# Patient Record
Sex: MALE | Race: BLACK OR AFRICAN AMERICAN | NOT HISPANIC OR LATINO
[De-identification: names, ages, dates, MRNs, and addresses within clinical notes are randomized per-mention and may not be internally consistent; named-entity substitution may affect disease eponyms.]

---

## 2017-02-01 ENCOUNTER — APPOINTMENT (OUTPATIENT)
Dept: VASCULAR SURGERY | Facility: CLINIC | Age: 77
End: 2017-02-01

## 2017-02-21 ENCOUNTER — APPOINTMENT (OUTPATIENT)
Dept: VASCULAR SURGERY | Facility: CLINIC | Age: 77
End: 2017-02-21

## 2017-02-21 VITALS
BODY MASS INDEX: 26.58 KG/M2 | TEMPERATURE: 97.7 F | DIASTOLIC BLOOD PRESSURE: 78 MMHG | HEART RATE: 70 BPM | WEIGHT: 150 LBS | SYSTOLIC BLOOD PRESSURE: 166 MMHG | HEIGHT: 63 IN

## 2018-03-06 ENCOUNTER — APPOINTMENT (OUTPATIENT)
Dept: VASCULAR SURGERY | Facility: CLINIC | Age: 78
End: 2018-03-06

## 2018-03-27 ENCOUNTER — EMERGENCY (EMERGENCY)
Facility: HOSPITAL | Age: 78
LOS: 1 days | Discharge: HOME | End: 2018-03-27
Attending: STUDENT IN AN ORGANIZED HEALTH CARE EDUCATION/TRAINING PROGRAM
Payer: MEDICARE

## 2018-03-27 VITALS — HEIGHT: 63 IN | WEIGHT: 149.03 LBS

## 2018-03-27 DIAGNOSIS — K40.90 UNILATERAL INGUINAL HERNIA, WITHOUT OBSTRUCTION OR GANGRENE, NOT SPECIFIED AS RECURRENT: Chronic | ICD-10-CM

## 2018-03-27 LAB
ALBUMIN SERPL ELPH-MCNC: 4.1 G/DL — SIGNIFICANT CHANGE UP (ref 3.3–5.2)
ALP SERPL-CCNC: 91 U/L — SIGNIFICANT CHANGE UP (ref 40–120)
ALT FLD-CCNC: 14 U/L — SIGNIFICANT CHANGE UP
ANION GAP SERPL CALC-SCNC: 14 MMOL/L — SIGNIFICANT CHANGE UP (ref 5–17)
AST SERPL-CCNC: 19 U/L — SIGNIFICANT CHANGE UP
BASOPHILS # BLD AUTO: 0 K/UL — SIGNIFICANT CHANGE UP (ref 0–0.2)
BASOPHILS NFR BLD AUTO: 0.5 % — SIGNIFICANT CHANGE UP (ref 0–2)
BILIRUB SERPL-MCNC: 0.4 MG/DL — SIGNIFICANT CHANGE UP (ref 0.4–2)
BUN SERPL-MCNC: 23 MG/DL — HIGH (ref 8–20)
CALCIUM SERPL-MCNC: 9.4 MG/DL — SIGNIFICANT CHANGE UP (ref 8.6–10.2)
CHLORIDE SERPL-SCNC: 100 MMOL/L — SIGNIFICANT CHANGE UP (ref 98–107)
CO2 SERPL-SCNC: 23 MMOL/L — SIGNIFICANT CHANGE UP (ref 22–29)
CREAT SERPL-MCNC: 1.28 MG/DL — SIGNIFICANT CHANGE UP (ref 0.5–1.3)
EOSINOPHIL # BLD AUTO: 0.1 K/UL — SIGNIFICANT CHANGE UP (ref 0–0.5)
EOSINOPHIL NFR BLD AUTO: 1.1 % — SIGNIFICANT CHANGE UP (ref 0–5)
GLUCOSE SERPL-MCNC: 136 MG/DL — HIGH (ref 70–115)
HCT VFR BLD CALC: 38 % — LOW (ref 42–52)
HGB BLD-MCNC: 13 G/DL — LOW (ref 14–18)
LYMPHOCYTES # BLD AUTO: 2.4 K/UL — SIGNIFICANT CHANGE UP (ref 1–4.8)
LYMPHOCYTES # BLD AUTO: 29 % — SIGNIFICANT CHANGE UP (ref 20–55)
MCHC RBC-ENTMCNC: 28.1 PG — SIGNIFICANT CHANGE UP (ref 27–31)
MCHC RBC-ENTMCNC: 34.2 G/DL — SIGNIFICANT CHANGE UP (ref 32–36)
MCV RBC AUTO: 82.1 FL — SIGNIFICANT CHANGE UP (ref 80–94)
MONOCYTES # BLD AUTO: 0.6 K/UL — SIGNIFICANT CHANGE UP (ref 0–0.8)
MONOCYTES NFR BLD AUTO: 8 % — SIGNIFICANT CHANGE UP (ref 3–10)
NEUTROPHILS # BLD AUTO: 5 K/UL — SIGNIFICANT CHANGE UP (ref 1.8–8)
NEUTROPHILS NFR BLD AUTO: 61.2 % — SIGNIFICANT CHANGE UP (ref 37–73)
NT-PROBNP SERPL-SCNC: 122 PG/ML — SIGNIFICANT CHANGE UP (ref 0–300)
PLATELET # BLD AUTO: 261 K/UL — SIGNIFICANT CHANGE UP (ref 150–400)
POTASSIUM SERPL-MCNC: 4.3 MMOL/L — SIGNIFICANT CHANGE UP (ref 3.5–5.3)
POTASSIUM SERPL-SCNC: 4.3 MMOL/L — SIGNIFICANT CHANGE UP (ref 3.5–5.3)
PROT SERPL-MCNC: 7.2 G/DL — SIGNIFICANT CHANGE UP (ref 6.6–8.7)
RBC # BLD: 4.63 M/UL — SIGNIFICANT CHANGE UP (ref 4.6–6.2)
RBC # FLD: 14.9 % — SIGNIFICANT CHANGE UP (ref 11–15.6)
SODIUM SERPL-SCNC: 137 MMOL/L — SIGNIFICANT CHANGE UP (ref 135–145)
TROPONIN T SERPL-MCNC: <0.01 NG/ML — SIGNIFICANT CHANGE UP (ref 0–0.06)
WBC # BLD: 8.1 K/UL — SIGNIFICANT CHANGE UP (ref 4.8–10.8)
WBC # FLD AUTO: 8.1 K/UL — SIGNIFICANT CHANGE UP (ref 4.8–10.8)

## 2018-03-27 PROCEDURE — 71046 X-RAY EXAM CHEST 2 VIEWS: CPT | Mod: 26

## 2018-03-27 PROCEDURE — 99283 EMERGENCY DEPT VISIT LOW MDM: CPT

## 2018-03-27 PROCEDURE — 93010 ELECTROCARDIOGRAM REPORT: CPT

## 2018-03-27 PROCEDURE — 84484 ASSAY OF TROPONIN QUANT: CPT

## 2018-03-27 PROCEDURE — 36415 COLL VENOUS BLD VENIPUNCTURE: CPT

## 2018-03-27 PROCEDURE — 85027 COMPLETE CBC AUTOMATED: CPT

## 2018-03-27 PROCEDURE — 99284 EMERGENCY DEPT VISIT MOD MDM: CPT

## 2018-03-27 PROCEDURE — 71046 X-RAY EXAM CHEST 2 VIEWS: CPT

## 2018-03-27 PROCEDURE — 80053 COMPREHEN METABOLIC PANEL: CPT

## 2018-03-27 PROCEDURE — 93005 ELECTROCARDIOGRAM TRACING: CPT

## 2018-03-27 PROCEDURE — 83880 ASSAY OF NATRIURETIC PEPTIDE: CPT

## 2018-03-27 NOTE — ED PROVIDER NOTE - OBJECTIVE STATEMENT
78 y/o M pt with a pmhx of DM , HTN, prostate CA(removed) asthma, hyperlipidemia, sickle cell anemia and hernia repair presents to the ED c/o high blood pressure that onset today. Reports a reading of 180/120. Reports diaphoresis after this. During the event he was lying down. Denies change in vision, dizziness, chest pain, sob, back pain, fever, chills, n/v/d, urinary symptoms, rash, recent travel, sick contacts or any other complaints. Allergic to aspirin.   PMD: Dr. Vergara.

## 2018-03-27 NOTE — ED PROVIDER NOTE - PROGRESS NOTE DETAILS
PT seen resting comfortably in no acute distress, no acute complaints at this time, PT tolerating PO intake,  PT informed of all results, pt informed of possibility of change in radiology read after official read, PT will be DC home with follow up to PCP, continue home medications, pt educated about when to return to the ED if needed. PT verbalizes that he understands all instructions and results.

## 2018-03-27 NOTE — ED PROVIDER NOTE - ATTENDING CONTRIBUTION TO CARE
76 yo male with episode of elevated blood pressure. I personally saw the patient with the PA, and completed the key components of the history and physical exam. I then discussed the management plan with the PA.

## 2018-03-27 NOTE — ED PROVIDER NOTE - PMH
Asthma, mild intermittent, uncomplicated    Cancer  protate  Diabetes mellitus    Hyperlipidemia    Hypertension    Other hemorrhoids    Sickle cell anemia  Carrier of the disease, presently doesn't have it

## 2018-03-27 NOTE — ED PROVIDER NOTE - CHPI ED SYMPTOMS NEG
no loss of consciousness/no nausea/no pain/no fever/no dizziness/no chills/no decreased eating/drinking/no back pain/no headache/no vomiting

## 2018-03-27 NOTE — ED ADULT NURSE REASSESSMENT NOTE - NS ED NURSE REASSESS COMMENT FT1
Pt. received at 1930, resting comfortably in stretcher, offers no complaints at this time. informed of plan of care. awaiting results, will continue to monitor and maintain safety.

## 2018-03-28 VITALS
OXYGEN SATURATION: 97 % | SYSTOLIC BLOOD PRESSURE: 156 MMHG | RESPIRATION RATE: 18 BRPM | TEMPERATURE: 98 F | DIASTOLIC BLOOD PRESSURE: 64 MMHG | HEART RATE: 68 BPM

## 2018-07-01 ENCOUNTER — OUTPATIENT (OUTPATIENT)
Dept: OUTPATIENT SERVICES | Facility: HOSPITAL | Age: 78
LOS: 1 days | End: 2018-07-01
Payer: MEDICARE

## 2018-07-01 DIAGNOSIS — K40.90 UNILATERAL INGUINAL HERNIA, WITHOUT OBSTRUCTION OR GANGRENE, NOT SPECIFIED AS RECURRENT: Chronic | ICD-10-CM

## 2018-07-01 PROCEDURE — G9001: CPT

## 2018-07-02 ENCOUNTER — INPATIENT (INPATIENT)
Facility: HOSPITAL | Age: 78
LOS: 1 days | Discharge: HOME | DRG: 247 | End: 2018-07-04
Attending: FAMILY MEDICINE | Admitting: INTERNAL MEDICINE
Payer: MEDICARE

## 2018-07-02 VITALS
HEART RATE: 65 BPM | SYSTOLIC BLOOD PRESSURE: 146 MMHG | OXYGEN SATURATION: 97 % | RESPIRATION RATE: 18 BRPM | TEMPERATURE: 98 F | DIASTOLIC BLOOD PRESSURE: 74 MMHG

## 2018-07-02 DIAGNOSIS — I20.9 ANGINA PECTORIS, UNSPECIFIED: ICD-10-CM

## 2018-07-02 DIAGNOSIS — K40.90 UNILATERAL INGUINAL HERNIA, WITHOUT OBSTRUCTION OR GANGRENE, NOT SPECIFIED AS RECURRENT: Chronic | ICD-10-CM

## 2018-07-02 DIAGNOSIS — Z90.79 ACQUIRED ABSENCE OF OTHER GENITAL ORGAN(S): Chronic | ICD-10-CM

## 2018-07-02 LAB
ALBUMIN SERPL ELPH-MCNC: 3.8 G/DL — SIGNIFICANT CHANGE UP (ref 3.3–5.2)
ALP SERPL-CCNC: 93 U/L — SIGNIFICANT CHANGE UP (ref 40–120)
ALT FLD-CCNC: 13 U/L — SIGNIFICANT CHANGE UP
ANION GAP SERPL CALC-SCNC: 13 MMOL/L — SIGNIFICANT CHANGE UP (ref 5–17)
APTT BLD: 35.3 SEC — SIGNIFICANT CHANGE UP (ref 27.5–37.4)
AST SERPL-CCNC: 17 U/L — SIGNIFICANT CHANGE UP
BASOPHILS # BLD AUTO: 0 K/UL — SIGNIFICANT CHANGE UP (ref 0–0.2)
BASOPHILS NFR BLD AUTO: 0.7 % — SIGNIFICANT CHANGE UP (ref 0–2)
BILIRUB SERPL-MCNC: 0.4 MG/DL — SIGNIFICANT CHANGE UP (ref 0.4–2)
BUN SERPL-MCNC: 23 MG/DL — HIGH (ref 8–20)
CALCIUM SERPL-MCNC: 9.4 MG/DL — SIGNIFICANT CHANGE UP (ref 8.6–10.2)
CHLORIDE SERPL-SCNC: 100 MMOL/L — SIGNIFICANT CHANGE UP (ref 98–107)
CO2 SERPL-SCNC: 23 MMOL/L — SIGNIFICANT CHANGE UP (ref 22–29)
CREAT SERPL-MCNC: 1.27 MG/DL — SIGNIFICANT CHANGE UP (ref 0.5–1.3)
EOSINOPHIL # BLD AUTO: 0.1 K/UL — SIGNIFICANT CHANGE UP (ref 0–0.5)
EOSINOPHIL NFR BLD AUTO: 1 % — SIGNIFICANT CHANGE UP (ref 0–5)
GLUCOSE SERPL-MCNC: 154 MG/DL — HIGH (ref 70–115)
HCT VFR BLD CALC: 37.3 % — LOW (ref 42–52)
HGB BLD-MCNC: 12.7 G/DL — LOW (ref 14–18)
INR BLD: 0.94 RATIO — SIGNIFICANT CHANGE UP (ref 0.88–1.16)
LIDOCAIN IGE QN: 43 U/L — SIGNIFICANT CHANGE UP (ref 22–51)
LYMPHOCYTES # BLD AUTO: 2.5 K/UL — SIGNIFICANT CHANGE UP (ref 1–4.8)
LYMPHOCYTES # BLD AUTO: 37.2 % — SIGNIFICANT CHANGE UP (ref 20–55)
MCHC RBC-ENTMCNC: 27.3 PG — SIGNIFICANT CHANGE UP (ref 27–31)
MCHC RBC-ENTMCNC: 34 G/DL — SIGNIFICANT CHANGE UP (ref 32–36)
MCV RBC AUTO: 80.2 FL — SIGNIFICANT CHANGE UP (ref 80–94)
MONOCYTES # BLD AUTO: 0.5 K/UL — SIGNIFICANT CHANGE UP (ref 0–0.8)
MONOCYTES NFR BLD AUTO: 7.1 % — SIGNIFICANT CHANGE UP (ref 3–10)
NEUTROPHILS # BLD AUTO: 3.6 K/UL — SIGNIFICANT CHANGE UP (ref 1.8–8)
NEUTROPHILS NFR BLD AUTO: 53.7 % — SIGNIFICANT CHANGE UP (ref 37–73)
NT-PROBNP SERPL-SCNC: 144 PG/ML — SIGNIFICANT CHANGE UP (ref 0–300)
PLATELET # BLD AUTO: 303 K/UL — SIGNIFICANT CHANGE UP (ref 150–400)
POTASSIUM SERPL-MCNC: 4.9 MMOL/L — SIGNIFICANT CHANGE UP (ref 3.5–5.3)
POTASSIUM SERPL-SCNC: 4.9 MMOL/L — SIGNIFICANT CHANGE UP (ref 3.5–5.3)
PROT SERPL-MCNC: 7.2 G/DL — SIGNIFICANT CHANGE UP (ref 6.6–8.7)
PROTHROM AB SERPL-ACNC: 10.3 SEC — SIGNIFICANT CHANGE UP (ref 9.8–12.7)
RBC # BLD: 4.65 M/UL — SIGNIFICANT CHANGE UP (ref 4.6–6.2)
RBC # FLD: 14.4 % — SIGNIFICANT CHANGE UP (ref 11–15.6)
SODIUM SERPL-SCNC: 136 MMOL/L — SIGNIFICANT CHANGE UP (ref 135–145)
T3 SERPL-MCNC: 100 NG/DL — SIGNIFICANT CHANGE UP (ref 80–200)
TROPONIN T SERPL-MCNC: <0.01 NG/ML — SIGNIFICANT CHANGE UP (ref 0–0.06)
WBC # BLD: 6.8 K/UL — SIGNIFICANT CHANGE UP (ref 4.8–10.8)
WBC # FLD AUTO: 6.8 K/UL — SIGNIFICANT CHANGE UP (ref 4.8–10.8)

## 2018-07-02 PROCEDURE — 99220: CPT

## 2018-07-02 PROCEDURE — 99223 1ST HOSP IP/OBS HIGH 75: CPT

## 2018-07-02 PROCEDURE — 93010 ELECTROCARDIOGRAM REPORT: CPT

## 2018-07-02 PROCEDURE — 71046 X-RAY EXAM CHEST 2 VIEWS: CPT | Mod: 26

## 2018-07-02 RX ORDER — METOPROLOL TARTRATE 50 MG
50 TABLET ORAL
Qty: 0 | Refills: 0 | Status: DISCONTINUED | OUTPATIENT
Start: 2018-07-02 | End: 2018-07-04

## 2018-07-02 RX ORDER — ONDANSETRON 8 MG/1
4 TABLET, FILM COATED ORAL EVERY 6 HOURS
Qty: 0 | Refills: 0 | Status: DISCONTINUED | OUTPATIENT
Start: 2018-07-02 | End: 2018-07-04

## 2018-07-02 RX ORDER — SODIUM CHLORIDE 9 MG/ML
3 INJECTION INTRAMUSCULAR; INTRAVENOUS; SUBCUTANEOUS EVERY 8 HOURS
Qty: 0 | Refills: 0 | Status: DISCONTINUED | OUTPATIENT
Start: 2018-07-02 | End: 2018-07-04

## 2018-07-02 RX ORDER — AMLODIPINE BESYLATE 2.5 MG/1
10 TABLET ORAL DAILY
Qty: 0 | Refills: 0 | Status: DISCONTINUED | OUTPATIENT
Start: 2018-07-02 | End: 2018-07-04

## 2018-07-02 RX ORDER — NITROGLYCERIN 6.5 MG
0.4 CAPSULE, EXTENDED RELEASE ORAL ONCE
Qty: 0 | Refills: 0 | Status: COMPLETED | OUTPATIENT
Start: 2018-07-02 | End: 2018-07-02

## 2018-07-02 RX ORDER — SODIUM CHLORIDE 9 MG/ML
3 INJECTION INTRAMUSCULAR; INTRAVENOUS; SUBCUTANEOUS ONCE
Qty: 0 | Refills: 0 | Status: COMPLETED | OUTPATIENT
Start: 2018-07-02 | End: 2018-07-02

## 2018-07-02 RX ORDER — GLUCAGON INJECTION, SOLUTION 0.5 MG/.1ML
1 INJECTION, SOLUTION SUBCUTANEOUS ONCE
Qty: 0 | Refills: 0 | Status: DISCONTINUED | OUTPATIENT
Start: 2018-07-02 | End: 2018-07-04

## 2018-07-02 RX ORDER — VALSARTAN 80 MG/1
160 TABLET ORAL DAILY
Qty: 0 | Refills: 0 | Status: DISCONTINUED | OUTPATIENT
Start: 2018-07-02 | End: 2018-07-04

## 2018-07-02 RX ORDER — DEXTROSE 50 % IN WATER 50 %
12.5 SYRINGE (ML) INTRAVENOUS ONCE
Qty: 0 | Refills: 0 | Status: DISCONTINUED | OUTPATIENT
Start: 2018-07-02 | End: 2018-07-04

## 2018-07-02 RX ORDER — SODIUM CHLORIDE 9 MG/ML
1000 INJECTION, SOLUTION INTRAVENOUS
Qty: 0 | Refills: 0 | Status: DISCONTINUED | OUTPATIENT
Start: 2018-07-02 | End: 2018-07-04

## 2018-07-02 RX ORDER — HYDRALAZINE HCL 50 MG
50 TABLET ORAL EVERY 8 HOURS
Qty: 0 | Refills: 0 | Status: DISCONTINUED | OUTPATIENT
Start: 2018-07-02 | End: 2018-07-04

## 2018-07-02 RX ORDER — ATORVASTATIN CALCIUM 80 MG/1
40 TABLET, FILM COATED ORAL AT BEDTIME
Qty: 0 | Refills: 0 | Status: DISCONTINUED | OUTPATIENT
Start: 2018-07-02 | End: 2018-07-04

## 2018-07-02 RX ORDER — INSULIN LISPRO 100/ML
VIAL (ML) SUBCUTANEOUS EVERY 6 HOURS
Qty: 0 | Refills: 0 | Status: DISCONTINUED | OUTPATIENT
Start: 2018-07-02 | End: 2018-07-04

## 2018-07-02 RX ORDER — ASPIRIN/CALCIUM CARB/MAGNESIUM 324 MG
325 TABLET ORAL ONCE
Qty: 0 | Refills: 0 | Status: COMPLETED | OUTPATIENT
Start: 2018-07-02 | End: 2018-07-02

## 2018-07-02 RX ORDER — ENOXAPARIN SODIUM 100 MG/ML
40 INJECTION SUBCUTANEOUS EVERY 24 HOURS
Qty: 0 | Refills: 0 | Status: DISCONTINUED | OUTPATIENT
Start: 2018-07-02 | End: 2018-07-03

## 2018-07-02 RX ORDER — DEXTROSE 50 % IN WATER 50 %
15 SYRINGE (ML) INTRAVENOUS ONCE
Qty: 0 | Refills: 0 | Status: DISCONTINUED | OUTPATIENT
Start: 2018-07-02 | End: 2018-07-04

## 2018-07-02 RX ORDER — NITROGLYCERIN 6.5 MG
1 CAPSULE, EXTENDED RELEASE ORAL ONCE
Qty: 0 | Refills: 0 | Status: COMPLETED | OUTPATIENT
Start: 2018-07-02 | End: 2018-07-02

## 2018-07-02 RX ADMIN — SODIUM CHLORIDE 3 MILLILITER(S): 9 INJECTION INTRAMUSCULAR; INTRAVENOUS; SUBCUTANEOUS at 13:00

## 2018-07-02 RX ADMIN — Medication 325 MILLIGRAM(S): at 13:05

## 2018-07-02 RX ADMIN — Medication 1 INCH(S): at 13:05

## 2018-07-02 RX ADMIN — Medication 0.4 MILLIGRAM(S): at 13:05

## 2018-07-02 NOTE — ED PROVIDER NOTE - CHEST RISK OF DEAD HOSPITALIZATION
Risk factors making the patient's symptoms likely to be from CAD Risk factors making the patient's symptoms likely to be from CAD/Persistent or servere chest pain or anginal equivalent pain

## 2018-07-02 NOTE — ED ADULT NURSE NOTE - ED STAT RN HANDOFF DETAILS
pt a+ox3, denies any pain or discomfort. pt placed on monitor, NSR noted on monitor, VSS and in no apparent distress.

## 2018-07-02 NOTE — ED PROVIDER NOTE - OBJECTIVE STATEMENT
CC: CP  Presenting symptoms: 78yo male c/o chest pain and diaphoresis since this am.  Patient also with hypertension w/  and then took his BP meds prior to arrival.  Diaphoresis now resolved, but CP continues in ED.  Patient has had this similar episodes recently and has not had a cardiac workup or stress test in over 1 year.  Pertinent Positives: CP, diaphoresis  Pertinent Negatives: no SOB, no HA, no rash, no N/V/D  Timing: intermittently, constant today  Quality: pressure  Radiation: none  Severity: moderate  Aggravating Factors: exertion  Relieving Factors: rest

## 2018-07-02 NOTE — H&P ADULT - PROBLEM SELECTOR PLAN 1
Await results of LHC, cont. o/p regimen. Patient with ASA allergy. Obtain prior echo report before repeating, Check ESR/CRP, Pericarditis? Last Cath reported normal 5 years ago, neg o/p holter, normal exercise tolerance. If LHC neg, advised family to seek GI evaluation for possible esophageal spasm/GERD. Ambulation, DVT-P. No hx of falls

## 2018-07-02 NOTE — ED PROVIDER NOTE - NS ED ROS FT
no fever  + chest pain  + diaphoresis  no SOB  no abd pain  no HA  All other ROS negative except as per HPI

## 2018-07-02 NOTE — H&P ADULT - HISTORY OF PRESENT ILLNESS
78 y/o male with history of DM-2, HTN, HLD, prostate cancer s/p prostatectomy, Asthma, Sickle cell trait, coming to the hospital with complaints of intermittent SSCP that has no association with activity. No associative SOB, Diaphoresis, pleurisy. Patient and family state his Cardiologist at [facility] did a LHC 5 years ago and was told it was negative. No hx of CHF, or arrythmia. He was given a holter in the past and was also told he has no arrhythmias. Had CP that woke him from sleep this time which prompted them to come to the hospital. He does admit to upset stomach and burping at times, but states this pain was higher up in his chest. ED w/u this time was non-diagnostic. Seen by cardiology and scheduled for LHC in AM.

## 2018-07-02 NOTE — ED ADULT TRIAGE NOTE - CHIEF COMPLAINT QUOTE
pt c/o chest pain and sweating this am. daughter called md who advised come in for eval despite recent normal  cardiac work up

## 2018-07-02 NOTE — ED CDU PROVIDER INITIAL DAY NOTE - OBJECTIVE STATEMENT
CC: CP  Presenting symptoms: 76yo male c/o chest pain and diaphoresis since this am.  Patient also with hypertension w/  and then took his BP meds prior to arrival.  Diaphoresis now resolved, but CP continues in ED.  Patient has had this similar episodes recently and has not had a cardiac workup or stress test in over 1 year.  Pertinent Positives: CP, diaphoresis  Pertinent Negatives: no SOB, no HA, no rash, no N/V/D  Timing: intermittently, constant today  Quality: pressure  Radiation: none  Severity: moderate  Aggravating Factors: exertion  Relieving Factors: rest

## 2018-07-02 NOTE — CONSULT NOTE ADULT - SUBJECTIVE AND OBJECTIVE BOX
[place] [facility] - [facility], THE [facility]                                   [address] [place] [address]                                                      PHONE: [phone]                                                         FAX: [phone]  [email or web address]  ---------------------------------------------------------------------------------------------------------------------------------    Reason for Consult: chest pain     HPI:  GUIDO MATUTE is an 77y Male with history of DM HTN HLD prostate cancer ex smoke quit 2006 who presents with recurrent episodes of chest pain 5/10 pressure associated with diaphoresis and improved with Nirto.  Current chest pain free.  Prior cardiac testing WNL as per patient and family.  This is the third episodes in the lasted 4 weeks.      PAST MEDICAL & SURGICAL HISTORY:  Sickle cell anemia: Carrier of the disease, presently doesn&#x27;t have it  Asthma, mild intermittent, uncomplicated  Other hemorrhoids  Cancer: protate  Diabetes mellitus  Hyperlipidemia  Hypertension  Inguinal hernia      aspirin (Stomach Upset)  EGGPLANT (Hives)  strawberry (Hives)      MEDICATIONS  (STANDING):    MEDICATIONS  (PRN):      Social History:  Cigarettes:       ex smoker              Alchohol:  none               Illicit Drug Abuse:  none    ROS: Negative other than as mentioned in HPI.    Vital Signs Last 24 Hrs  T(C): 36.7 (02 Jul 2018 11:44), Max: 36.7 (02 Jul 2018 11:44)  T(F): 98.1 (02 Jul 2018 11:44), Max: 98.1 (02 Jul 2018 11:44)  HR: 58 (02 Jul 2018 12:43) (58 - 65)  BP: 146/74 (02 Jul 2018 11:44) (146/74 - 146/74)  BP(mean): --  RR: 18 (02 Jul 2018 11:44) (18 - 18)  SpO2: 97% (02 Jul 2018 11:44) (97% - 97%)  ICU Vital Signs Last 24 Hrs  GUIDO MATUTE  I&O's Detail    I&O's Summary    Drug Dosing Weight  GUIDO MATUTE      PHYSICAL EXAM:  General: Appears well developed, well nourished alert and cooperative.  HEENT: Head; normocephalic, atraumatic.  Eyes: Pupils reactive, cornea wnl.  Neck: Supple, no nodes adenopathy, no NVD or carotid bruit or thyromegaly.  CARDIOVASCULAR: Normal S1 and S2, No murmur, rub, gallop or lift.   LUNGS: No rales, rhonchi or wheeze. Normal breath sounds bilaterally.  ABDOMEN: Soft, nontender without mass or organomegaly. bowel sounds normoactive.  EXTREMITIES: No clubbing, cyanosis or edema. Distal pulses wnl.   SKIN: warm and dry with normal turgor.  NEURO: Alert/oriented x 3/normal motor exam. No pathologic reflexes.    PSYCH: normal affect.        LABS:                        12.7   6.8   )-----------( 303      ( 02 Jul 2018 12:58 )             37.3     07-02    136  |  100  |  23.0<H>  ----------------------------<  154<H>  4.9   |  23.0  |  1.27    Ca    9.4      02 Jul 2018 12:58    TPro  7.2  /  Alb  3.8  /  TBili  0.4  /  DBili  x   /  AST  17  /  ALT  13  /  AlkPhos  93  07-02    GUIDO MATUTE  CARDIAC MARKERS ( 02 Jul 2018 12:58 )  x     / <0.01 ng/mL / x     / x     / x          PT/INR - ( 02 Jul 2018 12:58 )   PT: 10.3 sec;   INR: 0.94 ratio         PTT - ( 02 Jul 2018 12:58 )  PTT:35.3 sec      RADIOLOGY & ADDITIONAL STUDIES:    INTERPRETATION OF TELEMETRY (personally reviewed):    ECG: NSR without ischemic changes     ECHO: WNL as per pt and family ~ one year ago     CARDIAC CATHETERIZATION: pending     Assessment and Plan:  In summary, GUIDO MATUTE is an 77y Male with past medical history significant for DM HTN HLD prostate cancer ex smoke quit 2006 who presents with recurrent episodes of chest pain 5/10 pressure associated with diaphoresis and improved with Nirto.  Current chest pain free.  Prior cardiac testing WNL as per patient and family.  This is the third episodes in the lasted 4 weeks.      Plan  1.  Left heart cath today   2.  Continue current CV medications   3.  further CV recommendations pending left heart cath results

## 2018-07-02 NOTE — ED PROVIDER NOTE - MEDICAL DECISION MAKING DETAILS
Admit for cardiac cath. Obs patient pending cardiac cath in am Admit patient pending cardiac cath in am

## 2018-07-03 ENCOUNTER — TRANSCRIPTION ENCOUNTER (OUTPATIENT)
Age: 78
End: 2018-07-03

## 2018-07-03 DIAGNOSIS — E78.5 HYPERLIPIDEMIA, UNSPECIFIED: ICD-10-CM

## 2018-07-03 DIAGNOSIS — E11.9 TYPE 2 DIABETES MELLITUS WITHOUT COMPLICATIONS: ICD-10-CM

## 2018-07-03 DIAGNOSIS — I24.9 ACUTE ISCHEMIC HEART DISEASE, UNSPECIFIED: ICD-10-CM

## 2018-07-03 DIAGNOSIS — R07.89 OTHER CHEST PAIN: ICD-10-CM

## 2018-07-03 DIAGNOSIS — I10 ESSENTIAL (PRIMARY) HYPERTENSION: ICD-10-CM

## 2018-07-03 DIAGNOSIS — Z71.89 OTHER SPECIFIED COUNSELING: ICD-10-CM

## 2018-07-03 LAB
BLD GP AB SCN SERPL QL: SIGNIFICANT CHANGE UP
DIR ANTIGLOB POLYSPECIFIC INTERPRETATION: SIGNIFICANT CHANGE UP
GLUCOSE BLDC GLUCOMTR-MCNC: 114 MG/DL — HIGH (ref 70–99)
GLUCOSE BLDC GLUCOMTR-MCNC: 119 MG/DL — HIGH (ref 70–99)
GLUCOSE BLDC GLUCOMTR-MCNC: 124 MG/DL — HIGH (ref 70–99)
GLUCOSE BLDC GLUCOMTR-MCNC: 124 MG/DL — HIGH (ref 70–99)
GLUCOSE BLDC GLUCOMTR-MCNC: 129 MG/DL — HIGH (ref 70–99)
HBA1C BLD-MCNC: 6.4 % — HIGH (ref 4–5.6)
TYPE + AB SCN PNL BLD: SIGNIFICANT CHANGE UP

## 2018-07-03 PROCEDURE — 99233 SBSQ HOSP IP/OBS HIGH 50: CPT

## 2018-07-03 PROCEDURE — 99217: CPT

## 2018-07-03 PROCEDURE — 93010 ELECTROCARDIOGRAM REPORT: CPT

## 2018-07-03 RX ORDER — METOPROLOL TARTRATE 50 MG
1 TABLET ORAL
Qty: 0 | Refills: 0 | DISCHARGE
Start: 2018-07-03

## 2018-07-03 RX ORDER — VALSARTAN 80 MG/1
1 TABLET ORAL
Qty: 0 | Refills: 0 | DISCHARGE
Start: 2018-07-03

## 2018-07-03 RX ORDER — HYDRALAZINE HCL 50 MG
5 TABLET ORAL ONCE
Qty: 0 | Refills: 0 | Status: COMPLETED | OUTPATIENT
Start: 2018-07-03 | End: 2018-07-03

## 2018-07-03 RX ORDER — TICAGRELOR 90 MG/1
1 TABLET ORAL
Qty: 0 | Refills: 0 | COMMUNITY
Start: 2018-07-03

## 2018-07-03 RX ORDER — ASPIRIN/CALCIUM CARB/MAGNESIUM 324 MG
1 TABLET ORAL
Qty: 0 | Refills: 0 | DISCHARGE
Start: 2018-07-03

## 2018-07-03 RX ORDER — TICAGRELOR 90 MG/1
90 TABLET ORAL
Qty: 0 | Refills: 0 | Status: DISCONTINUED | OUTPATIENT
Start: 2018-07-03 | End: 2018-07-04

## 2018-07-03 RX ORDER — ASPIRIN/CALCIUM CARB/MAGNESIUM 324 MG
81 TABLET ORAL DAILY
Qty: 0 | Refills: 0 | Status: DISCONTINUED | OUTPATIENT
Start: 2018-07-03 | End: 2018-07-04

## 2018-07-03 RX ADMIN — Medication 81 MILLIGRAM(S): at 11:54

## 2018-07-03 RX ADMIN — SODIUM CHLORIDE 3 MILLILITER(S): 9 INJECTION INTRAMUSCULAR; INTRAVENOUS; SUBCUTANEOUS at 05:33

## 2018-07-03 RX ADMIN — Medication 5 MILLIGRAM(S): at 16:40

## 2018-07-03 RX ADMIN — Medication 50 MILLIGRAM(S): at 05:35

## 2018-07-03 RX ADMIN — AMLODIPINE BESYLATE 10 MILLIGRAM(S): 2.5 TABLET ORAL at 05:35

## 2018-07-03 RX ADMIN — SODIUM CHLORIDE 3 MILLILITER(S): 9 INJECTION INTRAMUSCULAR; INTRAVENOUS; SUBCUTANEOUS at 22:25

## 2018-07-03 RX ADMIN — Medication 50 MILLIGRAM(S): at 22:26

## 2018-07-03 RX ADMIN — Medication 1 INCH(S): at 01:00

## 2018-07-03 RX ADMIN — VALSARTAN 160 MILLIGRAM(S): 80 TABLET ORAL at 05:35

## 2018-07-03 RX ADMIN — Medication 50 MILLIGRAM(S): at 18:14

## 2018-07-03 RX ADMIN — Medication 30 MILLILITER(S): at 11:54

## 2018-07-03 RX ADMIN — ATORVASTATIN CALCIUM 40 MILLIGRAM(S): 80 TABLET, FILM COATED ORAL at 22:26

## 2018-07-03 RX ADMIN — TICAGRELOR 90 MILLIGRAM(S): 90 TABLET ORAL at 18:14

## 2018-07-03 NOTE — DISCHARGE NOTE ADULT - PATIENT PORTAL LINK FT
You can access the [facility] Patient Portal, offered by [facility], by registering with the following website: [email or web address]

## 2018-07-03 NOTE — DISCHARGE NOTE ADULT - HOSPITAL COURSE
Chief Complaint: Patient is a 77y old  Male who presents with a chief complaint of Chest Pain (02 Jul 2018 23:42)    HPI:  78 y/o male with history of DM-2, HTN, HLD, prostate cancer s/p prostatectomy, Asthma, Sickle cell trait, coming to the hospital with complaints of intermittent SSCP that has no association with activity. No associative SOB, Diaphoresis, pleurisy. Patient and family state his Cardiologist at [facility] did a LHC 5 years ago and was told it was negative. No hx of CHF, or arrythmia. He was given a holter in the past and was also told he has no arrhythmias. Had CP that woke him from sleep this time which prompted them to come to the hospital. He does admit to upset stomach and burping at times, but states this pain was higher up in his chest. ED w/u this time was non-diagnostic. Seen by cardiology and scheduled for LHC in AM. (02 Jul 2018 23:42)    Interim History:  Pt underwent cath with stent to 80% stenosis of LAD. Chief Complaint: Patient is a 77y old  Male who presents with a chief complaint of Chest Pain (02 Jul 2018 23:42)    HPI:  76 y/o male with history of DM-2, HTN, HLD, prostate cancer s/p prostatectomy, Asthma, Sickle cell trait, coming to the hospital with complaints of intermittent SSCP that has no association with activity. No associative SOB, Diaphoresis, pleurisy. Patient and family state his Cardiologist at [facility] did a LHC 5 years ago and was told it was negative. No hx of CHF, or arrythmia. He was given a holter in the past and was also told he has no arrhythmias. Had CP that woke him from sleep this time which prompted them to come to the hospital. He does admit to upset stomach and burping at times, but states this pain was higher up in his chest. ED w/u this time was non-diagnostic. Seen by cardiology and scheduled for LHC in AM. (02 Jul 2018 23:42)  s/p cath tolerated it well  right groin good pulse    results  VENTRICLES: EF 60% w/ min MR.  CORONARY VESSELS: R dominant.  Large RCA w/ moderate mid vessel disease.  Large LAD w/ 80% mid vessel stenosis and large distal vessel.  Moderate sized LCx w/ 60% calcified OM1 stenosis w/ large distal vessel.  LAD:   --  Mid LAD:  COMPLICATIONS: No complications occurred during the cath lab visit. No  complications occurred during the cath lab visit.  DIAGNOSTIC IMPRESSIONS: ICS x 1 to LAD w/ 0% residual stenosis and THOR III  flow maintained thVENTRICLES: EF 60% w/ min MR.  CORONARY VESSELS: R dominant.  Large RCA w/ moderate mid vessel disease.  Large LAD w/ 80% mid vessel stenosis and large distal vessel.  Moderate sized LCx w/ 60% calcified OM1 stenosis w/ large distal vessel.  LAD:   --  Mid LAD:  COMPLICATIONS: No complications occurred during the cath lab visit. No  complications occurred during the cath lab visit.  DIAGNOSTIC IMPRESSIONS: ICS x 1 to LAD w/ 0% residual stenosis and THOR III  flow maintained thrurout.  Moderate OM stenosis.  Preserved EF.  ruout.  Moderate OM stenosis.  Preserved EF.      Interim History:  Pt underwent cath with stent to 80% stenosis of LAD. CC: Chest Pain (03 Jul 2018 16:59)    HPI:76 y/o male with history of DM-2, HTN, HLD, prostate cancer s/p prostatectomy, Asthma, Sickle cell trait, coming to the hospital with complaints of intermittent SSCP that has no association with activity. No associative SOB, Diaphoresis, pleurisy. Patient and family state his Cardiologist at [facility] did a LHC 5 years ago and was told it was negative. No hx of CHF, or arrythmia. He was given a holter in the past and was also told he has no arrhythmias. Had CP that woke him from sleep this time which prompted them to come to the hospital. He does admit to upset stomach and burping at times, but states this pain was higher up in his chest. ED w/u this time was non-diagnostic. Seen by cardiology and scheduled for LHC in AM. (02 Jul 2018 23:42)  INTERVAL HPI/OVERNIGHT EVENTS: none, denies any complaints  Vital Signs Last 24 Hrs  T(C): 37 (04 Jul 2018 05:25), Max: 37.2 (03 Jul 2018 22:23)  T(F): 98.6 (04 Jul 2018 05:25), Max: 98.9 (03 Jul 2018 22:23)  HR: 69 (04 Jul 2018 05:25) (57 - 73)  BP: 110/68 (04 Jul 2018 05:25) (110/68 - 163/87)  RR: 20 (04 Jul 2018 05:25) (16 - 20)  SpO2: 99% (04 Jul 2018 05:25) (97% - 100%)  03 Jul 2018 07:01  -  04 Jul 2018 07:00  --------------------------------------------------------  IN Oral Fluid: 120 mL  Total IN: 120 mL  OUT:  Voided: 940 mL  Total OUT: 940 mL  Total NET: -820 mL  CARDIAC MARKERS ( 02 Jul 2018 12:58 )  x     / <0.01 ng/mL / x     / x     / x                         12.9   8.1   )-----------( 298      ( 04 Jul 2018 06:02 )             37.7   04 Jul 2018 06:02  140    |  104    |  31.0   ----------------------------<  110    4.8     |  21.0   |  1.29   Ca    9.4        04 Jul 2018 06:02  TPro  6.7    /  Alb  3.8    /  TBili  0.7    /  DBili  x      /  AST  16     /  ALT  11     /  AlkPhos  90     04 Jul 2018 06:02  PT/INR - ( 02 Jul 2018 12:58 )   PT: 10.3 sec;   INR: 0.94 ratio     PTT - ( 02 Jul 2018 12:58 )  PTT:35.3 sec  POCT Blood Glucose.: 116 mg/dL (04 Jul 2018 08:17)  POCT Blood Glucose.: 117 mg/dL (04 Jul 2018 05:23)  POCT Blood Glucose.: 124 mg/dL (03 Jul 2018 22:03)  POCT Blood Glucose.: 114 mg/dL (03 Jul 2018 18:11)  LIVER FUNCTIONS - ( 04 Jul 2018 06:02 )  Alb: 3.8 g/dL / Pro: 6.7 g/dL / ALK PHOS: 90 U/L / ALT: 11 U/L / AST: 16 U/L / GGT: x         Hemoglobin A1C, Whole Blood: 6.4 % (07-03-18 @ 05:16)  MEDICATIONS reviewed  RADIOLOGY & ADDITIONAL TESTS: personally visualized  PHYSICAL EXAM:  General: elderly male in no acute distress  Eyes: PERRLA, EOMI; conjunctiva and sclera clear  Head: Normocephalic; atraumatic  ENMT: No nasal discharge; airway clear  Neck: Supple; non tender; no masses  Respiratory: No wheezes, rales or rhonchi  Cardiovascular: Regular rate and rhythm. S1 and S2  Gastrointestinal: Soft non-tender non-distended; Normal bowel sounds  Genitourinary: No costovertebral angle tenderness  Extremities: Normal range of motion, No clubbing, cyanosis or edema  Vascular: Peripheral pulses palpable 2+ bilaterally  Neurological: Alert and oriented x4  Skin: Warm and dry.   Musculoskeletal: Normal tone, without deformities  Psychiatric: Cooperative and appropriate  A/P: 76 y/o male with history of DMII, HTN, HLD, prostate cancer s/p prostatectomy, Asthma, Sickle cell trait p/w complaints of intermittent SSCP that has no association with activity - underwent cardiac cath due to risk factors and found multivessel CAD with 80% stenosis in LAD - s/p PCI with SOCORRO placement, also 60% LCx dx with need to f/u with cardiology as outpt for PET and possible PCI as well  1. CAD s/p PCI with SOCORRO to LAD - DAP, statin, BBL, ARB, f/u with cardiology as above for PET and possible elective PCI  2. DMII - well controlled with HgbA1C 6.4 - resume previous regimen  3. HLD - statin  4. HTN - norvasc, metoprolol  Medically stable for DC  F/u with cardiology   Time spent 65 min

## 2018-07-03 NOTE — DISCHARGE NOTE ADULT - SECONDARY DIAGNOSIS.
Type 2 diabetes mellitus without complication, without long-term current use of insulin Essential hypertension Hyperlipidemia, unspecified hyperlipidemia type Hyperlipidemia

## 2018-07-03 NOTE — DISCHARGE NOTE ADULT - CARE PROVIDER_API CALL
Samir Perera), Cardiology; Internal Medicine  [address] [place] [address]  Phone: [phone]  Fax: [phone] Samir Perera), Cardiology; Internal Medicine  [address] [place] [address]  Phone: [phone]  Fax: [phone]    Izzy Silver  Phone: (   )    -  Fax: (   )    -

## 2018-07-03 NOTE — DISCHARGE NOTE ADULT - PROVIDER TOKENS
JAYCOB:'[id number]' TOKEN:'[id number]',FREE:[LAST:[Nadeem],FIRST:[Izzy],PHONE:[(   )    -],FAX:[(   )    -],ADDRESS:[PMD]]

## 2018-07-03 NOTE — PROGRESS NOTE ADULT - SUBJECTIVE AND OBJECTIVE BOX
Patient: GUIDO MATUTE [id number] 77y Male                           Internal Medicine Hospitalist Progress Note  Chief Complaint: Patient is a 77y old  Male who presents with a chief complaint of Chest Pain (2018 23:42)    HPI:  76 y/o male with history of DM-2, HTN, HLD, prostate cancer s/p prostatectomy, Asthma, Sickle cell trait, coming to the hospital with complaints of intermittent SSCP that has no association with activity. No associative SOB, Diaphoresis, pleurisy. Patient and family state his Cardiologist at [facility] did a LHC 5 years ago and was told it was negative. No hx of CHF, or arrythmia. He was given a holter in the past and was also told he has no arrhythmias. Had CP that woke him from sleep this time which prompted them to come to the hospital. He does admit to upset stomach and burping at times, but states this pain was higher up in his chest. ED w/u this time was non-diagnostic. Seen by cardiology and scheduled for LHC in AM. (2018 23:42)    Interim History:  My onservice on    7/3/2018  Pt underwent cath with stent to 80% stenosis of LAD.  Denies complaints.  No chest pain / palpitations.     ____________________PHYSICAL EXAM:  Vitals reviewed as indicated below  GENERAL:  NAD Alert and Oriented x 3   HEENT: NCAT  CARDIOVASCULAR:  S1, S2  LUNGS: CTAB  ABDOMEN:  soft, (-) tenderness, (-) distension, (+) bowel sounds, (-) guarding, (-) rebound (-) rigidity  EXTREMITIES:  no cyanosis / clubbing / edema.   ____________________      BACKGROUND:  HEALTH ISSUES - PROBLEM Dx:  Hyperlipidemia, unspecified hyperlipidemia type: Hyperlipidemia, unspecified hyperlipidemia type  Essential hypertension: Essential hypertension  Type 2 diabetes mellitus without complication, without long-term current use of insulin: Type 2 diabetes mellitus without complication, without long-term current use of insulin  Other chest pain: Other chest pain        Allergies    EGGPLANT (Hives)  No Known Drug Allergies  strawberry (Hives)    Intolerances    aspirin (Stomach Upset)    PAST MEDICAL & SURGICAL HISTORY:  Sickle cell anemia: Carrier of the disease, presently doesn&#x27;t have it  Asthma, mild intermittent, uncomplicated  Other hemorrhoids  Cancer: protate  Diabetes mellitus  Hyperlipidemia  Hypertension  S/P prostatectomy  Inguinal hernia        VITALS:  Vital Signs Last 24 Hrs  T(C): 36.6 (2018 10:50), Max: 36.9 (2018 22:44)  T(F): 97.9 (2018 10:50), Max: 98.5 (2018 09:56)  HR: 61 (2018 15:40) (57 - 99)  BP: 133/71 (2018 15:40) (113/62 - 153/79)  BP(mean): 99 (2018 23:42) (99 - 99)  RR: 16 (2018 15:40) (16 - 20)  SpO2: 100% (2018 15:40) (96% - 100%) Daily     Daily Weight in k.2 (2018 04:30)  CAPILLARY BLOOD GLUCOSE      POCT Blood Glucose.: 129 mg/dL (2018 07:56)  POCT Blood Glucose.: 124 mg/dL (2018 05:33)  POCT Blood Glucose.: 119 mg/dL (2018 00:44)    I&O's Summary    2018 07:01  -  2018 07:00  --------------------------------------------------------  IN: 0 mL / OUT: 650 mL / NET: -650 mL    2018 07:01  -  2018 16:19  --------------------------------------------------------  IN: 0 mL / OUT: 600 mL / NET: -600 mL          LABS:                        12.7   6.8   )-----------( 303      ( 2018 12:58 )             37.3     07-02    136  |  100  |  23.0<H>  ----------------------------<  154<H>  4.9   |  23.0  |  1.27    Ca    9.4      2018 12:58    TPro  7.2  /  Alb  3.8  /  TBili  0.4  /  DBili  x   /  AST  17  /  ALT  13  /  AlkPhos  93  07-02    PT/INR - ( 2018 12:58 )   PT: 10.3 sec;   INR: 0.94 ratio         PTT - ( 2018 12:58 )  PTT:35.3 sec  LIVER FUNCTIONS - ( 2018 12:58 )  Alb: 3.8 g/dL / Pro: 7.2 g/dL / ALK PHOS: 93 U/L / ALT: 13 U/L / AST: 17 U/L / GGT: x             CARDIAC MARKERS ( 2018 12:58 )  x     / <0.01 ng/mL / x     / x     / x              MEDICATIONS:  MEDICATIONS  (STANDING):  amLODIPine   Tablet 10 milliGRAM(s) Oral daily  aspirin  chewable 81 milliGRAM(s) Oral daily  atorvastatin 40 milliGRAM(s) Oral at bedtime  dextrose 5%. 1000 milliLiter(s) (50 mL/Hr) IV Continuous <Continuous>  dextrose 50% Injectable 12.5 Gram(s) IV Push once  hydrALAZINE 50 milliGRAM(s) Oral every 8 hours  insulin lispro (HumaLOG) corrective regimen sliding scale   SubCutaneous every 6 hours  metoprolol tartrate 50 milliGRAM(s) Oral two times a day  multivitamin 1 Tablet(s) Oral daily  sodium chloride 0.9% lock flush 3 milliLiter(s) IV Push every 8 hours  ticagrelor 90 milliGRAM(s) Oral two times a day  valsartan 160 milliGRAM(s) Oral daily    MEDICATIONS  (PRN):  dextrose 40% Gel 15 Gram(s) Oral once PRN Blood Glucose LESS THAN 70 milliGRAM(s)/deciliter  glucagon  Injectable 1 milliGRAM(s) IntraMuscular once PRN Glucose LESS THAN 70 milligrams/deciliter  ondansetron Injectable 4 milliGRAM(s) IV Push every 6 hours PRN Nausea

## 2018-07-03 NOTE — ASU PATIENT PROFILE, ADULT - TEACHING/LEARNING LEARNING PREFERENCES
verbal instruction/individual instruction/audio/computer/internet/video/pictorial/skill demonstration/written material/group instruction

## 2018-07-03 NOTE — DISCHARGE NOTE ADULT - MEDICATION SUMMARY - MEDICATIONS TO TAKE
I will START or STAY ON the medications listed below when I get home from the hospital:    aspirin 81 mg oral tablet, chewable  -- 1 tab(s) by mouth once a day  -- Indication: For ASHD    valsartan 160 mg oral tablet  -- 1 tab(s) by mouth once a day  -- Indication: For HTN    Tradjenta 5 mg oral tablet  -- 1 tab(s) by mouth once a day  -- Indication: For DM    atorvastatin 40 mg oral tablet  -- 1 tab(s) by mouth once a day (at bedtime)  -- HOME  -- Indication: For HLD    ticagrelor 90 mg oral tablet  -- 1 tab(s) by mouth 2 times a day  -- Indication: For ASHD    metoprolol tartrate 50 mg oral tablet  -- 1 tab(s) by mouth 2 times a day  -- Indication: For ASHD    amlodipine 10 mg oral tablet  -- 1 tab(s) by mouth once a day  -- HOME  -- Indication: For HTN I will START or STAY ON the medications listed below when I get home from the hospital:    aspirin 81 mg oral tablet, chewable  -- 1 tab(s) by mouth once a day  -- Indication: For Ashd    valsartan 160 mg oral tablet  -- 1 tab(s) by mouth once a day  -- Indication: For Htn    Tradjenta 5 mg oral tablet  -- 1 tab(s) by mouth once a day  -- Indication: For dm    atorvastatin 40 mg oral tablet  -- 1 tab(s) by mouth once a day (at bedtime)  -- HOME  -- Indication: For Hld    ticagrelor 90 mg oral tablet  -- 1 tab(s) by mouth 2 times a day  -- Indication: For Ashd    metoprolol tartrate 50 mg oral tablet  -- 1 tab(s) by mouth 2 times a day  -- Indication: For Htn    amlodipine 10 mg oral tablet  -- 1 tab(s) by mouth once a day  -- HOME  -- Indication: For Htn I will START or STAY ON the medications listed below when I get home from the hospital:    aspirin 81 mg oral tablet, chewable  -- 1 tab(s) by mouth once a day  -- Indication: For Ashd    valsartan 160 mg oral tablet  -- 1 tab(s) by mouth once a day  -- Indication: For Htn    Tradjenta 5 mg oral tablet  -- 1 tab(s) by mouth once a day  -- Indication: For dm    atorvastatin 40 mg oral tablet  -- 1 tab(s) by mouth once a day (at bedtime)  -- HOME  -- Indication: For Hld    ticagrelor 90 mg oral tablet  -- 1 tab(s) by mouth 2 times a day  -- Indication: For Ashd    metoprolol tartrate 50 mg oral tablet  -- 1 tab(s) by mouth 2 times a day  -- Indication: For Htn    amlodipine 10 mg oral tablet  -- 1 tab(s) by mouth once a day  -- HOME  -- Indication: For Htn    Protonix 20 mg oral delayed release tablet  -- 1 tab(s) by mouth once a day   -- It is very important that you take or use this exactly as directed.  Do not skip doses or discontinue unless directed by your doctor.  Obtain medical advice before taking any non-prescription drugs as some may affect the action of this medication.  Swallow whole.  Do not crush.    -- Indication: For gerd

## 2018-07-03 NOTE — DISCHARGE NOTE ADULT - INSTRUCTIONS
Activities as tolerated minimize stair climbing, heavy lifting greater than 10lbs, strenuous house work, contact sports, No intercourse for 1 week. Site care no Bath tubs, Hot tubs , Pools for 1 week. Monitor site for infection such as warmth drainage or swelling of site. Monitor for bleeding call MD if continous bleeding occurs hold pressure report to nearest ER, Do not operate heavy machinery or drive for 24hours.

## 2018-07-03 NOTE — ED ADULT NURSE REASSESSMENT NOTE - NS ED NURSE REASSESS COMMENT FT1
no true allergy to asa, causes stomach upset
Spoke with Cathy in logistics, informed 2nd set orders are in. awaiting bed, per GUS Falcon, report given to GUS Crowe.
pain relieved after nitro
pending admit orders pt resting comfortably
report given to grecia on 4tower pending order  s

## 2018-07-03 NOTE — DISCHARGE NOTE ADULT - CARE PROVIDERS DIRECT ADDRESSES
[email or web address]

## 2018-07-03 NOTE — DISCHARGE NOTE ADULT - PLAN OF CARE
It is important to see your primary physician as well as the physicians noted below within the next week to perform a comprehensive medical review.  Call their offices for an appointment as soon as you leave the hospital.  If you do not have a primary physician, contact the [facility] at [place] [phone] located on [address] [place] [address].  Your medical issues appear to be stable at this time, but if your symptoms recur or worsen, contact your physicians and/or return to the hospital if necessary.  If you encounter any issues or questions with your medication, call your physicians before stopping the medication.  Do not drive.  Limit your diet to 2 grams of sodium daily. Optimize cardiac health monitor glucose, diabetic diet monitor BP statin

## 2018-07-03 NOTE — DISCHARGE NOTE ADULT - CARE PLAN
Principal Discharge DX:	ACS (acute coronary syndrome)  Assessment and plan of treatment:	It is important to see your primary physician as well as the physicians noted below within the next week to perform a comprehensive medical review.  Call their offices for an appointment as soon as you leave the hospital.  If you do not have a primary physician, contact the [facility] at [place] [phone] located on [address] [place] [address], [place] [address].  Your medical issues appear to be stable at this time, but if your symptoms recur or worsen, contact your physicians and/or return to the hospital if necessary.  If you encounter any issues or questions with your medication, call your physicians before stopping the medication.  Do not drive.  Limit your diet to 2 grams of sodium daily.  Secondary Diagnosis:	Type 2 diabetes mellitus without complication, without long-term current use of insulin  Secondary Diagnosis:	Essential hypertension  Secondary Diagnosis:	Hyperlipidemia, unspecified hyperlipidemia type  Secondary Diagnosis:	Hyperlipidemia Principal Discharge DX:	ACS (acute coronary syndrome)  Goal:	Optimize cardiac health  Assessment and plan of treatment:	It is important to see your primary physician as well as the physicians noted below within the next week to perform a comprehensive medical review.  Call their offices for an appointment as soon as you leave the hospital.  If you do not have a primary physician, contact the [facility] at [place] [phone] located on [address] [place] [address].  Your medical issues appear to be stable at this time, but if your symptoms recur or worsen, contact your physicians and/or return to the hospital if necessary.  If you encounter any issues or questions with your medication, call your physicians before stopping the medication.  Do not drive.  Limit your diet to 2 grams of sodium daily.  Secondary Diagnosis:	Type 2 diabetes mellitus without complication, without long-term current use of insulin  Secondary Diagnosis:	Essential hypertension  Secondary Diagnosis:	Hyperlipidemia, unspecified hyperlipidemia type  Secondary Diagnosis:	Hyperlipidemia Principal Discharge DX:	ACS (acute coronary syndrome)  Goal:	Optimize cardiac health  Assessment and plan of treatment:	It is important to see your primary physician as well as the physicians noted below within the next week to perform a comprehensive medical review.  Call their offices for an appointment as soon as you leave the hospital.  If you do not have a primary physician, contact the [facility] at [place] [phone] located on [address] [place] [address].  Your medical issues appear to be stable at this time, but if your symptoms recur or worsen, contact your physicians and/or return to the hospital if necessary.  If you encounter any issues or questions with your medication, call your physicians before stopping the medication.  Do not drive.  Limit your diet to 2 grams of sodium daily.  Secondary Diagnosis:	Type 2 diabetes mellitus without complication, without long-term current use of insulin  Assessment and plan of treatment:	monitor glucose, diabetic diet  Secondary Diagnosis:	Essential hypertension  Assessment and plan of treatment:	monitor BP  Secondary Diagnosis:	Hyperlipidemia, unspecified hyperlipidemia type  Assessment and plan of treatment:	statin  Secondary Diagnosis:	Hyperlipidemia

## 2018-07-04 VITALS
RESPIRATION RATE: 18 BRPM | OXYGEN SATURATION: 99 % | HEART RATE: 75 BPM | TEMPERATURE: 98 F | DIASTOLIC BLOOD PRESSURE: 58 MMHG | SYSTOLIC BLOOD PRESSURE: 107 MMHG

## 2018-07-04 LAB
ALBUMIN SERPL ELPH-MCNC: 3.8 G/DL — SIGNIFICANT CHANGE UP (ref 3.3–5.2)
ALP SERPL-CCNC: 90 U/L — SIGNIFICANT CHANGE UP (ref 40–120)
ALT FLD-CCNC: 11 U/L — SIGNIFICANT CHANGE UP
ANION GAP SERPL CALC-SCNC: 15 MMOL/L — SIGNIFICANT CHANGE UP (ref 5–17)
AST SERPL-CCNC: 16 U/L — SIGNIFICANT CHANGE UP
BASOPHILS # BLD AUTO: 0.1 K/UL — SIGNIFICANT CHANGE UP (ref 0–0.2)
BASOPHILS NFR BLD AUTO: 0.7 % — SIGNIFICANT CHANGE UP (ref 0–2)
BILIRUB SERPL-MCNC: 0.7 MG/DL — SIGNIFICANT CHANGE UP (ref 0.4–2)
BUN SERPL-MCNC: 31 MG/DL — HIGH (ref 8–20)
CALCIUM SERPL-MCNC: 9.4 MG/DL — SIGNIFICANT CHANGE UP (ref 8.6–10.2)
CHLORIDE SERPL-SCNC: 104 MMOL/L — SIGNIFICANT CHANGE UP (ref 98–107)
CHOLEST SERPL-MCNC: 169 MG/DL — SIGNIFICANT CHANGE UP (ref 110–199)
CO2 SERPL-SCNC: 21 MMOL/L — LOW (ref 22–29)
CREAT SERPL-MCNC: 1.29 MG/DL — SIGNIFICANT CHANGE UP (ref 0.5–1.3)
EOSINOPHIL # BLD AUTO: 0.1 K/UL — SIGNIFICANT CHANGE UP (ref 0–0.5)
EOSINOPHIL NFR BLD AUTO: 1.6 % — SIGNIFICANT CHANGE UP (ref 0–5)
GLUCOSE BLDC GLUCOMTR-MCNC: 116 MG/DL — HIGH (ref 70–99)
GLUCOSE BLDC GLUCOMTR-MCNC: 117 MG/DL — HIGH (ref 70–99)
GLUCOSE SERPL-MCNC: 110 MG/DL — SIGNIFICANT CHANGE UP (ref 70–115)
HCT VFR BLD CALC: 37.7 % — LOW (ref 42–52)
HDLC SERPL-MCNC: 51 MG/DL — LOW
HGB BLD-MCNC: 12.9 G/DL — LOW (ref 14–18)
LIPID PNL WITH DIRECT LDL SERPL: 100 MG/DL — SIGNIFICANT CHANGE UP
LYMPHOCYTES # BLD AUTO: 1.9 K/UL — SIGNIFICANT CHANGE UP (ref 1–4.8)
LYMPHOCYTES # BLD AUTO: 23.7 % — SIGNIFICANT CHANGE UP (ref 20–55)
MCHC RBC-ENTMCNC: 27.1 PG — SIGNIFICANT CHANGE UP (ref 27–31)
MCHC RBC-ENTMCNC: 34.2 G/DL — SIGNIFICANT CHANGE UP (ref 32–36)
MCV RBC AUTO: 79.2 FL — LOW (ref 80–94)
MONOCYTES # BLD AUTO: 0.8 K/UL — SIGNIFICANT CHANGE UP (ref 0–0.8)
MONOCYTES NFR BLD AUTO: 10.2 % — HIGH (ref 3–10)
NEUTROPHILS # BLD AUTO: 5.1 K/UL — SIGNIFICANT CHANGE UP (ref 1.8–8)
NEUTROPHILS NFR BLD AUTO: 63.6 % — SIGNIFICANT CHANGE UP (ref 37–73)
PLATELET # BLD AUTO: 298 K/UL — SIGNIFICANT CHANGE UP (ref 150–400)
POTASSIUM SERPL-MCNC: 4.8 MMOL/L — SIGNIFICANT CHANGE UP (ref 3.5–5.3)
POTASSIUM SERPL-SCNC: 4.8 MMOL/L — SIGNIFICANT CHANGE UP (ref 3.5–5.3)
PROT SERPL-MCNC: 6.7 G/DL — SIGNIFICANT CHANGE UP (ref 6.6–8.7)
RBC # BLD: 4.76 M/UL — SIGNIFICANT CHANGE UP (ref 4.6–6.2)
RBC # FLD: 14.5 % — SIGNIFICANT CHANGE UP (ref 11–15.6)
SODIUM SERPL-SCNC: 140 MMOL/L — SIGNIFICANT CHANGE UP (ref 135–145)
TOTAL CHOLESTEROL/HDL RATIO MEASUREMENT: 3 RATIO — LOW (ref 3.4–9.6)
TRIGL SERPL-MCNC: 88 MG/DL — SIGNIFICANT CHANGE UP (ref 10–200)
WBC # BLD: 8.1 K/UL — SIGNIFICANT CHANGE UP (ref 4.8–10.8)
WBC # FLD AUTO: 8.1 K/UL — SIGNIFICANT CHANGE UP (ref 4.8–10.8)

## 2018-07-04 PROCEDURE — 83690 ASSAY OF LIPASE: CPT

## 2018-07-04 PROCEDURE — 99152 MOD SED SAME PHYS/QHP 5/>YRS: CPT

## 2018-07-04 PROCEDURE — 84484 ASSAY OF TROPONIN QUANT: CPT

## 2018-07-04 PROCEDURE — 82962 GLUCOSE BLOOD TEST: CPT

## 2018-07-04 PROCEDURE — 93005 ELECTROCARDIOGRAM TRACING: CPT

## 2018-07-04 PROCEDURE — C1887: CPT

## 2018-07-04 PROCEDURE — C9600: CPT | Mod: LD

## 2018-07-04 PROCEDURE — 80053 COMPREHEN METABOLIC PANEL: CPT

## 2018-07-04 PROCEDURE — G0378: CPT

## 2018-07-04 PROCEDURE — C1874: CPT

## 2018-07-04 PROCEDURE — 86850 RBC ANTIBODY SCREEN: CPT

## 2018-07-04 PROCEDURE — 93458 L HRT ARTERY/VENTRICLE ANGIO: CPT

## 2018-07-04 PROCEDURE — 86900 BLOOD TYPING SEROLOGIC ABO: CPT

## 2018-07-04 PROCEDURE — 36415 COLL VENOUS BLD VENIPUNCTURE: CPT

## 2018-07-04 PROCEDURE — 85730 THROMBOPLASTIN TIME PARTIAL: CPT

## 2018-07-04 PROCEDURE — 93010 ELECTROCARDIOGRAM REPORT: CPT

## 2018-07-04 PROCEDURE — 83036 HEMOGLOBIN GLYCOSYLATED A1C: CPT

## 2018-07-04 PROCEDURE — 84436 ASSAY OF TOTAL THYROXINE: CPT

## 2018-07-04 PROCEDURE — 99239 HOSP IP/OBS DSCHRG MGMT >30: CPT

## 2018-07-04 PROCEDURE — 83880 ASSAY OF NATRIURETIC PEPTIDE: CPT

## 2018-07-04 PROCEDURE — 71046 X-RAY EXAM CHEST 2 VIEWS: CPT

## 2018-07-04 PROCEDURE — 86901 BLOOD TYPING SEROLOGIC RH(D): CPT

## 2018-07-04 PROCEDURE — 86880 COOMBS TEST DIRECT: CPT

## 2018-07-04 PROCEDURE — C1769: CPT

## 2018-07-04 PROCEDURE — C1725: CPT

## 2018-07-04 PROCEDURE — C1894: CPT

## 2018-07-04 PROCEDURE — 84480 ASSAY TRIIODOTHYRONINE (T3): CPT

## 2018-07-04 PROCEDURE — 99285 EMERGENCY DEPT VISIT HI MDM: CPT | Mod: 25

## 2018-07-04 PROCEDURE — 84443 ASSAY THYROID STIM HORMONE: CPT

## 2018-07-04 PROCEDURE — 85027 COMPLETE CBC AUTOMATED: CPT

## 2018-07-04 PROCEDURE — 80061 LIPID PANEL: CPT

## 2018-07-04 PROCEDURE — 85610 PROTHROMBIN TIME: CPT

## 2018-07-04 RX ORDER — PANTOPRAZOLE SODIUM 20 MG/1
1 TABLET, DELAYED RELEASE ORAL
Qty: 90 | Refills: 0
Start: 2018-07-04 | End: 2018-10-01

## 2018-07-04 RX ORDER — TICAGRELOR 90 MG/1
1 TABLET ORAL
Qty: 180 | Refills: 0 | OUTPATIENT
Start: 2018-07-04 | End: 2018-10-01

## 2018-07-04 RX ADMIN — Medication 50 MILLIGRAM(S): at 05:25

## 2018-07-04 RX ADMIN — SODIUM CHLORIDE 3 MILLILITER(S): 9 INJECTION INTRAMUSCULAR; INTRAVENOUS; SUBCUTANEOUS at 05:25

## 2018-07-04 RX ADMIN — TICAGRELOR 90 MILLIGRAM(S): 90 TABLET ORAL at 05:25

## 2018-07-04 RX ADMIN — VALSARTAN 160 MILLIGRAM(S): 80 TABLET ORAL at 05:25

## 2018-07-04 RX ADMIN — AMLODIPINE BESYLATE 10 MILLIGRAM(S): 2.5 TABLET ORAL at 05:25

## 2018-07-04 NOTE — PROGRESS NOTE ADULT - SUBJECTIVE AND OBJECTIVE BOX
s/p cath  Daughter at bedside   Np cp sob or palp  Ekg sinus rhythmn  B/P 110/68  HR 72 RR 13  Chest clear  Heart s1&s2 regular  Abd soft non tender  Ext no c/c/e  Right groin no ecchymosis    s/p cath    VENTRICLES: EF 60% w/ min MR.  CORONARY VESSELS: R dominant.  Large RCA w/ moderate mid vessel disease.  Large LAD w/ 80% mid vessel stenosis and large distal vessel.  Moderate sized LCx w/ 60% calcified OM1 stenosis w/ large distal vessel.  LAD:   --  Mid LAD:  COMPLICATIONS: No complications occurred during the cath lab visit. No  complications occurred during the cath lab visit.  DIAGNOSTIC IMPRESSIONS: ICS x 1 to LAD w/ 0% residual stenosis and THOR III  flow maintained thruout.  Moderate OM stenosis.  Preserved EF.    Hgb 12.9  creat stable at 1.29  Cm no arrhythmias  ekg pending  Discussed importance of taking brillinta and asa  Groin care instructions    stable for d/c with out patient managment s/p cath  Daughter at bedside   Np cp sob or palp  Ekg sinus rhythmn wnl  B/P 110/68  HR 72 RR 13  Chest clear  Heart s1&s2 regular  Abd soft non tender  Ext no c/c/e  Right groin no ecchymosis    s/p cath    VENTRICLES: EF 60% w/ min MR.  CORONARY VESSELS: R dominant.  Large RCA w/ moderate mid vessel disease.  Large LAD w/ 80% mid vessel stenosis and large distal vessel.  Moderate sized LCx w/ 60% calcified OM1 stenosis w/ large distal vessel.  LAD:   --  Mid LAD:  COMPLICATIONS: No complications occurred during the cath lab visit. No  complications occurred during the cath lab visit.  DIAGNOSTIC IMPRESSIONS: ICS x 1 to LAD w/ 0% residual stenosis and THOR III  flow maintained thruout.  Moderate OM stenosis.  Preserved EF.    Hgb 12.9  creat stable at 1.29  Cm no arrhythmias  ekg pending  Discussed importance of taking brillinta and asa  Groin care instructions    stable for d/c with out patient managment s/p cath  Daughter at bedside   Np cp sob or palp  Ekg sinus rhythmn wnl  B/P 110/68  HR 72 RR 13  Chest clear  Heart s1&s2 regular  Abd soft non tender  Ext no c/c/e  Right groin no ecchymosis    s/p cath    VENTRICLES: EF 60% w/ min MR.  CORONARY VESSELS: R dominant.  Large RCA w/ moderate mid vessel disease.  Large LAD w/ 80% mid vessel stenosis and large distal vessel.  Moderate sized LCx w/ 60% calcified OM1 stenosis w/ large distal vessel.  LAD:   --  Mid LAD:  COMPLICATIONS: No complications occurred during the cath lab visit. No  complications occurred during the cath lab visit.  DIAGNOSTIC IMPRESSIONS: ICS x 1 to LAD w/ 0% residual stenosis and THOR III  flow maintained thruout.  Moderate OM stenosis.  Preserved EF.    Hgb 12.9  creat stable at 1.29  Cm no arrhythmias  ekg pending  Discussed importance of taking brillinta and asa  Groin care instructions    stable for d/c with out patient managment  Of note patient has aspirin as a allergy  reviewed with patient he gets gerd  wll d/c home on protonix

## 2018-07-04 NOTE — PROGRESS NOTE ADULT - SUBJECTIVE AND OBJECTIVE BOX
[facility]                                   [address] [place] [address]                                                      PHONE: [phone]                                                         FAX: [phone]  [email or web address]  ---------------------------------------------------------------------------------------------------------------------------------    Overnight events/patient complaints:  NAD feeling much better     aspirin (Stomach Upset)  EGGPLANT (Hives)  No Known Drug Allergies  strawberry (Hives)    MEDICATIONS  (STANDING):  amLODIPine   Tablet 10 milliGRAM(s) Oral daily  aspirin  chewable 81 milliGRAM(s) Oral daily  atorvastatin 40 milliGRAM(s) Oral at bedtime  dextrose 5%. 1000 milliLiter(s) (50 mL/Hr) IV Continuous <Continuous>  dextrose 50% Injectable 12.5 Gram(s) IV Push once  hydrALAZINE 50 milliGRAM(s) Oral every 8 hours  insulin lispro (HumaLOG) corrective regimen sliding scale   SubCutaneous every 6 hours  metoprolol tartrate 50 milliGRAM(s) Oral two times a day  multivitamin 1 Tablet(s) Oral daily  sodium chloride 0.9% lock flush 3 milliLiter(s) IV Push every 8 hours  ticagrelor 90 milliGRAM(s) Oral two times a day  valsartan 160 milliGRAM(s) Oral daily    MEDICATIONS  (PRN):  dextrose 40% Gel 15 Gram(s) Oral once PRN Blood Glucose LESS THAN 70 milliGRAM(s)/deciliter  glucagon  Injectable 1 milliGRAM(s) IntraMuscular once PRN Glucose LESS THAN 70 milligrams/deciliter  ondansetron Injectable 4 milliGRAM(s) IV Push every 6 hours PRN Nausea      Vital Signs Last 24 Hrs  T(C): 37 (04 Jul 2018 05:25), Max: 37.2 (03 Jul 2018 22:23)  T(F): 98.6 (04 Jul 2018 05:25), Max: 98.9 (03 Jul 2018 22:23)  HR: 69 (04 Jul 2018 05:25) (57 - 73)  BP: 110/68 (04 Jul 2018 05:25) (110/68 - 163/87)  BP(mean): --  RR: 20 (04 Jul 2018 05:25) (16 - 20)  SpO2: 99% (04 Jul 2018 05:25) (96% - 100%)  ICU Vital Signs Last 24 Hrs  GUIDO MATUTE  I&O's Detail    03 Jul 2018 07:01  -  04 Jul 2018 07:00  --------------------------------------------------------  IN:    Oral Fluid: 120 mL  Total IN: 120 mL    OUT:    Voided: 940 mL  Total OUT: 940 mL    Total NET: -820 mL        I&O's Summary    03 Jul 2018 07:01  -  04 Jul 2018 07:00  --------------------------------------------------------  IN: 120 mL / OUT: 940 mL / NET: -820 mL      Drug Dosing Weight  GUIDO MATUTE      PHYSICAL EXAM:  General: Appears well developed, well nourished alert and cooperative.  HEENT: Head; normocephalic, atraumatic.  Eyes: Pupils reactive, cornea wnl.  Neck: Supple, no nodes adenopathy, no NVD or carotid bruit or thyromegaly.  CARDIOVASCULAR: Normal S1 and S2, No murmur, rub, gallop or lift.   LUNGS: No rales, rhonchi or wheeze. Normal breath sounds bilaterally.  ABDOMEN: Soft, nontender without mass or organomegaly. bowel sounds normoactive.  EXTREMITIES: No clubbing, cyanosis or edema. Distal pulses wnl.   SKIN: warm and dry with normal turgor.  NEURO: Alert/oriented x 3/normal motor exam. No pathologic reflexes.    PSYCH: normal affect.        LABS:                        12.9   8.1   )-----------( 298      ( 04 Jul 2018 06:02 )             37.7     07-04    140  |  104  |  31.0<H>  ----------------------------<  110  4.8   |  21.0<L>  |  1.29    Ca    9.4      04 Jul 2018 06:02    TPro  6.7  /  Alb  3.8  /  TBili  0.7  /  DBili  x   /  AST  16  /  ALT  11  /  AlkPhos  90  07-04    GUIDO MATUTE  CARDIAC MARKERS ( 02 Jul 2018 12:58 )  x     / <0.01 ng/mL / x     / x     / x          PT/INR - ( 02 Jul 2018 12:58 )   PT: 10.3 sec;   INR: 0.94 ratio         PTT - ( 02 Jul 2018 12:58 )  PTT:35.3 sec      RADIOLOGY & ADDITIONAL STUDIES:    INTERPRETATION OF TELEMETRY (personally reviewed):    ECG:      CARDIAC CATHETERIZATION:  Preserved EF.  DIAGNOSTIC RECOMMENDATIONS: Asa/brilinta.  Outpatient PET/followup at [facility] to assess for possible LCx  intervention.  INTERVENTIONAL IMPRESSIONS: ICS x 1 to LAD w/ 0% residual stenosis and THOR  III flow maintained thruout.  Moderate OM stenosis.  Preserved EF.  INTERVENTIONAL RECOMMENDATIONS: Asa/brilinta.  Outpatient PET/followup at [facility] to assess for possible LCx  intervention.  Prepared and signed by          ASSESSMENT AND PLAN:  In summary, GUIDO MATUTE is an 77y Male with past medical history significant for DM HTN HLD prostate cancer ex smoke quit 2006 who presents with recurrent episodes of chest pain 5/10 pressure associated with diaphoresis and improved with Nirto s/p cath severe LAD s/p SOCORRO moderate OM         Plan  1.  out pt PET to evaluate ischemia in OM area   2.  Continue current CV medications DAPT     DC planning from CV standpoint

## 2018-07-05 NOTE — ED PROVIDER NOTE - NSCAREINITIATED _GEN_ER
Physical Therapy Daily Treatment     Visit Count: 3   Plan of Care Dates: Initial: 6/29/2018 Through: 8/24/2018    Insurance Information:   THERAPY BENEFITS:  PAYOR: KATHLEEN  VISIT LIMIT: MED NECC   PER YEAR  Next Referring Provider Visit: 7/9/18     Referred by: Dr. Adiel Olivier MD  Medical Diagnosis (from order):  right TKA  Treatment Diagnosis: Knee Symptoms with Pain, Impaired Posture, Impaired Joint Mobility, Impaired Range of Motion, Impaired Motor Function/Muscle Performance, Impaired Gait/Locomotion Deficits, Impaired Mobility and Impaired Balance  Insurance: 1. ANTHEM/BCBS  2. N/A     Diagnosis Precautions: none     SUBJECTIVE   Pt reports that pain levels are less today, though last night had difficulty finding a comfortable sleeping position.     Current Pain: 4/10.      Functional Change: see above    OBJECTIVE   Gait:  Ambulates with wheeled walker, step-to pattern, moderate pressure through upper extremities     Right Knee ROM:    6-29-18 7/3/18   7/5/18   PROM 10-70 3-89  0-98   AROM 20-60  10-85 2-95      Treatment   Therapeutic Exercise:   NuStep level 4 x 10 minutes warmup with discussion of symptoms and function  Stairs up and down with reciprocal gait with heavy use of bilateral rails on 4 and 6 inch steps  Leg press 50# bilateral x 15, 30# right x 10 with frequent cueing to move into full endrange movements  Quad sets with overpressure for knee extension   SLR with assist from therapist and cueing for decreased extensor lag  TKE with cueing and assist for endrange movement  Mini squats x 10 with light support on plinth  Reviewed bilateral heel raises for HEP    Gait Training:  Ambulation with wheeled walker x 200 feet with cueing for heel strike and knee extension at initial contact and decreased pressure through upper extremities.      Current Home Program (not performed this date except as noted above):   Access Code: [email or web address]   Quad set  SLR  Heel slides  Calf stretch with strap in  sitting  Mini squat  Heel raises    ASSESSMENT   Continues to improved with ROM. Has significant extensor lag, however, so cane not attempted at this time.     Pain after treatment: NR/10  Result of above outlined education: Needs reinforcement    Goals:       To be obtained by end of this plan of care:   ·   Patient will walk 30 minutes with pain no greater than 1/10 without gait deviation in order to perform ADLs, shopping and errands.  ·  Patient will ascend and descend stairs with reciprocal gait pattern with pain no greater than 0/10 using no support without demonstrating gait deviation.  ·  Patient will squat to lift 25 pounds floor to waist with pain no greater than 1/10 in order to carry laundry and groceries.  ·  Patient will be independent in a home exercise program for strength, range of motion, gait and balance and understand progression of all exercises.        PLAN   Continue progression of ROM, weightbearing exercises, gait. Progress to cane and balance activities when extensor lag improved.     THERAPY DAILY BILLING   Primary Insurance:  ANTHEM/BCBS  Secondary Insurance: N/A    Evaluation Procedures:  No evaluation codes were used on this date of service    Timed Procedures:  Gait Training, 10 minutes  Therapeutic Exercise, 35 minutes    Untimed Procedures:  No untimed codes were used on this date of service    Total Treatment Time: 45 minutes    Shawn Negron(Attending)

## 2018-07-05 NOTE — ED CDU PROVIDER DISPOSITION NOTE - CHEST RISK OF DEAD HOSPITALIZATION
Persistent or servere chest pain or anginal equivalent pain/Risk factors making the patient's symptoms likely to be from CAD

## 2018-07-11 ENCOUNTER — INPATIENT (INPATIENT)
Facility: HOSPITAL | Age: 78
LOS: 2 days | Discharge: HOME | DRG: 247 | End: 2018-07-14
Attending: INTERNAL MEDICINE | Admitting: HOSPITALIST
Payer: MEDICARE

## 2018-07-11 VITALS — HEIGHT: 63 IN | WEIGHT: 156.09 LBS

## 2018-07-11 DIAGNOSIS — Z90.79 ACQUIRED ABSENCE OF OTHER GENITAL ORGAN(S): Chronic | ICD-10-CM

## 2018-07-11 DIAGNOSIS — K40.90 UNILATERAL INGUINAL HERNIA, WITHOUT OBSTRUCTION OR GANGRENE, NOT SPECIFIED AS RECURRENT: Chronic | ICD-10-CM

## 2018-07-11 LAB
ALBUMIN SERPL ELPH-MCNC: 3.9 G/DL — SIGNIFICANT CHANGE UP (ref 3.3–5.2)
ALP SERPL-CCNC: 95 U/L — SIGNIFICANT CHANGE UP (ref 40–120)
ALT FLD-CCNC: 14 U/L — SIGNIFICANT CHANGE UP
ANION GAP SERPL CALC-SCNC: 13 MMOL/L — SIGNIFICANT CHANGE UP (ref 5–17)
AST SERPL-CCNC: 16 U/L — SIGNIFICANT CHANGE UP
BASOPHILS # BLD AUTO: 0 K/UL — SIGNIFICANT CHANGE UP (ref 0–0.2)
BASOPHILS NFR BLD AUTO: 0.5 % — SIGNIFICANT CHANGE UP (ref 0–2)
BILIRUB SERPL-MCNC: 0.3 MG/DL — LOW (ref 0.4–2)
BUN SERPL-MCNC: 30 MG/DL — HIGH (ref 8–20)
CALCIUM SERPL-MCNC: 9.4 MG/DL — SIGNIFICANT CHANGE UP (ref 8.6–10.2)
CHLORIDE SERPL-SCNC: 101 MMOL/L — SIGNIFICANT CHANGE UP (ref 98–107)
CK SERPL-CCNC: 67 U/L — SIGNIFICANT CHANGE UP (ref 30–200)
CO2 SERPL-SCNC: 21 MMOL/L — LOW (ref 22–29)
CREAT SERPL-MCNC: 1.47 MG/DL — HIGH (ref 0.5–1.3)
EOSINOPHIL # BLD AUTO: 0.2 K/UL — SIGNIFICANT CHANGE UP (ref 0–0.5)
EOSINOPHIL NFR BLD AUTO: 2.1 % — SIGNIFICANT CHANGE UP (ref 0–5)
GLUCOSE SERPL-MCNC: 173 MG/DL — HIGH (ref 70–115)
HCT VFR BLD CALC: 37.9 % — LOW (ref 42–52)
HGB BLD-MCNC: 12.9 G/DL — LOW (ref 14–18)
LYMPHOCYTES # BLD AUTO: 2.3 K/UL — SIGNIFICANT CHANGE UP (ref 1–4.8)
LYMPHOCYTES # BLD AUTO: 31.2 % — SIGNIFICANT CHANGE UP (ref 20–55)
MCHC RBC-ENTMCNC: 27.2 PG — SIGNIFICANT CHANGE UP (ref 27–31)
MCHC RBC-ENTMCNC: 34 G/DL — SIGNIFICANT CHANGE UP (ref 32–36)
MCV RBC AUTO: 80 FL — SIGNIFICANT CHANGE UP (ref 80–94)
MONOCYTES # BLD AUTO: 0.7 K/UL — SIGNIFICANT CHANGE UP (ref 0–0.8)
MONOCYTES NFR BLD AUTO: 9.3 % — SIGNIFICANT CHANGE UP (ref 3–10)
NEUTROPHILS # BLD AUTO: 4.2 K/UL — SIGNIFICANT CHANGE UP (ref 1.8–8)
NEUTROPHILS NFR BLD AUTO: 56.8 % — SIGNIFICANT CHANGE UP (ref 37–73)
NT-PROBNP SERPL-SCNC: 93 PG/ML — SIGNIFICANT CHANGE UP (ref 0–300)
PLATELET # BLD AUTO: 272 K/UL — SIGNIFICANT CHANGE UP (ref 150–400)
POTASSIUM SERPL-MCNC: 5.2 MMOL/L — SIGNIFICANT CHANGE UP (ref 3.5–5.3)
POTASSIUM SERPL-SCNC: 5.2 MMOL/L — SIGNIFICANT CHANGE UP (ref 3.5–5.3)
PROT SERPL-MCNC: 7 G/DL — SIGNIFICANT CHANGE UP (ref 6.6–8.7)
RBC # BLD: 4.74 M/UL — SIGNIFICANT CHANGE UP (ref 4.6–6.2)
RBC # FLD: 14.5 % — SIGNIFICANT CHANGE UP (ref 11–15.6)
SODIUM SERPL-SCNC: 135 MMOL/L — SIGNIFICANT CHANGE UP (ref 135–145)
TROPONIN T SERPL-MCNC: <0.01 NG/ML — SIGNIFICANT CHANGE UP (ref 0–0.06)
WBC # BLD: 7.5 K/UL — SIGNIFICANT CHANGE UP (ref 4.8–10.8)
WBC # FLD AUTO: 7.5 K/UL — SIGNIFICANT CHANGE UP (ref 4.8–10.8)

## 2018-07-11 PROCEDURE — 71045 X-RAY EXAM CHEST 1 VIEW: CPT | Mod: 26

## 2018-07-11 PROCEDURE — 99285 EMERGENCY DEPT VISIT HI MDM: CPT

## 2018-07-11 PROCEDURE — 93010 ELECTROCARDIOGRAM REPORT: CPT

## 2018-07-11 RX ORDER — PANTOPRAZOLE SODIUM 20 MG/1
40 TABLET, DELAYED RELEASE ORAL
Qty: 0 | Refills: 0 | Status: DISCONTINUED | OUTPATIENT
Start: 2018-07-11 | End: 2018-07-14

## 2018-07-11 RX ORDER — INSULIN LISPRO 100/ML
VIAL (ML) SUBCUTANEOUS
Qty: 0 | Refills: 0 | Status: DISCONTINUED | OUTPATIENT
Start: 2018-07-11 | End: 2018-07-14

## 2018-07-11 RX ORDER — TICAGRELOR 90 MG/1
90 TABLET ORAL
Qty: 0 | Refills: 0 | Status: DISCONTINUED | OUTPATIENT
Start: 2018-07-11 | End: 2018-07-14

## 2018-07-11 RX ORDER — VALSARTAN 80 MG/1
160 TABLET ORAL DAILY
Qty: 0 | Refills: 0 | Status: DISCONTINUED | OUTPATIENT
Start: 2018-07-11 | End: 2018-07-14

## 2018-07-11 RX ORDER — NITROGLYCERIN 6.5 MG
1 CAPSULE, EXTENDED RELEASE ORAL ONCE
Qty: 0 | Refills: 0 | Status: COMPLETED | OUTPATIENT
Start: 2018-07-11 | End: 2018-07-11

## 2018-07-11 RX ORDER — SODIUM CHLORIDE 9 MG/ML
3 INJECTION INTRAMUSCULAR; INTRAVENOUS; SUBCUTANEOUS ONCE
Qty: 0 | Refills: 0 | Status: COMPLETED | OUTPATIENT
Start: 2018-07-11 | End: 2018-07-11

## 2018-07-11 RX ORDER — DEXTROSE 50 % IN WATER 50 %
15 SYRINGE (ML) INTRAVENOUS ONCE
Qty: 0 | Refills: 0 | Status: DISCONTINUED | OUTPATIENT
Start: 2018-07-11 | End: 2018-07-14

## 2018-07-11 RX ORDER — DEXTROSE 50 % IN WATER 50 %
25 SYRINGE (ML) INTRAVENOUS ONCE
Qty: 0 | Refills: 0 | Status: DISCONTINUED | OUTPATIENT
Start: 2018-07-11 | End: 2018-07-14

## 2018-07-11 RX ORDER — ATORVASTATIN CALCIUM 80 MG/1
40 TABLET, FILM COATED ORAL AT BEDTIME
Qty: 0 | Refills: 0 | Status: DISCONTINUED | OUTPATIENT
Start: 2018-07-11 | End: 2018-07-14

## 2018-07-11 RX ORDER — DEXTROSE 50 % IN WATER 50 %
12.5 SYRINGE (ML) INTRAVENOUS ONCE
Qty: 0 | Refills: 0 | Status: DISCONTINUED | OUTPATIENT
Start: 2018-07-11 | End: 2018-07-14

## 2018-07-11 RX ORDER — METOPROLOL TARTRATE 50 MG
50 TABLET ORAL
Qty: 0 | Refills: 0 | Status: DISCONTINUED | OUTPATIENT
Start: 2018-07-11 | End: 2018-07-13

## 2018-07-11 RX ORDER — GLUCAGON INJECTION, SOLUTION 0.5 MG/.1ML
1 INJECTION, SOLUTION SUBCUTANEOUS ONCE
Qty: 0 | Refills: 0 | Status: DISCONTINUED | OUTPATIENT
Start: 2018-07-11 | End: 2018-07-14

## 2018-07-11 RX ORDER — ASPIRIN/CALCIUM CARB/MAGNESIUM 324 MG
81 TABLET ORAL DAILY
Qty: 0 | Refills: 0 | Status: DISCONTINUED | OUTPATIENT
Start: 2018-07-11 | End: 2018-07-14

## 2018-07-11 RX ORDER — SODIUM CHLORIDE 9 MG/ML
1000 INJECTION, SOLUTION INTRAVENOUS
Qty: 0 | Refills: 0 | Status: DISCONTINUED | OUTPATIENT
Start: 2018-07-11 | End: 2018-07-14

## 2018-07-11 RX ORDER — NITROGLYCERIN 6.5 MG
0.4 CAPSULE, EXTENDED RELEASE ORAL
Qty: 0 | Refills: 0 | Status: DISCONTINUED | OUTPATIENT
Start: 2018-07-11 | End: 2018-07-14

## 2018-07-11 RX ADMIN — SODIUM CHLORIDE 3 MILLILITER(S): 9 INJECTION INTRAMUSCULAR; INTRAVENOUS; SUBCUTANEOUS at 21:08

## 2018-07-11 RX ADMIN — Medication 1 INCH(S): at 21:08

## 2018-07-11 RX ADMIN — Medication 0.4 MILLIGRAM(S): at 21:08

## 2018-07-11 NOTE — ED PROVIDER NOTE - OBJECTIVE STATEMENT
78 y/o M former smoker (1PPD for 30+years, stopped in 2006) with hx of DM, HLD, HTN, sickle cell anemia, CA presents to ED chest pressure, palpitations, dizziness onset this morning. Denies pain. pt states he woke up with the pressure in his chest. He is taking all his meds including ASA. Denies blood thinners. Pt was at [facility] on 7/2/18 with CP and had a stent in place for blockage; he doesn't feel much better since the stent placement. Pt was then sent to Cardiology to have a stress test done; after test was done it was concluded that pt needs another stent in place. denies fever. denies HA or neck pain. no sob. no abd pain. no n/v/d. no urinary f/u/d. no back pain. no motor or sensory deficits. denies illicit drug use. no recent travel. no rash. no other acute issues symptoms or concerns. Cardiology: [facility] cardiology

## 2018-07-11 NOTE — H&P ADULT - PROBLEM SELECTOR PLAN 5
no focal neurological deficit.  CT head pending.  orthostatic BP.  fall precautions.  monitor clinically.

## 2018-07-11 NOTE — H&P ADULT - ASSESSMENT
77 years old male with PMH of HTN, DM, CAD and dyslipidemia presented to the ER for the evaluation of intermittent chest pain/discomfort this morning. Pain is 4-5/10, non radiating and associated with SOB. He also c/o dizziness since this morning. Patient was seen by his cardiologist yesterday and had a nuclear stress test showing lateral ischemia. Patient denies any fever, chills, nausea, vomiting, headache, weakness or numbness.

## 2018-07-11 NOTE — H&P ADULT - HISTORY OF PRESENT ILLNESS
· HPI Objective Statement: 76 y/o M former smoker (1PPD for 30+years, stopped in 2006) with hx of DM, HLD, HTN, sickle cell anemia, CA presents to ED chest pressure, palpitations, dizziness onset this morning. Denies pain. pt states he woke up with the pressure in his chest. He is taking all his meds including ASA. Denies blood thinners. Pt was at [facility] on 7/2/18 with CP and had a stent in place for blockage; he doesn't feel much better since the stent placement. Pt was then sent to Cardiology to have a stress test done; after test was done it was concluded that pt needs another stent in place. denies fever. denies HA or neck pain. no sob. no abd pain. no n/v/d. no urinary f/u/d. no back pain. no motor or sensory deficits. denies illicit drug use. no recent travel. no rash. no other acute issues symptoms or concerns. Cardiology: [facility] cardiology 77 years old male with PMH of HTN, DM, CAD and dyslipidemia presented to the ER for the evaluation of intermittent chest pain/discomfort this morning. Pain is 4-5/10, non radiating and associated with SOB. He also c/o dizziness since this morning. Patient was seen by his cardiologist yesterday and had a nuclear stress test showing lateral ischemia. Patient denies any fever, chills, nausea, vomiting, headache, weakness or numbness. Patient was recently admitted to [facility] and had PCI.  He was discharged home on 7/4.

## 2018-07-11 NOTE — ED ADULT TRIAGE NOTE - CHIEF COMPLAINT QUOTE
pt presents to ED chest pressure since this morning. pt states he had recent stent placement 7/3/18. breathing si even and unlabored.

## 2018-07-11 NOTE — ED PROVIDER NOTE - NS_ ATTENDINGSCRIBEDETAILS _ED_A_ED_FT
I, Selwyn Santos, performed the initial face to face bedside interview with this patient regarding history of present illness, review of symptoms and relevant past medical, social and family history.  I completed an independent physical examination.    The history, relevant review of systems, past medical and surgical history, medical decision making, and physical examination was documented by the scribe in my presence and I attest to the accuracy of the documentation.

## 2018-07-11 NOTE — ED ADULT NURSE REASSESSMENT NOTE - NS ED NURSE REASSESS COMMENT FT1
after medication administration, pt states chest pressure has decreased and "he feels good." pt remains Normal Sinus Rhythm on cardiac monitor. vital signs remain stable. wife remains at bedside. pt educated on plan of care, pt able to successfully teach back plan of care to RN, RN will continue to reeducate pt during hospital stay.

## 2018-07-11 NOTE — ED PROVIDER NOTE - MEDICAL DECISION MAKING DETAILS
Hx of recent cardiac stent with chest pressure and abnormal stress (nuclear stress). Evaluating for ACS, evaluate for cardiology, and re-admission.

## 2018-07-11 NOTE — ED ADULT NURSE NOTE - OBJECTIVE STATEMENT
pt care assumed at 2020, no apparent distress noted at this time. pt received Alert and Oriented to person, place, situation and time laying in bed comfortably with daughter and spouse at the bedside. pt c/o chest pressure that started this morning. pt is scheduled for cath in the future. HR is Normal Sinus Rhythm on cardiac monitor, lung sounds are clear b/l, abd is soft and nontender with positive bowel sounds in all four quadrants, skin is warm, dry and appropriate for age and race. pt educated on plan of care, plan of care taught back to RN. proficiency determined from successful pt teach back. will continue to educate pt throughout ED stay.

## 2018-07-12 DIAGNOSIS — R42 DIZZINESS AND GIDDINESS: ICD-10-CM

## 2018-07-12 DIAGNOSIS — E78.5 HYPERLIPIDEMIA, UNSPECIFIED: ICD-10-CM

## 2018-07-12 DIAGNOSIS — R07.9 CHEST PAIN, UNSPECIFIED: ICD-10-CM

## 2018-07-12 DIAGNOSIS — I20.0 UNSTABLE ANGINA: ICD-10-CM

## 2018-07-12 DIAGNOSIS — I10 ESSENTIAL (PRIMARY) HYPERTENSION: ICD-10-CM

## 2018-07-12 DIAGNOSIS — E11.9 TYPE 2 DIABETES MELLITUS WITHOUT COMPLICATIONS: ICD-10-CM

## 2018-07-12 LAB
GLUCOSE BLDC GLUCOMTR-MCNC: 127 MG/DL — HIGH (ref 70–99)
GLUCOSE BLDC GLUCOMTR-MCNC: 137 MG/DL — HIGH (ref 70–99)
GLUCOSE BLDC GLUCOMTR-MCNC: 156 MG/DL — HIGH (ref 70–99)
GLUCOSE BLDC GLUCOMTR-MCNC: 178 MG/DL — HIGH (ref 70–99)
TROPONIN T SERPL-MCNC: <0.01 NG/ML — SIGNIFICANT CHANGE UP (ref 0–0.06)
TROPONIN T SERPL-MCNC: <0.01 NG/ML — SIGNIFICANT CHANGE UP (ref 0–0.06)

## 2018-07-12 PROCEDURE — 70450 CT HEAD/BRAIN W/O DYE: CPT | Mod: 26

## 2018-07-12 PROCEDURE — 93010 ELECTROCARDIOGRAM REPORT: CPT

## 2018-07-12 PROCEDURE — 99232 SBSQ HOSP IP/OBS MODERATE 35: CPT

## 2018-07-12 RX ORDER — SODIUM CHLORIDE 9 MG/ML
60 INJECTION, SOLUTION INTRAVENOUS
Qty: 0 | Refills: 0 | Status: DISCONTINUED | OUTPATIENT
Start: 2018-07-12 | End: 2018-07-13

## 2018-07-12 RX ORDER — AMLODIPINE BESYLATE 2.5 MG/1
10 TABLET ORAL DAILY
Qty: 0 | Refills: 0 | Status: DISCONTINUED | OUTPATIENT
Start: 2018-07-12 | End: 2018-07-14

## 2018-07-12 RX ADMIN — PANTOPRAZOLE SODIUM 40 MILLIGRAM(S): 20 TABLET, DELAYED RELEASE ORAL at 08:00

## 2018-07-12 RX ADMIN — ATORVASTATIN CALCIUM 40 MILLIGRAM(S): 80 TABLET, FILM COATED ORAL at 22:04

## 2018-07-12 RX ADMIN — Medication 50 MILLIGRAM(S): at 17:16

## 2018-07-12 RX ADMIN — Medication 50 MILLIGRAM(S): at 05:16

## 2018-07-12 RX ADMIN — Medication 81 MILLIGRAM(S): at 11:49

## 2018-07-12 RX ADMIN — VALSARTAN 160 MILLIGRAM(S): 80 TABLET ORAL at 05:16

## 2018-07-12 RX ADMIN — TICAGRELOR 90 MILLIGRAM(S): 90 TABLET ORAL at 17:16

## 2018-07-12 RX ADMIN — SODIUM CHLORIDE 60 MILLILITER(S): 9 INJECTION, SOLUTION INTRAVENOUS at 17:57

## 2018-07-12 RX ADMIN — Medication 1 INCH(S): at 09:00

## 2018-07-12 RX ADMIN — AMLODIPINE BESYLATE 10 MILLIGRAM(S): 2.5 TABLET ORAL at 05:17

## 2018-07-12 RX ADMIN — TICAGRELOR 90 MILLIGRAM(S): 90 TABLET ORAL at 05:16

## 2018-07-12 RX ADMIN — Medication 1: at 11:48

## 2018-07-12 NOTE — ED ADULT NURSE REASSESSMENT NOTE - NS ED NURSE REASSESS COMMENT FT1
4 tower called to give report. as per RN. she is getting another patient from another floor and she will call me back she is ready. charge RN made aware.

## 2018-07-12 NOTE — ED ADULT NURSE REASSESSMENT NOTE - NS ED NURSE REASSESS COMMENT FT1
pt in no apparent distress, resting comfortably, family at bedside, plan of care explained to pt, pt verbalized understanding.

## 2018-07-12 NOTE — PROGRESS NOTE ADULT - ASSESSMENT
77 years old male with PMH of HTN, DM, CAD (s/p stent few weeks ago on ASA and brilinta) and dyslipidemia presented to the ER for the evaluation of intermittent chest pain/discomfort *1 day.   Pain is 4-5/10, non radiating and associated with SOB. Patient denies any fever, chills, nausea, vomiting, headache, weakness or numbness. cardiology recommended telemetry monitoring and will discuss with interventional cardiologist further management.     Problem/Plan - 1:  ·  Problem: Chest pain.   unstable angina:  negative troponin, no ischemic EKG changes.  abnormal stress test on 7/10.  cardiology note appreciated, recommended telemetry monitoring and continue DAPT and will discuss with interventional cardiologist further management.  last echo revealed EF of 60%%.  Continue ASA, brilinta, atorvastatin, valsartan and metoprolol  further management as per cardiology.      Problem/Plan - 2:  ·  Problem: Diabetes mellitus.  Plan: on Tradjenta--held for now.  FS with SSI.      Problem/Plan - 3:  ·  Problem: Hypertension.  Plan: controlled.  continue norvasc 10 mg, valsartan 160 and metoprolol 50 mg.      Problem/Plan - 4:  ·  Problem: Hyperlipidemia.  Plan: continue atrovastatin 40 mg daily.      Problem/Plan - 5:  ·  Problem: Dizziness.  Plan: no focal neurological deficit.  CT head _ve  orthostatic BP _ve  fall precautions.  monitor clinically.      Problem/Plan - 6:  ·  KENTRELL: likely secondary to dehydration  will order 0.45 NS at 60 ml/h for 1 day  f/u bmp in am

## 2018-07-12 NOTE — ED ADULT NURSE REASSESSMENT NOTE - NS ED NURSE REASSESS COMMENT FT1
patient awake, alert. patient denies pain or discomfort at this time. patient denies chest pain. vss. as documented. awaiting bed. will continue to monitor.

## 2018-07-12 NOTE — PROGRESS NOTE ADULT - SUBJECTIVE AND OBJECTIVE BOX
GUIDO MATUTE    [id number]    77y      Male    CC:    INTERVAL HPI/OVERNIGHT EVENTS:    REVIEW OF SYSTEMS:    CONSTITUTIONAL: No fever, weight loss, or fatigue  RESPIRATORY: No cough, wheezing, hemoptysis; No shortness of breath  CARDIOVASCULAR: No chest pain, palpitations  GASTROINTESTINAL: No abdominal or epigastric pain. No nausea, vomiting  NEUROLOGICAL: No headaches, memory loss, loss of strength.  MISCELLANEOUS:      Vital Signs Last 24 Hrs  T(C): 36.8 (12 Jul 2018 11:34), Max: 37 (12 Jul 2018 08:01)  T(F): 98.2 (12 Jul 2018 11:34), Max: 98.6 (12 Jul 2018 08:01)  HR: 63 (12 Jul 2018 14:07) (57 - 86)  BP: 122/70 (12 Jul 2018 14:07) (110/55 - 156/89)  BP(mean): 88 (11 Jul 2018 23:37) (88 - 88)  RR: 18 (12 Jul 2018 14:07) (18 - 20)  SpO2: 100% (12 Jul 2018 14:07) (98% - 100%)    PHYSICAL EXAM:    GENERAL: NAD, well-groomed  HEENT: PERRL, +EOMI  NECK: soft, Supple, No JVD,   CHEST/LUNG: Clear to auscultation bilaterally; No wheezing  HEART: S1S2+, Regular rate and rhythm; No murmurs, rubs, or gallops  ABDOMEN: Soft, Nontender, Nondistended; Bowel sounds present  EXTREMITIES:  2+ Peripheral Pulses, No clubbing, cyanosis, or edema  SKIN: No rashes or lesions  NEURO: AAOX3, no focal deficits, no motor r sensory loss  PSYCH: normal mood      LABS:                        12.9   7.5   )-----------( 272      ( 11 Jul 2018 21:16 )             37.9     07-11    135  |  101  |  30.0<H>  ----------------------------<  173<H>  5.2   |  21.0<L>  |  1.47<H>    Ca    9.4      11 Jul 2018 21:16    TPro  7.0  /  Alb  3.9  /  TBili  0.3<L>  /  DBili  x   /  AST  16  /  ALT  14  /  AlkPhos  95  07-11            MEDICATIONS  (STANDING):  amLODIPine   Tablet 10 milliGRAM(s) Oral daily  aspirin  chewable 81 milliGRAM(s) Oral daily  atorvastatin 40 milliGRAM(s) Oral at bedtime  dextrose 5%. 1000 milliLiter(s) (50 mL/Hr) IV Continuous <Continuous>  dextrose 50% Injectable 12.5 Gram(s) IV Push once  dextrose 50% Injectable 25 Gram(s) IV Push once  dextrose 50% Injectable 25 Gram(s) IV Push once  insulin lispro (HumaLOG) corrective regimen sliding scale   SubCutaneous three times a day before meals  metoprolol tartrate 50 milliGRAM(s) Oral two times a day  pantoprazole    Tablet 40 milliGRAM(s) Oral before breakfast  sodium chloride 0.45%. 60 milliLiter(s) (60 mL/Hr) IV Continuous <Continuous>  ticagrelor 90 milliGRAM(s) Oral two times a day  valsartan 160 milliGRAM(s) Oral daily    MEDICATIONS  (PRN):  dextrose 40% Gel 15 Gram(s) Oral once PRN Blood Glucose LESS THAN 70 milliGRAM(s)/deciliter  glucagon  Injectable 1 milliGRAM(s) IntraMuscular once PRN Glucose LESS THAN 70 milligrams/deciliter  nitroglycerin     SubLingual 0.4 milliGRAM(s) SubLingual every 5 minutes PRN Chest Pain      RADIOLOGY & ADDITIONAL TESTS: GUIDO MATUTE    [id number]    77y      Male    CC: chest pain    INTERVAL HPI/OVERNIGHT EVENTS: no acute overnight events. No chest pain, sob, orthopnea.    REVIEW OF SYSTEMS:    CONSTITUTIONAL: No fever, weight loss, or fatigue  RESPIRATORY: No cough, wheezing, hemoptysis; No shortness of breath  CARDIOVASCULAR: No chest pain, palpitations  GASTROINTESTINAL: No abdominal or epigastric pain. No nausea, vomiting  NEUROLOGICAL: No headaches, memory loss, loss of strength.  MISCELLANEOUS: no edema      Vital Signs Last 24 Hrs  T(C): 36.8 (12 Jul 2018 11:34), Max: 37 (12 Jul 2018 08:01)  T(F): 98.2 (12 Jul 2018 11:34), Max: 98.6 (12 Jul 2018 08:01)  HR: 63 (12 Jul 2018 14:07) (57 - 86)  BP: 122/70 (12 Jul 2018 14:07) (110/55 - 156/89)  BP(mean): 88 (11 Jul 2018 23:37) (88 - 88)  RR: 18 (12 Jul 2018 14:07) (18 - 20)  SpO2: 100% (12 Jul 2018 14:07) (98% - 100%)    PHYSICAL EXAM:    GENERAL: NAD, well-groomed  HEENT: PERRL, +EOMI  NECK: soft, Supple, No JVD,   CHEST/LUNG: Clear to auscultation bilaterally; No wheezing  HEART: S1S2+, Regular rate and rhythm; No murmurs, rubs, or gallops  ABDOMEN: Soft, Nontender, Nondistended; Bowel sounds present  EXTREMITIES:  2+ Peripheral Pulses, No clubbing, cyanosis, or edema  SKIN: No rashes or lesions  NEURO: AAOX3, no focal deficits, no motor r sensory loss  PSYCH: normal mood      LABS:                        12.9   7.5   )-----------( 272      ( 11 Jul 2018 21:16 )             37.9     07-11    135  |  101  |  30.0<H>  ----------------------------<  173<H>  5.2   |  21.0<L>  |  1.47<H>    Ca    9.4      11 Jul 2018 21:16    TPro  7.0  /  Alb  3.9  /  TBili  0.3<L>  /  DBili  x   /  AST  16  /  ALT  14  /  AlkPhos  95  07-11            MEDICATIONS  (STANDING):  amLODIPine   Tablet 10 milliGRAM(s) Oral daily  aspirin  chewable 81 milliGRAM(s) Oral daily  atorvastatin 40 milliGRAM(s) Oral at bedtime  dextrose 5%. 1000 milliLiter(s) (50 mL/Hr) IV Continuous <Continuous>  dextrose 50% Injectable 12.5 Gram(s) IV Push once  dextrose 50% Injectable 25 Gram(s) IV Push once  dextrose 50% Injectable 25 Gram(s) IV Push once  insulin lispro (HumaLOG) corrective regimen sliding scale   SubCutaneous three times a day before meals  metoprolol tartrate 50 milliGRAM(s) Oral two times a day  pantoprazole    Tablet 40 milliGRAM(s) Oral before breakfast  sodium chloride 0.45%. 60 milliLiter(s) (60 mL/Hr) IV Continuous <Continuous>  ticagrelor 90 milliGRAM(s) Oral two times a day  valsartan 160 milliGRAM(s) Oral daily    MEDICATIONS  (PRN):  dextrose 40% Gel 15 Gram(s) Oral once PRN Blood Glucose LESS THAN 70 milliGRAM(s)/deciliter  glucagon  Injectable 1 milliGRAM(s) IntraMuscular once PRN Glucose LESS THAN 70 milligrams/deciliter  nitroglycerin     SubLingual 0.4 milliGRAM(s) SubLingual every 5 minutes PRN Chest Pain      RADIOLOGY & ADDITIONAL TESTS:

## 2018-07-13 ENCOUNTER — TRANSCRIPTION ENCOUNTER (OUTPATIENT)
Age: 78
End: 2018-07-13

## 2018-07-13 LAB
ALBUMIN SERPL ELPH-MCNC: 3.4 G/DL — SIGNIFICANT CHANGE UP (ref 3.3–5.2)
ALP SERPL-CCNC: 83 U/L — SIGNIFICANT CHANGE UP (ref 40–120)
ALT FLD-CCNC: 10 U/L — SIGNIFICANT CHANGE UP
ANION GAP SERPL CALC-SCNC: 13 MMOL/L — SIGNIFICANT CHANGE UP (ref 5–17)
AST SERPL-CCNC: 13 U/L — SIGNIFICANT CHANGE UP
BILIRUB SERPL-MCNC: 0.5 MG/DL — SIGNIFICANT CHANGE UP (ref 0.4–2)
BUN SERPL-MCNC: 26 MG/DL — HIGH (ref 8–20)
CALCIUM SERPL-MCNC: 9.2 MG/DL — SIGNIFICANT CHANGE UP (ref 8.6–10.2)
CHLORIDE SERPL-SCNC: 103 MMOL/L — SIGNIFICANT CHANGE UP (ref 98–107)
CO2 SERPL-SCNC: 23 MMOL/L — SIGNIFICANT CHANGE UP (ref 22–29)
CREAT SERPL-MCNC: 1.2 MG/DL — SIGNIFICANT CHANGE UP (ref 0.5–1.3)
GLUCOSE BLDC GLUCOMTR-MCNC: 100 MG/DL — HIGH (ref 70–99)
GLUCOSE BLDC GLUCOMTR-MCNC: 111 MG/DL — HIGH (ref 70–99)
GLUCOSE BLDC GLUCOMTR-MCNC: 137 MG/DL — HIGH (ref 70–99)
GLUCOSE BLDC GLUCOMTR-MCNC: 145 MG/DL — HIGH (ref 70–99)
GLUCOSE SERPL-MCNC: 133 MG/DL — HIGH (ref 70–115)
HCT VFR BLD CALC: 35.5 % — LOW (ref 42–52)
HGB BLD-MCNC: 12 G/DL — LOW (ref 14–18)
MCHC RBC-ENTMCNC: 26.7 PG — LOW (ref 27–31)
MCHC RBC-ENTMCNC: 33.8 G/DL — SIGNIFICANT CHANGE UP (ref 32–36)
MCV RBC AUTO: 78.9 FL — LOW (ref 80–94)
PLATELET # BLD AUTO: 272 K/UL — SIGNIFICANT CHANGE UP (ref 150–400)
POTASSIUM SERPL-MCNC: 4.4 MMOL/L — SIGNIFICANT CHANGE UP (ref 3.5–5.3)
POTASSIUM SERPL-SCNC: 4.4 MMOL/L — SIGNIFICANT CHANGE UP (ref 3.5–5.3)
PROT SERPL-MCNC: 6.2 G/DL — LOW (ref 6.6–8.7)
RBC # BLD: 4.5 M/UL — LOW (ref 4.6–6.2)
RBC # FLD: 14.7 % — SIGNIFICANT CHANGE UP (ref 11–15.6)
SODIUM SERPL-SCNC: 139 MMOL/L — SIGNIFICANT CHANGE UP (ref 135–145)
TROPONIN T SERPL-MCNC: <0.01 NG/ML — SIGNIFICANT CHANGE UP (ref 0–0.06)
WBC # BLD: 5.7 K/UL — SIGNIFICANT CHANGE UP (ref 4.8–10.8)
WBC # FLD AUTO: 5.7 K/UL — SIGNIFICANT CHANGE UP (ref 4.8–10.8)

## 2018-07-13 PROCEDURE — 99232 SBSQ HOSP IP/OBS MODERATE 35: CPT

## 2018-07-13 PROCEDURE — 93010 ELECTROCARDIOGRAM REPORT: CPT

## 2018-07-13 RX ORDER — METOPROLOL TARTRATE 50 MG
50 TABLET ORAL AT BEDTIME
Qty: 0 | Refills: 0 | Status: DISCONTINUED | OUTPATIENT
Start: 2018-07-13 | End: 2018-07-14

## 2018-07-13 RX ORDER — SODIUM CHLORIDE 9 MG/ML
1000 INJECTION INTRAMUSCULAR; INTRAVENOUS; SUBCUTANEOUS
Qty: 0 | Refills: 0 | Status: COMPLETED | OUTPATIENT
Start: 2018-07-13 | End: 2018-07-13

## 2018-07-13 RX ORDER — METOPROLOL TARTRATE 50 MG
100 TABLET ORAL DAILY
Qty: 0 | Refills: 0 | Status: DISCONTINUED | OUTPATIENT
Start: 2018-07-14 | End: 2018-07-14

## 2018-07-13 RX ORDER — SODIUM CHLORIDE 9 MG/ML
1000 INJECTION, SOLUTION INTRAVENOUS
Qty: 0 | Refills: 0 | Status: DISCONTINUED | OUTPATIENT
Start: 2018-07-13 | End: 2018-07-14

## 2018-07-13 RX ADMIN — Medication 81 MILLIGRAM(S): at 08:59

## 2018-07-13 RX ADMIN — TICAGRELOR 90 MILLIGRAM(S): 90 TABLET ORAL at 05:26

## 2018-07-13 RX ADMIN — Medication 50 MILLIGRAM(S): at 05:26

## 2018-07-13 RX ADMIN — VALSARTAN 160 MILLIGRAM(S): 80 TABLET ORAL at 05:26

## 2018-07-13 RX ADMIN — ATORVASTATIN CALCIUM 40 MILLIGRAM(S): 80 TABLET, FILM COATED ORAL at 21:49

## 2018-07-13 RX ADMIN — TICAGRELOR 90 MILLIGRAM(S): 90 TABLET ORAL at 18:21

## 2018-07-13 RX ADMIN — SODIUM CHLORIDE 30 MILLILITER(S): 9 INJECTION INTRAMUSCULAR; INTRAVENOUS; SUBCUTANEOUS at 18:22

## 2018-07-13 RX ADMIN — PANTOPRAZOLE SODIUM 40 MILLIGRAM(S): 20 TABLET, DELAYED RELEASE ORAL at 05:26

## 2018-07-13 RX ADMIN — Medication 50 MILLIGRAM(S): at 21:49

## 2018-07-13 RX ADMIN — AMLODIPINE BESYLATE 10 MILLIGRAM(S): 2.5 TABLET ORAL at 05:26

## 2018-07-13 NOTE — DISCHARGE NOTE ADULT - CARE PLAN
Principal Discharge DX:	Chest pain  Goal:	ADL without chest pain  Assessment and plan of treatment:	No heavy lifting, driving, sex, tub baths, swimming, or any activity that submerges the lower half of the body in water for 48 hours.  Limited walking and stairs for 48 hours.    Change the bandaid after 24 hours and every 24 hours after that.  Keep the puncture site dry and covered with a bandaid until a scab forms.    Observe the site frequently.  If bleeding or a large lump (the size of a golf ball or bigger) occurs lie flat, apply continuous direct pressure just above the puncture site for at least 10 minutes, and notify your physician immediately.  If the bleeding cannot be controlled, call 911 immediately for assistance.  Notify your physician of pain, swelling or any drainage.    Notify your physician immediately if coldness, numbness, discoloration or pain in your foot occurs.

## 2018-07-13 NOTE — PROGRESS NOTE ADULT - SUBJECTIVE AND OBJECTIVE BOX
GUIDO MATUTE    [id number]    77y      Male    CC: chest pain    INTERVAL HPI/OVERNIGHT EVENTS: No acute overnight events. Denied chest pain, sob.     REVIEW OF SYSTEMS:    CONSTITUTIONAL: No fever, weight loss, or fatigue  RESPIRATORY: No cough, wheezing, hemoptysis; No shortness of breath  CARDIOVASCULAR: No active chest pain, palpitations  GASTROINTESTINAL: No abdominal or epigastric pain. No nausea, vomiting  NEUROLOGICAL: No headaches, memory loss, loss of strength.  MISCELLANEOUS:      Vital Signs Last 24 Hrs  T(C): 36.7 (13 Jul 2018 05:23), Max: 36.9 (12 Jul 2018 15:36)  T(F): 98 (13 Jul 2018 05:23), Max: 98.5 (12 Jul 2018 17:42)  HR: 60 (13 Jul 2018 09:03) (56 - 67)  BP: 131/67 (13 Jul 2018 09:03) (122/70 - 152/81)  BP(mean): --  RR: 18 (13 Jul 2018 09:03) (18 - 18)  SpO2: 98% (13 Jul 2018 09:03) (97% - 100%)    PHYSICAL EXAM:    GENERAL: NAD, well-groomed  HEENT: PERRL, +EOMI  NECK: soft, Supple, No JVD,   CHEST/LUNG: Clear to auscultation bilaterally; No wheezing  HEART: S1S2+, Regular rate and rhythm; No murmurs, rubs, or gallops  ABDOMEN: Soft, Nontender, Nondistended; Bowel sounds present  EXTREMITIES:  2+ Peripheral Pulses, No clubbing, cyanosis, or edema  SKIN: No rashes or lesions  NEURO: AAOX3, no focal deficits, no motor r sensory loss  PSYCH: normal mood      LABS:                        12.0   5.7   )-----------( 272      ( 13 Jul 2018 05:48 )             35.5     07-13    139  |  103  |  26.0<H>  ----------------------------<  133<H>  4.4   |  23.0  |  1.20    Ca    9.2      13 Jul 2018 05:48    TPro  6.2<L>  /  Alb  3.4  /  TBili  0.5  /  DBili  x   /  AST  13  /  ALT  10  /  AlkPhos  83  07-13            MEDICATIONS  (STANDING):  amLODIPine   Tablet 10 milliGRAM(s) Oral daily  aspirin  chewable 81 milliGRAM(s) Oral daily  atorvastatin 40 milliGRAM(s) Oral at bedtime  dextrose 5%. 1000 milliLiter(s) (50 mL/Hr) IV Continuous <Continuous>  dextrose 50% Injectable 12.5 Gram(s) IV Push once  dextrose 50% Injectable 25 Gram(s) IV Push once  dextrose 50% Injectable 25 Gram(s) IV Push once  insulin lispro (HumaLOG) corrective regimen sliding scale   SubCutaneous three times a day before meals  metoprolol tartrate 50 milliGRAM(s) Oral two times a day  pantoprazole    Tablet 40 milliGRAM(s) Oral before breakfast  sodium chloride 0.45%. 1000 milliLiter(s) (60 mL/Hr) IV Continuous <Continuous>  ticagrelor 90 milliGRAM(s) Oral two times a day  valsartan 160 milliGRAM(s) Oral daily    MEDICATIONS  (PRN):  dextrose 40% Gel 15 Gram(s) Oral once PRN Blood Glucose LESS THAN 70 milliGRAM(s)/deciliter  glucagon  Injectable 1 milliGRAM(s) IntraMuscular once PRN Glucose LESS THAN 70 milligrams/deciliter  nitroglycerin     SubLingual 0.4 milliGRAM(s) SubLingual every 5 minutes PRN Chest Pain      RADIOLOGY & ADDITIONAL TESTS:

## 2018-07-13 NOTE — DISCHARGE NOTE ADULT - NS AS ACTIVITY OBS
Walking-Indoors allowed/Do not make important decisions/Sex allowed/Showering allowed/Walking-Outdoors allowed/Stairs allowed/No Heavy lifting/straining/Do not drive or operate machinery

## 2018-07-13 NOTE — PROGRESS NOTE ADULT - ASSESSMENT
77 years old male with PMH of HTN, DM, CAD (s/p recent PCI of LAD few weeks ago on ASA and brilinta with moderate disease in his LCX synstem ) and dyslipidemia presented to the ER for the evaluation of intermittent chest pain/discomfort *1 day.   Pain is 4-5/10, non radiating and associated with SOB. Patient denies any fever, chills, nausea, vomiting, headache, weakness or numbness. cardiology recommended telemetry monitoring and will discuss with interventional cardiologist further management. He Had ad a myoview by his primary cardiologist that confirmed the presence of lateral ischemia. Presently free of symptoms. cardiology scheduled cardiac cath today.       Problem/Plan - 1:  ·  Problem: Chest pain.   unstable angina:  negative troponin, no ischemic EKG changes.  Abnormal stress test on 7/10. Hx of recent PCI of his LAD earlier this month with moderate disease in his LCX system had a myoview by his primary cardiologist that confirmed the presence of lateral ischemia.   Is scheduled for cardiac cath today, will follow up the results  Continue ASA, brilinta, atorvastatin, valsartan and metoprolol  further management as per cardiology.      Problem/Plan - 2:  ·  Problem: Diabetes mellitus.  Plan: on Tradjenta--held for now.  FS with SSI.      Problem/Plan - 3:  ·  Problem: Hypertension.  Plan: controlled.  continue Norvasc 10 mg, valsartan 160 and metoprolol 50 mg.      Problem/Plan - 4:  ·  Problem: Hyperlipidemia.  Plan: continue Atorvastatin 40 mg daily.      Problem/Plan - 5:  ·  Problem: Dizziness.  Plan: no focal neurological deficit.  CT head _ve  orthostatic BP _ve  fall precautions.  monitor clinically.      Problem/Plan - 6:  ·  KENTRELL: improved today after hydration.  will continue 0.45 NS for 1 more day a he is going for cardiac cath. 77 years old male with PMH of HTN, DM, CAD (s/p recent PCI of LAD few weeks ago on ASA and brilinta with moderate disease in his LCX synstem ) and dyslipidemia presented to the ER for the evaluation of intermittent chest pain/discomfort *1 day.   Pain is 4-5/10, non radiating and associated with SOB. Patient denies any fever, chills, nausea, vomiting, headache, weakness or numbness. cardiology recommended telemetry monitoring and will discuss with interventional cardiologist further management. He Had ad a myoview by his primary cardiologist that confirmed the presence of lateral ischemia. Presently free of symptoms. cardiology scheduled cardiac cath today.       Problem/Plan - 1:  ·  Problem: Chest pain.   unstable angina:  negative troponin, no ischemic EKG changes.  Abnormal stress test on 7/10. Hx of recent PCI of his LAD earlier this month with moderate disease in his LCX system had a myoview by his primary cardiologist that confirmed the presence of lateral ischemia.   Is scheduled for cardiac cath today, will follow up the results. Site check after cardiac cath.  Continue ASA, brilinta, atorvastatin, valsartan and metoprolol  further management as per cardiology.      Problem/Plan - 2:  ·  Problem: Diabetes mellitus.  Plan: on Tradjenta--held for now.  FS with SSI.      Problem/Plan - 3:  ·  Problem: Hypertension.  Plan: controlled.  continue Norvasc 10 mg, valsartan 160 and metoprolol 50 mg.      Problem/Plan - 4:  ·  Problem: Hyperlipidemia.  Plan: continue Atorvastatin 40 mg daily.      Problem/Plan - 5:  ·  Problem: Dizziness.  Plan: no focal neurological deficit.  CT head _ve  orthostatic BP _ve  fall precautions.  monitor clinically.      Problem/Plan - 6:  ·  KENTRELL: improved today after hydration.  will continue 0.45 NS for 1 more day a he is going for cardiac cath.    DVT prophylaxis: SCD, no need for lovenox as patient is ambulatory and on DAPT and scheduled for cardiac cath today  GI prophylaxis: not needed  DC planning: according to the results of cardiac cath.

## 2018-07-13 NOTE — PROGRESS NOTE ADULT - SUBJECTIVE AND OBJECTIVE BOX
cONTINUE History of Present Illness: 	  77 years old male with PMH of HTN, DM, CAD and dyslipidemia presented to the ER for the evaluation of intermittent chest pain/discomfort this morning. Pain is 4-5/10, non radiating and associated with SOB. He also c/o dizziness since this morning. Patient was seen by his cardiologist yesterday and had a nuclear stress test showing lateral ischemia. Patient denies any fever, chills, nausea, vomiting, headache, weakness or numbness. Patient was recently admitted to [facility] and had PCI.  He was discharged home on 7/4.     S/P < from: Cardiac Cath Lab - Adult (07.13.18 @ 15:31) >   Cardiac catheterization performed electively. Coronary  intervention performed electively.  Local anesthetic given. Right femoral artery access. Left coronary artery  angiography. The vessel was injected utilizing a catheter. Right coronary  artery angiography. The vessel was injected utilizing a catheter.  RADIATION EXPOSURE: 5.63 min. A drug-eluting stent was performed on the 70  % lesion in the 1st obtuse marginal. Following intervention there was a 1  % residual stenosis. There was no dissection. Balloon angioplasty was  performed, using a EUPHORA 3.0MM X 15MM balloon, with 1 inflations and a  maximum inflation pressure of 12 anna. During the procedure, the previous  guider was changed for a 6F EBU4.0 LAUNCHER guider, and a new BMW 190CM  WIRE wire was advanced across the lesion. A PRAVEEN 3.50 X 15MM drug-eluting  stent was placed across the lesion and deployed at a maximum inflation  pressure of 12 anna. Balloon angioplasty was performed, using a NC EMERGE  3.50 X 8MM balloon, with 2 inflations and a maximum inflation pressure of  18 anna. Sheath Exchange for Intervention.  CONTRAST GIVEN: Omnipaque 37 ml. Omnipaque 49 ml.  MEDICATIONS GIVEN: Midazolam, 1 mg, IV. Fentanyl, 25 mcg, IV. Heparin, 6000  units, IV. 1% Lidocaine, 10 ml, subcutaneously.  VENTRICLES: No LV gram ( Cr 1.2 ).  CORONARY VESSELS: R dominant.  Large RCA w/ moderate diffuse disease.  Moderate sized LCx w/ calcified 70% OM1 stenosis w/ large distal vessel.  OM2 w/ moderate disease.  Large LAD w/ patent mid vessel stent.  CX:   --  OM1: There was a 70 % stenosis.  COMPLICATIONS: No complications occurred during the cath lab visit.  DIAGNOSTIC IMPRESSIONS: ICS x 1 to OM1 w/ 0% residual stenosis and THOR III  flow maintained thruout.  Patent LAD stent.  DIAGNOSTIC RECOMMENDATIONS: Asa/brilinta.  PET/followup 4 weeks.  INTERVENTIONAL IMPRESSIONS: ICS x 1 to OM1 w/ 0% residual stenosis and THOR  III flow maintained thruout.  Patent LAD stent.  Continue aspirin and brilinta as ordered  D/C sheath at 630 pm if ACT < 180  Bedrest 6 hours post procedure  Then as tolerated  Will monitor for change  As per Dr Machado Increase lopressor to 100mg AM and 50mg pm. cONTINUE History of Present Illness: 	  77 years old male with PMH of HTN, DM, CAD and dyslipidemia presented to the ER for the evaluation of intermittent chest pain/discomfort this morning. Pain is 4-5/10, non radiating and associated with SOB. He also c/o dizziness since this morning. Patient was seen by his cardiologist yesterday and had a nuclear stress test showing lateral ischemia. Patient denies any fever, chills, nausea, vomiting, headache, weakness or numbness. Patient was recently admitted to [facility] and had PCI.  He was discharged home on 7/4.     S/P < from: Cardiac Cath Lab - Adult (07.13.18 @ 15:31) >   Cardiac catheterization performed electively. Coronary  intervention performed electively.  Local anesthetic given. Right femoral artery access. Left coronary artery  angiography. The vessel was injected utilizing a catheter. Right coronary  artery angiography. The vessel was injected utilizing a catheter.  RADIATION EXPOSURE: 5.63 min. A drug-eluting stent was performed on the 70  % lesion in the 1st obtuse marginal. Following intervention there was a 1  % residual stenosis. There was no dissection. Balloon angioplasty was  performed, using a EUPHORA 3.0MM X 15MM balloon, with 1 inflations and a  maximum inflation pressure of 12 anna. During the procedure, the previous  guider was changed for a 6F EBU4.0 LAUNCHER guider, and a new BMW 190CM  WIRE wire was advanced across the lesion. A PRAVEEN 3.50 X 15MM drug-eluting  stent was placed across the lesion and deployed at a maximum inflation  pressure of 12 anna. Balloon angioplasty was performed, using a NC EMERGE  3.50 X 8MM balloon, with 2 inflations and a maximum inflation pressure of  18 anna. Sheath Exchange for Intervention.  CONTRAST GIVEN: Omnipaque 37 ml. Omnipaque 49 ml.  MEDICATIONS GIVEN: Midazolam, 1 mg, IV. Fentanyl, 25 mcg, IV. Heparin, 6000  units, IV. 1% Lidocaine, 10 ml, subcutaneously.  VENTRICLES: No LV gram ( Cr 1.2 ).  CORONARY VESSELS: R dominant.  Large RCA w/ moderate diffuse disease.  Moderate sized LCx w/ calcified 70% OM1 stenosis w/ large distal vessel.  OM2 w/ moderate disease.  Large LAD w/ patent mid vessel stent.  CX:   --  OM1: There was a 70 % stenosis.  COMPLICATIONS: No complications occurred during the cath lab visit.  DIAGNOSTIC IMPRESSIONS: ICS x 1 to OM1 w/ 0% residual stenosis and THOR III  flow maintained thruout.  Patent LAD stent.  DIAGNOSTIC RECOMMENDATIONS: Asa/brilinta.  PET/followup 4 weeks.  INTERVENTIONAL IMPRESSIONS: ICS x 1 to OM1 w/ 0% residual stenosis and THOR  III flow maintained thruout.  Patent LAD stent.  Continue aspirin and brilinta as ordered  D/C sheath at 630 pm if ACT < 180  Bedrest 6 hours post procedure  Then as tolerated  Will monitor for change  As per Dr Machado Increase lopressor to 100mg AM and 50mg pm.  Post EKG NSR no acute ST changes.  Will monitor.

## 2018-07-13 NOTE — PROGRESS NOTE ADULT - SUBJECTIVE AND OBJECTIVE BOX
[place] [facility]                                   [address] [place] [address]                                                      PHONE: [phone]                                                         FAX: [phone]  [email or web address]  ---------------------------------------------------------------------------------------------------------------------------------    Reason for Consult: CP    HPI:  GUIDO MATUTE is an 77y Male with Hx of recent PCI of his LAD earlier this month with moderate disease in his LCX system presenting to [facility] c/o an episode of CP yesterday at rest that resolved after he took SL NTG had a myoview by his primary cardiologist that confirmed the presence of lateral ischemia. Presently free of symptoms , Discussed with daughter at bedside    PAST MEDICAL & SURGICAL HISTORY:  Sickle cell anemia: Carrier of the disease, presently doesn&#x27;t have it  Asthma, mild intermittent, uncomplicated  Other hemorrhoids  Cancer: protate  Diabetes mellitus  Hyperlipidemia  Hypertension  S/P prostatectomy  Inguinal hernia      aspirin (Stomach Upset)  EGGPLANT (Hives)  No Known Drug Allergies  strawberry (Hives)      MEDICATIONS  (STANDING):  amLODIPine   Tablet 10 milliGRAM(s) Oral daily  aspirin  chewable 81 milliGRAM(s) Oral daily  atorvastatin 40 milliGRAM(s) Oral at bedtime  dextrose 5%. 1000 milliLiter(s) (50 mL/Hr) IV Continuous <Continuous>  dextrose 50% Injectable 12.5 Gram(s) IV Push once  dextrose 50% Injectable 25 Gram(s) IV Push once  dextrose 50% Injectable 25 Gram(s) IV Push once  insulin lispro (HumaLOG) corrective regimen sliding scale   SubCutaneous three times a day before meals  metoprolol tartrate 50 milliGRAM(s) Oral two times a day  pantoprazole    Tablet 40 milliGRAM(s) Oral before breakfast  ticagrelor 90 milliGRAM(s) Oral two times a day  valsartan 160 milliGRAM(s) Oral daily    MEDICATIONS  (PRN):  dextrose 40% Gel 15 Gram(s) Oral once PRN Blood Glucose LESS THAN 70 milliGRAM(s)/deciliter  glucagon  Injectable 1 milliGRAM(s) IntraMuscular once PRN Glucose LESS THAN 70 milligrams/deciliter  nitroglycerin     SubLingual 0.4 milliGRAM(s) SubLingual every 5 minutes PRN Chest Pain      Social History:  Cigarettes:                    Alchohol:                 Illicit Drug Abuse:      REVIEW OF SYSTEMS:    Constitutional: No fever, weight loss or fatigue  Eyes: No eye pain, visual disturbances, or discharge  ENMT:  No difficulty hearing, tinnitus, vertigo; No sinus or throat pain  Neck: No pain or stiffness  Respiratory: No cough, wheezing, chills or hemoptysis  Cardiovascular: No chest pain, palpitations, shortness of breath, dizziness or leg swelling  Gastrointestinal: No abdominal or epigastric pain. No nausea, vomiting or hematemesis; No diarrhea or constipation. No melena or hematochezia.  Genitourinary: No dysuria, frequency, hematuria or incontinence  Rectal: No pain, hemorrhoids or incontinence  Neurological: No headaches, memory loss, loss of strength, numbness or tremors  Skin: No itching, burning, rashes or lesions   Lymph Nodes: No enlarged glands  Endocrine: No heat or cold intolerance; No hair loss  Musculoskeletal: No joint pain or swelling; No muscle, back or extremity pain  Psychiatric: No depression, anxiety, mood swings or difficulty sleeping  Heme/Lymph: No easy bruising or bleeding gums  Allergy and Immunologic: No hives or eczema    Vital Signs Last 24 Hrs  T(C): 36.8 (12 Jul 2018 11:34), Max: 37 (12 Jul 2018 08:01)  T(F): 98.2 (12 Jul 2018 11:34), Max: 98.6 (12 Jul 2018 08:01)  HR: 70 (12 Jul 2018 11:34) (57 - 86)  BP: 110/55 (12 Jul 2018 11:34) (110/55 - 156/89)  BP(mean): 88 (11 Jul 2018 23:37) (88 - 88)  RR: 18 (12 Jul 2018 11:34) (18 - 20)  SpO2: 98% (12 Jul 2018 11:34) (98% - 100%)  ICU Vital Signs Last 24 Hrs  GUIDO MATUTE  I&O's Detail    I&O's Summary    Drug Dosing Weight  GUIDO MATUTE      PHYSICAL EXAM:  General: Appears well developed, well nourished alert and cooperative.  HEENT: Head; normocephalic, atraumatic.  Eyes: Pupils reactive, cornea wnl.  Neck: Supple, no nodes adenopathy, no NVD or carotid bruit or thyromegaly.  CARDIOVASCULAR: Normal S1 and S2, No murmur, rub, gallop or lift.   LUNGS: No rales, rhonchi or wheeze. Normal breath sounds bilaterally.  ABDOMEN: Soft, nontender without mass or organomegaly. bowel sounds normoactive.  EXTREMITIES: No clubbing, cyanosis or edema. Distal pulses wnl.   SKIN: warm and dry with normal turgor.  NEURO: Alert/oriented x 3/normal motor exam. No pathologic reflexes.    PSYCH: normal affect.        LABS:                        12.9   7.5   )-----------( 272      ( 11 Jul 2018 21:16 )             37.9     07-11    135  |  101  |  30.0<H>  ----------------------------<  173<H>  5.2   |  21.0<L>  |  1.47<H>    Ca    9.4      11 Jul 2018 21:16    TPro  7.0  /  Alb  3.9  /  TBili  0.3<L>  /  DBili  x   /  AST  16  /  ALT  14  /  AlkPhos  95  07-11    GUIDO MATUTE  CARDIAC MARKERS ( 12 Jul 2018 08:10 )  x     / <0.01 ng/mL / x     / x     / x      CARDIAC MARKERS ( 12 Jul 2018 01:13 )  x     / <0.01 ng/mL / x     / x     / x      CARDIAC MARKERS ( 11 Jul 2018 21:16 )  x     / <0.01 ng/mL / 67 U/L / x     / x                RADIOLOGY & ADDITIONAL STUDIES:    INTERPRETATION OF TELEMETRY (personally reviewed):    ECG: NSR      ACTIVE PROBLEMS:  HEALTH ISSUES - PROBLEM Dx:  Dizziness: Dizziness  Hyperlipidemia: Hyperlipidemia  Hypertension: Hypertension  Diabetes mellitus: Diabetes mellitus  Chest pain: Chest pain          Assessment and Plan:    In summary,  GUIDO MATUTE is an 77y Male with Hx of recent PCI of his LAD earlier this month with moderate disease in his LCX system presenting to [facility] c/o an episode of CP yesterday at rest that resolved after he took SL NTG had a myoview by his primary cardiologist that confirmed the presence of lateral ischemia. Presently free of symptoms , Discussed with daughter at bedside    Telemetry monitoring  Cardiac cath today  Discussed with daughter over the phone    NELLIE PRECIADO MD, FACC, K[place]ASH

## 2018-07-13 NOTE — DISCHARGE NOTE ADULT - PATIENT PORTAL LINK FT
You can access the [facility], offered by [facility], by registering with the following website: [email or web address]

## 2018-07-13 NOTE — PROGRESS NOTE ADULT - NSHPATTENDINGPLANDISCUSS_GEN_ALL_CORE
patient and patient daughter in detail. Both verbalized understanding.
patient and daughter in detail. Both verbalized understanding.

## 2018-07-13 NOTE — PROGRESS NOTE ADULT - SUBJECTIVE AND OBJECTIVE BOX
Post sheath pull  Patient with a #6 RFA sheath pulled at 1900. Manual pressure held for 17 minutes. Hemostasis achieved. No bleeding, bruising or hematoma. Distal pulses present. Patient with stable vitals. No pain. There are 2 little bumps-lymph node vs. scar tissue at the site which is unchanged from pre cath.

## 2018-07-13 NOTE — DISCHARGE NOTE ADULT - PLAN OF CARE
ADL without chest pain No heavy lifting, driving, sex, tub baths, swimming, or any activity that submerges the lower half of the body in water for 48 hours.  Limited walking and stairs for 48 hours.    Change the bandaid after 24 hours and every 24 hours after that.  Keep the puncture site dry and covered with a bandaid until a scab forms.    Observe the site frequently.  If bleeding or a large lump (the size of a golf ball or bigger) occurs lie flat, apply continuous direct pressure just above the puncture site for at least 10 minutes, and notify your physician immediately.  If the bleeding cannot be controlled, call 911 immediately for assistance.  Notify your physician of pain, swelling or any drainage.    Notify your physician immediately if coldness, numbness, discoloration or pain in your foot occurs.

## 2018-07-13 NOTE — DISCHARGE NOTE ADULT - HOSPITAL COURSE
77 years old male with PMH of HTN, DM, CAD (s/p recent PCI of LAD few weeks ago on ASA and brilinta with moderate disease in his LCX synstem ) and dyslipidemia presented to the ER for the evaluation of intermittent chest pain/discomfort *1 day.   Pain is 4-5/10, non radiating and associated with SOB. Patient denies any fever, chills, nausea, vomiting, headache, weakness or numbness. cardiology recommended telemetry monitoring and will discuss with interventional cardiologist further management. He Had ad a myoview by his primary cardiologist that confirmed the presence of lateral ischemia. Presently free of symptoms. cardiology scheduled cardiac cath today.       Problem/Plan - 1:  ·  Problem: Chest pain.   unstable angina:  negative troponin, no ischemic EKG changes.  Abnormal stress test on 7/10. Hx of recent PCI of his LAD earlier this month with moderate disease in his LCX system had a myoview by his primary cardiologist that confirmed the presence of lateral ischemia.   Is scheduled for cardiac cath today, will follow up the results. Site check after cardiac cath.  Continue ASA, brilinta, atorvastatin, valsartan and metoprolol  further management as per cardiology.      Problem/Plan - 2:  ·  Problem: Diabetes mellitus.  Plan: on Tradjenta- resume as an outpatient  FS with SSI.      Problem/Plan - 3:  ·  Problem: Hypertension.  Plan: controlled.  continue Norvasc 10 mg, valsartan 160 and metoprolol 50 mg.      Problem/Plan - 4:  ·  Problem: Hyperlipidemia.  Plan: continue Atorvastatin 40 mg daily.      Problem/Plan - 5:  ·  Problem: Dizziness.  Plan: no focal neurological deficit.  CT head _ve  orthostatic BP _ve  fall precautions.  monitor clinically.      Problem/Plan - 6:  ·  KENTRELL: improved today after hydration.  will continue 0.45 NS for 1 more day a he is going for cardiac cath.    DVT prophylaxis: SCD, no need for lovenox as patient is ambulatory and on DAPT and scheduled for cardiac cath today  GI prophylaxis: not needed  DC planning:  Attending Attestation:   I was physically present for the key portions of the evaluation and management (E/M) service provided.  I agree with the above history, physical, and plan which I have reviewed and edited where appropriate.     45 minutes spent on total encounter; more than 50% of the visit was spent counseling and/or coordinating care by the attending physician.

## 2018-07-13 NOTE — DISCHARGE NOTE ADULT - CARE PROVIDER_API CALL
Mihai Blevins), Cardiovascular Disease; Internal Medicine; Nuclear Cardiology  [address] [place] [address]  Phone: [phone]  Fax: [phone]

## 2018-07-14 VITALS
SYSTOLIC BLOOD PRESSURE: 111 MMHG | OXYGEN SATURATION: 99 % | HEART RATE: 64 BPM | DIASTOLIC BLOOD PRESSURE: 62 MMHG | TEMPERATURE: 98 F | RESPIRATION RATE: 18 BRPM

## 2018-07-14 LAB
ANION GAP SERPL CALC-SCNC: 15 MMOL/L — SIGNIFICANT CHANGE UP (ref 5–17)
BASOPHILS # BLD AUTO: 0.1 K/UL — SIGNIFICANT CHANGE UP (ref 0–0.2)
BASOPHILS NFR BLD AUTO: 1.1 % — SIGNIFICANT CHANGE UP (ref 0–2)
BUN SERPL-MCNC: 29 MG/DL — HIGH (ref 8–20)
CALCIUM SERPL-MCNC: 9.1 MG/DL — SIGNIFICANT CHANGE UP (ref 8.6–10.2)
CHLORIDE SERPL-SCNC: 101 MMOL/L — SIGNIFICANT CHANGE UP (ref 98–107)
CO2 SERPL-SCNC: 22 MMOL/L — SIGNIFICANT CHANGE UP (ref 22–29)
CREAT SERPL-MCNC: 1.3 MG/DL — SIGNIFICANT CHANGE UP (ref 0.5–1.3)
EOSINOPHIL # BLD AUTO: 0.2 K/UL — SIGNIFICANT CHANGE UP (ref 0–0.5)
EOSINOPHIL NFR BLD AUTO: 3.2 % — SIGNIFICANT CHANGE UP (ref 0–5)
GLUCOSE BLDC GLUCOMTR-MCNC: 156 MG/DL — HIGH (ref 70–99)
GLUCOSE BLDC GLUCOMTR-MCNC: 73 MG/DL — SIGNIFICANT CHANGE UP (ref 70–99)
GLUCOSE SERPL-MCNC: 110 MG/DL — SIGNIFICANT CHANGE UP (ref 70–115)
HCT VFR BLD CALC: 36.7 % — LOW (ref 42–52)
HGB BLD-MCNC: 12.4 G/DL — LOW (ref 14–18)
LYMPHOCYTES # BLD AUTO: 2.2 K/UL — SIGNIFICANT CHANGE UP (ref 1–4.8)
LYMPHOCYTES # BLD AUTO: 34.7 % — SIGNIFICANT CHANGE UP (ref 20–55)
MCHC RBC-ENTMCNC: 27 PG — SIGNIFICANT CHANGE UP (ref 27–31)
MCHC RBC-ENTMCNC: 33.8 G/DL — SIGNIFICANT CHANGE UP (ref 32–36)
MCV RBC AUTO: 79.8 FL — LOW (ref 80–94)
MONOCYTES # BLD AUTO: 0.7 K/UL — SIGNIFICANT CHANGE UP (ref 0–0.8)
MONOCYTES NFR BLD AUTO: 11.1 % — HIGH (ref 3–10)
NEUTROPHILS # BLD AUTO: 3.2 K/UL — SIGNIFICANT CHANGE UP (ref 1.8–8)
NEUTROPHILS NFR BLD AUTO: 49.7 % — SIGNIFICANT CHANGE UP (ref 37–73)
PLATELET # BLD AUTO: 282 K/UL — SIGNIFICANT CHANGE UP (ref 150–400)
POTASSIUM SERPL-MCNC: 4.3 MMOL/L — SIGNIFICANT CHANGE UP (ref 3.5–5.3)
POTASSIUM SERPL-SCNC: 4.3 MMOL/L — SIGNIFICANT CHANGE UP (ref 3.5–5.3)
RBC # BLD: 4.6 M/UL — SIGNIFICANT CHANGE UP (ref 4.6–6.2)
RBC # FLD: 14.9 % — SIGNIFICANT CHANGE UP (ref 11–15.6)
SODIUM SERPL-SCNC: 138 MMOL/L — SIGNIFICANT CHANGE UP (ref 135–145)
WBC # BLD: 6.5 K/UL — SIGNIFICANT CHANGE UP (ref 4.8–10.8)
WBC # FLD AUTO: 6.5 K/UL — SIGNIFICANT CHANGE UP (ref 4.8–10.8)

## 2018-07-14 PROCEDURE — 84484 ASSAY OF TROPONIN QUANT: CPT

## 2018-07-14 PROCEDURE — 93454 CORONARY ARTERY ANGIO S&I: CPT | Mod: XU

## 2018-07-14 PROCEDURE — 71045 X-RAY EXAM CHEST 1 VIEW: CPT

## 2018-07-14 PROCEDURE — 80048 BASIC METABOLIC PNL TOTAL CA: CPT

## 2018-07-14 PROCEDURE — 82550 ASSAY OF CK (CPK): CPT

## 2018-07-14 PROCEDURE — 99239 HOSP IP/OBS DSCHRG MGMT >30: CPT

## 2018-07-14 PROCEDURE — C1894: CPT

## 2018-07-14 PROCEDURE — C1725: CPT

## 2018-07-14 PROCEDURE — C1769: CPT

## 2018-07-14 PROCEDURE — 70450 CT HEAD/BRAIN W/O DYE: CPT

## 2018-07-14 PROCEDURE — 99152 MOD SED SAME PHYS/QHP 5/>YRS: CPT

## 2018-07-14 PROCEDURE — C9600: CPT | Mod: LC

## 2018-07-14 PROCEDURE — 80053 COMPREHEN METABOLIC PANEL: CPT

## 2018-07-14 PROCEDURE — 99285 EMERGENCY DEPT VISIT HI MDM: CPT | Mod: 25

## 2018-07-14 PROCEDURE — 36415 COLL VENOUS BLD VENIPUNCTURE: CPT

## 2018-07-14 PROCEDURE — 83880 ASSAY OF NATRIURETIC PEPTIDE: CPT

## 2018-07-14 PROCEDURE — 93005 ELECTROCARDIOGRAM TRACING: CPT

## 2018-07-14 PROCEDURE — 82962 GLUCOSE BLOOD TEST: CPT

## 2018-07-14 PROCEDURE — C1874: CPT

## 2018-07-14 PROCEDURE — 85027 COMPLETE CBC AUTOMATED: CPT

## 2018-07-14 PROCEDURE — C1887: CPT

## 2018-07-14 PROCEDURE — 93010 ELECTROCARDIOGRAM REPORT: CPT

## 2018-07-14 RX ORDER — AMLODIPINE BESYLATE 2.5 MG/1
1 TABLET ORAL
Qty: 0 | Refills: 0 | DISCHARGE
Start: 2018-07-14

## 2018-07-14 RX ORDER — NITROGLYCERIN 6.5 MG
1 CAPSULE, EXTENDED RELEASE ORAL
Qty: 30 | Refills: 0 | OUTPATIENT
Start: 2018-07-14 | End: 2018-08-12

## 2018-07-14 RX ORDER — TICAGRELOR 90 MG/1
1 TABLET ORAL
Qty: 180 | Refills: 0 | OUTPATIENT
Start: 2018-07-14 | End: 2018-10-11

## 2018-07-14 RX ADMIN — Medication 100 MILLIGRAM(S): at 05:46

## 2018-07-14 RX ADMIN — Medication 81 MILLIGRAM(S): at 08:53

## 2018-07-14 RX ADMIN — Medication 1: at 08:52

## 2018-07-14 RX ADMIN — PANTOPRAZOLE SODIUM 40 MILLIGRAM(S): 20 TABLET, DELAYED RELEASE ORAL at 05:46

## 2018-07-14 RX ADMIN — VALSARTAN 160 MILLIGRAM(S): 80 TABLET ORAL at 05:46

## 2018-07-14 RX ADMIN — AMLODIPINE BESYLATE 10 MILLIGRAM(S): 2.5 TABLET ORAL at 05:46

## 2018-07-14 RX ADMIN — TICAGRELOR 90 MILLIGRAM(S): 90 TABLET ORAL at 05:46

## 2018-07-14 NOTE — PROGRESS NOTE ADULT - SUBJECTIVE AND OBJECTIVE BOX
GUIDO LEHMANUTIER     Chief Complaint: Patient is a 77y old  Male who presents with a chief complaint of chest pain (13 Jul 2018 17:01)      PAST MEDICAL & SURGICAL HISTORY:  Sickle cell anemia: Carrier of the disease, presently doesn&#x27;t have it  Asthma, mild intermittent, uncomplicated  Other hemorrhoids  Cancer: protate  Diabetes mellitus  Hyperlipidemia  Hypertension  S/P prostatectomy  Inguinal hernia      HPI/OVERNIGHT EVENTS: Patient stable for discharge    MEDICATIONS  (STANDING):  amLODIPine   Tablet 10 milliGRAM(s) Oral daily  aspirin  chewable 81 milliGRAM(s) Oral daily  atorvastatin 40 milliGRAM(s) Oral at bedtime  dextrose 5%. 1000 milliLiter(s) (50 mL/Hr) IV Continuous <Continuous>  dextrose 50% Injectable 12.5 Gram(s) IV Push once  dextrose 50% Injectable 25 Gram(s) IV Push once  dextrose 50% Injectable 25 Gram(s) IV Push once  insulin lispro (HumaLOG) corrective regimen sliding scale   SubCutaneous three times a day before meals  metoprolol tartrate 50 milliGRAM(s) Oral at bedtime  metoprolol tartrate 100 milliGRAM(s) Oral daily  pantoprazole    Tablet 40 milliGRAM(s) Oral before breakfast  sodium chloride 0.45%. 1000 milliLiter(s) (60 mL/Hr) IV Continuous <Continuous>  ticagrelor 90 milliGRAM(s) Oral two times a day  valsartan 160 milliGRAM(s) Oral daily      Vital Signs Last 24 Hrs  T(C): 36.9 (14 Jul 2018 05:43), Max: 36.9 (13 Jul 2018 21:47)  T(F): 98.5 (14 Jul 2018 05:43), Max: 98.5 (14 Jul 2018 05:43)  HR: 65 (14 Jul 2018 11:36) (56 - 82)  BP: 118/65 (14 Jul 2018 11:36) (118/65 - 155/71)  BP(mean): --  RR: 18 (14 Jul 2018 05:43) (11 - 25)  SpO2: 99% (13 Jul 2018 20:15) (96% - 100%)    PHYSICAL EXAM:  Constitutional: NAD, well-groomed, well-developed  HEENT: PERRLA, EOMI, Normal Hearing, MMM  Neck: No LAD, No JVD  Back: Normal spine flexure, No CVA tenderness  Respiratory: CTAB Cardiovascular: S1 and S2, RRR, no M/G/R  Gastrointestinal: BS+, soft, NT/ND  Extremities: No peripheral edema  Vascular: 2+ peripheral pulses  Neurological: A/O x 3, no focal deficits  Psychiatric: Normal mood, normal affect  Musculoskeletal: 5/5 strength b/l upper and lower extremities  Skin: No rashes    CAPILLARY BLOOD GLUCOSE    LABS:                        12.4   6.5   )-----------( 282      ( 14 Jul 2018 05:48 )             36.7     07-14    138  |  101  |  29.0<H>  ----------------------------<  110  4.3   |  22.0  |  1.30    Ca    9.1      14 Jul 2018 05:48    TPro  6.2<L>  /  Alb  3.4  /  TBili  0.5  /  DBili  x   /  AST  13  /  ALT  10  /  AlkPhos  83  07-13          RADIOLOGY & ADDITIONAL TESTS:

## 2018-07-14 NOTE — PROGRESS NOTE ADULT - ASSESSMENT
77 years old male with PMH of HTN, DM, CAD (s/p recent PCI of LAD few weeks ago on ASA and brilinta with moderate disease in his LCX synstem ) and dyslipidemia presented to the ER for the evaluation of intermittent chest pain/discomfort *1 day.   Pain is 4-5/10, non radiating and associated with SOB. Patient denies any fever, chills, nausea, vomiting, headache, weakness or numbness. cardiology recommended telemetry monitoring and will discuss with interventional cardiologist further management. He Had ad a myoview by his primary cardiologist that confirmed the presence of lateral ischemia. Presently free of symptoms. cardiology scheduled cardiac cath today.       Problem/Plan - 1:  ·  Problem: Chest pain.   unstable angina:  negative troponin, no ischemic EKG changes.  Abnormal stress test on 7/10. Hx of recent PCI of his LAD earlier this month with moderate disease in his LCX system had a myoview by his primary cardiologist that confirmed the presence of lateral ischemia.   Is scheduled for cardiac cath today, will follow up the results. Site check after cardiac cath.  Continue ASA, brilinta, atorvastatin, valsartan and metoprolol  further management as per cardiology.      Problem/Plan - 2:  ·  Problem: Diabetes mellitus.  Plan: on Tradjenta- resume as an outpatient  FS with SSI.      Problem/Plan - 3:  ·  Problem: Hypertension.  Plan: controlled.  continue Norvasc 10 mg, valsartan 160 and metoprolol 50 mg.      Problem/Plan - 4:  ·  Problem: Hyperlipidemia.  Plan: continue Atorvastatin 40 mg daily.      Problem/Plan - 5:  ·  Problem: Dizziness.  Plan: no focal neurological deficit.  CT head _ve  orthostatic BP _ve  fall precautions.  monitor clinically.      Problem/Plan - 6:  ·  KENTRELL: improved today after hydration.  will continue 0.45 NS for 1 more day a he is going for cardiac cath.    DVT prophylaxis: SCD, no need for lovenox as patient is ambulatory and on DAPT and scheduled for cardiac cath today  GI prophylaxis: not needed  DC planning: according to the results of cardiac cath.

## 2018-07-14 NOTE — PROGRESS NOTE ADULT - SUBJECTIVE AND OBJECTIVE BOX
[facility] - [facility], THE HEART CENTER                                   [address] [place] [address]                                                      PHONE: [phone]                                                         FAX: [phone]  [email or web address]  ---------------------------------------------------------------------------------------------------------------------------------    FU for  Pt seen and examined.     Overnight events/patient complaints: patient denies any cp/sob    aspirin (Stomach Upset)  EGGPLANT (Hives)  No Known Drug Allergies  strawberry (Hives)    MEDICATIONS  (STANDING):  amLODIPine   Tablet 10 milliGRAM(s) Oral daily  aspirin  chewable 81 milliGRAM(s) Oral daily  atorvastatin 40 milliGRAM(s) Oral at bedtime  dextrose 5%. 1000 milliLiter(s) (50 mL/Hr) IV Continuous <Continuous>  dextrose 50% Injectable 12.5 Gram(s) IV Push once  dextrose 50% Injectable 25 Gram(s) IV Push once  dextrose 50% Injectable 25 Gram(s) IV Push once  insulin lispro (HumaLOG) corrective regimen sliding scale   SubCutaneous three times a day before meals  metoprolol tartrate 50 milliGRAM(s) Oral at bedtime  metoprolol tartrate 100 milliGRAM(s) Oral daily  pantoprazole    Tablet 40 milliGRAM(s) Oral before breakfast  sodium chloride 0.45%. 1000 milliLiter(s) (60 mL/Hr) IV Continuous <Continuous>  ticagrelor 90 milliGRAM(s) Oral two times a day  valsartan 160 milliGRAM(s) Oral daily    MEDICATIONS  (PRN):  dextrose 40% Gel 15 Gram(s) Oral once PRN Blood Glucose LESS THAN 70 milliGRAM(s)/deciliter  glucagon  Injectable 1 milliGRAM(s) IntraMuscular once PRN Glucose LESS THAN 70 milligrams/deciliter  nitroglycerin     SubLingual 0.4 milliGRAM(s) SubLingual every 5 minutes PRN Chest Pain      Vital Signs Last 24 Hrs  T(C): 36.9 (14 Jul 2018 05:43), Max: 36.9 (13 Jul 2018 21:47)  T(F): 98.5 (14 Jul 2018 05:43), Max: 98.5 (14 Jul 2018 05:43)  HR: 66 (14 Jul 2018 05:43) (56 - 82)  BP: 121/68 (14 Jul 2018 05:43) (119/63 - 155/76)  BP(mean): --  RR: 18 (14 Jul 2018 05:43) (11 - 25)  SpO2: 99% (13 Jul 2018 20:15) (96% - 100%)  ICU Vital Signs Last 24 Hrs  I&O's Summary    13 Jul 2018 07:01  -  14 Jul 2018 07:00  --------------------------------------------------------  IN: 1140 mL / OUT: 1250 mL / NET: -110 mL        PHYSICAL EXAM:  HEENT: no JVD  CARDIOVASCULAR: Normal S1 and S2, No murmur, rub, gallop or lift.   LUNGS: No rales, rhonchi or wheeze. Normal breath sounds bilaterally.  ABDOMEN: Soft, nontender without mass or organomegaly. bowel sounds normoactive.  EXTREMITIES: no edema          LABS:                        12.4   6.5   )-----------( 282      ( 14 Jul 2018 05:48 )             36.7     07-14    138  |  101  |  29.0<H>  ----------------------------<  110  4.3   |  22.0  |  1.30    Ca    9.1      14 Jul 2018 05:48    TPro  6.2<L>  /  Alb  3.4  /  TBili  0.5  /  DBili  x   /  AST  13  /  ALT  10  /  AlkPhos  83  07-13    GUIDO MATUTE  CARDIAC MARKERS ( 13 Jul 2018 05:48 )  x     / <0.01 ng/mL / x     / x     / x                RADIOLOGY & ADDITIONAL STUDIES:    LABS:                        12.4   6.5   )-----------( 282      ( 14 Jul 2018 05:48 )             36.7     07-14    138  |  101  |  29.0<H>  ----------------------------<  110  4.3   |  22.0  |  1.30    Ca    9.1      14 Jul 2018 05:48    TPro  6.2<L>  /  Alb  3.4  /  TBili  0.5  /  DBili  x   /  AST  13  /  ALT  10  /  AlkPhos  83  07-13    77y  CARDIAC MARKERS ( 13 Jul 2018 05:48 )  x     / <0.01 ng/mL / x     / x     / x          Assessment and Plan:  GUIDO MATUTE is an 77y Male with Hx of recent PCI of his LAD earlier this month with moderate disease in his LCX system presenting to [facility] c/o an episode of CP yesterday at rest that resolved after he took SL NTG had a myoview by his primary cardiologist that confirmed the presence of lateral ischemia. Presently free of symptoms , Discussed with daughter at bedside  CAD/CP: s/p OM stent. asymptomatic today. Can d/c home from cardiac standpoint, with OP follow up. SEEw patient and his wife about need for close follow up   HTN

## 2018-07-20 ENCOUNTER — EMERGENCY (EMERGENCY)
Facility: HOSPITAL | Age: 78
LOS: 1 days | Discharge: HOME | End: 2018-07-20
Attending: EMERGENCY MEDICINE
Payer: MEDICARE

## 2018-07-20 VITALS
HEART RATE: 79 BPM | TEMPERATURE: 97 F | HEIGHT: 64 IN | DIASTOLIC BLOOD PRESSURE: 76 MMHG | WEIGHT: 149.91 LBS | RESPIRATION RATE: 18 BRPM | OXYGEN SATURATION: 99 % | SYSTOLIC BLOOD PRESSURE: 144 MMHG

## 2018-07-20 DIAGNOSIS — Z95.5 PRESENCE OF CORONARY ANGIOPLASTY IMPLANT AND GRAFT: Chronic | ICD-10-CM

## 2018-07-20 DIAGNOSIS — Z90.79 ACQUIRED ABSENCE OF OTHER GENITAL ORGAN(S): Chronic | ICD-10-CM

## 2018-07-20 DIAGNOSIS — K40.90 UNILATERAL INGUINAL HERNIA, WITHOUT OBSTRUCTION OR GANGRENE, NOT SPECIFIED AS RECURRENT: Chronic | ICD-10-CM

## 2018-07-20 LAB
ALBUMIN SERPL ELPH-MCNC: 4.2 G/DL — SIGNIFICANT CHANGE UP (ref 3.3–5.2)
ALP SERPL-CCNC: 99 U/L — SIGNIFICANT CHANGE UP (ref 40–120)
ALT FLD-CCNC: 15 U/L — SIGNIFICANT CHANGE UP
ANION GAP SERPL CALC-SCNC: 15 MMOL/L — SIGNIFICANT CHANGE UP (ref 5–17)
APPEARANCE UR: CLEAR — SIGNIFICANT CHANGE UP
APTT BLD: 34.4 SEC — SIGNIFICANT CHANGE UP (ref 27.5–37.4)
AST SERPL-CCNC: 18 U/L — SIGNIFICANT CHANGE UP
BACTERIA # UR AUTO: NEGATIVE — SIGNIFICANT CHANGE UP
BASOPHILS # BLD AUTO: 0.1 K/UL — SIGNIFICANT CHANGE UP (ref 0–0.2)
BASOPHILS NFR BLD AUTO: 1.1 % — SIGNIFICANT CHANGE UP (ref 0–2)
BILIRUB SERPL-MCNC: 0.5 MG/DL — SIGNIFICANT CHANGE UP (ref 0.4–2)
BILIRUB UR-MCNC: NEGATIVE — SIGNIFICANT CHANGE UP
BUN SERPL-MCNC: 26 MG/DL — HIGH (ref 8–20)
CALCIUM SERPL-MCNC: 9.8 MG/DL — SIGNIFICANT CHANGE UP (ref 8.6–10.2)
CHLORIDE SERPL-SCNC: 100 MMOL/L — SIGNIFICANT CHANGE UP (ref 98–107)
CK SERPL-CCNC: 72 U/L — SIGNIFICANT CHANGE UP (ref 30–200)
CO2 SERPL-SCNC: 23 MMOL/L — SIGNIFICANT CHANGE UP (ref 22–29)
COLOR SPEC: YELLOW — SIGNIFICANT CHANGE UP
CREAT SERPL-MCNC: 1.4 MG/DL — HIGH (ref 0.5–1.3)
DIFF PNL FLD: NEGATIVE — SIGNIFICANT CHANGE UP
EOSINOPHIL # BLD AUTO: 0.1 K/UL — SIGNIFICANT CHANGE UP (ref 0–0.5)
EOSINOPHIL NFR BLD AUTO: 1.5 % — SIGNIFICANT CHANGE UP (ref 0–5)
EPI CELLS # UR: NEGATIVE — SIGNIFICANT CHANGE UP
GLUCOSE SERPL-MCNC: 131 MG/DL — HIGH (ref 70–115)
GLUCOSE UR QL: NEGATIVE MG/DL — SIGNIFICANT CHANGE UP
HCT VFR BLD CALC: 38.5 % — LOW (ref 42–52)
HGB BLD-MCNC: 13 G/DL — LOW (ref 14–18)
INR BLD: 0.91 RATIO — SIGNIFICANT CHANGE UP (ref 0.88–1.16)
KETONES UR-MCNC: NEGATIVE — SIGNIFICANT CHANGE UP
LEUKOCYTE ESTERASE UR-ACNC: NEGATIVE — SIGNIFICANT CHANGE UP
LYMPHOCYTES # BLD AUTO: 1.9 K/UL — SIGNIFICANT CHANGE UP (ref 1–4.8)
LYMPHOCYTES # BLD AUTO: 26.2 % — SIGNIFICANT CHANGE UP (ref 20–55)
MCHC RBC-ENTMCNC: 26.8 PG — LOW (ref 27–31)
MCHC RBC-ENTMCNC: 33.8 G/DL — SIGNIFICANT CHANGE UP (ref 32–36)
MCV RBC AUTO: 79.4 FL — LOW (ref 80–94)
MONOCYTES # BLD AUTO: 0.7 K/UL — SIGNIFICANT CHANGE UP (ref 0–0.8)
MONOCYTES NFR BLD AUTO: 9.4 % — SIGNIFICANT CHANGE UP (ref 3–10)
NEUTROPHILS # BLD AUTO: 4.4 K/UL — SIGNIFICANT CHANGE UP (ref 1.8–8)
NEUTROPHILS NFR BLD AUTO: 61.7 % — SIGNIFICANT CHANGE UP (ref 37–73)
NITRITE UR-MCNC: NEGATIVE — SIGNIFICANT CHANGE UP
NT-PROBNP SERPL-SCNC: 145 PG/ML — SIGNIFICANT CHANGE UP (ref 0–300)
PH UR: 7 — SIGNIFICANT CHANGE UP (ref 5–8)
PLATELET # BLD AUTO: 277 K/UL — SIGNIFICANT CHANGE UP (ref 150–400)
POTASSIUM SERPL-MCNC: 4.9 MMOL/L — SIGNIFICANT CHANGE UP (ref 3.5–5.3)
POTASSIUM SERPL-SCNC: 4.9 MMOL/L — SIGNIFICANT CHANGE UP (ref 3.5–5.3)
PROT SERPL-MCNC: 7.4 G/DL — SIGNIFICANT CHANGE UP (ref 6.6–8.7)
PROT UR-MCNC: 100 MG/DL
PROTHROM AB SERPL-ACNC: 10 SEC — SIGNIFICANT CHANGE UP (ref 9.8–12.7)
RBC # BLD: 4.85 M/UL — SIGNIFICANT CHANGE UP (ref 4.6–6.2)
RBC # FLD: 14.9 % — SIGNIFICANT CHANGE UP (ref 11–15.6)
RBC CASTS # UR COMP ASSIST: SIGNIFICANT CHANGE UP /HPF (ref 0–4)
SODIUM SERPL-SCNC: 138 MMOL/L — SIGNIFICANT CHANGE UP (ref 135–145)
SP GR SPEC: 1.01 — SIGNIFICANT CHANGE UP (ref 1.01–1.02)
T4 AB SER-ACNC: 9.2 UG/DL — SIGNIFICANT CHANGE UP (ref 4.5–12)
TROPONIN T SERPL-MCNC: <0.01 NG/ML — SIGNIFICANT CHANGE UP (ref 0–0.06)
TROPONIN T SERPL-MCNC: <0.01 NG/ML — SIGNIFICANT CHANGE UP (ref 0–0.06)
TSH SERPL-MCNC: 1.06 UIU/ML — SIGNIFICANT CHANGE UP (ref 0.27–4.2)
UROBILINOGEN FLD QL: NEGATIVE MG/DL — SIGNIFICANT CHANGE UP
WBC # BLD: 7.1 K/UL — SIGNIFICANT CHANGE UP (ref 4.8–10.8)
WBC # FLD AUTO: 7.1 K/UL — SIGNIFICANT CHANGE UP (ref 4.8–10.8)
WBC UR QL: NEGATIVE — SIGNIFICANT CHANGE UP

## 2018-07-20 PROCEDURE — 99218: CPT

## 2018-07-20 PROCEDURE — 71045 X-RAY EXAM CHEST 1 VIEW: CPT | Mod: 26

## 2018-07-20 PROCEDURE — 93010 ELECTROCARDIOGRAM REPORT: CPT

## 2018-07-20 PROCEDURE — 93306 TTE W/DOPPLER COMPLETE: CPT | Mod: 26

## 2018-07-20 NOTE — ED ADULT NURSE REASSESSMENT NOTE - ANCILLARY STATUS
cardiology/physician at bedside
physician at bedside/Dr. Martinez
physician at bedside/SERA Crowder
Cardiologist at bedside/physician at bedside

## 2018-07-20 NOTE — ED PROVIDER NOTE - ATTENDING CONTRIBUTION TO CARE
seen with acp: well appearing male, NAD; currently no symptoms; normal heart and lung sounds; no JVD; no pedal edema; patient with lengthy recent workup for CAD, has had 2 caths recently with 2 stents; denies chest pain, only c/o palps; currently sinus rhythm, normal labs; seen and discussed with [facility]; will keep overnight on tele, obtain echo, if normal, can be d/c'd to f/u with them in office for holter monitor

## 2018-07-20 NOTE — CONSULT NOTE ADULT - SUBJECTIVE AND OBJECTIVE BOX
[facility], THE HEART CENTER                                   [address] [place] [address]                                                      PHONE: [phone]                                                         FAX: [phone]  [email or web address]  ---------------------------------------------------------------------------------------------------------------------------------    HPI:  GUIDO MATUTE is an 77y Male HTN, HLD, CAD s/p PCI to     PAST MEDICAL & SURGICAL HISTORY:  Sickle cell anemia: Carrier of the disease, presently doesn&#x27;t have it  Asthma, mild intermittent, uncomplicated  Other hemorrhoids  Cancer: protate  Diabetes mellitus  Hyperlipidemia  Hypertension  S/P primary angioplasty with coronary stent  S/P prostatectomy  Inguinal hernia      aspirin (Stomach Upset)  EGGPLANT (Hives)  No Known Drug Allergies  strawberry (Hives)      MEDICATIONS  (STANDING):    MEDICATIONS  (PRN):      Family History: Pt denies hx of early cad, SCD, or congenital heart disease.      Social History:  Cigarettes:                    Alchohol:                 Illicit Drug Abuse:      ROS:  Constitutional: No fever, weight loss or fatigue  Eyes: No eye pain, visual disturbances, or discharge  ENMT:  No difficulty hearing, tinnitus, vertigo; No sinus or throat pain  Neck: No pain or stiffness  Respiratory: No cough, wheezing, chills or hemoptysis  Cardiovascular: No chest pain, palpitations, shortness of breath, dizziness or leg swelling  Gastrointestinal: No abdominal or epigastric pain. No nausea, vomiting or hematemesis; No diarrhea or constipation. No melena or hematochezia.  Genitourinary: No dysuria, frequency, hematuria or incontinence  Rectal: No pain, hemorrhoids or incontinence  Neurological: No headaches, memory loss, loss of strength, numbness or tremors  Skin: No itching, burning, rashes or lesions   Lymph Nodes: No enlarged glands  Endocrine: No heat or cold intolerance; No hair loss  Musculoskeletal: No joint pain or swelling; No muscle, back or extremity pain  Psychiatric: No depression, anxiety, mood swings or difficulty sleeping  Heme/Lymph: No easy bruising or bleeding gums  Allergy and Immunologic: No hives or eczema    Vital Signs Last 24 Hrs  T(C): 36.3 (20 Jul 2018 15:09), Max: 36.3 (20 Jul 2018 15:09)  T(F): 97.4 (20 Jul 2018 15:09), Max: 97.4 (20 Jul 2018 15:09)  HR: 79 (20 Jul 2018 15:09) (79 - 79)  BP: 144/76 (20 Jul 2018 15:09) (144/76 - 144/76)  BP(mean): --  RR: 18 (20 Jul 2018 15:09) (18 - 18)  SpO2: 99% (20 Jul 2018 15:09) (99% - 99%)  ICU Vital Signs Last 24 Hrs  GUIDO MATUTE  I&O's Detail    I&O's Summary    Drug Dosing Weight  GUIDO MATUTE      PHYSICAL EXAM:  General: Appears well developed, well nourished alert and cooperative.  HEENT: Head; normocephalic, atraumatic.  Eyes: Pupils reactive, cornea wnl.  Neck: Supple, no nodes adenopathy, no NVD or carotid bruit or thyromegaly.  CARDIOVASCULAR: Normal S1 and S2, No murmur, rub, gallop or lift.   LUNGS: No rales, rhonchi or wheeze. Normal breath sounds bilaterally.  ABDOMEN: Soft, nontender without mass or organomegaly. bowel sounds normoactive.  EXTREMITIES: No clubbing, cyanosis or edema. Distal pulses wnl.   SKIN: warm and dry with normal turgor.  NEURO: Alert/oriented x 3/normal motor exam. No pathologic reflexes.    PSYCH: normal affect.        LABS:          GUIDO MATUTE      PT/INR - ( 20 Jul 2018 16:21 )   PT: 10.0 sec;   INR: 0.91 ratio         PTT - ( 20 Jul 2018 16:21 )  PTT:34.4 sec      RADIOLOGY & ADDITIONAL STUDIES:    INTERPRETATION OF TELEMETRY (personally reviewed):    ECG:    ECHO:    STRESS TEST:    CARDIAC CATHETERIZATION:    Assessment and Plan:  In summary, GUIDO MATUTE is an 77y Male with past medical history significant for       Thank you for allowing [facility] to participate in the care of this patient.  Please feel free to call with any questions or concerns. [facility], THE HEART CENTER                                   [address] [place] [address]                                                      PHONE: [phone]                                                         FAX: [phone]  [email or web address]  ---------------------------------------------------------------------------------------------------------------------------------    HPI:  GUIDO MATUTE is an 77y Male HTN, HLD, CAD s/p PCI to OM1 1 weeks ago, DM, prostate CA former smoker who presented to [facility] ED with chest pain.         PAST MEDICAL & SURGICAL HISTORY:  Sickle cell anemia: Carrier of the disease, presently doesn&#x27;t have it  Asthma, mild intermittent, uncomplicated  Other hemorrhoids  Cancer: protate  Diabetes mellitus  Hyperlipidemia  Hypertension  S/P primary angioplasty with coronary stent  S/P prostatectomy  Inguinal hernia      aspirin (Stomach Upset)  EGGPLANT (Hives)  No Known Drug Allergies  strawberry (Hives)      MEDICATIONS  (STANDING):    MEDICATIONS  (PRN):      Family History: Pt denies hx of early cad, SCD, or congenital heart disease.      Social History:  Cigarettes:                    Alchohol:                 Illicit Drug Abuse:      ROS:  Constitutional: No fever, weight loss or fatigue  Eyes: No eye pain, visual disturbances, or discharge  ENMT:  No difficulty hearing, tinnitus, vertigo; No sinus or throat pain  Neck: No pain or stiffness  Respiratory: No cough, wheezing, chills or hemoptysis  Cardiovascular: No chest pain, palpitations, shortness of breath, dizziness or leg swelling  Gastrointestinal: No abdominal or epigastric pain. No nausea, vomiting or hematemesis; No diarrhea or constipation. No melena or hematochezia.  Genitourinary: No dysuria, frequency, hematuria or incontinence  Rectal: No pain, hemorrhoids or incontinence  Neurological: No headaches, memory loss, loss of strength, numbness or tremors  Skin: No itching, burning, rashes or lesions   Lymph Nodes: No enlarged glands  Endocrine: No heat or cold intolerance; No hair loss  Musculoskeletal: No joint pain or swelling; No muscle, back or extremity pain  Psychiatric: No depression, anxiety, mood swings or difficulty sleeping  Heme/Lymph: No easy bruising or bleeding gums  Allergy and Immunologic: No hives or eczema    Vital Signs Last 24 Hrs  T(C): 36.3 (20 Jul 2018 15:09), Max: 36.3 (20 Jul 2018 15:09)  T(F): 97.4 (20 Jul 2018 15:09), Max: 97.4 (20 Jul 2018 15:09)  HR: 79 (20 Jul 2018 15:09) (79 - 79)  BP: 144/76 (20 Jul 2018 15:09) (144/76 - 144/76)  BP(mean): --  RR: 18 (20 Jul 2018 15:09) (18 - 18)  SpO2: 99% (20 Jul 2018 15:09) (99% - 99%)  ICU Vital Signs Last 24 Hrs  GUIDO LEHMANUTIER  I&O's Detail    I&O's Summary    Drug Dosing Weight  GUIDO MATUTE      PHYSICAL EXAM:  General: Appears well developed, well nourished alert and cooperative.  HEENT: Head; normocephalic, atraumatic.  Eyes: Pupils reactive, cornea wnl.  Neck: Supple, no nodes adenopathy, no NVD or carotid bruit or thyromegaly.  CARDIOVASCULAR: Normal S1 and S2, No murmur, rub, gallop or lift.   LUNGS: No rales, rhonchi or wheeze. Normal breath sounds bilaterally.  ABDOMEN: Soft, nontender without mass or organomegaly. bowel sounds normoactive.  EXTREMITIES: No clubbing, cyanosis or edema. Distal pulses wnl.   SKIN: warm and dry with normal turgor.  NEURO: Alert/oriented x 3/normal motor exam. No pathologic reflexes.    PSYCH: normal affect.        LABS:          GUIDO MATUTE      PT/INR - ( 20 Jul 2018 16:21 )   PT: 10.0 sec;   INR: 0.91 ratio         PTT - ( 20 Jul 2018 16:21 )  PTT:34.4 sec      RADIOLOGY & ADDITIONAL STUDIES:    INTERPRETATION OF TELEMETRY (personally reviewed):    ECG:       CARDIAC CATHETERIZATION:CORONARY VESSELS: R dominant.  Large RCA w/ moderate diffuse disease.  Moderate sized LCx w/ calcified 70% OM1 stenosis w/ large distal vessel.  OM2 w/ moderate disease.  Large LAD w/ patent mid vessel stent.  CX:   --  OM1: There was a 70 % stenosis.  COMPLICATIONS: No complications occurred during the cath lab visit.  SUMMARY:  Summary: Addendum 7/17/2018: Case reconfirmed to generate Attachment A  report  DIAGNOSTIC IMPRESSIONS: ICS x 1 to OM1 w/ 0% residual stenosis and THOR III  flow maintained thruout.  Patent LAD stent.  DIAGNOSTIC RECOMMENDATIONS: Asa/brilinta.  PET/followup 4 weeks.  INTERVENTIONAL IMPRESSIONS: ICS x 1 to OM1 w/ 0% residual stenosis and THOR  III flow maintained thruout.  Patent LAD stent.  INTERVENTIONAL RECOMMENDATIONS: Asa/brilinta.  PET/followup 4 weeks.      Assessment and Plan:  In summary, GUIDO MATUTE is an 77y Male with past medical history significant for     pericardiis   colchicine 0.6 mg bid for 1 month           Thank you for allowing [facility] to participate in the care of this patient.  Please feel free to call with any questions or concerns. [facility], THE HEART CENTER                                   [address] [place] [address]                                                      PHONE: [phone]                                                         FAX: [phone]  [email or web address]  ---------------------------------------------------------------------------------------------------------------------------------    HPI:  GUIDO MATUTE is an 77y Male HTN, HLD, CAD s/p PCI to OM1 1 weeks ago, DM, prostate CA former smoker who presented to [facility] ED with palpitations.  Pt states he developed palps since his stent was placed.  His episodes occur while sleeping and wake him up.  He also has episodes while he is awake.  He has never had this before.       PAST MEDICAL & SURGICAL HISTORY:  Sickle cell anemia: Carrier of the disease, presently doesn&#x27;t have it  Asthma, mild intermittent, uncomplicated  Other hemorrhoids  Cancer: protate  Diabetes mellitus  Hyperlipidemia  Hypertension  S/P primary angioplasty with coronary stent  S/P prostatectomy  Inguinal hernia      aspirin (Stomach Upset)  EGGPLANT (Hives)  No Known Drug Allergies  strawberry (Hives)      MEDICATIONS  (STANDING):    MEDICATIONS  (PRN):      Family History: Pt denies hx of early cad, SCD, or congenital heart disease.      Social History:  Cigarettes:                    Alchohol:                 Illicit Drug Abuse:      ROS:  Constitutional: No fever, weight loss or fatigue  Eyes: No eye pain, visual disturbances, or discharge  ENMT:  No difficulty hearing, tinnitus, vertigo; No sinus or throat pain  Neck: No pain or stiffness  Respiratory: No cough, wheezing, chills or hemoptysis  Cardiovascular: No chest pain, palpitations, shortness of breath, dizziness or leg swelling  Gastrointestinal: No abdominal or epigastric pain. No nausea, vomiting or hematemesis; No diarrhea or constipation. No melena or hematochezia.  Genitourinary: No dysuria, frequency, hematuria or incontinence  Rectal: No pain, hemorrhoids or incontinence  Neurological: No headaches, memory loss, loss of strength, numbness or tremors  Skin: No itching, burning, rashes or lesions   Lymph Nodes: No enlarged glands  Endocrine: No heat or cold intolerance; No hair loss  Musculoskeletal: No joint pain or swelling; No muscle, back or extremity pain  Psychiatric: No depression, anxiety, mood swings or difficulty sleeping  Heme/Lymph: No easy bruising or bleeding gums  Allergy and Immunologic: No hives or eczema    Vital Signs Last 24 Hrs  T(C): 36.3 (20 Jul 2018 15:09), Max: 36.3 (20 Jul 2018 15:09)  T(F): 97.4 (20 Jul 2018 15:09), Max: 97.4 (20 Jul 2018 15:09)  HR: 79 (20 Jul 2018 15:09) (79 - 79)  BP: 144/76 (20 Jul 2018 15:09) (144/76 - 144/76)  BP(mean): --  RR: 18 (20 Jul 2018 15:09) (18 - 18)  SpO2: 99% (20 Jul 2018 15:09) (99% - 99%)  ICU Vital Signs Last 24 Hrs  GUIDO MATUTE  I&O's Detail    I&O's Summary    Drug Dosing Weight  GUIDO MATUTE      PHYSICAL EXAM:  General: Appears well developed, well nourished alert and cooperative.  HEENT: Head; normocephalic, atraumatic.  Eyes: Pupils reactive, cornea wnl.  Neck: Supple, no nodes adenopathy, no NVD or carotid bruit or thyromegaly.  CARDIOVASCULAR: Normal S1 and S2, No murmur, rub, gallop or lift.   LUNGS: No rales, rhonchi or wheeze. Normal breath sounds bilaterally.  ABDOMEN: Soft, nontender without mass or organomegaly. bowel sounds normoactive.  EXTREMITIES: No clubbing, cyanosis or edema. Distal pulses wnl.   SKIN: warm and dry with normal turgor.  NEURO: Alert/oriented x 3/normal motor exam. No pathologic reflexes.    PSYCH: normal affect.        LABS:          GUIDO MATUTE      PT/INR - ( 20 Jul 2018 16:21 )   PT: 10.0 sec;   INR: 0.91 ratio         PTT - ( 20 Jul 2018 16:21 )  PTT:34.4 sec      RADIOLOGY & ADDITIONAL STUDIES:    INTERPRETATION OF TELEMETRY (personally reviewed):    ECG: nsr, no st elevations/depressions        CARDIAC CATHETERIZATION:CORONARY VESSELS: R dominant.  Large RCA w/ moderate diffuse disease.  Moderate sized LCx w/ calcified 70% OM1 stenosis w/ large distal vessel.  OM2 w/ moderate disease.  Large LAD w/ patent mid vessel stent.  CX:   --  OM1: There was a 70 % stenosis.  COMPLICATIONS: No complications occurred during the cath lab visit.  SUMMARY:  Summary: Addendum 7/17/2018: Case reconfirmed to generate Attachment A  report  DIAGNOSTIC IMPRESSIONS: ICS x 1 to OM1 w/ 0% residual stenosis and THOR III  flow maintained thruout.  Patent LAD stent.  DIAGNOSTIC RECOMMENDATIONS: Asa/brilinta.  PET/followup 4 weeks.  INTERVENTIONAL IMPRESSIONS: ICS x 1 to OM1 w/ 0% residual stenosis and THOR  III flow maintained thruout.  Patent LAD stent.  INTERVENTIONAL RECOMMENDATIONS: Asa/brilinta.  PET/followup 4 weeks.      Assessment and Plan:  In summary, GUIDO MATUTE is an 77y Male HTN, HLD, CAD s/p PCI to OM1 1 weeks ago, DM, prostate CA former smoker who presented to [facility] ED with palpitations.  Pt states he developed palps since his stent was placed.  His episodes occur while sleeping and wake him up.  He also has episodes while he is awake.  He has never had this before.       palpitations:  -etiology unclear - anxiety vs arrhythmia  -monitor on tele overnight  -echo  -will check labs  -no need to trend trops  -if labs, echo unremarkable then pt can be d/c'd home and will arrange for 30 day event monitor with his cardio or [facility]    Thank you for allowing [facility] to participate in the care of this patient.  Please feel free to call with any questions or concerns.

## 2018-07-20 NOTE — ED CDU PROVIDER INITIAL DAY NOTE - OBJECTIVE STATEMENT
78 y/o M pt with hx of HTN, HLD DM and prostate CA s/p 2 cardiac stents placed presents with heart palpitations. He reports approximately 1 hour ago, he started to feel like his heart was racing and developed SOB awhile ambulating. Had symptoms last week presented [facility] ED Friday and was discharged Saturday. Stents were placed July 2, and July 12 2018. He denies CP, ABD pain, nausea, vomiting fever or chills. Pt is a former smoker (2006). No further complaints at this time.

## 2018-07-20 NOTE — ED ADULT NURSE NOTE - OBJECTIVE STATEMENT
Pt feels like his heart is racing, has been short of breath, was diaphoretic yesterday when this was happening.  Patient A&Ox3, c/o some blurry vision.  Denies passing out.  Last episode was an hour ago, lasted for an hour.  Now has no complaints.

## 2018-07-20 NOTE — ED PROVIDER NOTE - OBJECTIVE STATEMENT
76 y/o M pt with hx of HTN, HLD DM and prostate CA s/p 2 cardiac stents placed presents with heart palpitations. He reports approximately 1 hour ago, he started to feel like his heart was racing and developed SOB awhile ambulating. Had symptoms last week presented [facility] ED Friday and was discharged Saturday. Stents were placed July 2, and July 12 2018. He denies CP, ABD pain, nausea, vomiting fever or chills. Pt is a former smoker (2006). No further complaints at this time.

## 2018-07-20 NOTE — ED PROVIDER NOTE - MEDICAL DECISION MAKING DETAILS
76 y/o M pt presents with palpitations, will check labs and CXR. Will contact cardiothoracic surgeon and reevaluate.

## 2018-07-20 NOTE — ED CDU PROVIDER INITIAL DAY NOTE - MEDICAL DECISION MAKING DETAILS
well appearing male, NAD; currently no symptoms; normal heart and lung sounds; no JVD; no pedal edema; patient with lengthy recent workup for CAD, has had 2 caths recently with 2 stents; denies chest pain, only c/o palps; currently sinus rhythm, normal labs; seen and discussed with [facility]; will keep overnight on tele, obtain echo, if normal, can be d/c'd to f/u with them in office for holter monitor

## 2018-07-21 VITALS
DIASTOLIC BLOOD PRESSURE: 64 MMHG | RESPIRATION RATE: 16 BRPM | SYSTOLIC BLOOD PRESSURE: 127 MMHG | TEMPERATURE: 98 F | OXYGEN SATURATION: 98 % | HEART RATE: 84 BPM

## 2018-07-21 PROCEDURE — 36415 COLL VENOUS BLD VENIPUNCTURE: CPT

## 2018-07-21 PROCEDURE — 84443 ASSAY THYROID STIM HORMONE: CPT

## 2018-07-21 PROCEDURE — 85730 THROMBOPLASTIN TIME PARTIAL: CPT

## 2018-07-21 PROCEDURE — 99284 EMERGENCY DEPT VISIT MOD MDM: CPT | Mod: 25

## 2018-07-21 PROCEDURE — 71045 X-RAY EXAM CHEST 1 VIEW: CPT

## 2018-07-21 PROCEDURE — 80053 COMPREHEN METABOLIC PANEL: CPT

## 2018-07-21 PROCEDURE — 83880 ASSAY OF NATRIURETIC PEPTIDE: CPT

## 2018-07-21 PROCEDURE — 85610 PROTHROMBIN TIME: CPT

## 2018-07-21 PROCEDURE — 81001 URINALYSIS AUTO W/SCOPE: CPT

## 2018-07-21 PROCEDURE — 82550 ASSAY OF CK (CPK): CPT

## 2018-07-21 PROCEDURE — 84484 ASSAY OF TROPONIN QUANT: CPT

## 2018-07-21 PROCEDURE — 85027 COMPLETE CBC AUTOMATED: CPT

## 2018-07-21 PROCEDURE — 93306 TTE W/DOPPLER COMPLETE: CPT

## 2018-07-21 PROCEDURE — G0378: CPT

## 2018-07-21 PROCEDURE — 84436 ASSAY OF TOTAL THYROXINE: CPT

## 2018-07-21 PROCEDURE — 99217: CPT

## 2018-07-21 PROCEDURE — 93010 ELECTROCARDIOGRAM REPORT: CPT

## 2018-07-21 PROCEDURE — 93005 ELECTROCARDIOGRAM TRACING: CPT

## 2018-07-21 NOTE — PROGRESS NOTE ADULT - SUBJECTIVE AND OBJECTIVE BOX
[place] [facility], THE [facility]                                   [address] [place] [address]                                                      PHONE: [phone]                                                         FAX: [phone]  [email or web address]  ---------------------------------------------------------------------------------------------------------------------------------    Overnight events/patient complaints:  NAD feeling     aspirin (Stomach Upset)  EGGPLANT (Hives)  No Known Drug Allergies  strawberry (Hives)    MEDICATIONS  (STANDING):    MEDICATIONS  (PRN):      Vital Signs Last 24 Hrs  T(C): 37 (2018 08:27), Max: 37 (2018 08:27)  T(F): 98.6 (2018 08:27), Max: 98.6 (2018 08:27)  HR: 77 (2018 08:27) (68 - 79)  BP: 142/68 (2018 08:27) (142/68 - 162/68)  BP(mean): --  RR: 16 (2018 08:27) (14 - 18)  SpO2: 98% (2018 08:27) (97% - 99%)  ICU Vital Signs Last 24 Hrs  GUIDO MATUTE  I&O's Detail    I&O's Summary    Drug Dosing Weight  GUIDO MATUTE      PHYSICAL EXAM:  General: Appears well developed, well nourished alert and cooperative.  HEENT: Head; normocephalic, atraumatic.  Eyes: Pupils reactive, cornea wnl.  Neck: Supple, no nodes adenopathy, no NVD or carotid bruit or thyromegaly.  CARDIOVASCULAR: Normal S1 and S2, No murmur, rub, gallop or lift.   LUNGS: No rales, rhonchi or wheeze. Normal breath sounds bilaterally.  ABDOMEN: Soft, nontender without mass or organomegaly. bowel sounds normoactive.  EXTREMITIES: No clubbing, cyanosis or edema. Distal pulses wnl.   SKIN: warm and dry with normal turgor.  NEURO: Alert/oriented x 3/normal motor exam. No pathologic reflexes.    PSYCH: normal affect.        LABS:                        13.0   7.1   )-----------( 277      ( 2018 16:21 )             38.5     07-20    138  |  100  |  26.0<H>  ----------------------------<  131<H>  4.9   |  23.0  |  1.40<H>    Ca    9.8      2018 16:21    TPro  7.4  /  Alb  4.2  /  TBili  0.5  /  DBili  x   /  AST  18  /  ALT  15  /  AlkPhos  99  07-    GUIDO MATUTE  CARDIAC MARKERS ( 2018 20:45 )  x     / <0.01 ng/mL / x     / x     / x      CARDIAC MARKERS ( 2018 16:21 )  x     / <0.01 ng/mL / 72 U/L / x     / x          PT/INR - ( 2018 16:21 )   PT: 10.0 sec;   INR: 0.91 ratio         PTT - ( 2018 16:21 )  PTT:34.4 sec  Urinalysis Basic - ( 2018 17:08 )    Color: Yellow / Appearance: Clear / S.010 / pH: x  Gluc: x / Ketone: Negative  / Bili: Negative / Urobili: Negative mg/dL   Blood: x / Protein: 100 mg/dL / Nitrite: Negative   Leuk Esterase: Negative / RBC: 0-2 /HPF / WBC Negative   Sq Epi: x / Non Sq Epi: Negative / Bacteria: Negative        RADIOLOGY & ADDITIONAL STUDIES:    INTERPRETATION OF TELEMETRY (personally reviewed):    ECG:    ECHO:  pending     CARDIAC CATHETERIZATION:  CORONARY VESSELS: R dominant.  Large RCA w/ moderate diffuse disease.  Moderate sized LCx w/ calcified 70% OM1 stenosis w/ large distal vessel.  OM2 w/ moderate disease.  Large LAD w/ patent mid vessel stent.  CX:   --  OM1: There was a 70 % stenosis.  COMPLICATIONS: No complications occurred during the cath lab visit.  SUMMARY:  Summary: Addendum 2018: Case reconfirmed to generate Attachment A  report  DIAGNOSTIC IMPRESSIONS: ICS x 1 to OM1 w/ 0% residual stenosis and THOR III  flow maintained thruout.  Patent LAD stent.  DIAGNOSTIC RECOMMENDATIONS: Asa/brilinta.  PET/followup 4 weeks.  INTERVENTIONAL IMPRESSIONS: ICS x 1 to OM1 w/ 0% residual stenosis and THOR  III flow maintained thruout.  Patent LAD stent.  INTERVENTIONAL RECOMMENDATIONS: Asa/brilinta.  PET/followup 4 weeks.  Prepared and signed by  Adán Machado MD  Signed 201813:[id number]      ASSESSMENT AND PLAN:  In summary, GUIDO MATUTE is an 77y Male with past medical history significant for HTN, HLD, CAD s/p PCI to OM1 1 weeks ago, DM, prostate CA former smoker who presented to [facility] ED with palpitations.  Pt states he developed palps since his stent was placed.  His episodes occur while sleeping and wake him up.  He also has episodes while he is awake; TELE stable negative for AMI     Plan  1.  Awaiting ECHO   2.  Continue current CV medications     DC planning if ECHO stable

## 2018-07-21 NOTE — ED ADULT NURSE REASSESSMENT NOTE - COMFORT CARE
ambulated to bathroom/plan of care explained
po fluids offered/repositioned/treatment delay explained/wait time explained/side rails up/warm blanket provided/plan of care explained
plan of care explained

## 2018-07-21 NOTE — ED ADULT NURSE REASSESSMENT NOTE - NS ED NURSE REASSESS COMMENT FT1
Pt received in the stretcher resting comfortably, no distress noted, no chest pain reported, NSR on CM, awaiting cardiologist eval, will continue to monitor
Pt reassessed.  Denies physical complaints.  No acute distress noted.

## 2018-07-21 NOTE — ED CDU PROVIDER DISPOSITION NOTE - CLINICAL COURSE
Pt had uneventful CDU stay. 2 troponins were negative. Pt was seen by cardiology both yesterday and today, received TTE, and experienced no cardiac events on monitor ; Was deemed well for discharge by two cardiologists.  Pt daughter requested EP but Dr. Perera spoke with them and advised that outpatient f/u for monitor placement was appropriate and safe.

## 2018-07-30 ENCOUNTER — EMERGENCY (EMERGENCY)
Facility: HOSPITAL | Age: 78
LOS: 1 days | Discharge: HOME | End: 2018-07-30
Attending: EMERGENCY MEDICINE
Payer: MEDICARE

## 2018-07-30 VITALS
RESPIRATION RATE: 20 BRPM | DIASTOLIC BLOOD PRESSURE: 70 MMHG | OXYGEN SATURATION: 98 % | SYSTOLIC BLOOD PRESSURE: 140 MMHG | TEMPERATURE: 98 F | HEART RATE: 78 BPM

## 2018-07-30 VITALS — HEIGHT: 63 IN | WEIGHT: 149.91 LBS

## 2018-07-30 DIAGNOSIS — Z95.5 PRESENCE OF CORONARY ANGIOPLASTY IMPLANT AND GRAFT: Chronic | ICD-10-CM

## 2018-07-30 DIAGNOSIS — K40.90 UNILATERAL INGUINAL HERNIA, WITHOUT OBSTRUCTION OR GANGRENE, NOT SPECIFIED AS RECURRENT: Chronic | ICD-10-CM

## 2018-07-30 DIAGNOSIS — Z90.79 ACQUIRED ABSENCE OF OTHER GENITAL ORGAN(S): Chronic | ICD-10-CM

## 2018-07-30 LAB
ALBUMIN SERPL ELPH-MCNC: 4 G/DL — SIGNIFICANT CHANGE UP (ref 3.3–5.2)
ALP SERPL-CCNC: 108 U/L — SIGNIFICANT CHANGE UP (ref 40–120)
ALT FLD-CCNC: 16 U/L — SIGNIFICANT CHANGE UP
ANION GAP SERPL CALC-SCNC: 14 MMOL/L — SIGNIFICANT CHANGE UP (ref 5–17)
APPEARANCE UR: CLEAR — SIGNIFICANT CHANGE UP
APTT BLD: 32.4 SEC — SIGNIFICANT CHANGE UP (ref 27.5–37.4)
AST SERPL-CCNC: 19 U/L — SIGNIFICANT CHANGE UP
BACTERIA # UR AUTO: ABNORMAL
BILIRUB SERPL-MCNC: 0.5 MG/DL — SIGNIFICANT CHANGE UP (ref 0.4–2)
BILIRUB UR-MCNC: NEGATIVE — SIGNIFICANT CHANGE UP
BLD GP AB SCN SERPL QL: SIGNIFICANT CHANGE UP
BUN SERPL-MCNC: 21 MG/DL — HIGH (ref 8–20)
CALCIUM SERPL-MCNC: 9.8 MG/DL — SIGNIFICANT CHANGE UP (ref 8.6–10.2)
CHLORIDE SERPL-SCNC: 100 MMOL/L — SIGNIFICANT CHANGE UP (ref 98–107)
CO2 SERPL-SCNC: 23 MMOL/L — SIGNIFICANT CHANGE UP (ref 22–29)
COLOR SPEC: ABNORMAL
CREAT SERPL-MCNC: 1.16 MG/DL — SIGNIFICANT CHANGE UP (ref 0.5–1.3)
DIFF PNL FLD: ABNORMAL
GLUCOSE SERPL-MCNC: 154 MG/DL — HIGH (ref 70–115)
GLUCOSE UR QL: NEGATIVE MG/DL — SIGNIFICANT CHANGE UP
HCT VFR BLD CALC: 36.9 % — LOW (ref 42–52)
HCT VFR BLD CALC: 38.2 % — LOW (ref 42–52)
HGB BLD-MCNC: 12.8 G/DL — LOW (ref 14–18)
HGB BLD-MCNC: 12.9 G/DL — LOW (ref 14–18)
INR BLD: 0.94 RATIO — SIGNIFICANT CHANGE UP (ref 0.88–1.16)
KETONES UR-MCNC: NEGATIVE — SIGNIFICANT CHANGE UP
LEUKOCYTE ESTERASE UR-ACNC: ABNORMAL
MCHC RBC-ENTMCNC: 27.2 PG — SIGNIFICANT CHANGE UP (ref 27–31)
MCHC RBC-ENTMCNC: 33.5 G/DL — SIGNIFICANT CHANGE UP (ref 32–36)
MCV RBC AUTO: 81.1 FL — SIGNIFICANT CHANGE UP (ref 80–94)
NITRITE UR-MCNC: NEGATIVE — SIGNIFICANT CHANGE UP
PH UR: 7 — SIGNIFICANT CHANGE UP (ref 5–8)
PLATELET # BLD AUTO: 269 K/UL — SIGNIFICANT CHANGE UP (ref 150–400)
POTASSIUM SERPL-MCNC: 4.7 MMOL/L — SIGNIFICANT CHANGE UP (ref 3.5–5.3)
POTASSIUM SERPL-SCNC: 4.7 MMOL/L — SIGNIFICANT CHANGE UP (ref 3.5–5.3)
PROT SERPL-MCNC: 7.2 G/DL — SIGNIFICANT CHANGE UP (ref 6.6–8.7)
PROT UR-MCNC: 100 MG/DL
PROTHROM AB SERPL-ACNC: 10.3 SEC — SIGNIFICANT CHANGE UP (ref 9.8–12.7)
RBC # BLD: 4.71 M/UL — SIGNIFICANT CHANGE UP (ref 4.6–6.2)
RBC # FLD: 15.2 % — SIGNIFICANT CHANGE UP (ref 11–15.6)
RBC CASTS # UR COMP ASSIST: >50 /HPF (ref 0–4)
SODIUM SERPL-SCNC: 137 MMOL/L — SIGNIFICANT CHANGE UP (ref 135–145)
SP GR SPEC: 1 — LOW (ref 1.01–1.02)
TYPE + AB SCN PNL BLD: SIGNIFICANT CHANGE UP
UROBILINOGEN FLD QL: NEGATIVE MG/DL — SIGNIFICANT CHANGE UP
WBC # BLD: 7.4 K/UL — SIGNIFICANT CHANGE UP (ref 4.8–10.8)
WBC # FLD AUTO: 7.4 K/UL — SIGNIFICANT CHANGE UP (ref 4.8–10.8)
WBC UR QL: SIGNIFICANT CHANGE UP

## 2018-07-30 PROCEDURE — 85018 HEMOGLOBIN: CPT

## 2018-07-30 PROCEDURE — 86850 RBC ANTIBODY SCREEN: CPT

## 2018-07-30 PROCEDURE — 85730 THROMBOPLASTIN TIME PARTIAL: CPT

## 2018-07-30 PROCEDURE — 99284 EMERGENCY DEPT VISIT MOD MDM: CPT

## 2018-07-30 PROCEDURE — 85610 PROTHROMBIN TIME: CPT

## 2018-07-30 PROCEDURE — 74176 CT ABD & PELVIS W/O CONTRAST: CPT | Mod: 26

## 2018-07-30 PROCEDURE — 85027 COMPLETE CBC AUTOMATED: CPT

## 2018-07-30 PROCEDURE — 99284 EMERGENCY DEPT VISIT MOD MDM: CPT | Mod: GC

## 2018-07-30 PROCEDURE — 74176 CT ABD & PELVIS W/O CONTRAST: CPT

## 2018-07-30 PROCEDURE — 80053 COMPREHEN METABOLIC PANEL: CPT

## 2018-07-30 PROCEDURE — 81001 URINALYSIS AUTO W/SCOPE: CPT

## 2018-07-30 PROCEDURE — 86901 BLOOD TYPING SEROLOGIC RH(D): CPT

## 2018-07-30 PROCEDURE — 86900 BLOOD TYPING SEROLOGIC ABO: CPT

## 2018-07-30 PROCEDURE — 85014 HEMATOCRIT: CPT

## 2018-07-30 PROCEDURE — 36415 COLL VENOUS BLD VENIPUNCTURE: CPT

## 2018-07-30 NOTE — ED STATDOCS - DIAGNOSTIC INTERPRETATION
CT report reviewed, non-obstructing stones, findings discussed with patient, pain free at time of evaln, referred to URology, patient understands and agrees with plan

## 2018-07-30 NOTE — ED STATDOCS - CHPI ED SYMPTOM NEG
no chills/no fever/no nausea/no numbness/no palpitations/no decreased eating/drinking/no dysuria/no pain no decreased eating/drinking/no fever/no numbness/no nausea/no chills/no dysuria/no pain

## 2018-07-30 NOTE — ED STATDOCS - ATTENDING CONTRIBUTION TO CARE
76 yo man seen and evaluated with ACP  Presents for gross hematuria, painless  no fever, chills, nausea, vomiting, urinary symptoms  no blood thinners, takes plavix and aspirin  vitals reviewed, exam as documented  line, labs urine  CT for hydro, renal stone hunt  check results, reassess

## 2018-07-30 NOTE — ED STATDOCS - CARE PLAN
Principal Discharge DX:	Hematuria, gross Principal Discharge DX:	Hematuria, gross  Goal:	Monitor  Assessment and plan of treatment:	Please follow up with your PMD as well as the urologist.  On Imagine their was noted to have Bi-lateral intrarenal non-obstructing calculi Principal Discharge DX:	Hematuria, gross  Goal:	Monitor  Assessment and plan of treatment:	Please follow up with your PMD as well as the urologist.  On Imagine their was noted to have Bi-lateral intrarenal non-obstructing calculi  Secondary Diagnosis:	Renal stones

## 2018-07-30 NOTE — ED STATDOCS - FAMILY HISTORY
Family history of prostate cancer     Father  Still living? Unknown  Family history of essential hypertension, Age at diagnosis: Age Unknown

## 2018-07-30 NOTE — ED STATDOCS - OBJECTIVE STATEMENT
A 76 yo man with pmhx of prostate cancer( Radiation therpay), Sickle cell disease, CAD( S/p stents 7/13/2018), presented to the ED with complaints of gross haematuria. The patient states it started earlier this morning.  The patient states he was urinating and noticed gross blood in the toilet bowel. Pt states he has had a similar presentation in past in 1990. During that hospitalization, patient was dx with sickle cell trait. The patient denies having any blurry vision, CP, SOB, abdominal pain, Melena, no recent travel nor sick contacts.

## 2018-07-30 NOTE — ED STATDOCS - PLAN OF CARE
Monitor Please follow up with your PMD as well as the urologist.  On Imagine their was noted to have Bi-lateral intrarenal non-obstructing calculi

## 2018-07-30 NOTE — ED ADULT TRIAGE NOTE - CHIEF COMPLAINT QUOTE
Patient states that he was seen in the ED 2 weeks ago for hematuria, patient states that he had a stent placed and has been home. Patient states that he is back here today for another episode of hematuria this morning with a clot. Patient denies any pain or other complaints

## 2018-07-30 NOTE — ED STATDOCS - PHYSICAL EXAMINATION
Vital Signs Last 24 Hrs  T(C): 36.7 (30 Jul 2018 14:04), Max: 36.7 (30 Jul 2018 14:04)  T(F): 98.1 (30 Jul 2018 14:04), Max: 98.1 (30 Jul 2018 14:04)  HR: 78 (30 Jul 2018 14:04) (78 - 78)  BP: 148/76 (30 Jul 2018 14:04) (148/76 - 148/76)  BP(mean): --  RR: 18 (30 Jul 2018 14:04) (18 - 18)  SpO2: 97% (30 Jul 2018 14:04) (97% - 97%)

## 2018-08-07 ENCOUNTER — OUTPATIENT (OUTPATIENT)
Dept: OUTPATIENT SERVICES | Facility: HOSPITAL | Age: 78
LOS: 1 days | End: 2018-08-07
Payer: MEDICARE

## 2018-08-07 ENCOUNTER — APPOINTMENT (OUTPATIENT)
Dept: CT IMAGING | Facility: CLINIC | Age: 78
End: 2018-08-07
Payer: MEDICARE

## 2018-08-07 DIAGNOSIS — Z90.79 ACQUIRED ABSENCE OF OTHER GENITAL ORGAN(S): Chronic | ICD-10-CM

## 2018-08-07 DIAGNOSIS — Z00.8 ENCOUNTER FOR OTHER GENERAL EXAMINATION: ICD-10-CM

## 2018-08-07 DIAGNOSIS — C61 MALIGNANT NEOPLASM OF PROSTATE: ICD-10-CM

## 2018-08-07 DIAGNOSIS — Z95.5 PRESENCE OF CORONARY ANGIOPLASTY IMPLANT AND GRAFT: Chronic | ICD-10-CM

## 2018-08-07 DIAGNOSIS — K40.90 UNILATERAL INGUINAL HERNIA, WITHOUT OBSTRUCTION OR GANGRENE, NOT SPECIFIED AS RECURRENT: Chronic | ICD-10-CM

## 2018-08-07 PROCEDURE — 74178 CT ABD&PLV WO CNTR FLWD CNTR: CPT | Mod: 26

## 2018-08-07 PROCEDURE — 74178 CT ABD&PLV WO CNTR FLWD CNTR: CPT

## 2018-08-13 ENCOUNTER — INPATIENT (INPATIENT)
Facility: HOSPITAL | Age: 78
LOS: 0 days | Discharge: HOME | DRG: 395 | End: 2018-08-13
Attending: INTERNAL MEDICINE | Admitting: HOSPITALIST
Payer: COMMERCIAL

## 2018-08-13 ENCOUNTER — TRANSCRIPTION ENCOUNTER (OUTPATIENT)
Age: 78
End: 2018-08-13

## 2018-08-13 VITALS
SYSTOLIC BLOOD PRESSURE: 132 MMHG | TEMPERATURE: 97 F | RESPIRATION RATE: 20 BRPM | OXYGEN SATURATION: 99 % | HEART RATE: 68 BPM | DIASTOLIC BLOOD PRESSURE: 68 MMHG

## 2018-08-13 VITALS — WEIGHT: 149.91 LBS

## 2018-08-13 DIAGNOSIS — K62.5 HEMORRHAGE OF ANUS AND RECTUM: ICD-10-CM

## 2018-08-13 DIAGNOSIS — Z95.5 PRESENCE OF CORONARY ANGIOPLASTY IMPLANT AND GRAFT: Chronic | ICD-10-CM

## 2018-08-13 DIAGNOSIS — Z90.79 ACQUIRED ABSENCE OF OTHER GENITAL ORGAN(S): Chronic | ICD-10-CM

## 2018-08-13 DIAGNOSIS — K40.90 UNILATERAL INGUINAL HERNIA, WITHOUT OBSTRUCTION OR GANGRENE, NOT SPECIFIED AS RECURRENT: Chronic | ICD-10-CM

## 2018-08-13 DIAGNOSIS — Z98.890 OTHER SPECIFIED POSTPROCEDURAL STATES: Chronic | ICD-10-CM

## 2018-08-13 LAB
ALBUMIN SERPL ELPH-MCNC: 3.8 G/DL — SIGNIFICANT CHANGE UP (ref 3.3–5.2)
ALP SERPL-CCNC: 101 U/L — SIGNIFICANT CHANGE UP (ref 40–120)
ALT FLD-CCNC: 13 U/L — SIGNIFICANT CHANGE UP
ANION GAP SERPL CALC-SCNC: 13 MMOL/L — SIGNIFICANT CHANGE UP (ref 5–17)
APTT BLD: 33.9 SEC — SIGNIFICANT CHANGE UP (ref 27.5–37.4)
AST SERPL-CCNC: 15 U/L — SIGNIFICANT CHANGE UP
BASOPHILS # BLD AUTO: 0 K/UL — SIGNIFICANT CHANGE UP (ref 0–0.2)
BASOPHILS NFR BLD AUTO: 0.5 % — SIGNIFICANT CHANGE UP (ref 0–2)
BILIRUB SERPL-MCNC: 0.4 MG/DL — SIGNIFICANT CHANGE UP (ref 0.4–2)
BLD GP AB SCN SERPL QL: SIGNIFICANT CHANGE UP
BUN SERPL-MCNC: 33 MG/DL — HIGH (ref 8–20)
CALCIUM SERPL-MCNC: 9.3 MG/DL — SIGNIFICANT CHANGE UP (ref 8.6–10.2)
CHLORIDE SERPL-SCNC: 103 MMOL/L — SIGNIFICANT CHANGE UP (ref 98–107)
CO2 SERPL-SCNC: 21 MMOL/L — LOW (ref 22–29)
CREAT SERPL-MCNC: 1.38 MG/DL — HIGH (ref 0.5–1.3)
DIR ANTIGLOB POLYSPECIFIC INTERPRETATION: SIGNIFICANT CHANGE UP
EOSINOPHIL # BLD AUTO: 0.3 K/UL — SIGNIFICANT CHANGE UP (ref 0–0.5)
EOSINOPHIL NFR BLD AUTO: 2.9 % — SIGNIFICANT CHANGE UP (ref 0–5)
GLUCOSE BLDC GLUCOMTR-MCNC: 113 MG/DL — HIGH (ref 70–99)
GLUCOSE SERPL-MCNC: 134 MG/DL — HIGH (ref 70–115)
HCT VFR BLD CALC: 35.1 % — LOW (ref 42–52)
HCT VFR BLD CALC: 37 % — LOW (ref 42–52)
HGB BLD-MCNC: 12.1 G/DL — LOW (ref 14–18)
HGB BLD-MCNC: 12.6 G/DL — LOW (ref 14–18)
INR BLD: 0.91 RATIO — SIGNIFICANT CHANGE UP (ref 0.88–1.16)
LYMPHOCYTES # BLD AUTO: 3 K/UL — SIGNIFICANT CHANGE UP (ref 1–4.8)
LYMPHOCYTES # BLD AUTO: 32.1 % — SIGNIFICANT CHANGE UP (ref 20–55)
MCHC RBC-ENTMCNC: 26.9 PG — LOW (ref 27–31)
MCHC RBC-ENTMCNC: 34.1 G/DL — SIGNIFICANT CHANGE UP (ref 32–36)
MCV RBC AUTO: 79.1 FL — LOW (ref 80–94)
MONOCYTES # BLD AUTO: 0.6 K/UL — SIGNIFICANT CHANGE UP (ref 0–0.8)
MONOCYTES NFR BLD AUTO: 6.5 % — SIGNIFICANT CHANGE UP (ref 3–10)
NEUTROPHILS # BLD AUTO: 5.5 K/UL — SIGNIFICANT CHANGE UP (ref 1.8–8)
NEUTROPHILS NFR BLD AUTO: 57.9 % — SIGNIFICANT CHANGE UP (ref 37–73)
PLATELET # BLD AUTO: 267 K/UL — SIGNIFICANT CHANGE UP (ref 150–400)
POTASSIUM SERPL-MCNC: 4.6 MMOL/L — SIGNIFICANT CHANGE UP (ref 3.5–5.3)
POTASSIUM SERPL-SCNC: 4.6 MMOL/L — SIGNIFICANT CHANGE UP (ref 3.5–5.3)
PROT SERPL-MCNC: 7 G/DL — SIGNIFICANT CHANGE UP (ref 6.6–8.7)
PROTHROM AB SERPL-ACNC: 10 SEC — SIGNIFICANT CHANGE UP (ref 9.8–12.7)
RBC # BLD: 4.68 M/UL — SIGNIFICANT CHANGE UP (ref 4.6–6.2)
RBC # FLD: 15.2 % — SIGNIFICANT CHANGE UP (ref 11–15.6)
SODIUM SERPL-SCNC: 137 MMOL/L — SIGNIFICANT CHANGE UP (ref 135–145)
TYPE + AB SCN PNL BLD: SIGNIFICANT CHANGE UP
WBC # BLD: 9.5 K/UL — SIGNIFICANT CHANGE UP (ref 4.8–10.8)
WBC # FLD AUTO: 9.5 K/UL — SIGNIFICANT CHANGE UP (ref 4.8–10.8)

## 2018-08-13 PROCEDURE — 86900 BLOOD TYPING SEROLOGIC ABO: CPT

## 2018-08-13 PROCEDURE — 93010 ELECTROCARDIOGRAM REPORT: CPT

## 2018-08-13 PROCEDURE — 85730 THROMBOPLASTIN TIME PARTIAL: CPT

## 2018-08-13 PROCEDURE — 99223 1ST HOSP IP/OBS HIGH 75: CPT

## 2018-08-13 PROCEDURE — 86901 BLOOD TYPING SEROLOGIC RH(D): CPT

## 2018-08-13 PROCEDURE — 99285 EMERGENCY DEPT VISIT HI MDM: CPT

## 2018-08-13 PROCEDURE — 36415 COLL VENOUS BLD VENIPUNCTURE: CPT

## 2018-08-13 PROCEDURE — 86850 RBC ANTIBODY SCREEN: CPT

## 2018-08-13 PROCEDURE — 85610 PROTHROMBIN TIME: CPT

## 2018-08-13 PROCEDURE — 85018 HEMOGLOBIN: CPT

## 2018-08-13 PROCEDURE — 85027 COMPLETE CBC AUTOMATED: CPT

## 2018-08-13 PROCEDURE — 82962 GLUCOSE BLOOD TEST: CPT

## 2018-08-13 PROCEDURE — 86880 COOMBS TEST DIRECT: CPT

## 2018-08-13 PROCEDURE — 85014 HEMATOCRIT: CPT

## 2018-08-13 PROCEDURE — 74174 CTA ABD&PLVS W/CONTRAST: CPT

## 2018-08-13 PROCEDURE — 93005 ELECTROCARDIOGRAM TRACING: CPT

## 2018-08-13 PROCEDURE — 80053 COMPREHEN METABOLIC PANEL: CPT

## 2018-08-13 PROCEDURE — 74174 CTA ABD&PLVS W/CONTRAST: CPT | Mod: 26

## 2018-08-13 RX ORDER — POLYETHYLENE GLYCOL 3350 17 G/17G
17 POWDER, FOR SOLUTION ORAL
Qty: 510 | Refills: 0
Start: 2018-08-13 | End: 2018-09-11

## 2018-08-13 RX ORDER — SODIUM CHLORIDE 9 MG/ML
1000 INJECTION INTRAMUSCULAR; INTRAVENOUS; SUBCUTANEOUS
Qty: 0 | Refills: 0 | Status: DISCONTINUED | OUTPATIENT
Start: 2018-08-13 | End: 2018-08-13

## 2018-08-13 RX ORDER — SODIUM CHLORIDE 9 MG/ML
3 INJECTION INTRAMUSCULAR; INTRAVENOUS; SUBCUTANEOUS ONCE
Qty: 0 | Refills: 0 | Status: COMPLETED | OUTPATIENT
Start: 2018-08-13 | End: 2018-08-13

## 2018-08-13 RX ORDER — INSULIN LISPRO 100/ML
VIAL (ML) SUBCUTANEOUS
Qty: 0 | Refills: 0 | Status: DISCONTINUED | OUTPATIENT
Start: 2018-08-13 | End: 2018-08-13

## 2018-08-13 RX ORDER — DEXTROSE 50 % IN WATER 50 %
25 SYRINGE (ML) INTRAVENOUS ONCE
Qty: 0 | Refills: 0 | Status: DISCONTINUED | OUTPATIENT
Start: 2018-08-13 | End: 2018-08-13

## 2018-08-13 RX ORDER — DEXTROSE 50 % IN WATER 50 %
15 SYRINGE (ML) INTRAVENOUS ONCE
Qty: 0 | Refills: 0 | Status: DISCONTINUED | OUTPATIENT
Start: 2018-08-13 | End: 2018-08-13

## 2018-08-13 RX ORDER — METOPROLOL TARTRATE 50 MG
50 TABLET ORAL
Qty: 0 | Refills: 0 | Status: DISCONTINUED | OUTPATIENT
Start: 2018-08-13 | End: 2018-08-13

## 2018-08-13 RX ORDER — DEXTROSE 50 % IN WATER 50 %
12.5 SYRINGE (ML) INTRAVENOUS ONCE
Qty: 0 | Refills: 0 | Status: DISCONTINUED | OUTPATIENT
Start: 2018-08-13 | End: 2018-08-13

## 2018-08-13 RX ORDER — GLUCAGON INJECTION, SOLUTION 0.5 MG/.1ML
1 INJECTION, SOLUTION SUBCUTANEOUS ONCE
Qty: 0 | Refills: 0 | Status: DISCONTINUED | OUTPATIENT
Start: 2018-08-13 | End: 2018-08-13

## 2018-08-13 RX ORDER — LOSARTAN POTASSIUM 100 MG/1
50 TABLET, FILM COATED ORAL DAILY
Qty: 0 | Refills: 0 | Status: DISCONTINUED | OUTPATIENT
Start: 2018-08-13 | End: 2018-08-13

## 2018-08-13 RX ORDER — CLOPIDOGREL BISULFATE 75 MG/1
1 TABLET, FILM COATED ORAL
Qty: 30 | Refills: 0
Start: 2018-08-13 | End: 2018-09-11

## 2018-08-13 RX ORDER — SENNA PLUS 8.6 MG/1
2 TABLET ORAL
Qty: 40 | Refills: 0
Start: 2018-08-13 | End: 2018-09-01

## 2018-08-13 RX ORDER — ASPIRIN/CALCIUM CARB/MAGNESIUM 324 MG
81 TABLET ORAL DAILY
Qty: 0 | Refills: 0 | Status: DISCONTINUED | OUTPATIENT
Start: 2018-08-13 | End: 2018-08-13

## 2018-08-13 RX ORDER — HYDROCORTISONE 1 %
1 OINTMENT (GRAM) TOPICAL
Qty: 10 | Refills: 0
Start: 2018-08-13 | End: 2018-08-22

## 2018-08-13 RX ORDER — PANTOPRAZOLE SODIUM 20 MG/1
40 TABLET, DELAYED RELEASE ORAL
Qty: 0 | Refills: 0 | Status: DISCONTINUED | OUTPATIENT
Start: 2018-08-13 | End: 2018-08-13

## 2018-08-13 RX ORDER — HYDROCORTISONE 1 %
1 OINTMENT (GRAM) TOPICAL DAILY
Qty: 0 | Refills: 0 | Status: DISCONTINUED | OUTPATIENT
Start: 2018-08-13 | End: 2018-08-13

## 2018-08-13 RX ORDER — TICAGRELOR 90 MG/1
90 TABLET ORAL
Qty: 0 | Refills: 0 | Status: DISCONTINUED | OUTPATIENT
Start: 2018-08-13 | End: 2018-08-13

## 2018-08-13 RX ORDER — ATORVASTATIN CALCIUM 80 MG/1
40 TABLET, FILM COATED ORAL AT BEDTIME
Qty: 0 | Refills: 0 | Status: DISCONTINUED | OUTPATIENT
Start: 2018-08-13 | End: 2018-08-13

## 2018-08-13 RX ORDER — SODIUM CHLORIDE 9 MG/ML
1000 INJECTION, SOLUTION INTRAVENOUS
Qty: 0 | Refills: 0 | Status: DISCONTINUED | OUTPATIENT
Start: 2018-08-13 | End: 2018-08-13

## 2018-08-13 RX ORDER — AMLODIPINE BESYLATE 2.5 MG/1
10 TABLET ORAL DAILY
Qty: 0 | Refills: 0 | Status: DISCONTINUED | OUTPATIENT
Start: 2018-08-13 | End: 2018-08-13

## 2018-08-13 RX ADMIN — SODIUM CHLORIDE 100 MILLILITER(S): 9 INJECTION INTRAMUSCULAR; INTRAVENOUS; SUBCUTANEOUS at 06:55

## 2018-08-13 RX ADMIN — LOSARTAN POTASSIUM 50 MILLIGRAM(S): 100 TABLET, FILM COATED ORAL at 06:54

## 2018-08-13 RX ADMIN — TICAGRELOR 90 MILLIGRAM(S): 90 TABLET ORAL at 06:53

## 2018-08-13 RX ADMIN — SODIUM CHLORIDE 3 MILLILITER(S): 9 INJECTION INTRAMUSCULAR; INTRAVENOUS; SUBCUTANEOUS at 02:34

## 2018-08-13 RX ADMIN — AMLODIPINE BESYLATE 10 MILLIGRAM(S): 2.5 TABLET ORAL at 06:54

## 2018-08-13 RX ADMIN — PANTOPRAZOLE SODIUM 40 MILLIGRAM(S): 20 TABLET, DELAYED RELEASE ORAL at 09:57

## 2018-08-13 RX ADMIN — Medication 50 MILLIGRAM(S): at 06:54

## 2018-08-13 NOTE — ED PROVIDER NOTE - OBJECTIVE STATEMENT
76 y/o M, with hx of asthma, MI, DM, HLD, HTN, prostate cancer, inguinal hernia, and coronary stents, presents to the ED c/o rectal bleeding, onset approximately 4:00PM.  Daughter states that pt has 5 episodes of blood in his stool, each time stool appeared brighter red.  Also notes that pt has been passing clots.  Daughter also notes that pt has recently had blood in his urine.  Denies any pain at this time including chest pain, abd pain, N/V, fever, chills, dizziness, SOB, or visual changes.  Currently taking aspirin and Brilinta.  Last colonoscopy approximately 3 years ago and was normal.

## 2018-08-13 NOTE — DISCHARGE NOTE ADULT - MEDICATION SUMMARY - MEDICATIONS TO STOP TAKING
I will STOP taking the medications listed below when I get home from the hospital:    ticagrelor 90 mg oral tablet  -- 1 tab(s) by mouth 2 times a day

## 2018-08-13 NOTE — DISCHARGE NOTE ADULT - MEDICATION SUMMARY - MEDICATIONS TO TAKE
I will START or STAY ON the medications listed below when I get home from the hospital:    aspirin 81 mg oral tablet, chewable  -- 1 tab(s) by mouth once a day  -- Indication: For cad stent    valsartan 160 mg oral tablet  -- 1 tab(s) by mouth once a day  -- Indication: For Htn    Tradjenta 5 mg oral tablet  -- 1 tab(s) by mouth once a day  -- Indication: For dm    atorvastatin 40 mg oral tablet  -- 1 tab(s) by mouth once a day (at bedtime)  -- HOME  -- Indication: For Hld    Plavix 75 mg oral tablet  -- 1 tab(s) by mouth once a day   -- Do not take aspirin or aspirin containing products without knowledge and consent of your physician.    -- Indication: For cad stent    metoprolol tartrate 50 mg oral tablet  -- 1 tab(s) by mouth 2 times a day  -- Indication: For cad    amLODIPine 10 mg oral tablet  -- 1 tab(s) by mouth once a day  -- Indication: For Htn    hydrocortisone 25 mg rectal suppository  -- 1 suppository(ies) rectally once a day  -- Indication: For Hemorrhoidal bleed    Protonix 20 mg oral delayed release tablet  -- 1 tab(s) by mouth once a day   -- It is very important that you take or use this exactly as directed.  Do not skip doses or discontinue unless directed by your doctor.  Obtain medical advice before taking any non-prescription drugs as some may affect the action of this medication.  Swallow whole.  Do not crush.    -- Indication: For gi prophylaxis

## 2018-08-13 NOTE — DISCHARGE NOTE ADULT - CARE PROVIDER_API CALL
Thania Epperson (DO), Gastroenterology  [address]  [address]  [place] [address]  Phone: [phone]  Fax: [phone]    Nadeem Vergara (MD), Cardiovascular Disease; Internal Medicine  [address]  [place] [address]  Phone: [phone]  Fax: [phone]    Art Daniel), Urology  [address] [facility] [address] [place] [address]  Phone: [phone]  Fax: [phone]

## 2018-08-13 NOTE — DISCHARGE NOTE ADULT - PATIENT PORTAL LINK FT
You can access the [facility] Portal, offered by [facility], by registering with the following website: [email or web address]

## 2018-08-13 NOTE — H&P ADULT - PSH
H/O colonoscopy  Normal as per patient 3 years ago.  Inguinal hernia    S/P primary angioplasty with coronary stent    S/P prostatectomy

## 2018-08-13 NOTE — H&P ADULT - HISTORY OF PRESENT ILLNESS
77 years old male with PMH of CAD with recent stents (on Brilinta and Aspirin), DM, HTN, HLD, CKD 3, Prostate Cancer s/p Radiation Seeds, Sickle Cell Trait and Lung Nodules comes with bleeding per rectum. As per patient, it started yesterday around 4 pm - had 6-7 episodes of bernice blood. Denies abdominal pain, nausea, vomiting or fever. He also noticed blood in urine 2 weeks ago and came to ER. He was discharged after complete evaluation. Denies hematuria or other urinary symptoms at this time.  He was admitted in 2015 with same complaint and was diagnosed with hemorrhoids/proctitis. Denies constipation. 77 years old male with PMH of CAD with recent stents (on Brilinta and Aspirin), DM, HTN, HLD, CKD 3, Prostate Cancer s/p Radiation Seeds, Sickle Cell Trait and Lung Nodules comes with bleeding per rectum. As per patient, it started yesterday around 4 pm - had 6-7 episodes of bernice blood. Denies abdominal pain, nausea, vomiting or fever. He also noticed blood in urine 2 weeks ago and came to ER. He was discharged after complete evaluation. Denies hematuria or other urinary symptoms at this time. Denies chest pain, palpitations, shortness of breath or dizziness.   He was admitted in 2015 with same complaint and was diagnosed with hemorrhoids/proctitis. Denies constipation.

## 2018-08-13 NOTE — DISCHARGE NOTE ADULT - HOSPITAL COURSE
Pt admitted with Rectal bleed. CT ruled out internal bleed. H/H stable. no further episodes. likely hemorrhoidal bleed. responded to steroid suppositories. UA showed plenty RBCs. d/w patient and family and daughter- to follow with regular URo/ Uro Onc about same since with prostate RT seed implants. stressed upon regular bowel  regimen. if recurs or increasing frequency to follow with GI. counselled about Valsartan recall and to check with her pharmacy about same.    Vital Signs Last 24 Hrs  T(C): 36.1 (08-13-18 @ 11:47), Max: 36.8 (08-13-18 @ 06:38)  T(F): 97 (08-13-18 @ 11:47), Max: 98.3 (08-13-18 @ 06:38)  HR: 68 (08-13-18 @ 11:47) (60 - 70)  BP: 132/68 (08-13-18 @ 11:47) (129/70 - 149/67)  BP(mean): --  RR: 20 (08-13-18 @ 11:47) (18 - 20)  SpO2: 99% (08-13-18 @ 11:47) (98% - 100%)  GENERAL:  well-groomed, well-developed  HEAD:  Atraumatic, Normocephalic  EYES: EOMI, conjunctiva and sclera clear  NECK:  No JVD, Normal thyroid  NERVOUS SYSTEM:  Alert & Oriented X 3, Motor Strength 5/5 B/L upper and lower extremities  CHEST/LUNG: Clear to auscultate bilaterally; No rales, rhonchi, wheezing, or rubs  HEART: Regular rate and rhythm; No murmurs, rubs, or gallops  ABDOMEN: Soft, Nontender, Nondistended; Bowel sounds present  EXTREMITIES:  2+ Peripheral Pulses, No clubbing, cyanosis, or edema  SKIN: No rashes or lesions    Medically stable and agreeable with discharge and follow up plan. Patient was advised to return to ED if any symptoms occur or worsen.    Time 45 mins

## 2018-08-13 NOTE — CONSULT NOTE ADULT - ASSESSMENT
Assessment  Rectal bleeding ? radiation proctitis  CAD s/p recent PCI OM &/2018  Remote LAD PCI  Normal EV no evidence of valvular disease  Htn  Dm  Prostate CA Assessment  Rectal bleeding ? radiation proctitis  CAD s/p recent PCI LAD / OM &/2018 - asx w/o angina or ischemia on ECG  Normal EV no evidence of valvular disease  Htn  Dm  Prostate CA      Rec  Agree with Hgb > 8.5, transfuse as needed  Cont asa   Hold brilinta for now  If pt requires colonscopy cardiac status is stable, no contraindicatipns  Prior to dc home switch from brilinta to plavix with asa 81 mg  will follow

## 2018-08-13 NOTE — H&P ADULT - NSHPLABSRESULTS_GEN_ALL_CORE
LABS:                        12.6   9.5   )-----------( 267      ( 13 Aug 2018 02:48 )             37.0     08-13    137  |  103  |  33.0<H>  ----------------------------<  134<H>  4.6   |  21.0<L>  |  1.38<H>    Ca    9.3      13 Aug 2018 02:48    TPro  7.0  /  Alb  3.8  /  TBili  0.4  /  DBili  x   /  AST  15  /  ALT  13  /  AlkPhos  101  08-13    PT/INR - ( 13 Aug 2018 02:48 )   PT: 10.0 sec;   INR: 0.91 ratio         PTT - ( 13 Aug 2018 02:48 )  PTT:33.9 sec      CT Angio Abdomen and Pelvis w/ IV Cont (08.13.18 @ 03:50)  No CT evidence of active GI bleed.  Normal appendix. No bowel obstruction or free air.

## 2018-08-13 NOTE — CONSULT NOTE ADULT - SUBJECTIVE AND OBJECTIVE BOX
Patient is a 77y old  Male who presents with a chief complaint of Bleeding per rectum (13 Aug 2018 05:50)      HPI:  77 years old male with PMH of CAD with recent stents (on Brilinta and Aspirin), DM, HTN, HLD, CKD 3, Prostate Cancer s/p Radiation Seeds, Sickle Cell Trait and Lung Nodules comes with bleeding per rectum. Patient seen in Er. Had PTCA  in July. Plesae on Brilinta and asa. 2 weeks ago had hematuria which has since stopped. Yesterday had 6 episodes of rectal bleeding.- stool mixed with blood. Moderate amount . Lasted minutes each episode. No abdominal pain, no nauasea, no vomiting, no fevers, no constipation, no diarrhea. NO heartburn symptoms. Hgb stable from 2 weeks ago.  Pt  with hx of prostate  CA in 2014  treated with radiation seeds. Had rectal bleeding in 2015- hemorrhoids, but no other findings as per patient. ( done in [facility]). pt does not know the name of MD.              REVIEW OF SYSTEMS:  Constitutional: No fever, weight loss or fatigue  ENMT:  No difficulty hearing, tinnitus, vertigo; No sinus or throat pain  Respiratory: No cough, wheezing, chills or hemoptysis  Cardiovascular: No chest pain, palpitations, dizziness or leg swelling  Gastrointestinal: No abdominal or epigastric pain. No nausea, vomiting or hematemesis; No diarrhea or constipation.+hematochezia.  Skin: No itching, burning, rashes or lesions   Musculoskeletal: No joint pain or swelling; No muscle, back or extremity pain    PAST MEDICAL & SURGICAL HISTORY:  MI (myocardial infarction)  Sickle cell anemia: Carrier of the disease, presently doesn&#x27;t have it  Asthma, mild intermittent, uncomplicated  Other hemorrhoids  Cancer: protate  Diabetes mellitus  Hyperlipidemia  Hypertension  H/O colonoscopy: Normal as per patient 3 years ago.  S/P primary angioplasty with coronary stent  S/P prostatectomy  Inguinal hernia      FAMILY HISTORY:  Family history of heart disease (Father)  Family history of essential hypertension  Family history of prostate cancer      SOCIAL HISTORY:  Smoking Status: [ ] Current, [ ] Former, [ ] Never  Pack Years:  [  ] EtOH-no  [  ] IVDA-no    MEDICATIONS:  MEDICATIONS  (STANDING):  amLODIPine   Tablet 10 milliGRAM(s) Oral daily  atorvastatin 40 milliGRAM(s) Oral at bedtime  dextrose 5%. 1000 milliLiter(s) (50 mL/Hr) IV Continuous <Continuous>  dextrose 50% Injectable 12.5 Gram(s) IV Push once  dextrose 50% Injectable 25 Gram(s) IV Push once  dextrose 50% Injectable 25 Gram(s) IV Push once  hydrocortisone hemorrhoidal Suppository 1 Suppository(s) Rectal daily  insulin lispro (HumaLOG) corrective regimen sliding scale   SubCutaneous Before meals and at bedtime  losartan 50 milliGRAM(s) Oral daily  metoprolol tartrate 50 milliGRAM(s) Oral two times a day  pantoprazole    Tablet 40 milliGRAM(s) Oral before breakfast  sodium chloride 0.9%. 1000 milliLiter(s) (100 mL/Hr) IV Continuous <Continuous>  ticagrelor 90 milliGRAM(s) Oral two times a day    MEDICATIONS  (PRN):  dextrose 40% Gel 15 Gram(s) Oral once PRN Blood Glucose LESS THAN 70 milliGRAM(s)/deciLiter  glucagon  Injectable 1 milliGRAM(s) IntraMuscular once PRN Glucose <70 milliGRAM(s)/deciLiter      Allergies    EGGPLANT (Hives)  No Known Drug Allergies  strawberry (Hives)    Intolerances    aspirin (Stomach Upset)      Vital Signs Last 24 Hrs  T(C): 36.8 (13 Aug 2018 06:38), Max: 36.8 (13 Aug 2018 06:38)  T(F): 98.3 (13 Aug 2018 06:38), Max: 98.3 (13 Aug 2018 06:38)  HR: 69 (13 Aug 2018 06:38) (60 - 70)  BP: 133/77 (13 Aug 2018 06:38) (133/77 - 149/67)  BP(mean): --  RR: 20 (13 Aug 2018 06:38) (18 - 20)  SpO2: 99% (13 Aug 2018 06:38) (98% - 100%)        PHYSICAL EXAM:    General: Well developed; well nourished; in no acute distress  HEENT: MMM, conjunctiva and sclera clear  H- RRR  L- CTA  Gastrointestinal: Soft, non-tender non-distended; Normal bowel sounds; No rebound or guarding  Extremities: Normal range of motion, No clubbing, cyanosis or edema  Neurological: Alert and oriented x3  Skin: Warm and dry. No obvious rash  rectal - scant blood on rectal exam. External hemorrhoids      LABS:                        12.6   9.5   )-----------( 267      ( 13 Aug 2018 02:48 )             37.0     13 Aug 2018 02:48    137    |  103    |  33.0   ----------------------------<  134    4.6     |  21.0   |  1.38     Ca    9.3        13 Aug 2018 02:48    TPro  7.0    /  Alb  3.8    /  TBili  0.4    /  DBili  x      /  AST  15     /  ALT  13     /  AlkPhos  101    / Amylase x      /Lipase x      13 Aug 2018 02:48              RADIOLOGY & ADDITIONAL STUDIES:    < from: CT Angio Abdomen and Pelvis w/ IV Cont (08.13.18 @ 03:50) >  IMPRESSION:     No CT evidence of active GI bleed.    Normal appendix. No bowel obstruction or free air.    < end of copied text >

## 2018-08-13 NOTE — H&P ADULT - NSHPPHYSICALEXAM_GEN_ALL_CORE
Vital Signs   T(C): 36.4 (13 Aug 2018 04:31), Max: 36.7 (13 Aug 2018 00:34)  T(F): 97.5 (13 Aug 2018 04:31), Max: 98.1 (13 Aug 2018 00:34)  HR: 70 (13 Aug 2018 04:31) (60 - 70)  BP: 149/67 (13 Aug 2018 04:31) (147/83 - 149/67)  RR: 20 (13 Aug 2018 04:31) (18 - 20)  SpO2: 100% (13 Aug 2018 04:31) (98% - 100%)  General: Well developed. Well nourished. No acute distress  HEENT: PERRLA. EOMI. Clear conjunctivae. Moist mucus membrane  Neck: Supple. No JVD. No Thyromegaly   Chest: CTA bilaterally - no wheezing, rales or rhonchi. No chest wall tenderness.  Heart: Normal S1 & S2 with RRR. No murmur.   Abdomen: Soft. Non-tender. Non-distended. + BS  Rectal: External hemorrhoids. No stool in rectum. Fresh Blood on glove tip. Normal sphincter tone.  Ext: No pedal edema. No calf tenderness   Neuro: AAO x 3. No focal deficit. CN II-XII grossly WNL.  No speech disorder.  Skin: Warm and Dry  Psychiatry: Normal mood and affect

## 2018-08-13 NOTE — DISCHARGE NOTE ADULT - CARE PLAN
Principal Discharge DX:	Rectal bleeding  Goal:	resolved  Assessment and plan of treatment:	Follow with PMD/ Uro ONC/ GI  Secondary Diagnosis:	Asthma, mild intermittent, uncomplicated  Secondary Diagnosis:	Diabetes mellitus  Secondary Diagnosis:	S/P primary angioplasty with coronary stent  Secondary Diagnosis:	Hypertension  Secondary Diagnosis:	Hyperlipidemia  Secondary Diagnosis:	Prostate cancer

## 2018-08-13 NOTE — CONSULT NOTE ADULT - SUBJECTIVE AND OBJECTIVE BOX
[place] [facility], THE HEART CENTER                                   [address] [place] [address]                                                      PHONE: [phone]                                                         FAX: [phone]  [email or web address]  ---------------------------------------------------------------------------------------------------------------------------------    Reason for Consult: GIB recent PCI    HPI:  GUIDO MATUTE is an 77y Male with htn, hyperlipidemia, CAd s/p remote LAD PCI, recent Cx OM PCI 7/2018 on asa and brilinta, h/o prostate Ca s/p XRT admitted with hematuria and rectal bleeding.  Cardio consult called for eval prior to possible coloscopy    PAST MEDICAL & SURGICAL HISTORY:  MI (myocardial infarction)  Sickle cell anemia: Carrier of the disease, presently doesn&#x27;t have it  Asthma, mild intermittent, uncomplicated  Other hemorrhoids  Cancer: protate  Diabetes mellitus  Hyperlipidemia  Hypertension  H/O colonoscopy: Normal as per patient 3 years ago.  S/P primary angioplasty with coronary stent  S/P prostatectomy  Inguinal hernia      aspirin (Stomach Upset)  EGGPLANT (Hives)  No Known Drug Allergies  strawberry (Hives)      MEDICATIONS  (STANDING):  amLODIPine   Tablet 10 milliGRAM(s) Oral daily  atorvastatin 40 milliGRAM(s) Oral at bedtime  dextrose 5%. 1000 milliLiter(s) (50 mL/Hr) IV Continuous <Continuous>  dextrose 50% Injectable 12.5 Gram(s) IV Push once  dextrose 50% Injectable 25 Gram(s) IV Push once  dextrose 50% Injectable 25 Gram(s) IV Push once  hydrocortisone hemorrhoidal Suppository 1 Suppository(s) Rectal daily  insulin lispro (HumaLOG) corrective regimen sliding scale   SubCutaneous Before meals and at bedtime  losartan 50 milliGRAM(s) Oral daily  metoprolol tartrate 50 milliGRAM(s) Oral two times a day  pantoprazole    Tablet 40 milliGRAM(s) Oral before breakfast  sodium chloride 0.9%. 1000 milliLiter(s) (100 mL/Hr) IV Continuous <Continuous>  ticagrelor 90 milliGRAM(s) Oral two times a day    MEDICATIONS  (PRN):  dextrose 40% Gel 15 Gram(s) Oral once PRN Blood Glucose LESS THAN 70 milliGRAM(s)/deciLiter  glucagon  Injectable 1 milliGRAM(s) IntraMuscular once PRN Glucose <70 milliGRAM(s)/deciLiter      Social History:  Cigarettes:         neg           Alchohol:          neg      Illicit Drug Abuse:  neg  neg FH CAD    ROS: Negative other than as mentioned in HPI.    Vital Signs Last 24 Hrs  T(C): 36.8 (13 Aug 2018 06:38), Max: 36.8 (13 Aug 2018 06:38)  T(F): 98.3 (13 Aug 2018 06:38), Max: 98.3 (13 Aug 2018 06:38)  HR: 69 (13 Aug 2018 06:38) (60 - 70)  BP: 133/77 (13 Aug 2018 06:38) (133/77 - 149/67)  BP(mean): --  RR: 20 (13 Aug 2018 06:38) (18 - 20)  SpO2: 99% (13 Aug 2018 06:38) (98% - 100%)  ICU Vital Signs Last 24 Hrs  GUIDO JUJU  I&O's Detail    I&O's Summary    Drug Dosing Weight  GUIDO MATUTE      PHYSICAL EXAM:  General: Appears well developed, well nourished alert and cooperative.  HEENT: Head; normocephalic, atraumatic.  Eyes: Pupils reactive, cornea wnl.  Neck: Supple, no nodes adenopathy, no NVD or carotid bruit or thyromegaly.  CARDIOVASCULAR: Normal S1 and S2, No murmur, rub, gallop or lift.   LUNGS: No rales, rhonchi or wheeze. Normal breath sounds bilaterally.  ABDOMEN: Soft, nontender without mass or organomegaly. bowel sounds normoactive.  EXTREMITIES: No clubbing, cyanosis or edema. Distal pulses wnl.   SKIN: warm and dry with normal turgor.  NEURO: Alert/oriented x 3/normal motor exam. No pathologic reflexes.    PSYCH: normal affect.        LABS:                        12.6   9.5   )-----------( 267      ( 13 Aug 2018 02:48 )             37.0     08-13    137  |  103  |  33.0<H>  ----------------------------<  134<H>  4.6   |  21.0<L>  |  1.38<H>    Ca    9.3      13 Aug 2018 02:48    TPro  7.0  /  Alb  3.8  /  TBili  0.4  /  DBili  x   /  AST  15  /  ALT  13  /  AlkPhos  101  08-13    GUIDO LEHMANUTIER      PT/INR - ( 13 Aug 2018 02:48 )   PT: 10.0 sec;   INR: 0.91 ratio         PTT - ( 13 Aug 2018 02:48 )  PTT:33.9 sec      RADIOLOGY & ADDITIONAL STUDIES:    INTERPRETATION OF TELEMETRY (personally reviewed):    ECG: NSR no acute changes    ECHO:  < from: TTE Echo Complete w/Doppler (07.20.18 @ 21:01) >    Summary:   1. Normal biventricular systolic function. Left ventricular ejection   fraction, by visual estimation, is 60 to 65%.   2. Spectral Doppler shows impaired relaxation pattern of left   ventricular myocardial filling (Grade I diastolic dysfunction).   3. No significant valvular abnormalities.   4. No pericardial effusion.   5. ** No prior echocardiograms available for comparison.    [id number] Radha Russell , Electronically signed on 7/21/2018 at   11:29:04 AM              *** Final ***                < end of copied text >    STRESS TEST:    CARDIAC CATHETERIZATION:< from: Cardiac Cath Lab - Adult (07.13.18 @ 15:31) >  VENTRICLES: No LV gram ( Cr 1.2 ).  CORONARY VESSELS: R dominant.  Large RCA w/ moderate diffuse disease.  Moderate sized LCx w/ calcified 70% OM1 stenosis w/ large distal vessel.  OM2 w/ moderate disease.  Large LAD w/ patent mid vessel stent.  CX:   --  OM1: There was a 70 % stenosis.  COMPLICATIONS: No complications occurred during the cath lab visit.  SUMMARY:  Summary: Addendum 7/17/2018: Case reconfirmed to generate Attachment A  report  DIAGNOSTIC IMPRESSIONS: ICS x 1 to OM1 w/ 0% residual stenosis and THOR III  flow maintained thruout.  Patent LAD stent.  DIAGNOSTIC RECOMMENDATIONS: Asa/brilinta.  PET/followup 4 weeks.  INTERVENTIONAL IMPRESSIONS: ICS x 1 to OM1 w/ 0% residual stenosis and THOR  III flow maintained thruout.  Patent LAD stent.  INTERVENTIONAL RECOMMENDATIONS: Asa/brilinta.  PET/followup 4 weeks.    < end of copied text > [place] [facility], [facility]                                   [address] [place] [address]                                                      PHONE: [phone]                                                         FAX: [phone]  [email or web address]  ---------------------------------------------------------------------------------------------------------------------------------    Reason for Consult: GIB recent PCI    HPI:  GUIDO MATUTE is an 77y Male with htn, hyperlipidemia, CAd s/p recent LAD and Cx PCI in July 2018,  on asa and brilinta, h/o prostate Ca s/p XRT admitted with hematuria and rectal bleeding.  Cardio consult called for eval prior to possible coloscopy.  Pt denies any assoc anginal sx since 2 vessel PCI.  Currently asx. Admitted with rectal bleeding, Hgb stable.    PAST MEDICAL & SURGICAL HISTORY:  MI (myocardial infarction)  Sickle cell anemia: Carrier of the disease, presently doesn&#x27;t have it  Asthma, mild intermittent, uncomplicated  Other hemorrhoids  Cancer: protate  Diabetes mellitus  Hyperlipidemia  Hypertension  H/O colonoscopy: Normal as per patient 3 years ago.  S/P primary angioplasty with coronary stent  S/P prostatectomy  Inguinal hernia      aspirin (Stomach Upset)  EGGPLANT (Hives)  No Known Drug Allergies  strawberry (Hives)      MEDICATIONS  (STANDING):  amLODIPine   Tablet 10 milliGRAM(s) Oral daily  atorvastatin 40 milliGRAM(s) Oral at bedtime  dextrose 5%. 1000 milliLiter(s) (50 mL/Hr) IV Continuous <Continuous>  dextrose 50% Injectable 12.5 Gram(s) IV Push once  dextrose 50% Injectable 25 Gram(s) IV Push once  dextrose 50% Injectable 25 Gram(s) IV Push once  hydrocortisone hemorrhoidal Suppository 1 Suppository(s) Rectal daily  insulin lispro (HumaLOG) corrective regimen sliding scale   SubCutaneous Before meals and at bedtime  losartan 50 milliGRAM(s) Oral daily  metoprolol tartrate 50 milliGRAM(s) Oral two times a day  pantoprazole    Tablet 40 milliGRAM(s) Oral before breakfast  sodium chloride 0.9%. 1000 milliLiter(s) (100 mL/Hr) IV Continuous <Continuous>  ticagrelor 90 milliGRAM(s) Oral two times a day    MEDICATIONS  (PRN):  dextrose 40% Gel 15 Gram(s) Oral once PRN Blood Glucose LESS THAN 70 milliGRAM(s)/deciLiter  glucagon  Injectable 1 milliGRAM(s) IntraMuscular once PRN Glucose <70 milliGRAM(s)/deciLiter      Social History:  Cigarettes:         neg           Alchohol:          neg      Illicit Drug Abuse:  neg  neg FH CAD    ROS: Negative other than as mentioned in HPI.    Vital Signs Last 24 Hrs  T(C): 36.8 (13 Aug 2018 06:38), Max: 36.8 (13 Aug 2018 06:38)  T(F): 98.3 (13 Aug 2018 06:38), Max: 98.3 (13 Aug 2018 06:38)  HR: 69 (13 Aug 2018 06:38) (60 - 70)  BP: 133/77 (13 Aug 2018 06:38) (133/77 - 149/67)  BP(mean): --  RR: 20 (13 Aug 2018 06:38) (18 - 20)  SpO2: 99% (13 Aug 2018 06:38) (98% - 100%)  ICU Vital Signs Last 24 Hrs  GUIDO JUJU  I&O's Detail    I&O's Summary    Drug Dosing Weight  GUIDO MATUTE      PHYSICAL EXAM:  General: Appears well developed, well nourished alert and cooperative.  HEENT: Head; normocephalic, atraumatic.  Eyes: Pupils reactive, cornea wnl.  Neck: Supple, no nodes adenopathy, no NVD or carotid bruit or thyromegaly.  CARDIOVASCULAR: Normal S1 and S2, No murmur, rub, gallop or lift.   LUNGS: No rales, rhonchi or wheeze. Normal breath sounds bilaterally.  ABDOMEN: Soft, nontender without mass or organomegaly. bowel sounds normoactive.  EXTREMITIES: No clubbing, cyanosis or edema. Distal pulses wnl.   SKIN: warm and dry with normal turgor.  NEURO: Alert/oriented x 3/normal motor exam. No pathologic reflexes.    PSYCH: normal affect.        LABS:                        12.6   9.5   )-----------( 267      ( 13 Aug 2018 02:48 )             37.0     08-13    137  |  103  |  33.0<H>  ----------------------------<  134<H>  4.6   |  21.0<L>  |  1.38<H>    Ca    9.3      13 Aug 2018 02:48    TPro  7.0  /  Alb  3.8  /  TBili  0.4  /  DBili  x   /  AST  15  /  ALT  13  /  AlkPhos  101  08-13    GUIDO MATUTE      PT/INR - ( 13 Aug 2018 02:48 )   PT: 10.0 sec;   INR: 0.91 ratio         PTT - ( 13 Aug 2018 02:48 )  PTT:33.9 sec      RADIOLOGY & ADDITIONAL STUDIES:    INTERPRETATION OF TELEMETRY (personally reviewed):    ECG: NSR no acute changes    ECHO:  < from: TTE Echo Complete w/Doppler (07.20.18 @ 21:01) >    Summary:   1. Normal biventricular systolic function. Left ventricular ejection   fraction, by visual estimation, is 60 to 65%.   2. Spectral Doppler shows impaired relaxation pattern of left   ventricular myocardial filling (Grade I diastolic dysfunction).   3. No significant valvular abnormalities.   4. No pericardial effusion.   5. ** No prior echocardiograms available for comparison.    [id number] Radha Russell DO, Electronically signed on 7/21/2018 at   11:29:04 AM              *** Final ***                < end of copied text >    STRESS TEST:    CARDIAC CATHETERIZATION:< from: Cardiac Cath Lab - Adult (07.13.18 @ 15:31) >  VENTRICLES: No LV gram ( Cr 1.2 ).  CORONARY VESSELS: R dominant.  Large RCA w/ moderate diffuse disease.  Moderate sized LCx w/ calcified 70% OM1 stenosis w/ large distal vessel.  OM2 w/ moderate disease.  Large LAD w/ patent mid vessel stent.  CX:   --  OM1: There was a 70 % stenosis.  COMPLICATIONS: No complications occurred during the cath lab visit.  SUMMARY:  Summary: Addendum 7/17/2018: Case reconfirmed to generate Attachment A  report  DIAGNOSTIC IMPRESSIONS: ICS x 1 to OM1 w/ 0% residual stenosis and THOR III  flow maintained thruout.  Patent LAD stent.  DIAGNOSTIC RECOMMENDATIONS: Asa/brilinta.  PET/followup 4 weeks.  INTERVENTIONAL IMPRESSIONS: ICS x 1 to OM1 w/ 0% residual stenosis and THOR  III flow maintained thruout.  Patent LAD stent.  INTERVENTIONAL RECOMMENDATIONS: Asa/brilinta.  PET/followup 4 weeks.    < end of copied text >

## 2018-08-13 NOTE — H&P ADULT - ASSESSMENT
77 years old male with PMH of CAD with recent stents (on Brilinta and Aspirin), DM, HTN, HLD, CKD 3, Prostate Cancer s/p Radiation Seeds, Sickle Cell Trait and Lung Nodules comes with bleeding per rectum. As per patient, it started yesterday around 4 pm - had 6-7 episodes of bernice blood. Denies abdominal pain, nausea, vomiting or fever. He also noticed blood in urine 2 weeks ago and came to ER. He was discharged after complete evaluation. Denies hematuria or other urinary symptoms at this time.  He was admitted in 2015 with same complaint and was diagnosed with hemorrhoids/proctitis. Denies constipation. 77 years old male with PMH of CAD with recent stents (on Brilinta and Aspirin), DM, HTN, HLD, CKD 3, Prostate Cancer s/p Radiation Seeds, Sickle Cell Trait and Lung Nodules comes with bleeding per rectum. As per patient, it started yesterday around 4 pm - had 6-7 episodes of bernice blood. Denies abdominal pain, nausea, vomiting or fever. He also noticed blood in urine 2 weeks ago and came to ER. He was discharged after complete evaluation. Denies hematuria or other urinary symptoms at this time. Denies chest pain, palpitations, shortness of breath or dizziness.   He was admitted in 2015 with same complaint and was diagnosed with hemorrhoids/proctitis. Denies constipation.    1) Rectal Bleed  - Likely secondary to hemorrhoids. However, cannot rule out upper GI bleed. Will get GI Consult.  - Monitor H&H  - UA to rule out hematuria  - Protonix 40 mg  - Hydrocortisone Suppository  - Hold Aspirin. Continue Brilinta. If H&H remain stable, restart Aspirin after GI evaluation.  2) CAD   - s/p recent stents  - Hold Asprin  - Continue Brilinta  - Continue Lipitor and Metoprolol  3) HLD  - Continue Lipitor  4) HTN  - Continue Amlodipine, Metoprolol and Valsartan  5) DM  - HbA1c 6.4 on 7/3/18  - Accu checks and RISS  6) Acute on chronic kidney injury  - Gentle hydration  - Avoid nephrotoxic medications  - Monitor renal function  DVT Prophylaxis -- IPC

## 2018-08-13 NOTE — ED ADULT NURSE NOTE - OBJECTIVE STATEMENT
Pt. presents to ED with c/o 5-6 episodes of bloody stool and hematuria. Pt. on Brilinta and ASA, denies any recent trauma, states Hx of prostate CA last received radiation in 2015

## 2018-08-13 NOTE — DISCHARGE NOTE ADULT - SECONDARY DIAGNOSIS.
Asthma, mild intermittent, uncomplicated Diabetes mellitus S/P primary angioplasty with coronary stent Hypertension Hyperlipidemia Prostate cancer

## 2018-08-13 NOTE — ED PROVIDER NOTE - PMH
Asthma, mild intermittent, uncomplicated    Cancer  protate  Diabetes mellitus    Hyperlipidemia    Hypertension    MI (myocardial infarction)    Other hemorrhoids    Sickle cell anemia  Carrier of the disease, presently doesn't have it

## 2018-08-13 NOTE — ED ADULT NURSE NOTE - NSIMPLEMENTINTERV_GEN_ALL_ED
Implemented All Universal Safety Interventions:  [place] to call system. Call bell, personal items and telephone within reach. Instruct patient to call for assistance. Room bathroom lighting operational. Non-slip footwear when patient is off stretcher. Physically safe environment: no spills, clutter or unnecessary equipment. Stretcher in lowest position, wheels locked, appropriate side rails in place.

## 2018-08-13 NOTE — ED PROVIDER NOTE - MUSCULOSKELETAL, MLM
Spine appears normal, range of motion is not limited, no muscle or joint tenderness.  No LE edema or cyanosis bilaterally

## 2018-08-13 NOTE — H&P ADULT - FAMILY HISTORY
Family history of prostate cancer     Family history of essential hypertension     Father  Still living? Unknown  Family history of heart disease, Age at diagnosis: Age Unknown

## 2018-08-13 NOTE — CONSULT NOTE ADULT - PROBLEM SELECTOR RECOMMENDATION 9
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 Bleeding secondary to Brilinta  - Hgb stable from 2 weeks ago. Negative CTA, scant blood on exam  - agree with hydrocortisone suppositories bid  - get cardiac clearance in case colonoscopy  is needed. However, if hemoglobin is stable, will not do colonoscopy as inpatient. ( can do as outpatient when Brilinta can be stopped).  If colonoscopy is done it would be diagnostic only as unable to biopsy, do cautery or remove polyps with brilinta on board.   - hematuria on U/A - to be adressed by primary MD  - clear liquid diet and advance as tolerated.   - please see if primary can get old colonoscopy report ( maybe call primary md)

## 2018-08-13 NOTE — CONSULT NOTE ADULT - PROBLEM/RECOMMENDATION-1
Problem: Patient Care Overview (Infant)  Goal: Plan of Care Review  Outcome: Ongoing (interventions implemented as appropriate)    17 [id number]   Outcome Evaluation   Outcome Summary/Follow up Plan PO fed well this shift and gained weight. Had no heartrate drops this shift.    Patient Care Overview   Progress improving       Goal: Infant Individualization and Mutuality  Outcome: Ongoing (interventions implemented as appropriate)  Goal: Discharge Needs Assessment  Outcome: Ongoing (interventions implemented as appropriate)    Problem:  Infant, Late or Early Term  Goal: Signs and Symptoms of Listed Potential Problems Will be Absent or Manageable ( Infant, Late or Early Term)  Outcome: Ongoing (interventions implemented as appropriate)       DISPLAY PLAN FREE TEXT

## 2018-08-31 NOTE — PATIENT PROFILE ADULT. - SPIRITUAL CULTURAL, RELIGIOUS PRACTICES/VALUES, PROFILE
Chief Complaint   Patient presents with    ADD         HPI:  Lesvia Whittington is a 22 y.o. (: 1999) here today  for symptoms he thinks are related to ADD . His mom is with him in the office today. He says that in class he is having difficult time with concentration. He says that his mind races with a thousand thoughts. He says that he has a difficult staying on task. He can't sit down and watch a movie he always has to be doing something. He says that he paces a lot, especially if on the phone he will apace all over the house. Mom says he was tested as a child at [facility] in the second grade and mom says that she was told that he was borderline ADD. Mom notes it was his  who pointed out the problem initially. Lesvia Whittington muddled through school managing it as best he could but is currently feeling overwhelmed by the demands and impulsivity. He has really struggled with inattention and has to relieve problems on tests multiple times      Review of Systems   Constitutional: Negative for chills and fever. Respiratory: Negative for cough and shortness of breath. Cardiovascular: Negative for chest pain and palpitations. Gastrointestinal: Negative for abdominal pain, constipation, diarrhea, nausea and vomiting. Endocrine: Negative for polyuria. Genitourinary: Negative for dysuria.        Past Medical History:   Diagnosis Date    Allergic rhinitis, seasonal     Conjunctivitis, allergic      Family History   Problem Relation Age of Onset    No Known Problems Mother     Hypertension Father      Social History     Social History    Marital status: Single     Spouse name: N/A    Number of children: N/A    Years of education: N/A     Occupational History    Student - construction      Social History Main Topics    Smoking status: Never Smoker    Smokeless tobacco: Never Used      Comment: counseled on tobacco exposure avoidance    Alcohol use 0.0 oz/week      Comment: Rarely    Drug use: No    Sexual activity: No     Other Topics Concern    Not on file     Social History Narrative    No narrative on file       New Prescriptions    METHYLPHENIDATE (RITALIN) 10 MG TABLET    Take 1 tablet by mouth 2 times daily for 30 days. .         Meds Prior to visit:  Prior to Visit Medications    Medication Sig Taking? Authorizing Provider   cetirizine (ZYRTEC) 10 MG tablet Take 10 mg by mouth daily. Yes Historical Provider, MD     No Known Allergies    OBJECTIVE:    Ht 5' 8.5\" (1.74 m)   Wt 168 lb (76.2 kg)   BMI 25.17 kg/m²   BP Readings from Last 2 Encounters:   11/08/17 130/85   10/09/17 120/79     Wt Readings from Last 3 Encounters:   08/31/18 168 lb (76.2 kg)   11/08/17 160 lb (72.6 kg)   10/09/17 163 lb (73.9 kg)       Physical Exam   Constitutional: He appears well-developed and well-nourished. Cardiovascular: Normal rate and regular rhythm. Exam reveals no gallop and no friction rub. No murmur heard. Pulmonary/Chest: Effort normal and breath sounds normal. He has no wheezes. He has no rales. Abdominal: Soft. Bowel sounds are normal. He exhibits no distension and no mass. There is no tenderness. Skin: Skin is warm and dry. No rash noted. No results found for: LABA1C, LABMICR, LDLCALC    ASSESSMENT/PLAN:    1. Attention deficit hyperactivity disorder (ADHD), predominantly inattentive type  Due to long-term presence of symptoms and concordant history of mother and patient along with prior evaluation. We will trial Ritalin 10 mg twice a day and recheck patient in 4 weeks. I counseled patient on risks and benefits of medication and also instructed him to seek counseling help at his school along with behavioral modification  - methylphenidate (RITALIN) 10 MG tablet; Take 1 tablet by mouth 2 times daily for 30 days. .  Dispense: 60 tablet;  Refill: 0      RTC 4 weeks    Scribe attestation:  Tom Torres MA, am scribing for and in the presence of Radha Cole MD. Electronically n/a

## 2018-09-02 ENCOUNTER — EMERGENCY (EMERGENCY)
Facility: HOSPITAL | Age: 78
LOS: 1 days | Discharge: HOME | End: 2018-09-02
Attending: EMERGENCY MEDICINE
Payer: MEDICARE

## 2018-09-02 VITALS
OXYGEN SATURATION: 98 % | HEART RATE: 72 BPM | TEMPERATURE: 98 F | SYSTOLIC BLOOD PRESSURE: 142 MMHG | WEIGHT: 134.92 LBS | DIASTOLIC BLOOD PRESSURE: 71 MMHG | RESPIRATION RATE: 18 BRPM | HEIGHT: 60 IN

## 2018-09-02 VITALS
DIASTOLIC BLOOD PRESSURE: 70 MMHG | TEMPERATURE: 98 F | HEART RATE: 76 BPM | RESPIRATION RATE: 16 BRPM | OXYGEN SATURATION: 98 % | SYSTOLIC BLOOD PRESSURE: 130 MMHG

## 2018-09-02 DIAGNOSIS — Z90.79 ACQUIRED ABSENCE OF OTHER GENITAL ORGAN(S): Chronic | ICD-10-CM

## 2018-09-02 DIAGNOSIS — K40.90 UNILATERAL INGUINAL HERNIA, WITHOUT OBSTRUCTION OR GANGRENE, NOT SPECIFIED AS RECURRENT: Chronic | ICD-10-CM

## 2018-09-02 DIAGNOSIS — Z98.890 OTHER SPECIFIED POSTPROCEDURAL STATES: Chronic | ICD-10-CM

## 2018-09-02 DIAGNOSIS — Z95.5 PRESENCE OF CORONARY ANGIOPLASTY IMPLANT AND GRAFT: Chronic | ICD-10-CM

## 2018-09-02 LAB
ALBUMIN SERPL ELPH-MCNC: 3.4 G/DL — SIGNIFICANT CHANGE UP (ref 3.3–5.2)
ALP SERPL-CCNC: 99 U/L — SIGNIFICANT CHANGE UP (ref 40–120)
ALT FLD-CCNC: 9 U/L — SIGNIFICANT CHANGE UP
ANION GAP SERPL CALC-SCNC: 14 MMOL/L — SIGNIFICANT CHANGE UP (ref 5–17)
ANISOCYTOSIS BLD QL: SLIGHT — SIGNIFICANT CHANGE UP
AST SERPL-CCNC: 16 U/L — SIGNIFICANT CHANGE UP
BASOPHILS # BLD AUTO: 0.1 K/UL — SIGNIFICANT CHANGE UP (ref 0–0.2)
BASOPHILS NFR BLD AUTO: 1 % — SIGNIFICANT CHANGE UP (ref 0–2)
BILIRUB SERPL-MCNC: 0.3 MG/DL — LOW (ref 0.4–2)
BUN SERPL-MCNC: 18 MG/DL — SIGNIFICANT CHANGE UP (ref 8–20)
CALCIUM SERPL-MCNC: 9.1 MG/DL — SIGNIFICANT CHANGE UP (ref 8.6–10.2)
CHLORIDE SERPL-SCNC: 101 MMOL/L — SIGNIFICANT CHANGE UP (ref 98–107)
CO2 SERPL-SCNC: 24 MMOL/L — SIGNIFICANT CHANGE UP (ref 22–29)
CREAT SERPL-MCNC: 1.55 MG/DL — HIGH (ref 0.5–1.3)
EOSINOPHIL # BLD AUTO: 0.4 K/UL — SIGNIFICANT CHANGE UP (ref 0–0.5)
EOSINOPHIL NFR BLD AUTO: 4 % — SIGNIFICANT CHANGE UP (ref 0–6)
GLUCOSE SERPL-MCNC: 160 MG/DL — HIGH (ref 70–115)
HCT VFR BLD CALC: 33.8 % — LOW (ref 42–52)
HGB BLD-MCNC: 11.1 G/DL — LOW (ref 14–18)
LYMPHOCYTES # BLD AUTO: 26 % — SIGNIFICANT CHANGE UP (ref 20–55)
LYMPHOCYTES # BLD AUTO: 3 K/UL — SIGNIFICANT CHANGE UP (ref 1–4.8)
MACROCYTES BLD QL: SLIGHT — SIGNIFICANT CHANGE UP
MCHC RBC-ENTMCNC: 26.6 PG — LOW (ref 27–31)
MCHC RBC-ENTMCNC: 32.8 G/DL — SIGNIFICANT CHANGE UP (ref 32–36)
MCV RBC AUTO: 80.9 FL — SIGNIFICANT CHANGE UP (ref 80–94)
MICROCYTES BLD QL: SLIGHT — SIGNIFICANT CHANGE UP
MONOCYTES # BLD AUTO: 1 K/UL — HIGH (ref 0–0.8)
MONOCYTES NFR BLD AUTO: 10 % — SIGNIFICANT CHANGE UP (ref 3–10)
NEUTROPHILS # BLD AUTO: 5.8 K/UL — SIGNIFICANT CHANGE UP (ref 1.8–8)
NEUTROPHILS NFR BLD AUTO: 56 % — SIGNIFICANT CHANGE UP (ref 37–73)
OVALOCYTES BLD QL SMEAR: SLIGHT — SIGNIFICANT CHANGE UP
PLAT MORPH BLD: NORMAL — SIGNIFICANT CHANGE UP
PLATELET # BLD AUTO: 288 K/UL — SIGNIFICANT CHANGE UP (ref 150–400)
POIKILOCYTOSIS BLD QL AUTO: SLIGHT — SIGNIFICANT CHANGE UP
POTASSIUM SERPL-MCNC: 4.6 MMOL/L — SIGNIFICANT CHANGE UP (ref 3.5–5.3)
POTASSIUM SERPL-SCNC: 4.6 MMOL/L — SIGNIFICANT CHANGE UP (ref 3.5–5.3)
PROT SERPL-MCNC: 7.1 G/DL — SIGNIFICANT CHANGE UP (ref 6.6–8.7)
RBC # BLD: 4.18 M/UL — LOW (ref 4.6–6.2)
RBC # FLD: 15.4 % — SIGNIFICANT CHANGE UP (ref 11–15.6)
RBC BLD AUTO: ABNORMAL
SODIUM SERPL-SCNC: 139 MMOL/L — SIGNIFICANT CHANGE UP (ref 135–145)
VARIANT LYMPHS # BLD: 3 % — SIGNIFICANT CHANGE UP (ref 0–6)
WBC # BLD: 10.3 K/UL — SIGNIFICANT CHANGE UP (ref 4.8–10.8)
WBC # FLD AUTO: 10.3 K/UL — SIGNIFICANT CHANGE UP (ref 4.8–10.8)

## 2018-09-02 PROCEDURE — 70486 CT MAXILLOFACIAL W/O DYE: CPT | Mod: 26

## 2018-09-02 PROCEDURE — 70486 CT MAXILLOFACIAL W/O DYE: CPT

## 2018-09-02 PROCEDURE — 36415 COLL VENOUS BLD VENIPUNCTURE: CPT

## 2018-09-02 PROCEDURE — 85027 COMPLETE CBC AUTOMATED: CPT

## 2018-09-02 PROCEDURE — 99284 EMERGENCY DEPT VISIT MOD MDM: CPT

## 2018-09-02 PROCEDURE — 71046 X-RAY EXAM CHEST 2 VIEWS: CPT

## 2018-09-02 PROCEDURE — 80053 COMPREHEN METABOLIC PANEL: CPT

## 2018-09-02 PROCEDURE — 71046 X-RAY EXAM CHEST 2 VIEWS: CPT | Mod: 26

## 2018-09-02 PROCEDURE — 99284 EMERGENCY DEPT VISIT MOD MDM: CPT | Mod: 25

## 2018-09-02 RX ORDER — SODIUM CHLORIDE 9 MG/ML
1000 INJECTION INTRAMUSCULAR; INTRAVENOUS; SUBCUTANEOUS ONCE
Qty: 0 | Refills: 0 | Status: COMPLETED | OUTPATIENT
Start: 2018-09-02 | End: 2018-09-02

## 2018-09-02 RX ORDER — AZITHROMYCIN 500 MG/1
500 TABLET, FILM COATED ORAL ONCE
Qty: 0 | Refills: 0 | Status: COMPLETED | OUTPATIENT
Start: 2018-09-02 | End: 2018-09-02

## 2018-09-02 RX ADMIN — SODIUM CHLORIDE 1000 MILLILITER(S): 9 INJECTION INTRAMUSCULAR; INTRAVENOUS; SUBCUTANEOUS at 17:50

## 2018-09-02 RX ADMIN — AZITHROMYCIN 500 MILLIGRAM(S): 500 TABLET, FILM COATED ORAL at 18:15

## 2018-09-02 RX ADMIN — Medication 1 TABLET(S): at 21:34

## 2018-09-02 RX ADMIN — Medication 100 MILLIGRAM(S): at 18:15

## 2018-09-02 NOTE — ED ADULT NURSE NOTE - NSIMPLEMENTINTERV_GEN_ALL_ED
Implemented All Universal Safety Interventions:  [place] to call system. Call bell, personal items and telephone within reach. Instruct patient to call for assistance. Room bathroom lighting operational. Non-slip footwear when patient is off stretcher. Physically safe environment: no spills, clutter or unnecessary equipment. Stretcher in lowest position, wheels locked, appropriate side rails in place.

## 2018-09-02 NOTE — ED STATDOCS - OBJECTIVE STATEMENT
CC: 76 y/o M with hx of HTN, HLD and GERD presents to ED c/o persistent productive cough that began 2 weeks ago.  Presenting symptoms: Productive cough, fever, chills, nasal congestion  Pertinent Positives: Productive cough, fever, nasal congestion  Pertinent Negatives: no SOB, no chest pain  Timin weeks ago  Radiation: None  Severity: Mild  Aggravating Factors: None  Relieving Factors: None

## 2018-09-02 NOTE — ED STATDOCS - PHYSICAL EXAMINATION
Constitutional: Alert, NAD.   ENT: Airway patent. Mouth with normal mucosa. Rhinorrhea and nasal congestion.  Head: Atraumatic.   Eyes: Clear bilaterally. PERRL.   Cardiac: Normal rate.   Respiratory: Breath sounds clear bilaterally.   GI: Abdomen soft, non-tender, no guarding.   : No CVA or bladder tenderness.   Musculoskeletal: FROM, no muscle or joint tenderness or swelling.   Neuro: alert and oriented, no focal deficits, no motor or sensory deficits.   Skin: Dry, intact, no rash.   Psych: normal mood and affect.

## 2018-09-02 NOTE — ED STATDOCS - NS ED ROS FT
+ fever  + chills  + nasal congestion  + productive cough  no chest pain  no SOB  no abd pain  no HA  All other ROS negative except as per HPI

## 2018-09-02 NOTE — ED STATDOCS - MEDICAL DECISION MAKING DETAILS
Pt presents with productive cough. Will obtain CXR, labs, and CT of sinuses. Will give antibiotics. Will re-eval Pt presents with productive cough. Will obtain CXR, labs, and CT of sinuses. Will give antibiotics. Will re-eval.    Patient given detailed discharge and return instructions and verbalized understanding.  Patient will follow up without fail.  All questions answered.

## 2018-09-03 PROBLEM — I21.9 ACUTE MYOCARDIAL INFARCTION, UNSPECIFIED: Chronic | Status: ACTIVE | Noted: 2018-08-13

## 2019-01-05 NOTE — ED ADULT NURSE NOTE - AS SC BRADEN NUTRITION
Notified Dr. Jeronimo about pt complaining of feeling really sleepy. Breath sounds clear, urine output adequate, VS WNL. Md will order a Mag level.   (3) adequate

## 2019-02-20 NOTE — ED STATDOCS - PROGRESS NOTE DETAILS
initial evaluation in intake room of ; presents for palpitations with sob; no fever/chills  PMH includes htn, high chol, type 2 DM no longer on meds, CAD with stents x 2, prostate cancer  protocol orders entered for patient, placed in MAIN AREA of ED on monitor for a completed evaluation by another provider
Statement Selected

## 2019-08-01 ENCOUNTER — OUTPATIENT (OUTPATIENT)
Dept: OUTPATIENT SERVICES | Facility: HOSPITAL | Age: 79
LOS: 1 days | End: 2019-08-01
Payer: MEDICARE

## 2019-08-01 DIAGNOSIS — Z98.890 OTHER SPECIFIED POSTPROCEDURAL STATES: Chronic | ICD-10-CM

## 2019-08-01 DIAGNOSIS — Z95.5 PRESENCE OF CORONARY ANGIOPLASTY IMPLANT AND GRAFT: Chronic | ICD-10-CM

## 2019-08-01 DIAGNOSIS — K40.90 UNILATERAL INGUINAL HERNIA, WITHOUT OBSTRUCTION OR GANGRENE, NOT SPECIFIED AS RECURRENT: Chronic | ICD-10-CM

## 2019-08-01 DIAGNOSIS — Z90.79 ACQUIRED ABSENCE OF OTHER GENITAL ORGAN(S): Chronic | ICD-10-CM

## 2019-08-01 PROCEDURE — G9001: CPT

## 2019-08-22 ENCOUNTER — INPATIENT (INPATIENT)
Facility: HOSPITAL | Age: 79
LOS: 3 days | Discharge: HOME | DRG: 394 | End: 2019-08-26
Attending: HOSPITALIST | Admitting: FAMILY MEDICINE
Payer: MEDICARE

## 2019-08-22 VITALS
TEMPERATURE: 98 F | WEIGHT: 160.06 LBS | DIASTOLIC BLOOD PRESSURE: 78 MMHG | RESPIRATION RATE: 20 BRPM | SYSTOLIC BLOOD PRESSURE: 141 MMHG | HEIGHT: 64 IN | OXYGEN SATURATION: 98 % | HEART RATE: 72 BPM

## 2019-08-22 DIAGNOSIS — K40.90 UNILATERAL INGUINAL HERNIA, WITHOUT OBSTRUCTION OR GANGRENE, NOT SPECIFIED AS RECURRENT: Chronic | ICD-10-CM

## 2019-08-22 DIAGNOSIS — Z95.5 PRESENCE OF CORONARY ANGIOPLASTY IMPLANT AND GRAFT: Chronic | ICD-10-CM

## 2019-08-22 DIAGNOSIS — Z90.79 ACQUIRED ABSENCE OF OTHER GENITAL ORGAN(S): Chronic | ICD-10-CM

## 2019-08-22 DIAGNOSIS — K92.2 GASTROINTESTINAL HEMORRHAGE, UNSPECIFIED: ICD-10-CM

## 2019-08-22 DIAGNOSIS — Z98.890 OTHER SPECIFIED POSTPROCEDURAL STATES: Chronic | ICD-10-CM

## 2019-08-22 LAB
ALBUMIN SERPL ELPH-MCNC: 4.1 G/DL — SIGNIFICANT CHANGE UP (ref 3.3–5.2)
ALP SERPL-CCNC: 141 U/L — HIGH (ref 40–120)
ALT FLD-CCNC: 18 U/L — SIGNIFICANT CHANGE UP
ANION GAP SERPL CALC-SCNC: 14 MMOL/L — SIGNIFICANT CHANGE UP (ref 5–17)
APTT BLD: 34.2 SEC — SIGNIFICANT CHANGE UP (ref 27.5–36.3)
AST SERPL-CCNC: 20 U/L — SIGNIFICANT CHANGE UP
BASOPHILS # BLD AUTO: 0.06 K/UL — SIGNIFICANT CHANGE UP (ref 0–0.2)
BASOPHILS # BLD AUTO: 0.06 K/UL — SIGNIFICANT CHANGE UP (ref 0–0.2)
BASOPHILS NFR BLD AUTO: 0.9 % — SIGNIFICANT CHANGE UP (ref 0–2)
BASOPHILS NFR BLD AUTO: 1 % — SIGNIFICANT CHANGE UP (ref 0–2)
BILIRUB SERPL-MCNC: 0.4 MG/DL — SIGNIFICANT CHANGE UP (ref 0.4–2)
BLD GP AB SCN SERPL QL: SIGNIFICANT CHANGE UP
BUN SERPL-MCNC: 28 MG/DL — HIGH (ref 8–20)
CALCIUM SERPL-MCNC: 9.6 MG/DL — SIGNIFICANT CHANGE UP (ref 8.6–10.2)
CHLORIDE SERPL-SCNC: 103 MMOL/L — SIGNIFICANT CHANGE UP (ref 98–107)
CO2 SERPL-SCNC: 20 MMOL/L — LOW (ref 22–29)
CREAT SERPL-MCNC: 1.54 MG/DL — HIGH (ref 0.5–1.3)
EOSINOPHIL # BLD AUTO: 0.1 K/UL — SIGNIFICANT CHANGE UP (ref 0–0.5)
EOSINOPHIL # BLD AUTO: 0.11 K/UL — SIGNIFICANT CHANGE UP (ref 0–0.5)
EOSINOPHIL NFR BLD AUTO: 1.5 % — SIGNIFICANT CHANGE UP (ref 0–6)
EOSINOPHIL NFR BLD AUTO: 1.9 % — SIGNIFICANT CHANGE UP (ref 0–6)
GLUCOSE SERPL-MCNC: 125 MG/DL — HIGH (ref 70–115)
HCT VFR BLD CALC: 28.6 % — LOW (ref 39–50)
HCT VFR BLD CALC: 32.4 % — LOW (ref 39–50)
HGB BLD-MCNC: 10.5 G/DL — LOW (ref 13–17)
HGB BLD-MCNC: 9.3 G/DL — LOW (ref 13–17)
IMM GRANULOCYTES NFR BLD AUTO: 0.2 % — SIGNIFICANT CHANGE UP (ref 0–1.5)
IMM GRANULOCYTES NFR BLD AUTO: 0.3 % — SIGNIFICANT CHANGE UP (ref 0–1.5)
INR BLD: 0.92 RATIO — SIGNIFICANT CHANGE UP (ref 0.88–1.16)
LYMPHOCYTES # BLD AUTO: 1.82 K/UL — SIGNIFICANT CHANGE UP (ref 1–3.3)
LYMPHOCYTES # BLD AUTO: 2.38 K/UL — SIGNIFICANT CHANGE UP (ref 1–3.3)
LYMPHOCYTES # BLD AUTO: 31.4 % — SIGNIFICANT CHANGE UP (ref 13–44)
LYMPHOCYTES # BLD AUTO: 34.8 % — SIGNIFICANT CHANGE UP (ref 13–44)
MCHC RBC-ENTMCNC: 24.5 PG — LOW (ref 27–34)
MCHC RBC-ENTMCNC: 24.5 PG — LOW (ref 27–34)
MCHC RBC-ENTMCNC: 32.4 GM/DL — SIGNIFICANT CHANGE UP (ref 32–36)
MCHC RBC-ENTMCNC: 32.5 GM/DL — SIGNIFICANT CHANGE UP (ref 32–36)
MCV RBC AUTO: 75.3 FL — LOW (ref 80–100)
MCV RBC AUTO: 75.7 FL — LOW (ref 80–100)
MONOCYTES # BLD AUTO: 0.69 K/UL — SIGNIFICANT CHANGE UP (ref 0–0.9)
MONOCYTES # BLD AUTO: 0.79 K/UL — SIGNIFICANT CHANGE UP (ref 0–0.9)
MONOCYTES NFR BLD AUTO: 10.1 % — SIGNIFICANT CHANGE UP (ref 2–14)
MONOCYTES NFR BLD AUTO: 13.6 % — SIGNIFICANT CHANGE UP (ref 2–14)
NEUTROPHILS # BLD AUTO: 3 K/UL — SIGNIFICANT CHANGE UP (ref 1.8–7.4)
NEUTROPHILS # BLD AUTO: 3.59 K/UL — SIGNIFICANT CHANGE UP (ref 1.8–7.4)
NEUTROPHILS NFR BLD AUTO: 51.9 % — SIGNIFICANT CHANGE UP (ref 43–77)
NEUTROPHILS NFR BLD AUTO: 52.4 % — SIGNIFICANT CHANGE UP (ref 43–77)
OB PNL STL: POSITIVE
PLATELET # BLD AUTO: 294 K/UL — SIGNIFICANT CHANGE UP (ref 150–400)
PLATELET # BLD AUTO: 314 K/UL — SIGNIFICANT CHANGE UP (ref 150–400)
POTASSIUM SERPL-MCNC: 4.6 MMOL/L — SIGNIFICANT CHANGE UP (ref 3.5–5.3)
POTASSIUM SERPL-SCNC: 4.6 MMOL/L — SIGNIFICANT CHANGE UP (ref 3.5–5.3)
PROT SERPL-MCNC: 7.2 G/DL — SIGNIFICANT CHANGE UP (ref 6.6–8.7)
PROTHROM AB SERPL-ACNC: 10.5 SEC — SIGNIFICANT CHANGE UP (ref 10–12.9)
RBC # BLD: 3.8 M/UL — LOW (ref 4.2–5.8)
RBC # BLD: 4.28 M/UL — SIGNIFICANT CHANGE UP (ref 4.2–5.8)
RBC # FLD: 15 % — HIGH (ref 10.3–14.5)
RBC # FLD: 15.2 % — HIGH (ref 10.3–14.5)
SODIUM SERPL-SCNC: 137 MMOL/L — SIGNIFICANT CHANGE UP (ref 135–145)
WBC # BLD: 5.79 K/UL — SIGNIFICANT CHANGE UP (ref 3.8–10.5)
WBC # BLD: 6.84 K/UL — SIGNIFICANT CHANGE UP (ref 3.8–10.5)
WBC # FLD AUTO: 5.79 K/UL — SIGNIFICANT CHANGE UP (ref 3.8–10.5)
WBC # FLD AUTO: 6.84 K/UL — SIGNIFICANT CHANGE UP (ref 3.8–10.5)

## 2019-08-22 PROCEDURE — 71045 X-RAY EXAM CHEST 1 VIEW: CPT | Mod: 26

## 2019-08-22 PROCEDURE — 99285 EMERGENCY DEPT VISIT HI MDM: CPT

## 2019-08-22 PROCEDURE — 93010 ELECTROCARDIOGRAM REPORT: CPT

## 2019-08-22 RX ORDER — METOPROLOL TARTRATE 50 MG
50 TABLET ORAL
Refills: 0 | Status: DISCONTINUED | OUTPATIENT
Start: 2019-08-22 | End: 2019-08-26

## 2019-08-22 RX ORDER — SODIUM CHLORIDE 9 MG/ML
1000 INJECTION, SOLUTION INTRAVENOUS
Refills: 0 | Status: DISCONTINUED | OUTPATIENT
Start: 2019-08-22 | End: 2019-08-26

## 2019-08-22 RX ORDER — SODIUM CHLORIDE 9 MG/ML
500 INJECTION INTRAMUSCULAR; INTRAVENOUS; SUBCUTANEOUS ONCE
Refills: 0 | Status: COMPLETED | OUTPATIENT
Start: 2019-08-22 | End: 2019-08-22

## 2019-08-22 RX ORDER — DEXTROSE 50 % IN WATER 50 %
12.5 SYRINGE (ML) INTRAVENOUS ONCE
Refills: 0 | Status: DISCONTINUED | OUTPATIENT
Start: 2019-08-22 | End: 2019-08-26

## 2019-08-22 RX ORDER — PANTOPRAZOLE SODIUM 20 MG/1
40 TABLET, DELAYED RELEASE ORAL DAILY
Refills: 0 | Status: DISCONTINUED | OUTPATIENT
Start: 2019-08-22 | End: 2019-08-26

## 2019-08-22 RX ORDER — DEXTROSE 50 % IN WATER 50 %
25 SYRINGE (ML) INTRAVENOUS ONCE
Refills: 0 | Status: DISCONTINUED | OUTPATIENT
Start: 2019-08-22 | End: 2019-08-26

## 2019-08-22 RX ORDER — GLUCAGON INJECTION, SOLUTION 0.5 MG/.1ML
1 INJECTION, SOLUTION SUBCUTANEOUS ONCE
Refills: 0 | Status: DISCONTINUED | OUTPATIENT
Start: 2019-08-22 | End: 2019-08-26

## 2019-08-22 RX ORDER — DEXTROSE 50 % IN WATER 50 %
15 SYRINGE (ML) INTRAVENOUS ONCE
Refills: 0 | Status: DISCONTINUED | OUTPATIENT
Start: 2019-08-22 | End: 2019-08-26

## 2019-08-22 RX ORDER — INSULIN LISPRO 100/ML
VIAL (ML) SUBCUTANEOUS AT BEDTIME
Refills: 0 | Status: DISCONTINUED | OUTPATIENT
Start: 2019-08-22 | End: 2019-08-26

## 2019-08-22 RX ORDER — SODIUM CHLORIDE 9 MG/ML
1000 INJECTION INTRAMUSCULAR; INTRAVENOUS; SUBCUTANEOUS
Refills: 0 | Status: DISCONTINUED | OUTPATIENT
Start: 2019-08-22 | End: 2019-08-26

## 2019-08-22 RX ADMIN — PANTOPRAZOLE SODIUM 40 MILLIGRAM(S): 20 TABLET, DELAYED RELEASE ORAL at 22:44

## 2019-08-22 RX ADMIN — SODIUM CHLORIDE 100 MILLILITER(S): 9 INJECTION INTRAMUSCULAR; INTRAVENOUS; SUBCUTANEOUS at 23:13

## 2019-08-22 RX ADMIN — Medication 50 MILLIGRAM(S): at 22:46

## 2019-08-22 RX ADMIN — SODIUM CHLORIDE 500 MILLILITER(S): 9 INJECTION INTRAMUSCULAR; INTRAVENOUS; SUBCUTANEOUS at 22:44

## 2019-08-22 NOTE — ED PROVIDER NOTE - GASTROINTESTINAL, MLM
Abdomen soft, non-tender, no guarding. + BS. RECTAL: + hemorrhoid but no bleedif from hemorrhoid. + blood in rectal vault. Bright red. RN Collin chaperone

## 2019-08-22 NOTE — H&P ADULT - NSICDXPASTMEDICALHX_GEN_ALL_CORE_FT
PAST MEDICAL HISTORY:  Asthma, mild intermittent, uncomplicated     Cancer protate    Diabetes mellitus     Hyperlipidemia     Hypertension     MI (myocardial infarction)     Other hemorrhoids     Sickle cell anemia Carrier of the disease, presently doesn't have it

## 2019-08-22 NOTE — H&P ADULT - NSHPLABSRESULTS_GEN_ALL_CORE
ekg is nsr 78, no acute changes. ekg is nsr 78, no acute changes.  cxr reviewed by me , no infiltrate or pl. effusion noted. radiologist read pending.

## 2019-08-22 NOTE — H&P ADULT - NSHPPHYSICALEXAM_GEN_ALL_CORE
General: Well developed male lying in bed not in distress.  HEENT: AT, NC. PERRL. intact EOM. no throat erythema or exudate.   Neck: supple. no JVD.   Chest: CTA bilaterally. no w/r/r.   Heart: S1,S2. RRR. no heart murmur. trace ankle edema.  Abdomen: soft. non-tender. no guarding, non-distended. + BS.   Rectal : deferred by pt. at this point,  had one done by ER physician who reported that pt. has hemorrhoids but did not notice them bleeding, red blood per rectum reported.   Ext: no calf tenderness. ROM of all ext. intact.   Neuro: AAO x3. no focal weakness. no speech disorder. Cns intact.   Skin: no rash noted, no pallor. no diaphoresis.  Psych : pleasant, normal affect.

## 2019-08-22 NOTE — ED ADULT NURSE REASSESSMENT NOTE - NS ED NURSE REASSESS COMMENT FT1
called to BR by family, pt had large bloody bowel movement. dark red mixed with bright red stool in toilet. MD called to bathroom to assess.

## 2019-08-22 NOTE — ED PROVIDER NOTE - OBJECTIVE STATEMENT
Pt is an 78 year old male with history of MI with stents on plavix, HTN, DM presenting with bloody stool. Sympotms present for the past several days. At first only present with BM and now bleeding happens in between BP. No rectal or abd pain. No trauma. Had this once before but not as bad and told it was a hemorrhoid. No changes in meds. No sob, cp, LH, syncope. Last colonoscopy was 4 years ago

## 2019-08-22 NOTE — H&P ADULT - NSICDXPASTSURGICALHX_GEN_ALL_CORE_FT
PAST SURGICAL HISTORY:  H/O colonoscopy Normal as per patient 3 years ago.    Inguinal hernia     S/P primary angioplasty with coronary stent     S/P prostatectomy

## 2019-08-22 NOTE — H&P ADULT - NSICDXFAMILYHX_GEN_ALL_CORE_FT
FAMILY HISTORY:  Family history of essential hypertension  Family history of heart disease, father  Family history of prostate cancer

## 2019-08-22 NOTE — H&P ADULT - ASSESSMENT
pt. is admitted for       Rectal bleed. close f/u of Hb, first Hb stable, VS stable,  transfuse prbc as needed and based on repeat blood work. hold asa/ plavix for now, will request cardio consult [place] ( saw pt in 2018 ) GI consult, further continuation of asa/ plavix per cardiology and GI recommendation.     CAD, native artery, native heart s/p stent, no angina. will continue with b. blockers.     Essential HTN, lopressor to be continued.    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      compression devices for dvt prophylaxis.

## 2019-08-22 NOTE — H&P ADULT - HISTORY OF PRESENT ILLNESS
79 y/o male came to the ER as he is having on and off red blood per rectum for past few days. As per pt. he went to see his oncologist for f/u on his prostate cancer , had rectal exam and everything was fine but later he started noticing blood per rectum with each BM, few times it was just blood and no stool. no abd. pain. no n/ v/d. no hematemesis. pt. has h/o hemorrhoids. feels a little tired ,  no cp. no sob, no dizziness. Pt. had few episodes of rectal bleed in the ER . At the time of admission feels ok, no urge to have a BM. 77 y/o male came to the ER as he is having on and off red blood per rectum for past few days. As per pt. he went to see his oncologist for f/u on his prostate cancer , had rectal exam and everything was fine but later he started noticing blood per rectum with each BM, few times it was just blood and no stool. no abd. pain. no n/ v/d. no hematemesis. pt. has h/o hemorrhoids. feels a little tired ,  no cp. no sob, no dizziness. Pt. had few episodes of rectal bleed in the ER . At the time of admission feels ok, no urge to have a BM.   pt. is on asa and plavix, took asa 81 mg today but did not use his plavix.

## 2019-08-22 NOTE — ED ADULT NURSE REASSESSMENT NOTE - NS ED NURSE REASSESS COMMENT FT1
Report given to accepting HR RN, POC discussed with ot who verbalize understanding and agree with plan, pt placed on transfer CM, medicated as ordered, safely transferred to unit at this time, VSS, safety and comfort maintained.

## 2019-08-23 LAB
ANION GAP SERPL CALC-SCNC: 11 MMOL/L — SIGNIFICANT CHANGE UP (ref 5–17)
BASOPHILS # BLD AUTO: 0.08 K/UL — SIGNIFICANT CHANGE UP (ref 0–0.2)
BASOPHILS NFR BLD AUTO: 1.5 % — SIGNIFICANT CHANGE UP (ref 0–2)
BUN SERPL-MCNC: 21 MG/DL — HIGH (ref 8–20)
CALCIUM SERPL-MCNC: 8.9 MG/DL — SIGNIFICANT CHANGE UP (ref 8.6–10.2)
CHLORIDE SERPL-SCNC: 107 MMOL/L — SIGNIFICANT CHANGE UP (ref 98–107)
CO2 SERPL-SCNC: 21 MMOL/L — LOW (ref 22–29)
CREAT SERPL-MCNC: 1.31 MG/DL — HIGH (ref 0.5–1.3)
EOSINOPHIL # BLD AUTO: 0.19 K/UL — SIGNIFICANT CHANGE UP (ref 0–0.5)
EOSINOPHIL NFR BLD AUTO: 3.6 % — SIGNIFICANT CHANGE UP (ref 0–6)
FERRITIN SERPL-MCNC: 23 NG/ML — LOW (ref 30–400)
GLUCOSE BLDC GLUCOMTR-MCNC: 109 MG/DL — HIGH (ref 70–99)
GLUCOSE BLDC GLUCOMTR-MCNC: 83 MG/DL — SIGNIFICANT CHANGE UP (ref 70–99)
GLUCOSE SERPL-MCNC: 118 MG/DL — HIGH (ref 70–115)
HBA1C BLD-MCNC: 6.1 % — HIGH (ref 4–5.6)
HCT VFR BLD CALC: 29.7 % — LOW (ref 39–50)
HGB BLD-MCNC: 9.7 G/DL — LOW (ref 13–17)
IMM GRANULOCYTES NFR BLD AUTO: 0.2 % — SIGNIFICANT CHANGE UP (ref 0–1.5)
IRON SATN MFR SERPL: 10 % — LOW (ref 16–55)
IRON SATN MFR SERPL: 42 UG/DL — LOW (ref 59–158)
LYMPHOCYTES # BLD AUTO: 1.73 K/UL — SIGNIFICANT CHANGE UP (ref 1–3.3)
LYMPHOCYTES # BLD AUTO: 32.5 % — SIGNIFICANT CHANGE UP (ref 13–44)
MCHC RBC-ENTMCNC: 24.7 PG — LOW (ref 27–34)
MCHC RBC-ENTMCNC: 32.7 GM/DL — SIGNIFICANT CHANGE UP (ref 32–36)
MCV RBC AUTO: 75.8 FL — LOW (ref 80–100)
MONOCYTES # BLD AUTO: 0.73 K/UL — SIGNIFICANT CHANGE UP (ref 0–0.9)
MONOCYTES NFR BLD AUTO: 13.7 % — SIGNIFICANT CHANGE UP (ref 2–14)
NEUTROPHILS # BLD AUTO: 2.59 K/UL — SIGNIFICANT CHANGE UP (ref 1.8–7.4)
NEUTROPHILS NFR BLD AUTO: 48.5 % — SIGNIFICANT CHANGE UP (ref 43–77)
PLATELET # BLD AUTO: 278 K/UL — SIGNIFICANT CHANGE UP (ref 150–400)
POTASSIUM SERPL-MCNC: 4.3 MMOL/L — SIGNIFICANT CHANGE UP (ref 3.5–5.3)
POTASSIUM SERPL-SCNC: 4.3 MMOL/L — SIGNIFICANT CHANGE UP (ref 3.5–5.3)
RBC # BLD: 3.92 M/UL — LOW (ref 4.2–5.8)
RBC # BLD: 3.92 M/UL — LOW (ref 4.2–5.8)
RBC # FLD: 15.1 % — HIGH (ref 10.3–14.5)
RETICS #: 65.9 K/UL — SIGNIFICANT CHANGE UP (ref 25–125)
RETICS/RBC NFR: 1.7 % — SIGNIFICANT CHANGE UP (ref 0.5–2.5)
SODIUM SERPL-SCNC: 139 MMOL/L — SIGNIFICANT CHANGE UP (ref 135–145)
TIBC SERPL-MCNC: 402 UG/DL — SIGNIFICANT CHANGE UP (ref 220–430)
TRANSFERRIN SERPL-MCNC: 281 MG/DL — SIGNIFICANT CHANGE UP (ref 180–329)
WBC # BLD: 5.33 K/UL — SIGNIFICANT CHANGE UP (ref 3.8–10.5)
WBC # FLD AUTO: 5.33 K/UL — SIGNIFICANT CHANGE UP (ref 3.8–10.5)

## 2019-08-23 PROCEDURE — 99233 SBSQ HOSP IP/OBS HIGH 50: CPT

## 2019-08-23 PROCEDURE — 99223 1ST HOSP IP/OBS HIGH 75: CPT

## 2019-08-23 RX ADMIN — PANTOPRAZOLE SODIUM 40 MILLIGRAM(S): 20 TABLET, DELAYED RELEASE ORAL at 12:36

## 2019-08-23 NOTE — PROGRESS NOTE ADULT - SUBJECTIVE AND OBJECTIVE BOX
CC: Blood in stool   HPI:  79 y/o male came to the ER as he is having on and off red blood per rectum for past few days. As per pt. he went to see his oncologist for f/u on his prostate cancer , had rectal exam and everything was fine but later he started noticing blood per rectum with each BM, few times it was just blood and no stool. no abd. pain. no n/ v/d. no hematemesis. pt. has h/o hemorrhoids. feels a little tired ,  no cp. no sob, no dizziness. Pt. had few episodes of rectal bleed in the ER . At the time of admission feels ok, no urge to have a BM.   pt. is on asa and plavix, took asa 81 mg today but did not use his plavix. (22 Aug 2019 21:38)    REVIEW OF SYSTEMS:    Patient denied fever, chills, abdominal pain, nausea, vomiting, cough, shortness of breath, chest pain or palpitations    Vital Signs Last 24 Hrs  T(C): 36.8 (23 Aug 2019 15:13), Max: 37.1 (23 Aug 2019 05:21)  T(F): 98.2 (23 Aug 2019 15:13), Max: 98.7 (23 Aug 2019 05:21)  HR: 70 (23 Aug 2019 15:13) (64 - 74)  BP: 159/76 (23 Aug 2019 15:13) (130/54 - 159/76)  BP(mean): --  RR: 18 (23 Aug 2019 15:13) (18 - 20)  SpO2: 99% (23 Aug 2019 15:13) (98% - 99%)I&O's Summary    22 Aug 2019 07:01  -  23 Aug 2019 07:00  --------------------------------------------------------  IN: 0 mL / OUT: 1000 mL / NET: -1000 mL      PHYSICAL EXAM:  GENERAL: NAD,   HEENT: PERRL, +EOMI, anicteric, no [place]  NECK: Supple, No JVD   CHEST/LUNG: CTA bilaterally; Normal effort  HEART: S1S2 Normal intensity, no murmurs, gallops or rubs noted  ABDOMEN: Soft, BS Normoactive, NT, ND, no HSM noted  EXTREMITIES:  2+ radial and DP pulses noted, no clubbing, cyanosis, or edema noted, Limited mobility   SKIN: No rashes or lesions noted  NEURO: A&Ox3, no focal deficits noted, CN II-XII intact  PSYCH: normal mood and affect; insight/judgement appropriate  LABS:                        9.7    5.33  )-----------( 278      ( 23 Aug 2019 07:28 )             29.7     08-23    139  |  107  |  21.0<H>  ----------------------------<  118<H>  4.3   |  21.0<L>  |  1.31<H>    Ca    8.9      23 Aug 2019 07:28    TPro  7.2  /  Alb  4.1  /  TBili  0.4  /  DBili  x   /  AST  20  /  ALT  18  /  AlkPhos  141<H>  08-22    PT/INR - ( 22 Aug 2019 17:46 )   PT: 10.5 sec;   INR: 0.92 ratio         PTT - ( 22 Aug 2019 17:46 )  PTT:34.2 sec    RADIOLOGY & ADDITIONAL TESTS:    MEDICATIONS:  MEDICATIONS  (STANDING):  dextrose 5%. 1000 milliLiter(s) (50 mL/Hr) IV Continuous <Continuous>  dextrose 50% Injectable 12.5 Gram(s) IV Push once  dextrose 50% Injectable 25 Gram(s) IV Push once  dextrose 50% Injectable 25 Gram(s) IV Push once  insulin lispro (HumaLOG) corrective regimen sliding scale   SubCutaneous at bedtime  metoprolol tartrate 50 milliGRAM(s) Oral two times a day  pantoprazole  Injectable 40 milliGRAM(s) IV Push daily  sodium chloride 0.9%. 1000 milliLiter(s) (100 mL/Hr) IV Continuous <Continuous>    MEDICATIONS  (PRN):  dextrose 40% Gel 15 Gram(s) Oral once PRN Blood Glucose LESS THAN 70 milliGRAM(s)/deciliter  glucagon  Injectable 1 milliGRAM(s) IntraMuscular once PRN Glucose LESS THAN 70 milligrams/deciliter

## 2019-08-23 NOTE — CONSULT NOTE ADULT - SUBJECTIVE AND OBJECTIVE BOX
HPI:  79 y/o male came to the ER as he is having on and off red blood per rectum for past few days. As per pt. he went to see his oncologist for f/u on his prostate cancer , had rectal exam and everything was fine but later he started noticing blood per rectum with each BM, few times it was just blood and no stool.  Bleeding occurring several times/ day and daily for at least 3 days.   No abd. pain. No n/ v/d. No hematemesis. Pt. has h/o hemorrhoids. Pt feels a little tired ,  no cp. no sob, no dizziness. Pt. had few episodes of rectal bleed in the ER, and has soiled his undergarments. At the time of admission feels ok, no urge to have a BM. Hx of MI and stents and is still on Aspirin and plavix.  Both were taken yesterday 8/22.    Reports last colonoscopy was 5 yrs ago and negative.  His second.  Stable overnight.  No BM or bleeding.        PAST MEDICAL & SURGICAL HISTORY:  MI (myocardial infarction)  Sickle cell anemia: Carrier of the disease, presently doesn&#x27;t have it  Asthma, mild intermittent, uncomplicated  Other hemorrhoids  Cancer: protate  Diabetes mellitus  Hyperlipidemia  Hypertension  H/O colonoscopy: Normal as per patient 3 years ago.  S/P primary angioplasty with coronary stent  S/P RT for prostate cancer  Inguinal hernia      ROS:  No Heartburn, regurgitation, dysphagia, odynophagia.  No dyspepsia  No abdominal pain.    No Nausea, vomiting.  No Bleeding.  No hematemesis.   No diarrhea.    No hematochesia.  No weight loss, anorexia.  No edema.      MEDICATIONS  (STANDING):  dextrose 5%. 1000 milliLiter(s) (50 mL/Hr) IV Continuous <Continuous>  dextrose 50% Injectable 12.5 Gram(s) IV Push once  dextrose 50% Injectable 25 Gram(s) IV Push once  dextrose 50% Injectable 25 Gram(s) IV Push once  insulin lispro (HumaLOG) corrective regimen sliding scale   SubCutaneous at bedtime  metoprolol tartrate 50 milliGRAM(s) Oral two times a day  pantoprazole  Injectable 40 milliGRAM(s) IV Push daily  sodium chloride 0.9%. 1000 milliLiter(s) (100 mL/Hr) IV Continuous <Continuous>    MEDICATIONS  (PRN):  dextrose 40% Gel 15 Gram(s) Oral once PRN Blood Glucose LESS THAN 70 milliGRAM(s)/deciliter  glucagon  Injectable 1 milliGRAM(s) IntraMuscular once PRN Glucose LESS THAN 70 milligrams/deciliter      Allergies  EGGPLANT (Hives)  No Known Drug Allergies  strawberry (Hives)    Intolerances  aspirin (Stomach Upset)      SOCIAL HISTORY: Distant smoker.  No ETOH.  no IVDU    FAMILY HISTORY:  Family history of heart disease: father  Family history of essential hypertension  Family history of prostate cancer      Vital Signs Last 24 Hrs  T(C): 37.1 (23 Aug 2019 05:21), Max: 37.1 (23 Aug 2019 05:21)  T(F): 98.7 (23 Aug 2019 05:21), Max: 98.7 (23 Aug 2019 05:21)  HR: 64 (23 Aug 2019 05:21) (64 - 74)  BP: 130/54 (23 Aug 2019 05:21) (130/54 - 150/67)  BP(mean): --  RR: 18 (23 Aug 2019 05:21) (18 - 20)  SpO2: 98% (23 Aug 2019 05:21) (98% - 99%)    PHYSICAL EXAM:    GENERAL: NAD, well-groomed, well-developed  HEAD:  Atraumatic, Normocephalic  EYES: EOMI, PERRLA, conjunctiva and sclera clear  ENMT: No tonsillar erythema, exudates, or enlargement; Moist mucous membranes, Good dentition, No lesions  NECK: Supple, No JVD, Normal thyroid  CHEST/LUNG: Clear to percussion bilaterally; No rales, rhonchi, wheezing, or rubs  HEART: Regular rate and rhythm; No murmurs, rubs, or gallops  ABDOMEN: Soft, Nontender, Nondistended; Bowel sounds present;  No scars.  No HSM.  No MTR.  No distention.  DOUG:  No lesions.  No palpable hemorrhoids.  No stool or blood in the rectal vault.  Shrunken prostate.    EXTREMITIES:  2+ Peripheral Pulses, No clubbing, cyanosis, or edema  LYMPH: No lymphadenopathy noted  SKIN: No rashes or lesions      LABS:                        9.7    5.33  )-----------( 278      ( 23 Aug 2019 07:28 )             29.7     08-23    139  |  107  |  21.0<H>  ----------------------------<  118<H>  4.3   |  21.0<L>  |  1.31<H>    Ca    8.9      23 Aug 2019 07:28    TPro  7.2  /  Alb  4.1  /  TBili  0.4  /  DBili  x   /  AST  20  /  ALT  18  /  AlkPhos  141<H>  08-22    PT/INR - ( 22 Aug 2019 17:46 )   PT: 10.5 sec;   INR: 0.92 ratio         PTT - ( 22 Aug 2019 17:46 )  PTT:34.2 sec       LIVER FUNCTIONS - ( 22 Aug 2019 17:46 )  Alb: 4.1 g/dL / Pro: 7.2 g/dL / ALK PHOS: 141 U/L / ALT: 18 U/L / AST: 20 U/L / GGT: x           EKG:  NSR;  Normal.      RADIOLOGY & ADDITIONAL STUDIES:  CXR:  NAPD.
[facility]                                   [address] [place] [address]                                                      PHONE: [phone]                                                         FAX: [phone]  [email or web address]  ---------------------------------------------------------------------------------------------------------------------------------    HPI:  GUIDO MATUTE is an 78 y Male HTN, HLD, CAD s/p PCI to OM1 1 year  ago, DM, prostate CA former smoker who presented to [facility] ED with GI bleed.  Pt recently started noticing blood in BM and per rectum for the past few days.  Pt denies chest pain, palps, sob.  pt planned for colonoscopy monday.      PAST MEDICAL & SURGICAL HISTORY:  MI (myocardial infarction)  Sickle cell anemia: Carrier of the disease, presently doesn&#x27;t have it  Asthma, mild intermittent, uncomplicated  Other hemorrhoids  Cancer: protate  Diabetes mellitus  Hyperlipidemia  Hypertension  H/O colonoscopy: Normal as per patient 3 years ago.  S/P primary angioplasty with coronary stent  S/P prostatectomy  Inguinal hernia      aspirin (Stomach Upset)  EGGPLANT (Hives)  No Known Drug Allergies  strawberry (Hives)      MEDICATIONS  (STANDING):  dextrose 5%. 1000 milliLiter(s) (50 mL/Hr) IV Continuous <Continuous>  dextrose 50% Injectable 12.5 Gram(s) IV Push once  dextrose 50% Injectable 25 Gram(s) IV Push once  dextrose 50% Injectable 25 Gram(s) IV Push once  insulin lispro (HumaLOG) corrective regimen sliding scale   SubCutaneous at bedtime  metoprolol tartrate 50 milliGRAM(s) Oral two times a day  pantoprazole  Injectable 40 milliGRAM(s) IV Push daily  sodium chloride 0.9%. 1000 milliLiter(s) (100 mL/Hr) IV Continuous <Continuous>    MEDICATIONS  (PRN):  dextrose 40% Gel 15 Gram(s) Oral once PRN Blood Glucose LESS THAN 70 milliGRAM(s)/deciliter  glucagon  Injectable 1 milliGRAM(s) IntraMuscular once PRN Glucose LESS THAN 70 milligrams/deciliter      Family History: Pt denies hx of early cad, SCD, or congenital heart disease.      Social History:  Cigarettes:   former                 Alchohol:   no              Illicit Drug Abuse:  no    ROS:    Extensively Reviewed with pertinents as per HPI the remainder were negative.      Vital Signs Last 24 Hrs  T(C): 37.1 (23 Aug 2019 05:21), Max: 37.1 (23 Aug 2019 05:21)  T(F): 98.7 (23 Aug 2019 05:21), Max: 98.7 (23 Aug 2019 05:21)  HR: 64 (23 Aug 2019 05:21) (64 - 74)  BP: 130/54 (23 Aug 2019 05:21) (130/54 - 150/67)  BP(mean): --  RR: 18 (23 Aug 2019 05:21) (18 - 20)  SpO2: 98% (23 Aug 2019 05:21) (98% - 99%)  ICU Vital Signs Last 24 Hrs  GUIDO MATUTE  I&O's Detail    22 Aug 2019 07:01  -  23 Aug 2019 07:00  --------------------------------------------------------  IN:  Total IN: 0 mL    OUT:    Voided: 1000 mL  Total OUT: 1000 mL    Total NET: -1000 mL        I&O's Summary    22 Aug 2019 07:01  -  23 Aug 2019 07:00  --------------------------------------------------------  IN: 0 mL / OUT: 1000 mL / NET: -1000 mL      Drug Dosing Weight  GUIDO MATUTE      PHYSICAL EXAM:  General: Appears well developed, well nourished alert and cooperative.  HEENT: Head; normocephalic, atraumatic.  Eyes: Pupils reactive, cornea wnl.  Neck: Supple, no nodes adenopathy, no NVD or carotid bruit or thyromegaly.  CARDIOVASCULAR: Normal S1 and S2, No murmur, rub, gallop or lift.   LUNGS: No rales, rhonchi or wheeze. Normal breath sounds bilaterally.  ABDOMEN: Soft, nontender without mass or organomegaly. bowel sounds normoactive.  EXTREMITIES: No clubbing, cyanosis or edema. Distal pulses wnl.   SKIN: warm and dry with normal turgor.  NEURO: Alert/oriented x 3/normal motor exam. No pathologic reflexes.    PSYCH: normal affect.        LABS:                        9.7    5.33  )-----------( 278      ( 23 Aug 2019 07:28 )             29.7     08-23    139  |  107  |  21.0<H>  ----------------------------<  118<H>  4.3   |  21.0<L>  |  1.31<H>    Ca    8.9      23 Aug 2019 07:28    TPro  7.2  /  Alb  4.1  /  TBili  0.4  /  DBili  x   /  AST  20  /  ALT  18  /  AlkPhos  141<H>  08-22    GUIDO MATUTE      PT/INR - ( 22 Aug 2019 17:46 )   PT: 10.5 sec;   INR: 0.92 ratio         PTT - ( 22 Aug 2019 17:46 )  PTT:34.2 sec      RADIOLOGY & ADDITIONAL STUDIES:    INTERPRETATION OF TELEMETRY (personally reviewed):    ECG: nsr, nml axis, lvh, no st elevations/depressions     GUIDO MATUTE is an 78 y Male HTN, HLD, CAD s/p PCI to OM1 1 year  ago, DM, prostate CA former smoker who presented to [facility] ED with GI bleed.  Pt recently started noticing blood in BM and per rectum for the past few days.  Pt denies chest pain, palps, sob.  pt planned for colonoscopy monday.      hold ac  pt medically optimized to undergo colonoscopy  pt is an intermediate cardio risk for a low risk procedure  pt can stop his plavix as he is > 1 year since stent placement  pt is to resume asa 81 mg when cleared from gi persepctive  pt to f/u wiht his cardio as an outpt   no further cardiac interventions warranted at this time.        Thank you for allowing [facility] to participate in the care of this patient.  Please feel free to call with any questions or concerns.

## 2019-08-23 NOTE — CONSULT NOTE ADULT - ASSESSMENT
Hematochezia for moderate volume over the past several day.  On Aspirin/ Plavix chronically for stent.  Asymptomatic.  Current DOUG:  No blood or hemorrhoids.  Probably a diverticular bleed.  Low probability for neoplasia.  Clinically stable and appears to have stopped bleeding for now.    Suggest:  CT scan A/P; Hold Aspirin / Plavix for now.  Serial Hgb's and keep Hgb > 8.  Cardiology clearance (GSB).  Colonoscopy for Monday.  Prep over the weekend.  Start clear liquid diet now.

## 2019-08-24 LAB
GLUCOSE BLDC GLUCOMTR-MCNC: 100 MG/DL — HIGH (ref 70–99)
GLUCOSE BLDC GLUCOMTR-MCNC: 124 MG/DL — HIGH (ref 70–99)
GLUCOSE BLDC GLUCOMTR-MCNC: 136 MG/DL — HIGH (ref 70–99)
GLUCOSE BLDC GLUCOMTR-MCNC: 155 MG/DL — HIGH (ref 70–99)
HCT VFR BLD CALC: 30.7 % — LOW (ref 39–50)
HGB BLD-MCNC: 10 G/DL — LOW (ref 13–17)
MCHC RBC-ENTMCNC: 24.8 PG — LOW (ref 27–34)
MCHC RBC-ENTMCNC: 32.6 GM/DL — SIGNIFICANT CHANGE UP (ref 32–36)
MCV RBC AUTO: 76 FL — LOW (ref 80–100)
PLATELET # BLD AUTO: 305 K/UL — SIGNIFICANT CHANGE UP (ref 150–400)
RBC # BLD: 4.04 M/UL — LOW (ref 4.2–5.8)
RBC # FLD: 15.2 % — HIGH (ref 10.3–14.5)
WBC # BLD: 5.12 K/UL — SIGNIFICANT CHANGE UP (ref 3.8–10.5)
WBC # FLD AUTO: 5.12 K/UL — SIGNIFICANT CHANGE UP (ref 3.8–10.5)

## 2019-08-24 PROCEDURE — 99233 SBSQ HOSP IP/OBS HIGH 50: CPT

## 2019-08-24 RX ADMIN — Medication 50 MILLIGRAM(S): at 05:52

## 2019-08-24 RX ADMIN — PANTOPRAZOLE SODIUM 40 MILLIGRAM(S): 20 TABLET, DELAYED RELEASE ORAL at 11:57

## 2019-08-24 RX ADMIN — Medication 50 MILLIGRAM(S): at 22:13

## 2019-08-24 NOTE — PROGRESS NOTE ADULT - SUBJECTIVE AND OBJECTIVE BOX
CC: Rectal bleed is resolved. Suspected diverticular bleed.  Planned for colonoscopy 8/26/19  HPI:  77 y/o male came to the ER as he is having on and off red blood per rectum for past few days. As per pt. he went to see his oncologist for f/u on his prostate cancer , had rectal exam and everything was fine but later he started noticing blood per rectum with each BM, few times it was just blood and no stool. no abd. pain. no n/ v/d. no hematemesis. pt. has h/o hemorrhoids. feels a little tired ,  no cp. no sob, no dizziness. Pt. had few episodes of rectal bleed in the ER . At the time of admission feels ok, no urge to have a BM.   pt. is on asa and plavix, took asa 81 mg today but did not use his plavix. (22 Aug 2019 21:38)    REVIEW OF SYSTEMS:    Patient denied fever, chills, abdominal pain, nausea, vomiting, cough, shortness of breath, chest pain or palpitations    Vital Signs Last 24 Hrs  T(C): 36.8 (24 Aug 2019 15:50), Max: 37.1 (23 Aug 2019 19:00)  T(F): 98.3 (24 Aug 2019 15:50), Max: 98.8 (23 Aug 2019 19:00)  HR: 68 (24 Aug 2019 15:50) (64 - 84)  BP: 131/65 (24 Aug 2019 15:50) (131/65 - 176/56)  BP(mean): --  RR: 18 (24 Aug 2019 15:50) (16 - 18)  SpO2: 96% (24 Aug 2019 15:50) (95% - 100%)I&O's Summary    23 Aug 2019 07:01  -  24 Aug 2019 07:00  --------------------------------------------------------  IN: 0 mL / OUT: 400 mL / NET: -400 mL    24 Aug 2019 07:01  -  24 Aug 2019 18:05  --------------------------------------------------------  IN: 240 mL / OUT: 300 mL / NET: -60 mL      PHYSICAL EXAM:  GENERAL: NAD, well-groomed  HEENT: PERRL, +EOMI, anicteric, no [place]  NECK: Supple, No JVD   CHEST/LUNG: CTA bilaterally; Normal effort  HEART: S1S2 Normal intensity, no murmurs, gallops or rubs noted  ABDOMEN: Soft, BS Normoactive, NT, ND, no HSM noted  EXTREMITIES:  2+ radial and DP pulses noted, no clubbing, cyanosis, or edema noted, FROM x 4  SKIN: No rashes or lesions noted  NEURO: A&Ox3, no focal deficits noted, CN II-XII intact  PSYCH: normal mood and affect; insight/judgement appropriate  LABS:                        10.0   5.12  )-----------( 305      ( 24 Aug 2019 13:42 )             30.7     08-23    139  |  107  |  21.0<H>  ----------------------------<  118<H>  4.3   |  21.0<L>  |  1.31<H>    Ca    8.9      23 Aug 2019 07:28          RADIOLOGY & ADDITIONAL TESTS:    MEDICATIONS:  MEDICATIONS  (STANDING):  dextrose 5%. 1000 milliLiter(s) (50 mL/Hr) IV Continuous <Continuous>  dextrose 50% Injectable 12.5 Gram(s) IV Push once  dextrose 50% Injectable 25 Gram(s) IV Push once  dextrose 50% Injectable 25 Gram(s) IV Push once  insulin lispro (HumaLOG) corrective regimen sliding scale   SubCutaneous at bedtime  metoprolol tartrate 50 milliGRAM(s) Oral two times a day  pantoprazole  Injectable 40 milliGRAM(s) IV Push daily  sodium chloride 0.9%. 1000 milliLiter(s) (100 mL/Hr) IV Continuous <Continuous>    MEDICATIONS  (PRN):  dextrose 40% Gel 15 Gram(s) Oral once PRN Blood Glucose LESS THAN 70 milliGRAM(s)/deciliter  glucagon  Injectable 1 milliGRAM(s) IntraMuscular once PRN Glucose LESS THAN 70 milligrams/deciliter

## 2019-08-25 ENCOUNTER — TRANSCRIPTION ENCOUNTER (OUTPATIENT)
Age: 79
End: 2019-08-25

## 2019-08-25 LAB
ANION GAP SERPL CALC-SCNC: 11 MMOL/L — SIGNIFICANT CHANGE UP (ref 5–17)
BUN SERPL-MCNC: 20 MG/DL — SIGNIFICANT CHANGE UP (ref 8–20)
CALCIUM SERPL-MCNC: 9 MG/DL — SIGNIFICANT CHANGE UP (ref 8.6–10.2)
CHLORIDE SERPL-SCNC: 105 MMOL/L — SIGNIFICANT CHANGE UP (ref 98–107)
CO2 SERPL-SCNC: 21 MMOL/L — LOW (ref 22–29)
CREAT SERPL-MCNC: 1.2 MG/DL — SIGNIFICANT CHANGE UP (ref 0.5–1.3)
GLUCOSE BLDC GLUCOMTR-MCNC: 109 MG/DL — HIGH (ref 70–99)
GLUCOSE BLDC GLUCOMTR-MCNC: 146 MG/DL — HIGH (ref 70–99)
GLUCOSE BLDC GLUCOMTR-MCNC: 78 MG/DL — SIGNIFICANT CHANGE UP (ref 70–99)
GLUCOSE SERPL-MCNC: 113 MG/DL — SIGNIFICANT CHANGE UP (ref 70–115)
HCT VFR BLD CALC: 30.4 % — LOW (ref 39–50)
HGB BLD-MCNC: 9.8 G/DL — LOW (ref 13–17)
MCHC RBC-ENTMCNC: 24.4 PG — LOW (ref 27–34)
MCHC RBC-ENTMCNC: 32.2 GM/DL — SIGNIFICANT CHANGE UP (ref 32–36)
MCV RBC AUTO: 75.8 FL — LOW (ref 80–100)
PLATELET # BLD AUTO: 295 K/UL — SIGNIFICANT CHANGE UP (ref 150–400)
POTASSIUM SERPL-MCNC: 3.9 MMOL/L — SIGNIFICANT CHANGE UP (ref 3.5–5.3)
POTASSIUM SERPL-SCNC: 3.9 MMOL/L — SIGNIFICANT CHANGE UP (ref 3.5–5.3)
RBC # BLD: 4.01 M/UL — LOW (ref 4.2–5.8)
RBC # FLD: 15 % — HIGH (ref 10.3–14.5)
SODIUM SERPL-SCNC: 137 MMOL/L — SIGNIFICANT CHANGE UP (ref 135–145)
WBC # BLD: 6.87 K/UL — SIGNIFICANT CHANGE UP (ref 3.8–10.5)
WBC # FLD AUTO: 6.87 K/UL — SIGNIFICANT CHANGE UP (ref 3.8–10.5)

## 2019-08-25 PROCEDURE — 99233 SBSQ HOSP IP/OBS HIGH 50: CPT

## 2019-08-25 PROCEDURE — 99232 SBSQ HOSP IP/OBS MODERATE 35: CPT

## 2019-08-25 RX ORDER — SOD SULF/SODIUM/NAHCO3/KCL/PEG
4000 SOLUTION, RECONSTITUTED, ORAL ORAL ONCE
Refills: 0 | Status: DISCONTINUED | OUTPATIENT
Start: 2019-08-25 | End: 2019-08-25

## 2019-08-25 RX ORDER — HYDRALAZINE HCL 50 MG
10 TABLET ORAL EVERY 6 HOURS
Refills: 0 | Status: DISCONTINUED | OUTPATIENT
Start: 2019-08-25 | End: 2019-08-26

## 2019-08-25 RX ORDER — HYDRALAZINE HCL 50 MG
25 TABLET ORAL THREE TIMES A DAY
Refills: 0 | Status: DISCONTINUED | OUTPATIENT
Start: 2019-08-25 | End: 2019-08-26

## 2019-08-25 RX ORDER — HYDRALAZINE HCL 50 MG
5 TABLET ORAL ONCE
Refills: 0 | Status: COMPLETED | OUTPATIENT
Start: 2019-08-25 | End: 2019-08-25

## 2019-08-25 RX ORDER — SOD SULF/SODIUM/NAHCO3/KCL/PEG
4000 SOLUTION, RECONSTITUTED, ORAL ORAL ONCE
Refills: 0 | Status: COMPLETED | OUTPATIENT
Start: 2019-08-25 | End: 2019-08-25

## 2019-08-25 RX ADMIN — PANTOPRAZOLE SODIUM 40 MILLIGRAM(S): 20 TABLET, DELAYED RELEASE ORAL at 13:07

## 2019-08-25 RX ADMIN — Medication 25 MILLIGRAM(S): at 22:34

## 2019-08-25 RX ADMIN — Medication 4000 MILLILITER(S): at 17:07

## 2019-08-25 RX ADMIN — Medication 50 MILLIGRAM(S): at 17:08

## 2019-08-25 RX ADMIN — Medication 5 MILLIGRAM(S): at 21:13

## 2019-08-25 RX ADMIN — Medication 50 MILLIGRAM(S): at 05:41

## 2019-08-25 NOTE — PROGRESS NOTE ADULT - SUBJECTIVE AND OBJECTIVE BOX
INTERVAL HPI/OVERNIGHT EVENTS:Colonoscopy scheduled for tomorrow. No complaints.    MEDICATIONS  (STANDING):  dextrose 5%. 1000 milliLiter(s) (50 mL/Hr) IV Continuous <Continuous>  dextrose 50% Injectable 12.5 Gram(s) IV Push once  dextrose 50% Injectable 25 Gram(s) IV Push once  dextrose 50% Injectable 25 Gram(s) IV Push once  insulin lispro (HumaLOG) corrective regimen sliding scale   SubCutaneous at bedtime  metoprolol tartrate 50 milliGRAM(s) Oral two times a day  pantoprazole  Injectable 40 milliGRAM(s) IV Push daily  sodium chloride 0.9%. 1000 milliLiter(s) (100 mL/Hr) IV Continuous <Continuous>    MEDICATIONS  (PRN):  dextrose 40% Gel 15 Gram(s) Oral once PRN Blood Glucose LESS THAN 70 milliGRAM(s)/deciliter  glucagon  Injectable 1 milliGRAM(s) IntraMuscular once PRN Glucose LESS THAN 70 milligrams/deciliter      Allergies    EGGPLANT (Hives)  No Known Drug Allergies  strawberry (Hives)    Intolerances    aspirin (Stomach Upset)      Vital Signs Last 24 Hrs  T(C): 36.7 (25 Aug 2019 15:07), Max: 36.8 (25 Aug 2019 05:38)  T(F): 98 (25 Aug 2019 15:07), Max: 98.2 (25 Aug 2019 05:38)  HR: 75 (25 Aug 2019 17:09) (63 - 75)  BP: 164/72 (25 Aug 2019 17:09) (138/76 - 164/72)  BP(mean): --  RR: 18 (25 Aug 2019 15:07) (18 - 18)  SpO2: 98% (25 Aug 2019 15:07) (95% - 98%)    LABS:                        9.8    6.87  )-----------( 295      ( 25 Aug 2019 06:55 )             30.4     08-25    137  |  105  |  20.0  ----------------------------<  113  3.9   |  21.0<L>  |  1.20    Ca    9.0      25 Aug 2019 06:52            RADIOLOGY & ADDITIONAL TESTS:

## 2019-08-25 NOTE — CHART NOTE - NSCHARTNOTEFT_GEN_A_CORE
HPI:  79 y/o male came to the ER as he is having on and off red blood per rectum for past few days. As per pt. he went to see his oncologist for f/u on his prostate cancer , had rectal exam and everything was fine but later he started noticing blood per rectum with each BM, few times it was just blood and no stool. no abd. pain. no n/ v/d. no hematemesis. pt. has h/o hemorrhoids. feels a little tired ,  no cp. no sob, no dizziness. Pt. had few episodes of rectal bleed in the ER . At the time of admission feels ok, no urge to have a BM.   pt. is on asa and plavix, took asa 81 mg today but did not use his plavix. (22 Aug 2019 21:38)    Interval HPI:   Called by RN for worsening hypertension, patient is asymptomatic and SBP is 170. Review of chart revealed patient takes valsartan at home but it was discontinued here inpatient likely 2/2 to KENTRELL on admission. Patient likely needs secondary agent, wont use Valsartan as there was KENTRELL before. Discussed with Dr. Timmons of [facility] will add hydralazine 25mg q8h and utilize IVP PRN for BP >160.

## 2019-08-25 NOTE — PROGRESS NOTE ADULT - SUBJECTIVE AND OBJECTIVE BOX
CC:  Rectal bleed is resolved.  Awaiting colonoscopy in AM  HPI:  77 y/o male came to the ER as he is having on and off red blood per rectum for past few days. As per pt. he went to see his oncologist for f/u on his prostate cancer , had rectal exam and everything was fine but later he started noticing blood per rectum with each BM, few times it was just blood and no stool. no abd. pain. no n/ v/d. no hematemesis. pt. has h/o hemorrhoids. feels a little tired ,  no cp. no sob, no dizziness. Pt. had few episodes of rectal bleed in the ER . At the time of admission feels ok, no urge to have a BM.   pt. is on asa and plavix, took asa 81 mg today but did not use his plavix. (22 Aug 2019 21:38)    REVIEW OF SYSTEMS:    Patient denied fever, chills, abdominal pain, nausea, vomiting, cough, shortness of breath, chest pain or palpitations    Vital Signs Last 24 Hrs  T(C): 36.7 (25 Aug 2019 15:07), Max: 36.9 (24 Aug 2019 19:30)  T(F): 98 (25 Aug 2019 15:07), Max: 98.5 (24 Aug 2019 19:30)  HR: 71 (25 Aug 2019 15:07) (63 - 77)  BP: 160/70 (25 Aug 2019 15:07) (131/65 - 160/70)  BP(mean): --  RR: 18 (25 Aug 2019 15:07) (18 - 18)  SpO2: 98% (25 Aug 2019 15:07) (95% - 98%)I&O's Summary    24 Aug 2019 07:01  -  25 Aug 2019 07:00  --------------------------------------------------------  IN: 240 mL / OUT: 1150 mL / NET: -910 mL    25 Aug 2019 07:01  -  25 Aug 2019 15:13  --------------------------------------------------------  IN: 360 mL / OUT: 0 mL / NET: 360 mL      PHYSICAL EXAM:  GENERAL: NAD, well-groomed  HEENT: PERRL, +EOMI, anicteric, no Huslia  NECK: Supple, No JVD   CHEST/LUNG: CTA bilaterally; Normal effort  HEART: S1S2 Normal intensity, no murmurs, gallops or rubs noted  ABDOMEN: Soft, BS Normoactive, NT, ND, no HSM noted  EXTREMITIES:  2+ radial and DP pulses noted, no clubbing, cyanosis, or edema noted, FROM x 4  SKIN: No rashes or lesions noted  NEURO: A&Ox3, no focal deficits noted, CN II-XII intact  PSYCH: normal mood and affect; insight/judgement appropriate  LABS:                        9.8    6.87  )-----------( 295      ( 25 Aug 2019 06:55 )             30.4     08-25    137  |  105  |  20.0  ----------------------------<  113  3.9   |  21.0<L>  |  1.20    Ca    9.0      25 Aug 2019 06:52          RADIOLOGY & ADDITIONAL TESTS:    MEDICATIONS:  MEDICATIONS  (STANDING):  dextrose 5%. 1000 milliLiter(s) (50 mL/Hr) IV Continuous <Continuous>  dextrose 50% Injectable 12.5 Gram(s) IV Push once  dextrose 50% Injectable 25 Gram(s) IV Push once  dextrose 50% Injectable 25 Gram(s) IV Push once  insulin lispro (HumaLOG) corrective regimen sliding scale   SubCutaneous at bedtime  metoprolol tartrate 50 milliGRAM(s) Oral two times a day  pantoprazole  Injectable 40 milliGRAM(s) IV Push daily  polyethylene glycol/electrolyte Solution. 4000 milliLiter(s) Oral once  sodium chloride 0.9%. 1000 milliLiter(s) (100 mL/Hr) IV Continuous <Continuous>    MEDICATIONS  (PRN):  dextrose 40% Gel 15 Gram(s) Oral once PRN Blood Glucose LESS THAN 70 milliGRAM(s)/deciliter  glucagon  Injectable 1 milliGRAM(s) IntraMuscular once PRN Glucose LESS THAN 70 milligrams/deciliter

## 2019-08-25 NOTE — PROGRESS NOTE ADULT - ASSESSMENT
GI bleed  GI considering diverticular bleed. . close f/u of Hb, first Hb stable, VS stable,  transfuse prbc as needed and based on repeat blood work. hold asa/ plavix for now,    GI planned colonoscopy Monday 8/26/18. Bowel prep tomorrow and will be NPO after midnight tomorrow.  .    CAD, native artery, native heart s/p stent, no angina. will continue with b. blockers. Hold aspirin and plavix for colonoscopy schedule 8/26/19    Essential HTN, lopressor to be continued.    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      compression devices for dvt prophylaxis.
GI bleed  GI considering diverticular bleed. . close f/u of Hb, first Hb stable, VS stable,  transfuse prbc as needed and based on repeat blood work. hold asa/ plavix for now,  [facility] . , further     CAD, native artery, native heart s/p stent, no angina. will continue with b. blockers. Hold aspirin and plavix for colonoscopy schedule 8/26/19    Essential HTN, lopressor to be continued.    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      compression devices for dvt prophylaxis.
GI bleed-  GI considering diverticular bleed. . close f/u of Hb, first Hb stable, VS stable,  transfuse prbc as needed and based on repeat blood work. hold asa/ plavix for now,    GI planned colonoscopy Monday 8/26/18. Bowel prep tomorrow and will be NPO after midnight tomorrow.  .    CAD, native artery, native heart s/p stent, no angina. will continue with b. blockers. Hold aspirin and plavix for colonoscopy schedule 8/26/19    Essential HTN, lopressor to be continued.    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      compression devices for dvt prophylaxis.
Anemia: Prep ordered. Colonoscopy tomorrow. patient is agreeable for the procedure

## 2019-08-26 ENCOUNTER — TRANSCRIPTION ENCOUNTER (OUTPATIENT)
Age: 79
End: 2019-08-26

## 2019-08-26 ENCOUNTER — RESULT REVIEW (OUTPATIENT)
Age: 79
End: 2019-08-26

## 2019-08-26 VITALS
RESPIRATION RATE: 20 BRPM | OXYGEN SATURATION: 96 % | HEART RATE: 87 BPM | SYSTOLIC BLOOD PRESSURE: 155 MMHG | DIASTOLIC BLOOD PRESSURE: 68 MMHG

## 2019-08-26 DIAGNOSIS — Z71.89 OTHER SPECIFIED COUNSELING: ICD-10-CM

## 2019-08-26 LAB
ALBUMIN SERPL ELPH-MCNC: 3.5 G/DL — SIGNIFICANT CHANGE UP (ref 3.3–5.2)
ALP SERPL-CCNC: 134 U/L — HIGH (ref 40–120)
ALT FLD-CCNC: 13 U/L — SIGNIFICANT CHANGE UP
ANION GAP SERPL CALC-SCNC: 12 MMOL/L — SIGNIFICANT CHANGE UP (ref 5–17)
AST SERPL-CCNC: 16 U/L — SIGNIFICANT CHANGE UP
BILIRUB SERPL-MCNC: 0.4 MG/DL — SIGNIFICANT CHANGE UP (ref 0.4–2)
BUN SERPL-MCNC: 18 MG/DL — SIGNIFICANT CHANGE UP (ref 8–20)
CALCIUM SERPL-MCNC: 9.2 MG/DL — SIGNIFICANT CHANGE UP (ref 8.6–10.2)
CHLORIDE SERPL-SCNC: 105 MMOL/L — SIGNIFICANT CHANGE UP (ref 98–107)
CO2 SERPL-SCNC: 20 MMOL/L — LOW (ref 22–29)
CREAT SERPL-MCNC: 1.06 MG/DL — SIGNIFICANT CHANGE UP (ref 0.5–1.3)
GLUCOSE BLDC GLUCOMTR-MCNC: 129 MG/DL — HIGH (ref 70–99)
GLUCOSE BLDC GLUCOMTR-MCNC: 151 MG/DL — HIGH (ref 70–99)
GLUCOSE SERPL-MCNC: 114 MG/DL — SIGNIFICANT CHANGE UP (ref 70–115)
HCT VFR BLD CALC: 31.9 % — LOW (ref 39–50)
HGB BLD-MCNC: 10.7 G/DL — LOW (ref 13–17)
MCHC RBC-ENTMCNC: 25 PG — LOW (ref 27–34)
MCHC RBC-ENTMCNC: 33.5 GM/DL — SIGNIFICANT CHANGE UP (ref 32–36)
MCV RBC AUTO: 74.5 FL — LOW (ref 80–100)
PLATELET # BLD AUTO: 304 K/UL — SIGNIFICANT CHANGE UP (ref 150–400)
POTASSIUM SERPL-MCNC: 3.5 MMOL/L — SIGNIFICANT CHANGE UP (ref 3.5–5.3)
POTASSIUM SERPL-SCNC: 3.5 MMOL/L — SIGNIFICANT CHANGE UP (ref 3.5–5.3)
PROT SERPL-MCNC: 6.7 G/DL — SIGNIFICANT CHANGE UP (ref 6.6–8.7)
RBC # BLD: 4.28 M/UL — SIGNIFICANT CHANGE UP (ref 4.2–5.8)
RBC # FLD: 14.8 % — HIGH (ref 10.3–14.5)
SODIUM SERPL-SCNC: 137 MMOL/L — SIGNIFICANT CHANGE UP (ref 135–145)
TROPONIN T SERPL-MCNC: <0.01 NG/ML — SIGNIFICANT CHANGE UP (ref 0–0.06)
WBC # BLD: 8.52 K/UL — SIGNIFICANT CHANGE UP (ref 3.8–10.5)
WBC # FLD AUTO: 8.52 K/UL — SIGNIFICANT CHANGE UP (ref 3.8–10.5)

## 2019-08-26 PROCEDURE — 88305 TISSUE EXAM BY PATHOLOGIST: CPT | Mod: 26

## 2019-08-26 PROCEDURE — 93010 ELECTROCARDIOGRAM REPORT: CPT

## 2019-08-26 PROCEDURE — 45382 COLONOSCOPY W/CONTROL BLEED: CPT | Mod: 59

## 2019-08-26 PROCEDURE — 99239 HOSP IP/OBS DSCHRG MGMT >30: CPT

## 2019-08-26 PROCEDURE — 45380 COLONOSCOPY AND BIOPSY: CPT

## 2019-08-26 RX ORDER — CLOPIDOGREL BISULFATE 75 MG/1
1 TABLET, FILM COATED ORAL
Qty: 30 | Refills: 0
Start: 2019-08-26 | End: 2019-09-24

## 2019-08-26 RX ORDER — PANTOPRAZOLE SODIUM 20 MG/1
1 TABLET, DELAYED RELEASE ORAL
Qty: 90 | Refills: 0
Start: 2019-08-26 | End: 2019-11-23

## 2019-08-26 RX ORDER — VALSARTAN 80 MG/1
160 TABLET ORAL DAILY
Refills: 0 | Status: COMPLETED | OUTPATIENT
Start: 2019-08-26 | End: 2019-08-26

## 2019-08-26 RX ORDER — LISINOPRIL 2.5 MG/1
20 TABLET ORAL DAILY
Refills: 0 | Status: DISCONTINUED | OUTPATIENT
Start: 2019-08-26 | End: 2019-08-26

## 2019-08-26 RX ORDER — METOPROLOL TARTRATE 50 MG
1 TABLET ORAL
Qty: 60 | Refills: 0
Start: 2019-08-26 | End: 2019-09-24

## 2019-08-26 RX ORDER — HYDROCHLOROTHIAZIDE 25 MG
12.5 TABLET ORAL DAILY
Refills: 0 | Status: DISCONTINUED | OUTPATIENT
Start: 2019-08-26 | End: 2019-08-26

## 2019-08-26 RX ORDER — AMLODIPINE BESYLATE 2.5 MG/1
10 TABLET ORAL ONCE
Refills: 0 | Status: COMPLETED | OUTPATIENT
Start: 2019-08-26 | End: 2019-08-26

## 2019-08-26 RX ORDER — LISINOPRIL/HYDROCHLOROTHIAZIDE 10-12.5 MG
1 TABLET ORAL
Qty: 0 | Refills: 6 | DISCHARGE
Start: 2019-08-26 | End: 2020-03-22

## 2019-08-26 RX ORDER — METOPROLOL TARTRATE 50 MG
3 TABLET ORAL
Qty: 0 | Refills: 0 | DISCHARGE
Start: 2019-08-26 | End: 2019-09-24

## 2019-08-26 RX ORDER — SENNA PLUS 8.6 MG/1
2 TABLET ORAL
Qty: 60 | Refills: 0
Start: 2019-08-26 | End: 2019-09-24

## 2019-08-26 RX ORDER — LISINOPRIL/HYDROCHLOROTHIAZIDE 10-12.5 MG
1 TABLET ORAL
Qty: 30 | Refills: 6
Start: 2019-08-26 | End: 2020-03-22

## 2019-08-26 RX ADMIN — Medication 25 MILLIGRAM(S): at 05:00

## 2019-08-26 RX ADMIN — LISINOPRIL 20 MILLIGRAM(S): 2.5 TABLET ORAL at 13:53

## 2019-08-26 RX ADMIN — VALSARTAN 160 MILLIGRAM(S): 80 TABLET ORAL at 02:06

## 2019-08-26 RX ADMIN — Medication 25 MILLIGRAM(S): at 13:53

## 2019-08-26 RX ADMIN — Medication 10 MILLIGRAM(S): at 00:16

## 2019-08-26 RX ADMIN — Medication 50 MILLIGRAM(S): at 05:01

## 2019-08-26 RX ADMIN — Medication 10 MILLIGRAM(S): at 13:54

## 2019-08-26 RX ADMIN — AMLODIPINE BESYLATE 10 MILLIGRAM(S): 2.5 TABLET ORAL at 01:37

## 2019-08-26 RX ADMIN — Medication 12.5 MILLIGRAM(S): at 13:53

## 2019-08-26 NOTE — CHART NOTE - NSCHARTNOTEFT_GEN_A_CORE
- Nurse called me to inform me patient blood pressure not well controlled.   - Patient is hypertensive and asymptomatic  - Patient takes amlodinpine and Valsartan at home. Medication was initially held due to evidence of KENTRELL  - will restart patient back on home medication at this time. - Nurse called me to inform me patient blood pressure not well controlled.   - Patient is hypertensive and asymptomatic  - Patient takes amlodinpine and Valsartan at home. Medication was initially held due to evidence of KENTRELL  - will give patient one time dose of home medication at this time.  - Informed the nurse to repeat BP

## 2019-08-26 NOTE — DISCHARGE NOTE NURSING/CASE MANAGEMENT/SOCIAL WORK - NSDCDPATPORTLINK_GEN_ALL_CORE
You can access the [facility] Patient Portal, offered by [facility], by registering with the following website: [email or web address]

## 2019-08-26 NOTE — BRIEF OPERATIVE NOTE - OPERATION/FINDINGS
2 small polyps in cecum and desc colon. 4 and 5 mm.  Both completely removed.    + Radiation proctopathy with several telangiectasias.  no bleeding but likely source of recent bleeding.  APC applied with rectal settings and all vessels were obliterated.  Small internal hemorrhoids.

## 2019-08-26 NOTE — DISCHARGE NOTE PROVIDER - HOSPITAL COURSE
77 y/o male came to the ER as he is having on and off red blood per rectum for past few days. As per pt. he went to see his oncologist for f/u on his prostate cancer , had rectal exam and everything was fine but later he started noticing blood per rectum with each BM, few times it was just blood and no stool. no abd. pain. no n/ v/d. no hematemesis. pt. has h/o hemorrhoids. feels a little tired ,  no cp. no sob, no dizziness. Pt. had few episodes of rectal bleed in the ER . At the time of admission feels ok, no urge to have a BM.     pt. is on asa and plavix, took asa 81 mg today but did not use his plavix. (22 Aug 2019 21:38)        GI bleed-    GI considering diverticular bleed.       GI Colonoscopy Monday 8/26/18.  Operative Findings: 2  small polyps in cecum and desc colon. 4 and 5 mm.  Both completely removed.      + Radiation proctopathy with several telangiectasias.  no bleeding but likely source of recent bleeding.  APC applied with rectal settings and all vessels were obliterated.  Small internal hemorrhoids :  Tolerated well.  No bleeding before or after.    Restart Aspirin tomorrow and Plavix in 2 days.  GI office follow up post discharge.  DASH, CHO diet.    Stable        CAD, native artery, native heart s/p stent, no angina. continue with b. blockers. Restart aspirin 8/26/19.  Restart plavix 8/29/19        Essential HTN, lopressor , Lisinopril-HCTZ .

## 2019-08-26 NOTE — BRIEF OPERATIVE NOTE - COMMENTS
Tolerated well.  No bleeding before or after.  Restart Aspirin tomorrow and Plavix in 2 days.  GI office follow up post discharge.  DASH, CHO diet.

## 2019-08-26 NOTE — DISCHARGE NOTE PROVIDER - NSDCCPCAREPLAN_GEN_ALL_CORE_FT
PRINCIPAL DISCHARGE DIAGNOSIS  Diagnosis: Lower GI bleed  Assessment and Plan of Treatment:       SECONDARY DISCHARGE DIAGNOSES  Diagnosis: Hypertension  Assessment and Plan of Treatment:     Diagnosis: CAD in native artery  Assessment and Plan of Treatment:

## 2019-08-26 NOTE — BRIEF OPERATIVE NOTE - NSICDXBRIEFPROCEDURE_GEN_ALL_CORE_FT
PROCEDURES:  Colonoscopy, with biopsy or removal of polyps or both if indicated 26-Aug-2019 09:27:37  Juan Matute  Colonoscopy with ablation of lesion using argon plasma coagulation (APC) or laser 26-Aug-2019 09:27:12  Juan Matute

## 2019-08-26 NOTE — BRIEF OPERATIVE NOTE - NSICDXBRIEFPOSTOP_GEN_ALL_CORE_FT
POST-OP DIAGNOSIS:  Radiation induced proctitis 26-Aug-2019 09:28:48  Juan Matute  Colon polyps 26-Aug-2019 09:28:25  Juan Matute

## 2019-08-26 NOTE — DISCHARGE NOTE PROVIDER - NSDCFUADDAPPT_GEN_ALL_CORE_FT
Progress Notes by Clarisse Goode LCSW, CADC at 06/14/18 05:37 PM     Author:  Clarisse Goode LCSW, CADC Service:  (none) Author Type:       Filed:  06/14/18 05:41 PM Encounter Date:  6/13/2018 Status:  Signed     :  Clarisse Goode LCSW, CADC ()            PROGRESS NOTE    Time Started: 1132       Time Ended: 1225    Reason For Visit: Individual Therapy    Problem/Chief Complaint: Depression; Schizoaffective Disorder    Treatment Plan Review Date:  0[LB1.1C]9[LB1.1M]/[LB1.1C]13[LB1.1M]/18    Current Medical Conditions:    Patient Active Problem List    Diagnosis    • Carpal tunnel syndrome   • Hypogammaglobulinemia (HCC)   • Social communication disorder   • Anxiety   • Major depressive disorder, recurrent episode, in full remission (HCC)   • Schizoaffective disorder, bipolar type (HCC)   • Personal history of skin cancer   • Seborrheic keratosis     Current Medications:    Current outpatient prescriptions prior to encounter       Medication  Sig Dispense Refill   • clonazepam (KLONOPIN) 1 MG tablet Take 1 Tab by mouth 2 (two) times daily as needed. Anxiety 60 Tab 1   • fluoxetine (PROZAC) 20 MG Cap Take 1 Cap by mouth daily. 30 Cap 1   • risperidone (RISPERDAL) 0.5 MG tablet Take 1 Tab by mouth 2 (two) times daily. 60 Tab 1   • etodolac (LODINE XL) 500 MG 24 hr tablet Take 1 Tab by mouth daily. 30 Tab 0   • hydrocodone-acetaminophen (NORCO) 5-325 MG per tablet Take 1-2 Tabs by mouth every 6 (six) hours as needed for Pain. 20 Tab 0   • pantoprazole (PROTONIX) 40 MG tablet Take 1 Tab by mouth daily. 90 Tab 1   • albuterol (VENTOLIN HFA) 108 (90 BASE) MCG/ACT inhaler Inhale 2 Puffs by mouth every 6 (six) hours as needed for Wheezing. 1 Inhaler 0   • fluticasone (FLONASE) 50 MCG/ACT nasal spray Use 2 Sprays in each nostril daily as needed for Rhinitis. 1 Bottle 5   • nystatin-triamcinolone (MYCOLOG) ointment Apply sparingly to affected twice daily 30 g 0   • sildenafil (VIAGRA) 50 MG  tablet Take 1 Tab by mouth as needed for Erectile Dysfunction. 10 Tab 0   • clobetasol (OLUX) 0.05 % topical foam Apply to aa scalp and upper back BID, avoid eyes, face 100 g 1   • LIDODERM 5 % APPLY 1 PATCH EVERY DAY *USE FOR 12 HOURS THEN REMOVE* 30 Patch 2       New Stressors: The patient reports no new stressors.    Progress Towards Goals of Managing Moods and Managing Anxiety:     · the patient reports that he has been doing well  · depression and anxiety are down and manageable  · he and his wife are getting along much better[LB1.1C]  · he states that today he is very bothered by a conflict he had with a close male friend[LB1.1M]    Therapeutic Response:    · assisted the patient to processs thoughts and feelings  · explored the issues he raised  · validated his experiences[LB1.1C] with his friend who has ended their relationship over something fairly minor  · the patient states that he just can't seem to get over what happened  · suggested that it will take some time for his feelings to settle  · he is still thinking about what if anything he wants to do about what happened[LB1.1M]    · Homework Assignment:     · he will notice his thoughts and feelings and practice his coping skills  · he will take a break from focusing on interactions by doing something that holds his attention   · he will not try to talk with wife when she is so frustrated  · he will practice the skill of mirroring with his wife  · he will use work sheets to gain insight into his thoughts and feelings; he will use list of coping thoughts to have a more balanced response    Mental Status:    Emmanuel is a 39 year old  male who appears  neat and clean and casually dressed.  The following areas were assessed:    Behavior: cooperative, polite  Eye Contact:  Makes eye contact  Speech: logical and coherent  Gait, movement and Motor Coordination: Within Normal Limits  Alert and Oriented: Yes to Person, Place, and Time  Mood/Affect:[LB1.1C]  anxious and sad[LB1.1M]  Organization of thought: logical and coherent   Insight and Judgment: good  Self-Harm: Suicidal ideation, plan, or intent reported? No   Homicidal Ideation: Homicidal ideation, plan, or intent reported?  No   Domestic Violence: No   Physical/Sexual Abuse: No     Strategies/Interventions:    · Assertiveness Training-Teaching and practice in communicating needs and wants to others; and respecting the rights of \"the other\".  · Sean-Establishing an agreement with the patient for the purpose of personal growth and development; or safety.  · Decision-Making-Assist patient to stop and think before making decisions; also to evaluate choices that are consistent with values and goals.  · Eozoamo-Vfiscud-Zseiqhrnap identification of problems and solutions that are effective.  · Insight-Assisting the patient to gain understanding into the patient's own motivations, perceptions, behavior choices, thoughts/feelings in a specific situation in order to increase personal power.  · Motivational Interviewing- A specific technique using a nondirective and an empathic eliciting style to motivate the client, using the client’s own motivation to change; and guiding the client to articulate ambivalence.  · Patient Education-Teaching about a specific topic that will enhance the patient's social or emotional health.    · Social Skills Training-Teaching and practicing skills that foster effective interpersonal relationships.   · Supportive Therapy-Listen and encourage verbalization of thoughts/feelings, invite participation in decision-making, and assist patient to identify problems/solutions.    APA DIAGNOSIS:     AXIS I: Depression, major, severe, in full remission.,Anxiety, generalized 300.02., Communication Disorder, unspecified.  AXIS II: Deferred.   AXIS III: None   AXIS IV: Severity of psychosocial stressors- moderate conflict in couples relationship, unable to have contact with his daughter, financial  strain, poor coping skills, low frustration tolerance, lack of social support   AXIS V: Current GAF 70. Highest GAF in the last year 70.     Treatment Plan   This treatment plan was collaboratively developed with the patient.     Formal treatment Plan Review Date (every three months):  0[LB1.1C]9[LB1.1M]/[LB1.1C]13[LB1.1M]/18        (Reviewed on: 0[LB1.1C]6[LB1.1M]/[LB1.1C]13[LB1.1M]/18)    Diagnosis #1: Depression    Desired Outcomes:     1. Improve mood and return to previous level of functioning as evidenced by a lower score on the PHQ and the patient's self-report that the level of depressed mood is 3 or less on a 1/10 scale.  2. Recognize and accept feelings of depression when present; and continue to make effective choices as evidenced by improved functioning per lower scores on the PHQ and the patient's self-report report.  3. Establish healthy cognitive patterns and beliefs about self and the world that lead to improved mood and increased functioning as evidenced by the patient's self-report that level of depressed mood is 3 or less on a 1/10 scale and lower PHQ scores.     Diagnosis #2: Anxiety    Desired Outcomes:    1. Experience more contentment and increase capacity to carry out daily routines as evidenced by a lower score on the PHQ and a subjective report that the   level of  anxiety is 3 or less on a 1/10 scale.  2. Recognize and accept anxiety when present and continue to make effective choices as evidenced by the patient's self-report.  3. Establish healthy cognitive patterns and beliefs about self and the world that lead to the ability to effectively manage the full range of life's anxieties as evidenced by the patient's self-report and a lower score on the PHQ.    Diagnosis #3: PTSD    1. Establish effective coping skills to carry out normal responsibilities and constructive relationships.   2. Recall the traumatic event without becoming overtaken with negative emotions.   3. Identify and change  any destructive behaviors that serve to maintain escape and denial; and implement behaviors that promote healing, acceptance of the past events, and       responsible functioning.    Goal #1: Improving Relationships     How Will I Get There?     Make a commitment to change specific behaviors that have been identified as problematic.   Decrease critical complaining by learning how to reframe each complaint into a polite request.   Identify, list, and express expectations you have for your social relationships.   Learn conflict resolution skills to resolve issues reasonably.   Identify and list any behavior patterns repeatedly used in a destructive manner in your social relationships.   Learn and practice self assertion skills   Learn and practice social skills.    Goal #2: Managing Anxiety    How Will I Get There?    · Learn and practice Mindfulness Skills  · Recognize attempts to avoid anxiety and/or the struggle to control it.  · Explore areas of internal conflict in session.  · Understand the difference between realistic fears and unrealistic fears.  · Identify and list key life conflicts that increase anxiety.   · Express and clarify feelings connected to major life conflicts.   · Learn to lower level of emotional intensity around conflicts by accepting feelings and defusing from ineffective thoughts.   · Identify and list distorted thinking and belief errors and how each impacts daily functioning.  · Express positive, reality-based messages that can lower obsessive and perfectionistic thoughts.  · Identify any circumstances that are contributing to stress, anxiety, or lack of fulfillment.   · Identify and describe which values guide life's decisions and determine its fulfillment level.   · Implement new activities that can help increase a new sense of satisfaction.   · Identify what changes can reduce feelings of being overwhelmed by caretaking responsibilities.   · Increase assertivness to take control of  conflicts.   · Apply new problem-solving skills to help current circumstances.    · Increase the level of communication with significant others, allowing to express current life stress factors.   · Identify what changes in time allocation and resources to restore balance to life.   · Indentify and implement activities that reinforce a positive self-identity.   · Increase the level of social contacts to reduce sense of isolation.   · Share emotional struggles related to current adjustment stress.           ANXIETY DUE TO PTSD  · Identify and list the symptoms of Post-Traumatic Stress Disorder that cause distress and impaired functioning.   · Participate in EMDR Therapy  · Explain the traumatic event in as clear detail as possible.   · Express feelings that were experienced at the time of the trauma.   · Identify and list how Post-Traumatic Stress Disorder symptoms have affected personal relationships, and social or recreational life.  · Identify and list examples of the loss of control of anger.   · Express an awareness poor anger control and implement anger control techniques.  · Practice relaxation training as a coping mechanism for tension, panic, stress, anger, and anxiety.   · Increased comfort and ability to talk and think about the traumatic incident without emotional stress.   · Identify and list negative self-talk that is associated with past trauma and current stimulus triggers for anxiety.   · Remove negative, self-defeating thinking and replace it with positive, accurate, self-enhancing self-talk.  · Express a hopeful and positive attitude regarding the future.     Goal #3: Managing Moods    How Will I Get There?    · Take all medications as prescribed by physician.  · Practice self-care by attention to personal grooming, nutritional eating, exercise, and good sleep hygiene.  · Learn and practice self-assertion skills.  · Understand the positive function of sadness, anxiety, fear, guilt, and  anger.  · Experience sadness, hurt, shame, disappointment, and anger in session while discussing losses or pain form the past.   · Verbalize understanding of the relationship between depressed mood and repression or avoidance of feelings.  · Engage in valued recreational and social activities.  · Identify and list losses that have been experienced in life.  · Learn about the process of grieving including the concept of \"Attachment Dreams\"; the function of denial, the feelings of grieving including depression, guilt, fear, anger; and begin work on the tasks of the grieving process.  · Discuss in detail the current loss that is triggering symptoms.  · Express and resolve feelings of the grieving process.  · Refocus life on independent actions to meet physical, social and emotional needs.    Barriers   None Identified    Recommendations    Based upon the above information the following recommendations are made:    · Continue Individual Therapy   · Frequency of sessions: Every[LB1.1C] 4[LB1.1M] weeks    He was made aware of these recommendations and did agree to them.  He was made aware of how to contact the undersigned in case of an emergency.      Electronically Signed by:    Clarisse Goode LCSW, CADC , 0[LB1.1C]6[LB1.1M]/[LB1.1C]13[LB1.1M]/2018[LB1.1C]        Revision History        User Key Date/Time User Provider Type Action    > LB1.1 06/14/18 05:41 PM Clarisse Goode LCSW, CRISPIN  Sign    C - Copied, M - Manual             With PMD in 5-7 days to check BP and adjust medications

## 2019-08-26 NOTE — DISCHARGE NOTE PROVIDER - CARE PROVIDER_API CALL
Juan Matute (MD)  Gastroenterology; Internal Medicine  [address] [facility], [place] [address]  Phone: [phone]  Fax: [phone]  Follow Up Time:

## 2019-08-30 LAB — SURGICAL PATHOLOGY STUDY: SIGNIFICANT CHANGE UP

## 2019-09-06 ENCOUNTER — APPOINTMENT (OUTPATIENT)
Dept: CT IMAGING | Facility: CLINIC | Age: 79
End: 2019-09-06
Payer: MEDICARE

## 2019-09-06 ENCOUNTER — OUTPATIENT (OUTPATIENT)
Dept: OUTPATIENT SERVICES | Facility: HOSPITAL | Age: 79
LOS: 1 days | End: 2019-09-06
Payer: MEDICARE

## 2019-09-06 DIAGNOSIS — Z00.8 ENCOUNTER FOR OTHER GENERAL EXAMINATION: ICD-10-CM

## 2019-09-06 DIAGNOSIS — R05 COUGH: ICD-10-CM

## 2019-09-06 DIAGNOSIS — K40.90 UNILATERAL INGUINAL HERNIA, WITHOUT OBSTRUCTION OR GANGRENE, NOT SPECIFIED AS RECURRENT: Chronic | ICD-10-CM

## 2019-09-06 DIAGNOSIS — Z90.79 ACQUIRED ABSENCE OF OTHER GENITAL ORGAN(S): Chronic | ICD-10-CM

## 2019-09-06 DIAGNOSIS — Z95.5 PRESENCE OF CORONARY ANGIOPLASTY IMPLANT AND GRAFT: Chronic | ICD-10-CM

## 2019-09-06 DIAGNOSIS — Z98.890 OTHER SPECIFIED POSTPROCEDURAL STATES: Chronic | ICD-10-CM

## 2019-09-06 PROCEDURE — 71250 CT THORAX DX C-: CPT

## 2019-09-06 PROCEDURE — 71250 CT THORAX DX C-: CPT | Mod: 26

## 2019-09-27 NOTE — ED CDU PROVIDER INITIAL DAY NOTE - CAS EDP CONSULT WILL SEE PT
Patient:   MALACHI NEWBERRY            MRN: [id number]            FIN: [id number]              Age:   69 years     Sex:  MALE     :  [birth year]   Associated Diagnoses:   None   Author:   BLANCA RODRÍGUEZ     Physical Examination   VS/Measurements     Vitals between:   2019 12:06:53   TO   15-JUL-2019 12:06:53                   LAST RESULT MINIMUM MAXIMUM  Temperature 37 36.6 37.2  Heart Rate 55 55 56  Respiratory Rate 18 17 18  NISBP           105 81 133  NIDBP           42 40 62  NIMBP           59 50 78  SpO2                    100 99 100  FiO2                    0.40 0.40 1.0  , Measurements from flowsheet : Height and Weight   07/15/19 04:00 CDT CLINICALWEIGHT 96.8 kg    Weight Method Measured    Weight Scale Bed   19 02:45 CDT CLINICALWEIGHT 86.5 kg    Weight Method Measured    MEDDOSEWT 86.5 kg    CLINICALHEIGHT 177.80 cm    Height Method Stated    BMI-Medical History 27.4 kg/m2   19 00:45 CDT CLINICALHEIGHT 177.80 cm   19 00:41 CDT CLINICALWEIGHT 86.5 kg    Weight Method Measured    MEDDOSEWT 86.5 kg    CLINICALHEIGHT 177.80 cm    BMI-Medical History 27.4 kg/m2       Neuro: Sedated  Resp: Intubated  CV: RRR  GI: Soft, NT, ND  :Perales in pace      Intake and Output   I&O 24 hr   I & O between:  2019 12:07 TO 15-JUL-2019 12:07  Med Dosing Weight:  86.5  kg   2019  24 Hour Intake:   3132.09  ( 36.21 mL/kg )  24 Hour Output:   1228.00           24 Hour Urine/Stool Output:   0.0  24 Hour Balance:   1904.09           24 Hour Urine Output:   738.00  ( 0.36 mL/kg/hr )      Review / Management   Laboratory results:     Labs between:  2019 12:06 to 15-JUL-2019 12:06  CBC:                 WBC  HgB  Hct  Plt  MCV  RDW   15-JUL-2019 (H) 18.4  (L) 8.5  (L) 28.9  314  97.6  (H) 26.4   2019 (H) 25.7  (L) 8.6  (L) 27.7  281  95.5  (H) 25.6   DIFF:                 Seg  Neutroph//ABS  Lymph//ABS  Mono//ABS  EOS/ABS  15-JUL-2019 NOT APPLICABLE  84 // (H) 15.7  3 // (L)  0.5  4 // 0.8 6 // (H) 1.0  BMP:                 Na  Cl  BUN  Glu   15-JUL-2019 (H) 146  (H) 114  (H) 21  84                              K  CO2  Cr  Ca                              4.2  27  1.14  (L) 7.8   BMP:                 Na  Cl  BUN  Glu   14-JUL-2019 143  (H) 113  (H) 22  91                              K  CO2  Cr  Ca                              4.4  25  (H) 1.25  (L) 7.7   Other Chem:             Mg  Phos  Triglycerides  GGTP  DirectBili                           (H) 2.8  4.0         POC GLU:                 Latest Result  Latest Date  Minimum  Min Date  Maximum  Max Date                             89 14-JUL-2019 89 14-JUL-2019 89             COAG:                 INR  PT  PTT  Ddimer  Fibrinogen    14-JUL-2019 1.1  11.6  26                      .    Assessment & Plan   Patient is a 68 yo M with multiple co morbidities who presents as a trauma consultation after he had a fall yesterday. CT head with findings of subdural hematoma which is likely traumatic.  Dx:  1. Subdural hematoma w/ shift  2. Subarachnoid hematoma  Consults:  1. Neurosurgery  2. Neurology  3. Hospitalist  4. Cardiology  Procedures:  R Craniotomy and SDH evacuation - 07/14  Plan  Neuro:  Keep sedated for today  Keppra  Q1h neuri checks  EEG  Neurology on consult  NSx on consult  Monitor drain output  Resp:  Intubated on a rate  CTM  CV:  SBP< 130 mmHg  Cardiology on consult for extensive cardiac history  GI:  OG tube in place  Begin TFs today  :  Perales in place  Lasix 40 IV this AM  Heme:  Hb stable  s/p platelets x 2 yesterday  Met:  Replete lytes PRN  PPx:  SCDs, Lovenox  Dispo:  SICU  Seen and discussed with Dr. Emma VILLANUEVA  General Surgery PGY-2  Patient seen and examined with the residents and APNs during rounds.  Chart and studies reviewed.  Plan of care discussed in detail with the team.  Agree with the note below.  Patient's family at the bedside during rounds.  Findings and plan of care discussed in detail.   All questions answered.  35 minutes critical care time spent.   immediately

## 2019-09-29 PROCEDURE — 83550 IRON BINDING TEST: CPT

## 2019-09-29 PROCEDURE — 86850 RBC ANTIBODY SCREEN: CPT

## 2019-09-29 PROCEDURE — 85730 THROMBOPLASTIN TIME PARTIAL: CPT

## 2019-09-29 PROCEDURE — 93005 ELECTROCARDIOGRAM TRACING: CPT

## 2019-09-29 PROCEDURE — 85610 PROTHROMBIN TIME: CPT

## 2019-09-29 PROCEDURE — 85045 AUTOMATED RETICULOCYTE COUNT: CPT

## 2019-09-29 PROCEDURE — 88305 TISSUE EXAM BY PATHOLOGIST: CPT

## 2019-09-29 PROCEDURE — 84466 ASSAY OF TRANSFERRIN: CPT

## 2019-09-29 PROCEDURE — 83036 HEMOGLOBIN GLYCOSYLATED A1C: CPT

## 2019-09-29 PROCEDURE — 86900 BLOOD TYPING SEROLOGIC ABO: CPT

## 2019-09-29 PROCEDURE — 36415 COLL VENOUS BLD VENIPUNCTURE: CPT

## 2019-09-29 PROCEDURE — 83540 ASSAY OF IRON: CPT

## 2019-09-29 PROCEDURE — 85027 COMPLETE CBC AUTOMATED: CPT

## 2019-09-29 PROCEDURE — 99285 EMERGENCY DEPT VISIT HI MDM: CPT

## 2019-09-29 PROCEDURE — 86901 BLOOD TYPING SEROLOGIC RH(D): CPT

## 2019-09-29 PROCEDURE — 80048 BASIC METABOLIC PNL TOTAL CA: CPT

## 2019-09-29 PROCEDURE — 82272 OCCULT BLD FECES 1-3 TESTS: CPT

## 2019-09-29 PROCEDURE — 82728 ASSAY OF FERRITIN: CPT

## 2019-09-29 PROCEDURE — 80053 COMPREHEN METABOLIC PANEL: CPT

## 2019-09-29 PROCEDURE — 82962 GLUCOSE BLOOD TEST: CPT

## 2019-09-29 PROCEDURE — 84484 ASSAY OF TROPONIN QUANT: CPT

## 2019-09-29 PROCEDURE — 71045 X-RAY EXAM CHEST 1 VIEW: CPT

## 2020-02-16 ENCOUNTER — EMERGENCY (EMERGENCY)
Facility: HOSPITAL | Age: 80
LOS: 1 days | Discharge: HOME | End: 2020-02-16
Attending: EMERGENCY MEDICINE
Payer: MEDICARE

## 2020-02-16 VITALS
HEART RATE: 70 BPM | TEMPERATURE: 98 F | RESPIRATION RATE: 16 BRPM | SYSTOLIC BLOOD PRESSURE: 178 MMHG | HEIGHT: 68 IN | OXYGEN SATURATION: 99 % | WEIGHT: 149.91 LBS | DIASTOLIC BLOOD PRESSURE: 85 MMHG

## 2020-02-16 VITALS
DIASTOLIC BLOOD PRESSURE: 74 MMHG | HEART RATE: 57 BPM | RESPIRATION RATE: 18 BRPM | OXYGEN SATURATION: 100 % | SYSTOLIC BLOOD PRESSURE: 158 MMHG

## 2020-02-16 DIAGNOSIS — Z98.890 OTHER SPECIFIED POSTPROCEDURAL STATES: Chronic | ICD-10-CM

## 2020-02-16 DIAGNOSIS — Z90.79 ACQUIRED ABSENCE OF OTHER GENITAL ORGAN(S): Chronic | ICD-10-CM

## 2020-02-16 DIAGNOSIS — K40.90 UNILATERAL INGUINAL HERNIA, WITHOUT OBSTRUCTION OR GANGRENE, NOT SPECIFIED AS RECURRENT: Chronic | ICD-10-CM

## 2020-02-16 DIAGNOSIS — Z95.5 PRESENCE OF CORONARY ANGIOPLASTY IMPLANT AND GRAFT: Chronic | ICD-10-CM

## 2020-02-16 LAB
ALBUMIN SERPL ELPH-MCNC: 3.8 G/DL — SIGNIFICANT CHANGE UP (ref 3.3–5.2)
ALP SERPL-CCNC: 118 U/L — SIGNIFICANT CHANGE UP (ref 40–120)
ALT FLD-CCNC: 15 U/L — SIGNIFICANT CHANGE UP
ANION GAP SERPL CALC-SCNC: 12 MMOL/L — SIGNIFICANT CHANGE UP (ref 5–17)
AST SERPL-CCNC: 22 U/L — SIGNIFICANT CHANGE UP
BILIRUB SERPL-MCNC: 0.4 MG/DL — SIGNIFICANT CHANGE UP (ref 0.4–2)
BUN SERPL-MCNC: 24 MG/DL — HIGH (ref 8–20)
CALCIUM SERPL-MCNC: 9 MG/DL — SIGNIFICANT CHANGE UP (ref 8.6–10.2)
CHLORIDE SERPL-SCNC: 99 MMOL/L — SIGNIFICANT CHANGE UP (ref 98–107)
CO2 SERPL-SCNC: 23 MMOL/L — SIGNIFICANT CHANGE UP (ref 22–29)
CREAT SERPL-MCNC: 1.47 MG/DL — HIGH (ref 0.5–1.3)
D DIMER BLD IA.RAPID-MCNC: 180 NG/ML DDU — SIGNIFICANT CHANGE UP
GLUCOSE SERPL-MCNC: 124 MG/DL — HIGH (ref 70–99)
HCT VFR BLD CALC: 33.8 % — LOW (ref 39–50)
HGB BLD-MCNC: 10.9 G/DL — LOW (ref 13–17)
MAGNESIUM SERPL-MCNC: 2 MG/DL — SIGNIFICANT CHANGE UP (ref 1.6–2.6)
MCHC RBC-ENTMCNC: 23.4 PG — LOW (ref 27–34)
MCHC RBC-ENTMCNC: 32.2 GM/DL — SIGNIFICANT CHANGE UP (ref 32–36)
MCV RBC AUTO: 72.7 FL — LOW (ref 80–100)
NT-PROBNP SERPL-SCNC: 184 PG/ML — SIGNIFICANT CHANGE UP (ref 0–300)
PLATELET # BLD AUTO: 280 K/UL — SIGNIFICANT CHANGE UP (ref 150–400)
POTASSIUM SERPL-MCNC: 4.8 MMOL/L — SIGNIFICANT CHANGE UP (ref 3.5–5.3)
POTASSIUM SERPL-SCNC: 4.8 MMOL/L — SIGNIFICANT CHANGE UP (ref 3.5–5.3)
PROT SERPL-MCNC: 6.9 G/DL — SIGNIFICANT CHANGE UP (ref 6.6–8.7)
RBC # BLD: 4.65 M/UL — SIGNIFICANT CHANGE UP (ref 4.2–5.8)
RBC # FLD: 17.2 % — HIGH (ref 10.3–14.5)
SODIUM SERPL-SCNC: 134 MMOL/L — LOW (ref 135–145)
TROPONIN T SERPL-MCNC: <0.01 NG/ML — SIGNIFICANT CHANGE UP (ref 0–0.06)
WBC # BLD: 7.76 K/UL — SIGNIFICANT CHANGE UP (ref 3.8–10.5)
WBC # FLD AUTO: 7.76 K/UL — SIGNIFICANT CHANGE UP (ref 3.8–10.5)

## 2020-02-16 PROCEDURE — 85379 FIBRIN DEGRADATION QUANT: CPT

## 2020-02-16 PROCEDURE — 83735 ASSAY OF MAGNESIUM: CPT

## 2020-02-16 PROCEDURE — 71045 X-RAY EXAM CHEST 1 VIEW: CPT

## 2020-02-16 PROCEDURE — 85027 COMPLETE CBC AUTOMATED: CPT

## 2020-02-16 PROCEDURE — 99285 EMERGENCY DEPT VISIT HI MDM: CPT

## 2020-02-16 PROCEDURE — 84484 ASSAY OF TROPONIN QUANT: CPT

## 2020-02-16 PROCEDURE — 70450 CT HEAD/BRAIN W/O DYE: CPT | Mod: 26

## 2020-02-16 PROCEDURE — 99284 EMERGENCY DEPT VISIT MOD MDM: CPT | Mod: 25

## 2020-02-16 PROCEDURE — 83880 ASSAY OF NATRIURETIC PEPTIDE: CPT

## 2020-02-16 PROCEDURE — 71045 X-RAY EXAM CHEST 1 VIEW: CPT | Mod: 26

## 2020-02-16 PROCEDURE — 36415 COLL VENOUS BLD VENIPUNCTURE: CPT

## 2020-02-16 PROCEDURE — 70450 CT HEAD/BRAIN W/O DYE: CPT

## 2020-02-16 PROCEDURE — 93005 ELECTROCARDIOGRAM TRACING: CPT

## 2020-02-16 PROCEDURE — 93010 ELECTROCARDIOGRAM REPORT: CPT

## 2020-02-16 PROCEDURE — 80053 COMPREHEN METABOLIC PANEL: CPT

## 2020-02-16 RX ORDER — SODIUM CHLORIDE 9 MG/ML
500 INJECTION INTRAMUSCULAR; INTRAVENOUS; SUBCUTANEOUS ONCE
Refills: 0 | Status: COMPLETED | OUTPATIENT
Start: 2020-02-16 | End: 2020-02-16

## 2020-02-16 RX ORDER — HYDRALAZINE HCL 50 MG
50 TABLET ORAL ONCE
Refills: 0 | Status: COMPLETED | OUTPATIENT
Start: 2020-02-16 | End: 2020-02-16

## 2020-02-16 RX ORDER — SODIUM CHLORIDE 9 MG/ML
3 INJECTION INTRAMUSCULAR; INTRAVENOUS; SUBCUTANEOUS ONCE
Refills: 0 | Status: COMPLETED | OUTPATIENT
Start: 2020-02-16 | End: 2020-02-16

## 2020-02-16 RX ADMIN — SODIUM CHLORIDE 500 MILLILITER(S): 9 INJECTION INTRAMUSCULAR; INTRAVENOUS; SUBCUTANEOUS at 17:16

## 2020-02-16 RX ADMIN — Medication 50 MILLIGRAM(S): at 15:50

## 2020-02-16 RX ADMIN — SODIUM CHLORIDE 3 MILLILITER(S): 9 INJECTION INTRAMUSCULAR; INTRAVENOUS; SUBCUTANEOUS at 15:14

## 2020-02-16 NOTE — ED ADULT NURSE NOTE - OBJECTIVE STATEMENT
Via daughter as , assumed pt care at 1417.  Pt has had cough X 8 months with negative workup.  Pt coughing at time of assessment.  Clear bilateral lung sounds with no shortness of breath.  Pt c/o dizziness, hypertension and palpitations and and episode of blurry vision at 1000 today. Currently has no vision complaints.  /94.  took blood pressure medications today.  No extremity edema present.

## 2020-02-16 NOTE — ED PROVIDER NOTE - OBJECTIVE STATEMENT
80 y/o M pt with significant PMHx of MI, Prostate CA, DM, HTN, HLD, Asthma and Sickle Cell carrier presents to the ED c/o dizziness with associated blurry vision. Pt has also been experiencing a productive cough with clear sputum for 3 weeks. Today when he took his bp it was 191/100. Denies fever, chills, CP, SOB or back pain. Currently on Hydralazine 50 mg TID (only took one dose today), Metoprolol, Tradjenta, and Enalapril. No further complaints at this time.

## 2020-02-16 NOTE — ED PROVIDER NOTE - CLINICAL SUMMARY MEDICAL DECISION MAKING FREE TEXT BOX
Pt with dizziness and HTN likely HTN as cause. Will check labs, CXR and re-asses. Pt with dizziness and HTN likely HTN as cause. Will check labs, CXR and re-asses.  pt not taking bp meds at same time every day  likely caused bp to elevate and cause dizziness. also bun slight increase compared to last test. Possible medical side effect or dehydration. Received iv fluids and food and his hydralazine which was due. Pt improved clinically and states he feels much better. Pt to fu with his pcp and bring lab results. Daughter verbalized understanding. He will also take meds at about same time on daily basis.

## 2020-02-16 NOTE — ED PROVIDER NOTE - PATIENT PORTAL LINK FT
You can access the FollowMyHealth Patient Portal offered by [facility] by registering at the following website: [email or web address]. By joining [employer]’s FollowMyHealth portal, you will also be able to view your health information using other applications (apps) compatible with our system.

## 2020-02-16 NOTE — ED PROVIDER NOTE - CROS ED NEURO POS
----- Message from Beltran Villanueva MD sent at 5/6/2019  9:46 AM EDT -----  Pls inform patient CT head did not show any acute process    ----- Message -----  From: Josesito Vivar Results In  Sent: 5/3/2019   1:49 PM  To: Beltran Villanueva MD dizziness

## 2020-02-16 NOTE — ED PROVIDER NOTE - NSFOLLOWUPINSTRUCTIONS_ED_ALL_ED_FT
See your doctor and bring your lab results because I want him/her to determine if your medications are causing an elevation of your "BUN" in your blood. It is related to kidney function.  Take your medication at the same time every day. This will help regulate your blood pressure so it does not go high and cause you discomfort like dizziness or headache.  Return for any emergent problems

## 2020-05-12 ENCOUNTER — INPATIENT (INPATIENT)
Facility: HOSPITAL | Age: 80
LOS: 2 days | Discharge: HOME | DRG: 261 | End: 2020-05-15
Attending: HOSPITALIST | Admitting: HOSPITALIST
Payer: MEDICARE

## 2020-05-12 VITALS
DIASTOLIC BLOOD PRESSURE: 81 MMHG | TEMPERATURE: 98 F | OXYGEN SATURATION: 100 % | RESPIRATION RATE: 20 BRPM | HEART RATE: 117 BPM | WEIGHT: 164.91 LBS | HEIGHT: 64 IN | SYSTOLIC BLOOD PRESSURE: 182 MMHG

## 2020-05-12 DIAGNOSIS — Z98.890 OTHER SPECIFIED POSTPROCEDURAL STATES: Chronic | ICD-10-CM

## 2020-05-12 DIAGNOSIS — Z95.5 PRESENCE OF CORONARY ANGIOPLASTY IMPLANT AND GRAFT: Chronic | ICD-10-CM

## 2020-05-12 DIAGNOSIS — Z90.79 ACQUIRED ABSENCE OF OTHER GENITAL ORGAN(S): Chronic | ICD-10-CM

## 2020-05-12 DIAGNOSIS — K40.90 UNILATERAL INGUINAL HERNIA, WITHOUT OBSTRUCTION OR GANGRENE, NOT SPECIFIED AS RECURRENT: Chronic | ICD-10-CM

## 2020-05-12 LAB
ALBUMIN SERPL ELPH-MCNC: 4 G/DL — SIGNIFICANT CHANGE UP (ref 3.3–5.2)
ALP SERPL-CCNC: 118 U/L — SIGNIFICANT CHANGE UP (ref 40–120)
ALT FLD-CCNC: 14 U/L — SIGNIFICANT CHANGE UP
ANION GAP SERPL CALC-SCNC: 15 MMOL/L — SIGNIFICANT CHANGE UP (ref 5–17)
APPEARANCE UR: CLEAR — SIGNIFICANT CHANGE UP
AST SERPL-CCNC: 24 U/L — SIGNIFICANT CHANGE UP
BACTERIA # UR AUTO: ABNORMAL
BILIRUB SERPL-MCNC: 0.4 MG/DL — SIGNIFICANT CHANGE UP (ref 0.4–2)
BILIRUB UR-MCNC: NEGATIVE — SIGNIFICANT CHANGE UP
BUN SERPL-MCNC: 29 MG/DL — HIGH (ref 8–20)
CALCIUM SERPL-MCNC: 9.3 MG/DL — SIGNIFICANT CHANGE UP (ref 8.6–10.2)
CHLORIDE SERPL-SCNC: 98 MMOL/L — SIGNIFICANT CHANGE UP (ref 98–107)
CO2 SERPL-SCNC: 20 MMOL/L — LOW (ref 22–29)
COLOR SPEC: YELLOW — SIGNIFICANT CHANGE UP
CREAT SERPL-MCNC: 1.85 MG/DL — HIGH (ref 0.5–1.3)
D DIMER BLD IA.RAPID-MCNC: 178 NG/ML DDU — SIGNIFICANT CHANGE UP
DIFF PNL FLD: NEGATIVE — SIGNIFICANT CHANGE UP
EPI CELLS # UR: SIGNIFICANT CHANGE UP
GLUCOSE BLDC GLUCOMTR-MCNC: 123 MG/DL — HIGH (ref 70–99)
GLUCOSE BLDC GLUCOMTR-MCNC: 131 MG/DL — HIGH (ref 70–99)
GLUCOSE BLDC GLUCOMTR-MCNC: 146 MG/DL — HIGH (ref 70–99)
GLUCOSE SERPL-MCNC: 196 MG/DL — HIGH (ref 70–99)
GLUCOSE UR QL: NEGATIVE MG/DL — SIGNIFICANT CHANGE UP
HCT VFR BLD CALC: 32.4 % — LOW (ref 39–50)
HGB BLD-MCNC: 10.7 G/DL — LOW (ref 13–17)
KETONES UR-MCNC: NEGATIVE — SIGNIFICANT CHANGE UP
LEUKOCYTE ESTERASE UR-ACNC: NEGATIVE — SIGNIFICANT CHANGE UP
LIDOCAIN IGE QN: 53 U/L — HIGH (ref 22–51)
MCHC RBC-ENTMCNC: 23.3 PG — LOW (ref 27–34)
MCHC RBC-ENTMCNC: 33 GM/DL — SIGNIFICANT CHANGE UP (ref 32–36)
MCV RBC AUTO: 70.6 FL — LOW (ref 80–100)
NITRITE UR-MCNC: NEGATIVE — SIGNIFICANT CHANGE UP
NT-PROBNP SERPL-SCNC: 249 PG/ML — SIGNIFICANT CHANGE UP (ref 0–300)
PH UR: 6.5 — SIGNIFICANT CHANGE UP (ref 5–8)
PLATELET # BLD AUTO: 271 K/UL — SIGNIFICANT CHANGE UP (ref 150–400)
POTASSIUM SERPL-MCNC: 5.2 MMOL/L — SIGNIFICANT CHANGE UP (ref 3.5–5.3)
POTASSIUM SERPL-SCNC: 5.2 MMOL/L — SIGNIFICANT CHANGE UP (ref 3.5–5.3)
PROT SERPL-MCNC: 7.4 G/DL — SIGNIFICANT CHANGE UP (ref 6.6–8.7)
PROT UR-MCNC: 100 MG/DL
RBC # BLD: 4.59 M/UL — SIGNIFICANT CHANGE UP (ref 4.2–5.8)
RBC # FLD: 17.5 % — HIGH (ref 10.3–14.5)
RBC CASTS # UR COMP ASSIST: NEGATIVE /HPF — SIGNIFICANT CHANGE UP (ref 0–4)
SARS-COV-2 RNA SPEC QL NAA+PROBE: SIGNIFICANT CHANGE UP
SODIUM SERPL-SCNC: 133 MMOL/L — LOW (ref 135–145)
SP GR SPEC: 1.01 — SIGNIFICANT CHANGE UP (ref 1.01–1.02)
TROPONIN T SERPL-MCNC: <0.01 NG/ML — SIGNIFICANT CHANGE UP (ref 0–0.06)
UROBILINOGEN FLD QL: NEGATIVE MG/DL — SIGNIFICANT CHANGE UP
WBC # BLD: 7.03 K/UL — SIGNIFICANT CHANGE UP (ref 3.8–10.5)
WBC # FLD AUTO: 7.03 K/UL — SIGNIFICANT CHANGE UP (ref 3.8–10.5)
WBC UR QL: SIGNIFICANT CHANGE UP

## 2020-05-12 PROCEDURE — 70450 CT HEAD/BRAIN W/O DYE: CPT | Mod: 26

## 2020-05-12 PROCEDURE — 70551 MRI BRAIN STEM W/O DYE: CPT | Mod: 26

## 2020-05-12 PROCEDURE — 71045 X-RAY EXAM CHEST 1 VIEW: CPT | Mod: 26

## 2020-05-12 PROCEDURE — 99220: CPT

## 2020-05-12 RX ORDER — INSULIN LISPRO 100/ML
VIAL (ML) SUBCUTANEOUS
Refills: 0 | Status: DISCONTINUED | OUTPATIENT
Start: 2020-05-12 | End: 2020-05-15

## 2020-05-12 RX ORDER — GLUCAGON INJECTION, SOLUTION 0.5 MG/.1ML
1 INJECTION, SOLUTION SUBCUTANEOUS ONCE
Refills: 0 | Status: DISCONTINUED | OUTPATIENT
Start: 2020-05-12 | End: 2020-05-15

## 2020-05-12 RX ORDER — PANTOPRAZOLE SODIUM 20 MG/1
40 TABLET, DELAYED RELEASE ORAL
Refills: 0 | Status: DISCONTINUED | OUTPATIENT
Start: 2020-05-12 | End: 2020-05-15

## 2020-05-12 RX ORDER — SODIUM CHLORIDE 9 MG/ML
1000 INJECTION INTRAMUSCULAR; INTRAVENOUS; SUBCUTANEOUS
Refills: 0 | Status: DISCONTINUED | OUTPATIENT
Start: 2020-05-12 | End: 2020-05-14

## 2020-05-12 RX ORDER — MONTELUKAST 4 MG/1
10 TABLET, CHEWABLE ORAL DAILY
Refills: 0 | Status: DISCONTINUED | OUTPATIENT
Start: 2020-05-12 | End: 2020-05-15

## 2020-05-12 RX ORDER — DEXTROSE 50 % IN WATER 50 %
12.5 SYRINGE (ML) INTRAVENOUS ONCE
Refills: 0 | Status: DISCONTINUED | OUTPATIENT
Start: 2020-05-12 | End: 2020-05-15

## 2020-05-12 RX ORDER — DEXTROSE 50 % IN WATER 50 %
15 SYRINGE (ML) INTRAVENOUS ONCE
Refills: 0 | Status: DISCONTINUED | OUTPATIENT
Start: 2020-05-12 | End: 2020-05-15

## 2020-05-12 RX ORDER — DEXTROSE 50 % IN WATER 50 %
25 SYRINGE (ML) INTRAVENOUS ONCE
Refills: 0 | Status: DISCONTINUED | OUTPATIENT
Start: 2020-05-12 | End: 2020-05-15

## 2020-05-12 RX ORDER — INSULIN LISPRO 100/ML
VIAL (ML) SUBCUTANEOUS AT BEDTIME
Refills: 0 | Status: DISCONTINUED | OUTPATIENT
Start: 2020-05-12 | End: 2020-05-15

## 2020-05-12 RX ORDER — ATORVASTATIN CALCIUM 80 MG/1
40 TABLET, FILM COATED ORAL AT BEDTIME
Refills: 0 | Status: DISCONTINUED | OUTPATIENT
Start: 2020-05-12 | End: 2020-05-15

## 2020-05-12 RX ORDER — LOSARTAN POTASSIUM 100 MG/1
50 TABLET, FILM COATED ORAL ONCE
Refills: 0 | Status: COMPLETED | OUTPATIENT
Start: 2020-05-12 | End: 2020-05-12

## 2020-05-12 RX ORDER — METOPROLOL TARTRATE 50 MG
50 TABLET ORAL
Refills: 0 | Status: DISCONTINUED | OUTPATIENT
Start: 2020-05-12 | End: 2020-05-13

## 2020-05-12 RX ORDER — METOPROLOL TARTRATE 50 MG
50 TABLET ORAL ONCE
Refills: 0 | Status: COMPLETED | OUTPATIENT
Start: 2020-05-12 | End: 2020-05-12

## 2020-05-12 RX ORDER — SODIUM CHLORIDE 9 MG/ML
1000 INJECTION INTRAMUSCULAR; INTRAVENOUS; SUBCUTANEOUS ONCE
Refills: 0 | Status: COMPLETED | OUTPATIENT
Start: 2020-05-12 | End: 2020-05-12

## 2020-05-12 RX ORDER — CLOPIDOGREL BISULFATE 75 MG/1
75 TABLET, FILM COATED ORAL DAILY
Refills: 0 | Status: DISCONTINUED | OUTPATIENT
Start: 2020-05-12 | End: 2020-05-15

## 2020-05-12 RX ORDER — SODIUM CHLORIDE 9 MG/ML
1000 INJECTION, SOLUTION INTRAVENOUS
Refills: 0 | Status: DISCONTINUED | OUTPATIENT
Start: 2020-05-12 | End: 2020-05-15

## 2020-05-12 RX ADMIN — MONTELUKAST 10 MILLIGRAM(S): 4 TABLET, CHEWABLE ORAL at 13:15

## 2020-05-12 RX ADMIN — ATORVASTATIN CALCIUM 40 MILLIGRAM(S): 80 TABLET, FILM COATED ORAL at 20:49

## 2020-05-12 RX ADMIN — LOSARTAN POTASSIUM 50 MILLIGRAM(S): 100 TABLET, FILM COATED ORAL at 08:31

## 2020-05-12 RX ADMIN — CLOPIDOGREL BISULFATE 75 MILLIGRAM(S): 75 TABLET, FILM COATED ORAL at 13:15

## 2020-05-12 RX ADMIN — SODIUM CHLORIDE 75 MILLILITER(S): 9 INJECTION INTRAMUSCULAR; INTRAVENOUS; SUBCUTANEOUS at 13:15

## 2020-05-12 RX ADMIN — Medication 50 MILLIGRAM(S): at 08:31

## 2020-05-12 RX ADMIN — SODIUM CHLORIDE 1000 MILLILITER(S): 9 INJECTION INTRAMUSCULAR; INTRAVENOUS; SUBCUTANEOUS at 09:23

## 2020-05-12 RX ADMIN — Medication 50 MILLIGRAM(S): at 17:11

## 2020-05-12 NOTE — ED PROVIDER NOTE - NS ED ROS FT
CONSTITUTIONAL: No fevers, no chills  Eyes: No vision changes  Cardiovascular: No Chest pain  Respiratory: No SOB  Gastrointestinal: No n/v/d, no abd pain  Genitourinary: no dysuria, no hematuria  SKIN: no rashes.  MSK: no weakness, myalgias, arthralgias  NEURO: no headache, no weakness or numbness  PSYCHIATRIC: no known mental health issues.

## 2020-05-12 NOTE — ED ADULT NURSE REASSESSMENT NOTE - NSFALLRSKUNASSIST_ED_ALL_ED
Caller is patient.  Patient called to let Dr Soler's nurse know that she was accepted into the assistance program for Eliquis.    If you need to speak with patient, her phone number is [phone].  
Dr Joe NORMAN  
Ok noted  
Patient is asking to speak with Mansi regarding appeals for medication. She states that she given new information late last night.     Please call her back at [phone]  
Patient left message.  Called patient back.    Patient would like Mansi to call her before day is over.      She has a sheet she needs filled out by a physician that she is faxing over.    Call patient at [phone]  
Pt stated she heard back from the insurance company last night and the medication was denied because it is a brand name. There is no for this medication generic medication.  Pt is going to try calling the company that makes the medication to see if there is anything they can assist with.  No official denial has been received by our office at this time. Writer will call [employer] this afternoon for an update.    
[employer] pt assistance foundation application completed by writer and placed on Dr. Soler's desk for signature.  
Writer called patient to update that form was completed. Will mail to pt per her request.  
Writer placed call to pt who wanted to update writer on application mentioned below.   
no

## 2020-05-12 NOTE — ED ADULT NURSE REASSESSMENT NOTE - INTERVENTIONS DEFINITIONS
Monitor gait and stability/[place] to call system/Call bell, personal items and telephone within reach
Physically safe environment: no spills, clutter or unnecessary equipment/Monitor gait and stability/[place] to call system/Non-slip footwear when patient is off stretcher
Instruct patient to call for assistance/Non-slip footwear when patient is off stretcher/Physically safe environment: no spills, clutter or unnecessary equipment/Stretcher in lowest position, wheels locked, appropriate side rails in place/[place] to call system/Call bell, personal items and telephone within reach

## 2020-05-12 NOTE — ED ADULT NURSE REASSESSMENT NOTE - NS ED NURSE REASSESS COMMENT FT1
Pt received at approx 1920. Daughter at bedside for translation purposes as well as patient understands fluently [place]. A&OX4. Pt denies visual changes at this time. No acute distress noted. Pt denies chest pain,sob,dizziness and headache. Reports intermittent palpitations at this time. NSR on the monitor. Alarms reviewed with monitor tech. Refer to VS flowsheet. Patent 20 LAC. Call bell within reach. Will continue to monitor. Safety maintained.

## 2020-05-12 NOTE — ED PROVIDER NOTE - ATTENDING CONTRIBUTION TO CARE
I, Abram Singleton, personally saw the patient with the resident, and completed the key components of the history and physical exam. I then discussed the management plan with the resident.    80 yo M hx of CAD s/p stent x 1, HTN, HDL p/w palpitation worse with exertion. patient also report dizziness and an episode of vision loss that resolved. patient report the last time he felt this way he had a catherization and placed a stent. No chest pain or sob at the moment. no fever. no cough. former smoker. EKG without ischemic changes. Trop negative. D-dimer 178. probnop 249. Worsening KENTRELL, creatinine 1.85. CT head negative. CXR clear. patient placed in Obs for serial trop and cardiology ([facility]) consult.

## 2020-05-12 NOTE — ED PROVIDER NOTE - OBJECTIVE STATEMENT
Pt is an 80 y/o M w/PMHx MI w/stent (plavix), HTN, HLD, T2DM presents c/o heart palpitations.  Pt states that this morning he woke up around 3 am and was walking to the bathroom when he started to feel his heart pound and dizzy.  He also noted a brief loss of vision and had to sit down.  The vision returned, and the dizziness has mostly resolved.  He states that he still feels his heart symptoms, but denies chest pain.  He called his daughter and was brought to the ED for further evaluation.  Denies fever, cough, chest pain, shortness of breath, abdominal pain, endorses leg swelling.

## 2020-05-12 NOTE — ED ADULT NURSE REASSESSMENT NOTE - NSIMPLEMENTINTERV_GEN_ALL_ED
Specific interventions were implemented:

## 2020-05-12 NOTE — ED ADULT NURSE NOTE - OBJECTIVE STATEMENT
Pt arrived to ED complaining of palpitations with walking, dizziness and temporary loss of vision at 0300 today.  Palpitations resolved at this time normal sinus on cardiac monitoring.  At this time no complaints of pain, SOB, respirations unlabored.

## 2020-05-12 NOTE — ED ADULT NURSE REASSESSMENT NOTE - NS ED NURSE REASSESS COMMENT FT1
Pt was taken to MRI after Echo.  Pt will go to CDU after test.  Report given to Linda Fabian RN in CDU.

## 2020-05-12 NOTE — ED CDU PROVIDER INITIAL DAY NOTE - DETAILS
79m presented to ED with episode of dizziness, transient vision loss, and palpitations. All symptoms resolved upon arrival to ED. Pt found to have elevated renal fx ( Creat 1.85 ). Pt evaluated by Dr. Perera who requested TTE, serial trops, hold ACE / diuretic, IVF, and monitor in CDU for 24 hrs. Will check MRI to assess for posterior stroke.

## 2020-05-12 NOTE — CONSULT NOTE ADULT - SUBJECTIVE AND OBJECTIVE BOX
[facility], [facility]                                   [address] [place] [address]                                                      PHONE: [phone]                                                         FAX: [phone]  [email or web address]  ---------------------------------------------------------------------------------------------------------------------------------    Reason for Consult: Dizziness palpations     HPI:  GUIDO MATUTE is an 79y Male past medical history significant for HTN, HLD, CAD s/p PCI LAD/LCx 2018 normal EF, DM, prostate CA former smoker who presented to [facility] ED with palpitations and dizziness.  Patient states that he woke ~ 3 am and was walking to the bathroom when he started feeing heart pounding with dizziness with near syncope without LOC chest pain orthopnea or PND.        PAST MEDICAL & SURGICAL HISTORY:  MI (myocardial infarction)  Sickle cell anemia: Carrier of the disease, presently doesn&#x27;t have it  Asthma, mild intermittent, uncomplicated  Other hemorrhoids  Cancer: protate  Diabetes mellitus  Hyperlipidemia  Hypertension  H/O colonoscopy: Normal as per patient 3 years ago.  S/P primary angioplasty with coronary stent  S/P prostatectomy  Inguinal hernia      aspirin (Stomach Upset)  EGGPLANT (Hives)  No Known Drug Allergies  strawberry (Hives)      MEDICATIONS  (STANDING):    MEDICATIONS  (PRN):      Social History:  Cigarettes:           none          Alchohol:      none           Illicit Drug Abuse:  none    FH negative for CAD     ROS: Negative other than as mentioned in HPI.    Vital Signs Last 24 Hrs  T(C): 36.3 (12 May 2020 08:32), Max: 36.6 (12 May 2020 06:56)  T(F): 97.3 (12 May 2020 08:32), Max: 97.8 (12 May 2020 06:56)  HR: 69 (12 May 2020 09:24) (69 - 117)  BP: 133/63 (12 May 2020 09:24) (133/63 - 182/81)  BP(mean): --  RR: 20 (12 May 2020 08:32) (20 - 20)  SpO2: 100% (12 May 2020 08:32) (100% - 100%)  ICU Vital Signs Last 24 Hrs  GUIDO MATUTE  I&O's Detail    I&O's Summary    Drug Dosing Weight  GUIDO MATUTE      PHYSICAL EXAM:  General: Appears well developed, well nourished alert and cooperative.  HEENT: Head; normocephalic, atraumatic.  Eyes: Pupils reactive, cornea wnl.  Neck: Supple, no nodes adenopathy, no NVD or carotid bruit or thyromegaly.  CARDIOVASCULAR: Normal S1 and S2, No murmur, rub, gallop or lift.   LUNGS: No rales, rhonchi or wheeze. Normal breath sounds bilaterally.  ABDOMEN: Soft, nontender without mass or organomegaly. bowel sounds normoactive.  EXTREMITIES: No clubbing, cyanosis or edema. Distal pulses wnl.   SKIN: warm and dry with normal turgor.  NEURO: Alert/oriented x 3/normal motor exam. No pathologic reflexes.    PSYCH: normal affect.        LABS:                        10.7   7.03  )-----------( 271      ( 12 May 2020 08:43 )             32.4     05-12    133<L>  |  98  |  29.0<H>  ----------------------------<  196<H>  5.2   |  20.0<L>  |  1.85<H>    Ca    9.3      12 May 2020 08:43  Mg     2.1     05-12    TPro  7.4  /  Alb  4.0  /  TBili  0.4  /  DBili  x   /  AST  24  /  ALT  14  /  AlkPhos  118  05-12    GUIDO MATUTE  CARDIAC MARKERS ( 12 May 2020 08:43 )  x     / <0.01 ng/mL / x     / x     / x            Urinalysis Basic - ( 12 May 2020 08:52 )    Color: Yellow / Appearance: Clear / S.010 / pH: x  Gluc: x / Ketone: Negative  / Bili: Negative / Urobili: Negative mg/dL   Blood: x / Protein: 100 mg/dL / Nitrite: Negative   Leuk Esterase: Negative / RBC: Negative /HPF / WBC 0-2   Sq Epi: x / Non Sq Epi: Occasional / Bacteria: Occasional        RADIOLOGY & ADDITIONAL STUDIES:    INTERPRETATION OF TELEMETRY (personally reviewed):    ECG: Sinus tachycardia     ECHO:    Summary:   1. Normal biventricular systolic function. Left ventricular ejection   fraction, by visual estimation, is 60 to 65%.   2. Spectral Doppler shows impaired relaxation pattern of left   ventricular myocardial filling (Grade I diastolic dysfunction).   3. No significant valvular abnormalities.   4. No pericardial effusion.   5. ** No prior echocardiograms available for comparison.    [id number] Radha Russell , Electronically signed on 2018 at   11:29:04 AM      CARDIAC CATHETERIZATION:  VENTRICLES: No LV gram ( Cr 1.2 ).  CORONARY VESSELS: R dominant.  Large RCA w/ moderate diffuse disease.  Moderate sized LCx w/ calcified 70% OM1 stenosis w/ large distal vessel.  OM2 w/ moderate disease.  Large LAD w/ patent mid vessel stent.  CX:   --  OM1: There was a 70 % stenosis.  COMPLICATIONS: No complications occurred during the cath lab visit.  SUMMARY:  Summary: Addendum 2018: Case reconfirmed to generate Attachment A  report  DIAGNOSTIC IMPRESSIONS: ICS x 1 to OM1 w/ 0% residual stenosis and THOR III  flow maintained thruout.  Patent LAD stent.  DIAGNOSTIC RECOMMENDATIONS: Asa/brilinta.  PET/followup 4 weeks.  INTERVENTIONAL IMPRESSIONS: ICS x 1 to OM1 w/ 0% residual stenosis and THOR  III flow maintained thruout.  Patent LAD stent.  INTERVENTIONAL RECOMMENDATIONS: Asa/brilinta.  PET/followup 4 weeks.  Prepared and signed by  Adán Machado MD  Signed 201813:43:26    Assessment and Plan:  In summary, GUIDO MATUTE is an 79y Male with past medical history significant for HTN, HLD, CAD s/p PCI LAD/LCx 2018 normal EF, DM, prostate CA former smoker who presented to [facility] ED with palpitations and dizziness.  Patient states that he woke ~ 3 am and was walking to the bathroom when he started feeing heart pounding with dizziness with near syncope without LOC chest pain orthopnea or PND.  EKG Sinus tachycardia without ischemic changes.  No evidence of heart failure.  KENTRELL.      Plan  1.  HOLD ACE-I and HCTZ  2.  Agree with IVF  3.  Monitor in Observation unit   4.  Check TTE to re-evaluate LV EF   5.  Serial trop to rule out AMI unlikely  6. Check orthostatics     Further cardiovascular recommendations pending above findings

## 2020-05-12 NOTE — ED CDU PROVIDER INITIAL DAY NOTE - FAMILY HISTORY
Family history of prostate cancer     Family history of essential hypertension     Family history of heart disease, father     Father  Still living? No  Family history of MI (myocardial infarction), Age at diagnosis: Age Unknown

## 2020-05-12 NOTE — ED CDU PROVIDER INITIAL DAY NOTE - ATTENDING CONTRIBUTION TO CARE
Patient seen with PA.  Will follow cardio recommendations.  VSS.  d/w daughter.  Uneventful ED observation period. Non toxic.  Well appearing.

## 2020-05-12 NOTE — ED PROVIDER NOTE - CLINICAL SUMMARY MEDICAL DECISION MAKING FREE TEXT BOX
Pt is an 80 y/o M presents c/o palpitations, will get labs, cxr, ekg, dose home BP meds, consult cardiology, reassess

## 2020-05-12 NOTE — ED CDU PROVIDER INITIAL DAY NOTE - PROGRESS NOTE DETAILS
Daughter at bedside to translate. Patient without complaints. 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 Continue gentle hydration overnight, repeat AM BMP. Pending cardiology reassessment. Received call from RN patient BP mildly elevated. Patient asymptomatic, received daily medication 3 hours ago. Will repeat.

## 2020-05-12 NOTE — ED ADULT NURSE REASSESSMENT NOTE - NS ED NURSE REASSESS COMMENT FT1
RN received report from, ED RN. Pt had so signs or c/o pain or distress.  Pt A/Ox4. Patient oriented to unit. Safety checks and IV assessments performed. Will continue to monitor. Pt placed on monitor- SR.

## 2020-05-12 NOTE — ED ADULT TRIAGE NOTE - CHIEF COMPLAINT QUOTE
patient brought in by daughter states that he has been having palpitation dizziness and feeling weak started around 2 am

## 2020-05-12 NOTE — ED CDU PROVIDER INITIAL DAY NOTE - OBJECTIVE STATEMENT
80 y/o M w/PMHx MI w/stent (plavix), HTN, HLD, T2DM presents c/o heart palpitations.  Pt states that this morning he woke up around 3 am and was walking to the bathroom when he started to feel his heart pound and dizzy.  He also noted a brief loss of vision and had to sit down.  The vision returned, and the dizziness has mostly resolved.  He states that he still feels his heart symptoms, but denies chest pain.  He called his daughter and was brought to the ED for further evaluation.  Denies fever, cough, chest pain, shortness of breath, abdominal pain, endorses leg swelling.

## 2020-05-13 DIAGNOSIS — R00.2 PALPITATIONS: ICD-10-CM

## 2020-05-13 LAB
ANION GAP SERPL CALC-SCNC: 13 MMOL/L — SIGNIFICANT CHANGE UP (ref 5–17)
BUN SERPL-MCNC: 25 MG/DL — HIGH (ref 8–20)
CALCIUM SERPL-MCNC: 8.9 MG/DL — SIGNIFICANT CHANGE UP (ref 8.6–10.2)
CHLORIDE SERPL-SCNC: 105 MMOL/L — SIGNIFICANT CHANGE UP (ref 98–107)
CO2 SERPL-SCNC: 19 MMOL/L — LOW (ref 22–29)
CREAT SERPL-MCNC: 1.38 MG/DL — HIGH (ref 0.5–1.3)
CULTURE RESULTS: SIGNIFICANT CHANGE UP
GLUCOSE BLDC GLUCOMTR-MCNC: 134 MG/DL — HIGH (ref 70–99)
GLUCOSE BLDC GLUCOMTR-MCNC: 134 MG/DL — HIGH (ref 70–99)
GLUCOSE BLDC GLUCOMTR-MCNC: 179 MG/DL — HIGH (ref 70–99)
GLUCOSE SERPL-MCNC: 129 MG/DL — HIGH (ref 70–99)
MAGNESIUM SERPL-MCNC: 2 MG/DL — SIGNIFICANT CHANGE UP (ref 1.6–2.6)
POTASSIUM SERPL-MCNC: 4.5 MMOL/L — SIGNIFICANT CHANGE UP (ref 3.5–5.3)
POTASSIUM SERPL-SCNC: 4.5 MMOL/L — SIGNIFICANT CHANGE UP (ref 3.5–5.3)
SODIUM SERPL-SCNC: 137 MMOL/L — SIGNIFICANT CHANGE UP (ref 135–145)
SPECIMEN SOURCE: SIGNIFICANT CHANGE UP

## 2020-05-13 PROCEDURE — 99223 1ST HOSP IP/OBS HIGH 75: CPT | Mod: AI

## 2020-05-13 PROCEDURE — 99217: CPT

## 2020-05-13 RX ORDER — METOPROLOL TARTRATE 50 MG
50 TABLET ORAL EVERY 8 HOURS
Refills: 0 | Status: DISCONTINUED | OUTPATIENT
Start: 2020-05-13 | End: 2020-05-15

## 2020-05-13 RX ADMIN — MONTELUKAST 10 MILLIGRAM(S): 4 TABLET, CHEWABLE ORAL at 12:33

## 2020-05-13 RX ADMIN — Medication 50 MILLIGRAM(S): at 16:26

## 2020-05-13 RX ADMIN — Medication 0.1 MILLIGRAM(S): at 06:12

## 2020-05-13 RX ADMIN — ATORVASTATIN CALCIUM 40 MILLIGRAM(S): 80 TABLET, FILM COATED ORAL at 21:25

## 2020-05-13 RX ADMIN — Medication 50 MILLIGRAM(S): at 21:25

## 2020-05-13 RX ADMIN — PANTOPRAZOLE SODIUM 40 MILLIGRAM(S): 20 TABLET, DELAYED RELEASE ORAL at 07:33

## 2020-05-13 RX ADMIN — Medication 50 MILLIGRAM(S): at 06:12

## 2020-05-13 RX ADMIN — CLOPIDOGREL BISULFATE 75 MILLIGRAM(S): 75 TABLET, FILM COATED ORAL at 12:33

## 2020-05-13 RX ADMIN — SODIUM CHLORIDE 1000 MILLILITER(S): 9 INJECTION INTRAMUSCULAR; INTRAVENOUS; SUBCUTANEOUS at 02:00

## 2020-05-13 RX ADMIN — Medication 2: at 16:26

## 2020-05-13 NOTE — ED ADULT NURSE REASSESSMENT NOTE - GENERAL PATIENT STATE
Resting comfortably in bed. No acute distress noted./comfortable appearance
comfortable appearance/Pt resting comfortably in bed. Sleeping at time. Breathing equal and unlabored. NSR on the monitor.
comfortable appearance/cooperative

## 2020-05-13 NOTE — ED CDU PROVIDER SUBSEQUENT DAY NOTE - ATTENDING CONTRIBUTION TO CARE
I, Selwyn Santos, performed a face to face bedside interview with this patient regarding history of present illness, review of symptoms and relevant past medical, social and family history.  I completed an independent physical examination. I have communicated the patient’s plan of care and disposition with the ACP.      pt without complaints overnight; pt on monitor;  awake alert heent ncat neck supple cor s1 s2 regular rate chest clear neuro nonfocal;  dx palpitations;    continue cardiac monitor ; cardiology  evaluation in am

## 2020-05-13 NOTE — H&P ADULT - NSICDXFAMILYHX_GEN_ALL_CORE_FT
FAMILY HISTORY:  Family history of essential hypertension  Family history of heart disease, father  Family history of MI (myocardial infarction)  Family history of prostate cancer

## 2020-05-13 NOTE — ED CDU PROVIDER DISPOSITION NOTE - ATTENDING CONTRIBUTION TO CARE
I, Selwyn Santos, performed a face to face bedside interview with this patient regarding history of present illness, review of symptoms and relevant past medical, social and family history.  I completed an independent physical examination. I have communicated the patient’s plan of care and disposition with the ACP.      pt placed in observation for palpitations ; evaluated by cardiology this am with noted  short run v tach on monitor; pt to be admitted for continued monitoring; pt to have follow up with dilated aorta on cta;

## 2020-05-13 NOTE — CONSULT NOTE ADULT - SUBJECTIVE AND OBJECTIVE BOX
[facility], THE HEART CENTER                                   [address] [place] [address]                                                      PHONE: [phone]                                                         FAX: [phone]  [email or web address]  ---------------------------------------------------------------------------------------------------------------------------------    Reason for Consult: Dizziness, near syncope, palpitations, NSVT    HPI:  GUIDO MATUTE is an 79y Male h/o CAD s/p PCI LAD/LCx 2018, preserved EF, HTN, HLD, DM, prostate CA, and former smoker who presented to [facility] ED with palpitations and dizziness of unclear etiology.  Patient states that he woke ~ 3 am and was walking to the bathroom when he started feeing heart pounding with dizziness with near syncope without LOC, chest pain, orthopnea, or PND. Patient speaks Citizen of [place] and Turkish and some English. He currently feels well with no sx's. Remains in CDU. Telemetry overnight showed 3 beat NSVT at 4:13 AM while sleeping.  2D echo here showed EF remains preserved at 65-70%. Electrolytes K and Mg WNL. Renal function improved.      PAST MEDICAL & SURGICAL HISTORY:  MI (myocardial infarction)  Sickle cell anemia: Carrier of the disease, presently doesn&#x27;t have it  Asthma, mild intermittent, uncomplicated  Other hemorrhoids  Cancer: protate  Diabetes mellitus  Hyperlipidemia  Hypertension  H/O colonoscopy: Normal as per patient 3 years ago.  S/P primary angioplasty with coronary stent  S/P prostatectomy  Inguinal hernia      aspirin (Stomach Upset)  EGGPLANT (Hives)  No Known Drug Allergies  strawberry (Hives)      MEDICATIONS  (STANDING):  atorvastatin 40 milliGRAM(s) Oral at bedtime  cloNIDine 0.1 milliGRAM(s) Oral daily  clopidogrel Tablet 75 milliGRAM(s) Oral daily  dextrose 5%. 1000 milliLiter(s) (50 mL/Hr) IV Continuous <Continuous>  dextrose 50% Injectable 12.5 Gram(s) IV Push once  dextrose 50% Injectable 25 Gram(s) IV Push once  dextrose 50% Injectable 25 Gram(s) IV Push once  insulin lispro (HumaLOG) corrective regimen sliding scale   SubCutaneous three times a day before meals  insulin lispro (HumaLOG) corrective regimen sliding scale   SubCutaneous at bedtime  metoprolol tartrate 50 milliGRAM(s) Oral every 8 hours  montelukast 10 milliGRAM(s) Oral daily  pantoprazole    Tablet 40 milliGRAM(s) Oral before breakfast  sodium chloride 0.9%. 1000 milliLiter(s) (75 mL/Hr) IV Continuous <Continuous>    MEDICATIONS  (PRN):  dextrose 40% Gel 15 Gram(s) Oral once PRN Blood Glucose LESS THAN 70 milliGRAM(s)/deciliter  glucagon  Injectable 1 milliGRAM(s) IntraMuscular once PRN Glucose LESS THAN 70 milligrams/deciliter      Social History:  Cigarettes:       quit             Alchohol:   no              Illicit Drug Abuse:  no    ROS: Negative other than as mentioned in HPI.    Vital Signs Last 24 Hrs  T(C): 37 (13 May 2020 07:17), Max: 37 (13 May 2020 07:17)  T(F): 98.6 (13 May 2020 07:17), Max: 98.6 (13 May 2020 07:17)  HR: 63 (13 May 2020 07:17) (63 - 83)  BP: 151/77 (13 May 2020 07:17) (143/78 - 176/71)  BP(mean): 103 (12 May 2020 15:31) (103 - 103)  RR: 18 (13 May 2020 07:17) (18 - 18)  SpO2: 99% (13 May 2020 07:17) (97% - 100%)  ICU Vital Signs Last 24 Hrs  GUIDO MATUTE  I&O's Detail    I&O's Summary    Drug Dosing Weight  GUIDO MATUTE      PHYSICAL EXAM:  General: NAD.  HEENT: Head; normocephalic.  Eyes: Pupils reactive.  Neck: Supple.  CARDIOVASCULAR: Normal S1 and S2. Mild otoniel.   LUNGS: Normal breath sounds bilaterally.  ABDOMEN: Soft, nontender.  EXTREMITIES: No clubbing, cyanosis or edema.   SKIN: warm and dry.  NEURO: Alert/oriented x 3.    PSYCH: normal affect.        LABS:                        10.7   7.03  )-----------( 271      ( 12 May 2020 08:43 )             32.4     05-13    137  |  105  |  25.0<H>  ----------------------------<  129<H>  4.5   |  19.0<L>  |  1.38<H>    Ca    8.9      13 May 2020 07:03  Mg     2.0     -    TPro  7.4  /  Alb  4.0  /  TBili  0.4  /  DBili  x   /  AST  24  /  ALT  14  /  AlkPhos  118  05-12    GUIDO MATUTE  CARDIAC MARKERS ( 12 May 2020 17:28 )  x     / <0.01 ng/mL / x     / x     / x      CARDIAC MARKERS ( 12 May 2020 14:05 )  x     / <0.01 ng/mL / x     / x     / x      CARDIAC MARKERS ( 12 May 2020 08:43 )  x     / <0.01 ng/mL / x     / x     / x            Urinalysis Basic - ( 12 May 2020 08:52 )    Color: Yellow / Appearance: Clear / S.010 / pH: x  Gluc: x / Ketone: Negative  / Bili: Negative / Urobili: Negative mg/dL   Blood: x / Protein: 100 mg/dL / Nitrite: Negative   Leuk Esterase: Negative / RBC: Negative /HPF / WBC 0-2   Sq Epi: x / Non Sq Epi: Occasional / Bacteria: Occasional        RADIOLOGY & ADDITIONAL STUDIES:    INTERPRETATION OF TELEMETRY (personally reviewed): NSR / SB 50's. 1 episode NSVT 3 beats at 4:13AM.    ECG: , normal intervals, nl QTc, nl axis.    ECHO:  1. Normal global left ventricular systolic function.   2. Left ventricular ejection fraction, by visual estimation, is 65 to 70%.   3. Mildly increased LV wall thickness.   4. No obvious left ventricle regional wall motion abnormalities visualized.   5. Normal right ventricle size and normal systolic function.   6. The left atrium is normal in size.   7. The right atrium is normal in size.   8. Mild thickening and calcification of the anterior and posterior mitral valve leaflets.   9. Mild aortic valve leaflet calcification. Small, round echodensities on the aortic valve leaflet tips which may be consistent with calcification. No aortic stenosis.  10. Dilated proximal ascending aorta (3.8 cm; 2.1 cm/m^2).  11. Recommend clinical correlation with the above findings.    Stephanie Shankar MD Electronically signed on 2020 at 2:20:47 PM      Assessment and Plan:  In summary, GUIDO MATUTE is an 79y Male with past medical history significant for CAD s/p PCI LAD/LCx 2018, preserved EF, HTN, HLD, DM, prostate CA, and former smoker who presented to [facility] ED with palpitations and dizziness of unclear etiology.  Patient states that he woke ~ 3 am and was walking to the bathroom when he started feeing heart pounding with dizziness with near syncope without LOC, chest pain, orthopnea, or PND. Patient speaks Citizen of [place] and Turkish and some English. He currently feels well with no sx's. Remains in CDU. Telemetry overnight showed 3 beat NSVT at 4:13 AM while sleeping.  2D echo here showed EF remains preserved at 65-70%. Electrolytes K and Mg WNL. Renal function improved.    1. Recommend cardiac cath to re-evaluate coronary anatomy given evidence of NSVT, normal EF, normal electrolytes in setting of dizziness, palpitations, near syncope of unclear etiology with h/o CAD s/p prior PCI LAD/LCX.  2. If cardiac cath is negative, then recommend implantable loop recorder for long-term symptom rhythm correlation. [facility], THE [facility]ART [facility]                                   [address] [place] [address]                                                      PHONE: [phone]                                                         FAX: [phone]  [email or web address]  ---------------------------------------------------------------------------------------------------------------------------------    Reason for Consult: Dizziness, near syncope, palpitations, NSVT    HPI:  GUIDO MATUTE is an 79y Male h/o CAD s/p PCI LAD/LCx 2018, preserved EF, HTN, HLD, DM, prostate CA, and former smoker who presented to [facility] ED with palpitations and dizziness of unclear etiology.  Patient states that he woke ~ 3 am and was walking to the bathroom when he started feeing heart pounding with dizziness with near syncope without LOC, chest pain, orthopnea, or PND. Patient speaks Czech and Hungarian and some English. He currently feels well with no sx's. Remains in CDU. Telemetry overnight showed 3 beat NSVT at 4:13 AM while sleeping.  2D echo here showed EF remains preserved at 65-70%. Electrolytes K and Mg WNL. Renal function improved.      PAST MEDICAL & SURGICAL HISTORY:  MI (myocardial infarction)  Sickle cell anemia: Carrier of the disease, presently doesn&#x27;t have it  Asthma, mild intermittent, uncomplicated  Other hemorrhoids  Cancer: protate  Diabetes mellitus  Hyperlipidemia  Hypertension  H/O colonoscopy: Normal as per patient 3 years ago.  S/P primary angioplasty with coronary stent  S/P prostatectomy  Inguinal hernia      aspirin (Stomach Upset)  EGGPLANT (Hives)  No Known Drug Allergies  strawberry (Hives)      MEDICATIONS  (STANDING):  atorvastatin 40 milliGRAM(s) Oral at bedtime  cloNIDine 0.1 milliGRAM(s) Oral daily  clopidogrel Tablet 75 milliGRAM(s) Oral daily  dextrose 5%. 1000 milliLiter(s) (50 mL/Hr) IV Continuous <Continuous>  dextrose 50% Injectable 12.5 Gram(s) IV Push once  dextrose 50% Injectable 25 Gram(s) IV Push once  dextrose 50% Injectable 25 Gram(s) IV Push once  insulin lispro (HumaLOG) corrective regimen sliding scale   SubCutaneous three times a day before meals  insulin lispro (HumaLOG) corrective regimen sliding scale   SubCutaneous at bedtime  metoprolol tartrate 50 milliGRAM(s) Oral every 8 hours  montelukast 10 milliGRAM(s) Oral daily  pantoprazole    Tablet 40 milliGRAM(s) Oral before breakfast  sodium chloride 0.9%. 1000 milliLiter(s) (75 mL/Hr) IV Continuous <Continuous>    MEDICATIONS  (PRN):  dextrose 40% Gel 15 Gram(s) Oral once PRN Blood Glucose LESS THAN 70 milliGRAM(s)/deciliter  glucagon  Injectable 1 milliGRAM(s) IntraMuscular once PRN Glucose LESS THAN 70 milligrams/deciliter      Social History:  Cigarettes:       quit             Alchohol:   no              Illicit Drug Abuse:  no    ROS: Negative other than as mentioned in HPI.    Vital Signs Last 24 Hrs  T(C): 37 (13 May 2020 07:17), Max: 37 (13 May 2020 07:17)  T(F): 98.6 (13 May 2020 07:17), Max: 98.6 (13 May 2020 07:17)  HR: 63 (13 May 2020 07:17) (63 - 83)  BP: 151/77 (13 May 2020 07:17) (143/78 - 176/71)  BP(mean): 103 (12 May 2020 15:31) (103 - 103)  RR: 18 (13 May 2020 07:17) (18 - 18)  SpO2: 99% (13 May 2020 07:17) (97% - 100%)  ICU Vital Signs Last 24 Hrs  GUIDO MATUTE  I&O's Detail    I&O's Summary    Drug Dosing Weight  GUIDO MATUTE      PHYSICAL EXAM:  General: NAD.  HEENT: Head; normocephalic.  Eyes: Pupils reactive.  Neck: Supple.  CARDIOVASCULAR: Normal S1 and S2. Mild otoniel.   LUNGS: Normal breath sounds bilaterally.  ABDOMEN: Soft, nontender.  EXTREMITIES: No clubbing, cyanosis or edema.   SKIN: warm and dry.  NEURO: Alert/oriented x 3.    PSYCH: normal affect.        LABS:                        10.7   7.03  )-----------( 271      ( 12 May 2020 08:43 )             32.4     05-13    137  |  105  |  25.0<H>  ----------------------------<  129<H>  4.5   |  19.0<L>  |  1.38<H>    Ca    8.9      13 May 2020 07:03  Mg     2.0     -    TPro  7.4  /  Alb  4.0  /  TBili  0.4  /  DBili  x   /  AST  24  /  ALT  14  /  AlkPhos  118  05-12    GUIDO MATUTE  CARDIAC MARKERS ( 12 May 2020 17:28 )  x     / <0.01 ng/mL / x     / x     / x      CARDIAC MARKERS ( 12 May 2020 14:05 )  x     / <0.01 ng/mL / x     / x     / x      CARDIAC MARKERS ( 12 May 2020 08:43 )  x     / <0.01 ng/mL / x     / x     / x            Urinalysis Basic - ( 12 May 2020 08:52 )    Color: Yellow / Appearance: Clear / S.010 / pH: x  Gluc: x / Ketone: Negative  / Bili: Negative / Urobili: Negative mg/dL   Blood: x / Protein: 100 mg/dL / Nitrite: Negative   Leuk Esterase: Negative / RBC: Negative /HPF / WBC 0-2   Sq Epi: x / Non Sq Epi: Occasional / Bacteria: Occasional        RADIOLOGY & ADDITIONAL STUDIES:    INTERPRETATION OF TELEMETRY (personally reviewed): NSR / SB 50's. 1 episode NSVT 3 beats at 4:13AM.    ECG: , normal intervals, nl QTc, nl axis.    ECHO:  1. Normal global left ventricular systolic function.   2. Left ventricular ejection fraction, by visual estimation, is 65 to 70%.   3. Mildly increased LV wall thickness.   4. No obvious left ventricle regional wall motion abnormalities visualized.   5. Normal right ventricle size and normal systolic function.   6. The left atrium is normal in size.   7. The right atrium is normal in size.   8. Mild thickening and calcification of the anterior and posterior mitral valve leaflets.   9. Mild aortic valve leaflet calcification. Small, round echodensities on the aortic valve leaflet tips which may be consistent with calcification. No aortic stenosis.  10. Dilated proximal ascending aorta (3.8 cm; 2.1 cm/m^2).  11. Recommend clinical correlation with the above findings.    Stephanie Shankar MD Electronically signed on 2020 at 2:20:47 PM      Assessment and Plan:  In summary, GUIDO MATUTE is an 79y Male with past medical history significant for CAD s/p PCI LAD/LCx 2018, preserved EF, HTN, HLD, DM, prostate CA, and former smoker who presented to [facility] ED with palpitations and dizziness of unclear etiology.  Patient states that he woke ~ 3 am and was walking to the bathroom when he started feeing heart pounding with dizziness with near syncope without LOC, chest pain, orthopnea, or PND. Patient speaks Czech and Hungarian and some English. He currently feels well with no sx's. Remains in CDU. Telemetry overnight showed 3 beat NSVT at 4:13 AM while sleeping.  2D echo here showed EF remains preserved at 65-70%. Electrolytes K and Mg WNL. Renal function improved.    1. Recommend cardiac cath to re-evaluate coronary anatomy given evidence of NSVT, normal EF, normal electrolytes in setting of dizziness, palpitations, near syncope of unclear etiology with h/o CAD s/p prior PCI LAD/LCX.  2. If cardiac cath is negative, then recommend implantable loop recorder for long-term symptom rhythm correlation.  3. Continue Bblocker.  4. Discussed plan with patient and Dr. Knott, Dr. Fine, and Dr. Dailey.

## 2020-05-13 NOTE — ED ADULT NURSE REASSESSMENT NOTE - SYMPTOMS
none
Palpitations intermittently. Denies dizziness and acute vision changes at this time. As per patient at baseline.
none

## 2020-05-13 NOTE — ED ADULT NURSE REASSESSMENT NOTE - ANCILLARY STATUS
0600AM repeat lab draws/awaiting lab draw
EKG at bedside/awaiting lab draw
awaiting lab draw
BMP/lab results pending

## 2020-05-13 NOTE — H&P ADULT - ASSESSMENT
79M with a history of coronary artery disease, diabetes, and hypertension who presented with palpitations and dizziness found to have ventricular tachycardia on telemetry.    NSVT - Cardiology consultation noted. On telemetry monitoring. For electrophysiology evaluation. Monitoring electrolytes. Echocardiogram noted preserved left ventricular ejection fraction.    Coronary artery disease - On clopidogrel and atorvastatin. Serial troponin levels were not elevated.    Acute kidney injury - Losartan was discontinued on admission. Renal function improved on repeat laboratory studies.    Diabetes - Insulin coverage, close monitoring of blood glucose levels.    Discussed with the patient and his daughter at the bedside. 79M with a history of coronary artery disease, diabetes, and hypertension who presented with palpitations and dizziness found to have ventricular tachycardia on telemetry.    NSVT - Metoprolol dose increased. Cardiology consultation noted. On telemetry monitoring. For electrophysiology evaluation. Monitoring electrolytes. Echocardiogram noted preserved left ventricular ejection fraction.    Coronary artery disease - On clopidogrel and atorvastatin. Serial troponin levels were not elevated.    Acute kidney injury - Losartan was discontinued on admission. Renal function improved on repeat laboratory studies.    Diabetes - Insulin coverage, close monitoring of blood glucose levels.    Discussed with the patient and his daughter at the bedside.

## 2020-05-13 NOTE — ED CDU PROVIDER DISPOSITION NOTE - CLINICAL COURSE
79 male presented to ED with episode of dizziness, transient vision loss, and palpitations. Patient found to have elevated creatine, gentle hydration and repeat labs. Patient evaluated by Dr. Perera who requested TTE (dilated aorta). MRI negative for acute stroke. Patient to be discharged pending morning cardiology reassessment improve creatinine. Pt seen by cardiology Dr. Fine this morning, had 3 beats V-tach overnight on monitor. Cardiology requesting admission for continued telemetry monitoring and further workup.

## 2020-05-13 NOTE — ED ADULT NURSE REASSESSMENT NOTE - NS ED NURSE REASSESS COMMENT FT1
Pt resting comfortably in bed. No acute distress noted. Pt denies chest pain,sob,dizziness and palpitations. Pt reports no visual changes at this time. Voided 900cc throughout shift. Yellow. Voiding w/o difficulties. Pending 0500 BMP collection. Safety maintained. Reeducation provided on call bell use. Call bell within reach. Will continue to monitor.

## 2020-05-13 NOTE — H&P ADULT - HISTORY OF PRESENT ILLNESS
79M presented yesterday with palpitation and dizziness. He denied any associated chest pain or dyspnea. The symptoms occurred in the morning while he was walking to the bathroom. He also noted a brief loss of vision which resolved spontaneously. He denied any loss of consciousness. He denied any similar symptoms in the past and is unaware of any aggravating or relieving factors. The patient was placed under observation in the emergency department. He denied any fevers, chills, or sick contacts. Telemetry monitoring noted episode of ventricular tachycardia and the patient was thought to require admission to the hospital for further management. The patient noted episodes of pedal edema which improved with positioning. He denied any associated dyspnea on exertion or orthopnea.

## 2020-05-13 NOTE — ED ADULT NURSE REASSESSMENT NOTE - NS ED NURSE REASSESS COMMENT FT1
Assumed care of the patient at 0730. Patient A&Ox4, creole speaking only, daughter present at the bedside. No s/s of distress. Denies CP/SOB or dizziness. NSR to sinus otoniel on CM. VSS. Patient pending cardiology consult and labs results. PIv patent. Patient and daughter that is present at the bedside in understanding of plan of care. Patient with no further questions for the RN. Resting in comfort. call bell within reach and encouraged to use when assistance needed. Will continue to monitor.

## 2020-05-13 NOTE — H&P ADULT - NSHPSOCIALHISTORY_GEN_ALL_CORE
Former tobacco use  Denied alcohol or illicit drug use Former tobacco use  Denied alcohol or illicit drug use    Family History: Father with coronary artery disease

## 2020-05-13 NOTE — ED ADULT NURSE REASSESSMENT NOTE - COMFORT CARE
meal provided
side rails up/plan of care explained/assisted to bathroom/darkened lights/meal provided/repositioned
po fluids offered/meal provided/assisted to bathroom/plan of care explained/side rails up
repositioned/plan of care explained/darkened lights/po fluids offered/assisted to bathroom/meal provided/side rails up/wait time explained
po fluids offered/repositioned/plan of care explained/side rails up

## 2020-05-13 NOTE — ED CDU PROVIDER SUBSEQUENT DAY NOTE - CONDUCTED A DETAILED DISCUSSION WITH PATIENT AND/OR GUARDIAN REGARDING, MDM
return to ED if symptoms worsen, persist or questions arise/lab results/need for outpatient follow-up/radiology results Partial Purse String (Intermediate) Text: Given the location of the defect and the characteristics of the surrounding skin an intermediate purse string closure was deemed most appropriate.  Undermining was performed circumfirentially around the surgical defect.  A purse string suture was then placed and tightened. Wound tension only allowed a partial closure of the circular defect.

## 2020-05-13 NOTE — H&P ADULT - NSHPPHYSICALEXAM_GEN_ALL_CORE
Vital Signs Last 24 Hrs  T(C): 37 (13 May 2020 07:17), Max: 37 (13 May 2020 07:17)  T(F): 98.6 (13 May 2020 07:17), Max: 98.6 (13 May 2020 07:17)  HR: 63 (13 May 2020 07:17) (63 - 83)  BP: 151/77 (13 May 2020 07:17) (143/78 - 176/71)  BP(mean): 103 (12 May 2020 15:31) (103 - 103)  RR: 18 (13 May 2020 07:17) (18 - 18)  SpO2: 99% (13 May 2020 07:17) (97% - 100%)    General appearance: No acute distress, Awake, Alert  HEENT: Normocephalic, Atraumatic, Conjunctiva clear, EOMI  Neck: Supple, No JVD, No tenderness  Lungs: Breath sound equal bilaterally, No wheezes, No rales  Cardiovascular: S1S2, Regular rhythm  Abdomen: Soft, Nontender, Nondistended, No guarding/rebound, Positive bowel sounds  Extremities: No clubbing, No cyanosis, No calf tenderness, Mild pedal edema  Neuro: Strength equal bilaterally, No tremors  Psychiatric: Appropriate mood, Normal affect

## 2020-05-13 NOTE — ED ADULT NURSE REASSESSMENT NOTE - ED CARDIAC HEART SOUNDS
Resting comfortably in bed. Pt denies chest pain,sob. No acute distress noted at this time./normal S1, S2 heard
normal S1, S2 heard

## 2020-05-13 NOTE — ED CDU PROVIDER SUBSEQUENT DAY NOTE - PROGRESS NOTE DETAILS
Pt evaluated at bedside with daughter Damaris. Pt reports he is feeling well. Pt seen by cardiology Dr. Lim, pt had 3 beats V-tach on monitor overnight. Cardiology requesting admission for telemetry monitoring and continued work up.

## 2020-05-13 NOTE — PROGRESS NOTE ADULT - SUBJECTIVE AND OBJECTIVE BOX
[facility], THE HEART [facility]ENTER                                   [address] [place] [address]                                                      PHONE: [phone]                                                         FAX: [phone]  [email or web address]  ---------------------------------------------------------------------------------------------------------------------------------    Reason for Consult: Dizziness palpations     HPI:  GUIDO MATUTE is an 79y Male past medical history significant for HTN, HLD, CAD s/p PCI LAD/LCx 2018 normal EF, DM, prostate CA former smoker who presented to [facility] ED with palpitations and dizziness.  Patient states that he woke ~ 3 am and was walking to the bathroom when he started feeing heart pounding with dizziness with near syncope without LOC chest pain orthopnea or PND.      RECENT EVENTS    Episode of NSVT 3 beats overnight  COVID negative  Discussed with daughter and CDU PA in detail cardiologist in [facility]  TTE NL LV fx  mildly dilated AA 3,8 cm        PAST MEDICAL & SURGICAL HISTORY:  MI (myocardial infarction)  Sickle cell anemia: Carrier of the disease, presently doesn&#x27;t have it  Asthma, mild intermittent, uncomplicated  Other hemorrhoids  Cancer: protate  Diabetes mellitus  Hyperlipidemia  Hypertension  H/O colonoscopy: Normal as per patient 3 years ago.  S/P primary angioplasty with coronary stent  S/P prostatectomy  Inguinal hernia      aspirin (Stomach Upset)  EGGPLANT (Hives)  No Known Drug Allergies  strawberry (Hives)      MEDICATIONS  (STANDING):    MEDICATIONS  (PRN):      Social History:  Cigarettes:           none          Alchohol:      none           Illicit Drug Abuse:  none    FH negative for CAD     ROS: Negative other than as mentioned in HPI.    Vital Signs Last 24 Hrs  T(C): 36.3 (12 May 2020 08:32), Max: 36.6 (12 May 2020 06:56)  T(F): 97.3 (12 May 2020 08:32), Max: 97.8 (12 May 2020 06:56)  HR: 69 (12 May 2020 09:24) (69 - 117)  BP: 133/63 (12 May 2020 09:24) (133/63 - 182/81)  BP(mean): --  RR: 20 (12 May 2020 08:32) (20 - 20)  SpO2: 100% (12 May 2020 08:32) (100% - 100%)  ICU Vital Signs Last 24 Hrs  GUIDO MATUTE  I&O's Detail    I&O's Summary    Drug Dosing Weight  GUIDO MATUTE      PHYSICAL EXAM:  General: Appears well developed, well nourished alert and cooperative.  HEENT: Head; normocephalic, atraumatic.  Eyes: Pupils reactive, cornea wnl.  Neck: Supple, no nodes adenopathy, no NVD or carotid bruit or thyromegaly.  CARDIOVASCULAR: Normal S1 and S2, No murmur, rub, gallop or lift.   LUNGS: No rales, rhonchi or wheeze. Normal breath sounds bilaterally.  ABDOMEN: Soft, nontender without mass or organomegaly. bowel sounds normoactive.  EXTREMITIES: No clubbing, cyanosis or edema. Distal pulses wnl.   SKIN: warm and dry with normal turgor.  NEURO: Alert/oriented x 3/normal motor exam. No pathologic reflexes.    PSYCH: normal affect.        LABS:                        10.7   7.03  )-----------( 271      ( 12 May 2020 08:43 )             32.4     05-12    133<L>  |  98  |  29.0<H>  ----------------------------<  196<H>  5.2   |  20.0<L>  |  1.85<H>    Ca    9.3      12 May 2020 08:43  Mg     2.1     05-12    TPro  7.4  /  Alb  4.0  /  TBili  0.4  /  DBili  x   /  AST  24  /  ALT  14  /  AlkPhos  118  05-12    GUIDO MATUTE  CARDIAC MARKERS ( 12 May 2020 08:43 )  x     / <0.01 ng/mL / x     / x     / x            Urinalysis Basic - ( 12 May 2020 08:52 )    Color: Yellow / Appearance: Clear / S.010 / pH: x  Gluc: x / Ketone: Negative  / Bili: Negative / Urobili: Negative mg/dL   Blood: x / Protein: 100 mg/dL / Nitrite: Negative   Leuk Esterase: Negative / RBC: Negative /HPF / WBC 0-2   Sq Epi: x / Non Sq Epi: Occasional / Bacteria: Occasional        RADIOLOGY & ADDITIONAL STUDIES:    INTERPRETATION OF TELEMETRY (personally reviewed):    ECG: Sinus tachycardia     ECHO:    Summary:   1. Normal biventricular systolic function. Left ventricular ejection   fraction, by visual estimation, is 60 to 65%.   2. Spectral Doppler shows impaired relaxation pattern of left   ventricular myocardial filling (Grade I diastolic dysfunction).   3. No significant valvular abnormalities.   4. No pericardial effusion.   5. ** No prior echocardiograms available for comparison.    [id number] Radha Russell , Electronically signed on 2018 at   11:29:04 AM      CARDIAC CATHETERIZATION:  VENTRICLES: No LV gram ( Cr 1.2 ).  CORONARY VESSELS: R dominant.  Large RCA w/ moderate diffuse disease.  Moderate sized LCx w/ calcified 70% OM1 stenosis w/ large distal vessel.  OM2 w/ moderate disease.  Large LAD w/ patent mid vessel stent.  CX:   --  OM1: There was a 70 % stenosis.  COMPLICATIONS: No complications occurred during the cath lab visit.  SUMMARY:  Summary: Addendum 2018: Case reconfirmed to generate Attachment A  report  DIAGNOSTIC IMPRESSIONS: ICS x 1 to OM1 w/ 0% residual stenosis and THOR III  flow maintained thruout.  Patent LAD stent.  DIAGNOSTIC RECOMMENDATIONS: Asa/brilinta.  PET/followup 4 weeks.  INTERVENTIONAL IMPRESSIONS: ICS x 1 to OM1 w/ 0% residual stenosis and THOR  III flow maintained thruout.  Patent LAD stent.  INTERVENTIONAL RECOMMENDATIONS: Asa/brilinta.  PET/followup 4 weeks.  Prepared and signed by  Adán Machado MD  Signed 201813:43:26    Assessment and Plan:  In summary, GUIDO MATUTE is an 79y Male with past medical history significant for HTN, HLD, CAD s/p PCI LAD/LCx 2018 normal EF, DM, prostate CA former smoker who presented to [facility] ED with palpitations and dizziness.  Patient states that he woke ~ 3 am and was walking to the bathroom when he started feeing heart pounding with dizziness with near syncope without LOC chest pain orthopnea or PND.  EKG Sinus tachycardia without ischemic changes.  No evidence of heart failure.  KENTRELL.      Plan    1. Tele monitor overnight in view of NSVT  2. Check lytes including Mg and K  3. continue present BB therapy  4. EP evaluation

## 2020-05-13 NOTE — ED CDU PROVIDER SUBSEQUENT DAY NOTE - MEDICAL DECISION MAKING DETAILS
79 male presented to ED with episode of dizziness, transient vision loss, and palpitations. Patient found to have elevated creatine, gentle hydration and repeat labs. Patient evaluated by Dr. Perera who requested TTE (dilated aorta). MRI negative for acute stroke. Patient to be discharged pending morning cardiology reassessment improve creatinine.

## 2020-05-13 NOTE — ED ADULT NURSE REASSESSMENT NOTE - NS ED NURSE REASSESS COMMENT FT1
Pt resting comfortably in bed. Currently sleeping. NSR on the monitor. Breathing equal and unlabored. Denies chest pain,sob and palpitations at this time. Will continue to monitor. Call bell within reach. Safety maintained. Pt resting comfortably in bed. Currently sleeping. NSR on the monitor. Breathing equal and unlabored. Denies chest pain,sob and palpitations at this time. VSS Reviewed and stable. Will continue to monitor. Call bell within reach. Safety maintained.

## 2020-05-14 ENCOUNTER — TRANSCRIPTION ENCOUNTER (OUTPATIENT)
Age: 80
End: 2020-05-14

## 2020-05-14 LAB
ANION GAP SERPL CALC-SCNC: 12 MMOL/L — SIGNIFICANT CHANGE UP (ref 5–17)
APTT BLD: 34.2 SEC — SIGNIFICANT CHANGE UP (ref 27.5–36.3)
BUN SERPL-MCNC: 25 MG/DL — HIGH (ref 8–20)
CALCIUM SERPL-MCNC: 9.3 MG/DL — SIGNIFICANT CHANGE UP (ref 8.6–10.2)
CHLORIDE SERPL-SCNC: 104 MMOL/L — SIGNIFICANT CHANGE UP (ref 98–107)
CO2 SERPL-SCNC: 21 MMOL/L — LOW (ref 22–29)
CREAT SERPL-MCNC: 1.52 MG/DL — HIGH (ref 0.5–1.3)
GLUCOSE BLDC GLUCOMTR-MCNC: 127 MG/DL — HIGH (ref 70–99)
GLUCOSE BLDC GLUCOMTR-MCNC: 175 MG/DL — HIGH (ref 70–99)
GLUCOSE BLDC GLUCOMTR-MCNC: 98 MG/DL — SIGNIFICANT CHANGE UP (ref 70–99)
GLUCOSE SERPL-MCNC: 126 MG/DL — HIGH (ref 70–99)
INR BLD: 0.99 RATIO — SIGNIFICANT CHANGE UP (ref 0.88–1.16)
MAGNESIUM SERPL-MCNC: 2.1 MG/DL — SIGNIFICANT CHANGE UP (ref 1.6–2.6)
POTASSIUM SERPL-MCNC: 4.7 MMOL/L — SIGNIFICANT CHANGE UP (ref 3.5–5.3)
POTASSIUM SERPL-SCNC: 4.7 MMOL/L — SIGNIFICANT CHANGE UP (ref 3.5–5.3)
PROTHROM AB SERPL-ACNC: 11.2 SEC — SIGNIFICANT CHANGE UP (ref 10–12.9)
SODIUM SERPL-SCNC: 137 MMOL/L — SIGNIFICANT CHANGE UP (ref 135–145)

## 2020-05-14 PROCEDURE — 99232 SBSQ HOSP IP/OBS MODERATE 35: CPT

## 2020-05-14 RX ORDER — SODIUM CHLORIDE 9 MG/ML
225 INJECTION INTRAMUSCULAR; INTRAVENOUS; SUBCUTANEOUS
Refills: 0 | Status: COMPLETED | OUTPATIENT
Start: 2020-05-14 | End: 2020-05-14

## 2020-05-14 RX ORDER — HEPARIN SODIUM 5000 [USP'U]/ML
5000 INJECTION INTRAVENOUS; SUBCUTANEOUS EVERY 8 HOURS
Refills: 0 | Status: DISCONTINUED | OUTPATIENT
Start: 2020-05-14 | End: 2020-05-15

## 2020-05-14 RX ORDER — CEFAZOLIN SODIUM 1 G
1000 VIAL (EA) INJECTION ONCE
Refills: 0 | Status: COMPLETED | OUTPATIENT
Start: 2020-05-14 | End: 2020-05-14

## 2020-05-14 RX ORDER — HYDRALAZINE HCL 50 MG
10 TABLET ORAL ONCE
Refills: 0 | Status: COMPLETED | OUTPATIENT
Start: 2020-05-14 | End: 2020-05-14

## 2020-05-14 RX ORDER — SODIUM CHLORIDE 9 MG/ML
1000 INJECTION INTRAMUSCULAR; INTRAVENOUS; SUBCUTANEOUS
Refills: 0 | Status: DISCONTINUED | OUTPATIENT
Start: 2020-05-14 | End: 2020-05-15

## 2020-05-14 RX ORDER — ASPIRIN/CALCIUM CARB/MAGNESIUM 324 MG
81 TABLET ORAL ONCE
Refills: 0 | Status: COMPLETED | OUTPATIENT
Start: 2020-05-14 | End: 2020-05-14

## 2020-05-14 RX ORDER — CHLORHEXIDINE GLUCONATE 213 G/1000ML
1 SOLUTION TOPICAL ONCE
Refills: 0 | Status: DISCONTINUED | OUTPATIENT
Start: 2020-05-14 | End: 2020-05-15

## 2020-05-14 RX ADMIN — CLOPIDOGREL BISULFATE 75 MILLIGRAM(S): 75 TABLET, FILM COATED ORAL at 10:38

## 2020-05-14 RX ADMIN — Medication 100 MILLIGRAM(S): at 13:47

## 2020-05-14 RX ADMIN — ATORVASTATIN CALCIUM 40 MILLIGRAM(S): 80 TABLET, FILM COATED ORAL at 21:45

## 2020-05-14 RX ADMIN — Medication 50 MILLIGRAM(S): at 05:26

## 2020-05-14 RX ADMIN — Medication 50 MILLIGRAM(S): at 15:02

## 2020-05-14 RX ADMIN — SODIUM CHLORIDE 150 MILLILITER(S): 9 INJECTION INTRAMUSCULAR; INTRAVENOUS; SUBCUTANEOUS at 15:02

## 2020-05-14 RX ADMIN — HEPARIN SODIUM 5000 UNIT(S): 5000 INJECTION INTRAVENOUS; SUBCUTANEOUS at 05:27

## 2020-05-14 RX ADMIN — Medication 10 MILLIGRAM(S): at 23:30

## 2020-05-14 RX ADMIN — Medication 50 MILLIGRAM(S): at 21:46

## 2020-05-14 RX ADMIN — SODIUM CHLORIDE 225 MILLILITER(S): 9 INJECTION INTRAMUSCULAR; INTRAVENOUS; SUBCUTANEOUS at 10:00

## 2020-05-14 RX ADMIN — Medication 2: at 17:04

## 2020-05-14 RX ADMIN — Medication 81 MILLIGRAM(S): at 09:59

## 2020-05-14 RX ADMIN — MONTELUKAST 10 MILLIGRAM(S): 4 TABLET, CHEWABLE ORAL at 15:02

## 2020-05-14 RX ADMIN — Medication 0.1 MILLIGRAM(S): at 05:26

## 2020-05-14 NOTE — DISCHARGE NOTE PROVIDER - NSDCMRMEDTOKEN_GEN_ALL_CORE_FT
aspirin 81 mg oral tablet, chewable: 1 tab(s) orally once a day  atorvastatin 40 mg oral tablet: 1 tab(s) orally once a day (at bedtime)  azelastine nasal:   cloNIDine 0.1 mg oral tablet:   hydrocortisone 25 mg rectal suppository: 1 suppository(ies) rectal once a day  lisinopril-hydrochlorothiazide 20 mg-12.5 mg oral tablet: 1 tab(s) orally once a day   losartan 50 mg oral tablet:   metoprolol tartrate 50 mg oral tablet: 1 tab(s) orally 2 times a day  MiraLax oral powder for reconstitution: 17 gram(s) orally once a day   montelukast:   Plavix 75 mg oral tablet: 1 tab(s) orally once a day   Protonix 20 mg oral delayed release tablet: 1 tab(s) orally once a day   senna oral tablet: 2 tab(s) orally once a day (at bedtime)   Tradjenta 5 mg oral tablet: 1 tab(s) orally once a day

## 2020-05-14 NOTE — DISCHARGE NOTE PROVIDER - NSDCCPCAREPLAN_GEN_ALL_CORE_FT
PRINCIPAL DISCHARGE DIAGNOSIS  Diagnosis: Palpitations  Assessment and Plan of Treatment: PRINCIPAL DISCHARGE DIAGNOSIS  Diagnosis: Palpitations  Assessment and Plan of Treatment: ILR placed

## 2020-05-14 NOTE — PROGRESS NOTE ADULT - SUBJECTIVE AND OBJECTIVE BOX
GUIDO MATUTE  ----------------------------------------  The patient was seen and evaluated for ventricular tachycardia. Offers no complaints.    Vital Signs Last 24 Hrs  T(C): 36.7 (14 May 2020 06:54), Max: 37.1 (13 May 2020 19:39)  T(F): 98.1 (14 May 2020 06:54), Max: 98.8 (13 May 2020 19:39)  HR: 67 (14 May 2020 06:54) (67 - 76)  BP: 165/77 (14 May 2020 06:54) (157/83 - 177/83)  BP(mean): --  RR: 16 (14 May 2020 06:54) (16 - 19)  SpO2: 100% (14 May 2020 06:54) (98% - 100%)    CAPILLARY BLOOD GLUCOSE  POCT Blood Glucose.: 127 mg/dL (14 May 2020 10:33)  POCT Blood Glucose.: 134 mg/dL (13 May 2020 21:24)  POCT Blood Glucose.: 179 mg/dL (13 May 2020 16:24)    PHYSICAL EXAMINATION:  ----------------------------------------  General appearance: No acute distress, Awake, Alert  HEENT: Normocephalic, Atraumatic, Conjunctiva clear, EOMI  Neck: Supple, No JVD, No tenderness  Lungs: Breath sound equal bilaterally, No wheezes, No rales  Cardiovascular: S1S2, Regular rhythm  Abdomen: Soft, Nontender, Nondistended, No guarding/rebound, Positive bowel sounds  Extremities: No clubbing, No cyanosis, No calf tenderness, Mild pedal edema  Neuro: Strength equal bilaterally, No tremors  Psychiatric: Appropriate mood, Normal affect    LABORATORY STUDIES:  ----------------------------------------  05-14    137  |  104  |  25.0<H>  ----------------------------<  126<H>  4.7   |  21.0<L>  |  1.52<H>    Ca    9.3      14 May 2020 06:28  Mg     2.1     05-14    PT/INR - ( 14 May 2020 09:34 )   PT: 11.2 sec;   INR: 0.99 ratio    PTT - ( 14 May 2020 09:34 )  PTT:34.2 sec    CARDIAC MARKERS ( 12 May 2020 17:28 )  x     / <0.01 ng/mL / x     / x     / x      CARDIAC MARKERS ( 12 May 2020 14:05 )  x     / <0.01 ng/mL / x     / x     / x        Culture - Urine (collected 12 May 2020 16:50)  Source: .Urine Clean Catch (Midstream)  Final Report (13 May 2020 12:59):    <10,000 CFU/mL Normal Urogenital Dee    MEDICATIONS  (STANDING):  atorvastatin 40 milliGRAM(s) Oral at bedtime  chlorhexidine 4% Liquid 1 Application(s) Topical once  cloNIDine 0.1 milliGRAM(s) Oral daily  clopidogrel Tablet 75 milliGRAM(s) Oral daily  dextrose 5%. 1000 milliLiter(s) (50 mL/Hr) IV Continuous <Continuous>  dextrose 50% Injectable 12.5 Gram(s) IV Push once  dextrose 50% Injectable 25 Gram(s) IV Push once  dextrose 50% Injectable 25 Gram(s) IV Push once  heparin   Injectable 5000 Unit(s) SubCutaneous every 8 hours  insulin lispro (HumaLOG) corrective regimen sliding scale   SubCutaneous three times a day before meals  insulin lispro (HumaLOG) corrective regimen sliding scale   SubCutaneous at bedtime  metoprolol tartrate 50 milliGRAM(s) Oral every 8 hours  montelukast 10 milliGRAM(s) Oral daily  pantoprazole    Tablet 40 milliGRAM(s) Oral before breakfast  sodium chloride 0.9%. 1000 milliLiter(s) (75 mL/Hr) IV Continuous <Continuous>    MEDICATIONS  (PRN):  aluminum hydroxide/magnesium hydroxide/simethicone Suspension 30 milliLiter(s) Oral every 4 hours PRN Dyspepsia  dextrose 40% Gel 15 Gram(s) Oral once PRN Blood Glucose LESS THAN 70 milliGRAM(s)/deciliter  glucagon  Injectable 1 milliGRAM(s) IntraMuscular once PRN Glucose LESS THAN 70 milligrams/deciliter      ASSESSMENT / PLAN:  ----------------------------------------  79M with a history of coronary artery disease, diabetes, and hypertension who presented with palpitations and dizziness found to have ventricular tachycardia on telemetry.    NSVT - On metoprolol. Planned for cardiac catheterization and possibly loop recorder implantation.    Coronary artery disease - On clopidogrel and atorvastatin. Serial troponin levels were not elevated.    Acute kidney injury - Losartan was discontinued on admission. Renal function stable.    Diabetes - Insulin coverage, close monitoring of blood glucose levels.

## 2020-05-14 NOTE — DISCHARGE NOTE NURSING/CASE MANAGEMENT/SOCIAL WORK - PATIENT PORTAL LINK FT
You can access the FollowMyHealth Patient Portal offered by [facility] by registering at the following website: [email or web address]. By joining [facility]’s FollowMyHealth portal, you will also be able to view your health information using other applications (apps) compatible with our system.

## 2020-05-14 NOTE — PROGRESS NOTE ADULT - SUBJECTIVE AND OBJECTIVE BOX
79y Male who had a C which showed patent stents, see full report. Via RRA radial  band in place, right wrist site benign. Patient awake and alert without complaints. Denies chest pain, sob, palps.    < from: Cardiac Cath Lab - Adult (05.14.20 @ 11:18) >  INTERVENTIONAL IMPRESSIONS: Patent mLAD stent.  Patent OM1 stent.  Mild mRCA stenosis  INTERVENTIONAL RECOMMENDATIONS: 150mL of NS for four hours  Continue medical management and lifestyle modification for CAD  Prepared and signed by  Mike Dailey MD  Signed 05/14/2020 11:58:00    < end of copied text >      Neuro: A&OX3  Lungs: CTA B/L  CV: S1, S2, no murmur, RRR  Abd: Soft  Right Wrist no bleeding, no hematoma, no ecchymosis  Extremity: + distal pulses      A/P: 79y Male s/p [facility] no intervention  1. Wrist management discussed with patient  2. Continue current meds  3. Follow up as an outpatient with cardiologist  4. Remove radial band in 1 hour  5. Bedrest x 1 hour  6. ILR to be placed by Dr. Escalera

## 2020-05-14 NOTE — DISCHARGE NOTE PROVIDER - HOSPITAL COURSE
79y Male who had a C which showed patent stents, see full report. Via RRA radial  band in place, right wrist site benign. Patient awake and alert without complaints. Denies chest pain, sob, palps.        < from: Cardiac Cath Lab - Adult (05.14.20 @ 11:18) >    INTERVENTIONAL IMPRESSIONS: Patent mLAD stent.    Patent OM1 stent.    Mild mRCA stenosis    INTERVENTIONAL RECOMMENDATIONS: 150mL of NS for four hours    Continue medical management and lifestyle modification for CAD    Prepared and signed by    Mike Dailey MD    Signed 05/14/2020 11:58:00        < end of copied text >            Neuro: A&OX3    Lungs: CTA B/L    CV: S1, S2, no murmur, RRR    Abd: Soft    Right Wrist no bleeding, no hematoma, no ecchymosis    Extremity: + distal pulses            A/P: 79y Male s/p [facility] no intervention    1. Wrist management discussed with patient    2. Continue current meds    3. Follow up as an outpatient with cardiologist    4. ILR placed by Dr. Escalera

## 2020-05-14 NOTE — DISCHARGE NOTE PROVIDER - NSDCACTIVITY_GEN_ALL_CORE
Walking - Outdoors allowed/Showering allowed/Do not drive or operate machinery/Walking - Indoors allowed/No heavy lifting/straining

## 2020-05-14 NOTE — PROGRESS NOTE ADULT - SUBJECTIVE AND OBJECTIVE BOX
NPO > hours  IVF ordered for GFR<60 mL/hr 225 cc/hr x1  Aspirin 81 x1 w/mylanta for GI tolerance  Dr Dailey to consent for [facility]  Dr Escalera for ILR  Labs reviewed  INR pending  ASA 2 Mallampat 2  BR  2.5%  History of Present Illness: 	  79M presented yesterday with palpitation and dizziness. He denied any associated chest pain or dyspnea. The symptoms occurred in the morning while he was walking to the bathroom. He also noted a brief loss of vision which resolved spontaneously. He denied any loss of consciousness. He denied any similar symptoms in the past and is unaware of any aggravating or relieving factors. The patient was placed under observation in the emergency department. He denied any fevers, chills, or sick contacts. Telemetry monitoring noted episode of ventricular tachycardia and the patient was thought to require admission to the hospital for further management. The patient noted episodes of pedal edema which improved with positioning. He denied any associated dyspnea on exertion or orthopnea.  Assessment and Plan:  In summary, GUIDO MATTUE is an 79y Male with past medical history significant for HTN, HLD, CAD s/p PCI LAD/LCx 2018 normal EF, DM, prostate CA former smoker who presented to [facility] ED with palpitations and dizziness.  Patient states that he woke ~ 3 am and was walking to the bathroom when he started feeing heart pounding with dizziness with near syncope without LOC chest pain orthopnea or PND.  EKG Sinus tachycardia without ischemic changes.  No evidence of heart failure.  KENTRELL.    ECG: , normal intervals, nl QTc, nl axis.    ECHO:  1. Normal global left ventricular systolic function.   2. Left ventricular ejection fraction, by visual estimation, is 65 to 70%.   3. Mildly increased LV wall thickness.   4. No obvious left ventricle regional wall motion abnormalities visualized.   5. Normal right ventricle size and normal systolic function.   6. The left atrium is normal in size.   7. The right atrium is normal in size.   8. Mild thickening and calcification of the anterior and posterior mitral valve leaflets.   9. Mild aortic valve leaflet calcification. Small, round echodensities on the aortic valve leaflet tips which may be consistent with calcification. No aortic stenosis.  10. Dilated proximal ascending aorta (3.8 cm; 2.1 cm/m^2).  11. Recommend clinical correlation with the above findings.    Stephanie Shankar MD Electronically signed on 5/12/2020 at 2:20:47 PM      Assessment and Plan:  In summary, GUIDO MATUTE is an 79y Male with past medical history significant for CAD s/p PCI LAD/LCx 2018, preserved EF, HTN, HLD, DM, prostate CA, and former smoker who presented to [facility] ED with palpitations and dizziness of unclear etiology.  Patient states that he woke ~ 3 am and was walking to the bathroom when he started feeing heart pounding with dizziness with near syncope without LOC, chest pain, orthopnea, or PND. Patient speaks Vincentian and Malaysian and some English. He currently feels well with no sx's. Remains in CDU. Telemetry overnight showed 3 beat NSVT at 4:13 AM while sleeping.  2D echo here showed EF remains preserved at 65-70%. Electrolytes K and Mg WNL. Renal function improved.    1. Recommend cardiac cath to re-evaluate coronary anatomy given evidence of NSVT, normal EF, normal electrolytes in setting of dizziness, palpitations, near syncope of unclear etiology with h/o CAD s/p prior PCI LAD/LCX.  2. If cardiac cath is negative, then recommend implantable loop recorder for long-term symptom rhythm correlation.  3. Continue Bblocker.  4. Discussed plan with patient and Dr. Knott, Dr. Fine, and Dr. Dailey.          REVIEW OF SYSTEMS:  Denies SOB, CP, NV, HA, dizziness, palpitations, site pain    PHYSICAL EXAM: A&Ox3 NAD Skin warm and dry  NEURO: Speech intact +gag +swallow Tongue midline GEORGE  NECK: No JVD, trachea midline. Eupneic  HEART: RRR S1S2 no g/m SR on tele  PLMONARY:  CTA adam  ABDOMEN: Soft nontender X4 +BS Vdg/eating  EXTREMITIES: Rt Radial site: Rt radial pulse + Marco Antonio's Adam DP/PTs +/palpable NPO > hours  IVF ordered for GFR<60 mL/hr 225 cc/hr x1  Aspirin 81 x1 w/mylanta for GI tolerance  Dr Dailey to consent for [facility]  Dr Escalera for ILR  Labs reviewed  INR pending  ASA 2 Mallampat 2  BR  2.7%  History of Present Illness: 	  79M presented yesterday with palpitation and dizziness. He denied any associated chest pain or dyspnea. The symptoms occurred in the morning while he was walking to the bathroom. He also noted a brief loss of vision which resolved spontaneously. He denied any loss of consciousness. He denied any similar symptoms in the past and is unaware of any aggravating or relieving factors. The patient was placed under observation in the emergency department. He denied any fevers, chills, or sick contacts. Telemetry monitoring noted episode of ventricular tachycardia and the patient was thought to require admission to the hospital for further management. The patient noted episodes of pedal edema which improved with positioning. He denied any associated dyspnea on exertion or orthopnea.  Assessment and Plan:  In summary, GUIDO MATUTE is an 79y Male with past medical history significant for HTN, HLD, CAD s/p PCI LAD/LCx 2018 normal EF, DM, prostate CA former smoker who presented to [facility] ED with palpitations and dizziness.  Patient states that he woke ~ 3 am and was walking to the bathroom when he started feeing heart pounding with dizziness with near syncope without LOC chest pain orthopnea or PND.  EKG Sinus tachycardia without ischemic changes.  No evidence of heart failure.  KENTRELL.    ECG: , normal intervals, nl QTc, nl axis.    ECHO:  1. Normal global left ventricular systolic function.   2. Left ventricular ejection fraction, by visual estimation, is 65 to 70%.   3. Mildly increased LV wall thickness.   4. No obvious left ventricle regional wall motion abnormalities visualized.   5. Normal right ventricle size and normal systolic function.   6. The left atrium is normal in size.   7. The right atrium is normal in size.   8. Mild thickening and calcification of the anterior and posterior mitral valve leaflets.   9. Mild aortic valve leaflet calcification. Small, round echodensities on the aortic valve leaflet tips which may be consistent with calcification. No aortic stenosis.  10. Dilated proximal ascending aorta (3.8 cm; 2.1 cm/m^2).  11. Recommend clinical correlation with the above findings.    Stephanie Shankar MD Electronically signed on 5/12/2020 at 2:20:47 PM      Assessment and Plan:  In summary, GUIDO MATUTE is an 79y Male with past medical history significant for CAD s/p PCI LAD/LCx 2018, preserved EF, HTN, HLD, DM, prostate CA, and former smoker who presented to [facility] ED with palpitations and dizziness of unclear etiology.  Patient states that he woke ~ 3 am and was walking to the bathroom when he started feeing heart pounding with dizziness with near syncope without LOC, chest pain, orthopnea, or PND. Patient speaks Bruneian and Djiboutian and some English. He currently feels well with no sx's. Remains in CDU. Telemetry overnight showed 3 beat NSVT at 4:13 AM while sleeping.  2D echo here showed EF remains preserved at 65-70%. Electrolytes K and Mg WNL. Renal function improved.    1. Recommend cardiac cath to re-evaluate coronary anatomy given evidence of NSVT, normal EF, normal electrolytes in setting of dizziness, palpitations, near syncope of unclear etiology with h/o CAD s/p prior PCI LAD/LCX.  2. If cardiac cath is negative, then recommend implantable loop recorder for long-term symptom rhythm correlation.  3. Continue Bblocker.  4. Discussed plan with patient and Dr. Knott, Dr. Fine, and Dr. Dailey.          REVIEW OF SYSTEMS:  Denies SOB, CP, NV, HA, dizziness, palpitations, site pain    PHYSICAL EXAM: A&Ox3 NAD Skin warm and dry  NEURO: Speech intact +gag +swallow Tongue midline GEORGE  NECK: No JVD, trachea midline. Eupneic  HEART: RRR S1S2 no g/m SR on tele  PLMONARY:  CTA adam  ABDOMEN: Soft nontender X4 +BS Vdg/eating  EXTREMITIES: Rt Radial site: Rt radial pulse + Marco Antonio's Adam DP/PTs +/palpable NPO > hours  IVF ordered for GFR<60 mL/hr 225 cc/hr x1  Aspirin 81 x1 w/mylanta for GI tolerance  Dr Dailey to consent for [facility]  Dr Escalera for ILR  Labs reviewed  INR pending  ASA 2 Mallampat 2  BR  2.7%  History of Present Illness: 	  79M presented yesterday with palpitation and dizziness. He denied any associated chest pain or dyspnea. The symptoms occurred in the morning while he was walking to the bathroom. He also noted a brief loss of vision which resolved spontaneously. He denied any loss of consciousness. He denied any similar symptoms in the past and is unaware of any aggravating or relieving factors. The patient was placed under observation in the emergency department. He denied any fevers, chills, or sick contacts. Telemetry monitoring noted episode of ventricular tachycardia and the patient was thought to require admission to the hospital for further management. The patient noted episodes of pedal edema which improved with positioning. He denied any associated dyspnea on exertion or orthopnea.  Assessment and Plan:  In summary, GUIDO MATUTE is an 79y Male with past medical history significant for HTN, HLD, CAD s/p PCI LAD/LCx 2018 normal EF, DM, prostate CA former smoker who presented to [facility] ED with palpitations and dizziness.  Patient states that he woke ~ 3 am and was walking to the bathroom when he started feeing heart pounding with dizziness with near syncope without LOC chest pain orthopnea or PND.  EKG Sinus tachycardia without ischemic changes.  No evidence of heart failure.  KENTRELL.    ECG: , normal intervals, nl QTc, nl axis.    ECHO:  1. Normal global left ventricular systolic function.   2. Left ventricular ejection fraction, by visual estimation, is 65 to 70%.   3. Mildly increased LV wall thickness.   4. No obvious left ventricle regional wall motion abnormalities visualized.   5. Normal right ventricle size and normal systolic function.   6. The left atrium is normal in size.   7. The right atrium is normal in size.   8. Mild thickening and calcification of the anterior and posterior mitral valve leaflets.   9. Mild aortic valve leaflet calcification. Small, round echodensities on the aortic valve leaflet tips which may be consistent with calcification. No aortic stenosis.  10. Dilated proximal ascending aorta (3.8 cm; 2.1 cm/m^2).  11. Recommend clinical correlation with the above findings.    Stephanie Shankar MD Electronically signed on 5/12/2020 at 2:20:47 PM      Assessment and Plan:  In summary, GUIDO MATUTE is an 79y Male with past medical history significant for CAD s/p PCI LAD/LCx 2018, preserved EF, HTN, HLD, DM, prostate CA, and former smoker who presented to [facility] ED with palpitations and dizziness of unclear etiology.  Patient states that he woke ~ 3 am and was walking to the bathroom when he started feeing heart pounding with dizziness with near syncope without LOC, chest pain, orthopnea, or PND. Patient speaks Afghan and Slovenian and some English. He currently feels well with no sx's. Remains in CDU. Telemetry overnight showed 3 beat NSVT at 4:13 AM while sleeping.  2D echo here showed EF remains preserved at 65-70%. Electrolytes K and Mg WNL. Renal function improved.    1. Recommend cardiac cath to re-evaluate coronary anatomy given evidence of NSVT, normal EF, normal electrolytes in setting of dizziness, palpitations, near syncope of unclear etiology with h/o CAD s/p prior PCI LAD/LCX.  2. If cardiac cath is negative, then recommend implantable loop recorder for long-term symptom rhythm correlation.  3. Continue Bblocker.  4. Discussed plan with patient and Dr. Knott, Dr. Fine, and Dr. Dailey.          REVIEW OF SYSTEMS:  Denies SOB, CP, NV, HA, dizziness, palpitations, site pain    PHYSICAL EXAM: A&Ox3 NAD Skin warm and dry  NEURO: Speech intact +gag +swallow Tongue midline GEORGE  NECK: No JVD, trachea midline. Eupneic  HEART: RRR S1S2 no g/m SR on tele  PLMONARY:  CTA adam  ABDOMEN: Soft nontender X4 +BS Vdg/eating  EXTREMITIES: Rt Radial site: Rt radial pulse + Marco Antonio's Adam DP/PTs +/palpable no edema NPO > hours  IVF ordered for GFR<60 mL/hr 225 cc/hr x1  Aspirin 81 x1 w/mylanta for GI tolerance  Dr Dailey to consent for [facility]  Dr Escalera for ILR  Labs reviewed  INR pending  ASA 2 Mallampat 2  BR  2.7%  History of Present Illness: 	  79M presented yesterday with palpitation and dizziness. He denied any associated chest pain or dyspnea. The symptoms occurred in the morning while he was walking to the bathroom. He also noted a brief loss of vision which resolved spontaneously. He denied any loss of consciousness. He denied any similar symptoms in the past and is unaware of any aggravating or relieving factors. The patient was placed under observation in the emergency department. He denied any fevers, chills, or sick contacts. Telemetry monitoring noted episode of ventricular tachycardia and the patient was thought to require admission to the hospital for further management. The patient noted episodes of pedal edema which improved with positioning. He denied any associated dyspnea on exertion or orthopnea.  Assessment and Plan:  In summary, GUIDO MATUTE is an 79y Male with past medical history significant for HTN, HLD, CAD s/p PCI LAD/LCx 2018 normal EF, DM, prostate CA former smoker who presented to [facility] ED with palpitations and dizziness.  Patient states that he woke ~ 3 am and was walking to the bathroom when he started feeing heart pounding with dizziness with near syncope without LOC chest pain orthopnea or PND.  EKG Sinus tachycardia without ischemic changes.  No evidence of heart failure.  KENTRELL.    ECG: , normal intervals, nl QTc, nl axis.    ECHO:  1. Normal global left ventricular systolic function.   2. Left ventricular ejection fraction, by visual estimation, is 65 to 70%.   3. Mildly increased LV wall thickness.   4. No obvious left ventricle regional wall motion abnormalities visualized.   5. Normal right ventricle size and normal systolic function.   6. The left atrium is normal in size.   7. The right atrium is normal in size.   8. Mild thickening and calcification of the anterior and posterior mitral valve leaflets.   9. Mild aortic valve leaflet calcification. Small, round echodensities on the aortic valve leaflet tips which may be consistent with calcification. No aortic stenosis.  10. Dilated proximal ascending aorta (3.8 cm; 2.1 cm/m^2).  11. Recommend clinical correlation with the above findings.    Stephanie Shankar MD Electronically signed on 5/12/2020 at 2:20:47 PM      Assessment and Plan:  In summary, GUIDO MATUTE is an 79y Male with past medical history significant for CAD s/p PCI LAD/LCx 2018, preserved EF, HTN, HLD, DM, prostate CA, and former smoker who presented to [facility] ED with palpitations and dizziness of unclear etiology.  Patient states that he woke ~ 3 am and was walking to the bathroom when he started feeing heart pounding with dizziness with near syncope without LOC, chest pain, orthopnea, or PND. Patient speaks Finnish and Sammarinese and some English. He currently feels well with no sx's. Remains in CDU. Telemetry overnight showed 3 beat NSVT at 4:13 AM while sleeping.  2D echo here showed EF remains preserved at 65-70%. Electrolytes K and Mg WNL. Renal function improved.    1. Recommend cardiac cath to re-evaluate coronary anatomy given evidence of NSVT, normal EF, normal electrolytes in setting of dizziness, palpitations, near syncope of unclear etiology with h/o CAD s/p prior PCI LAD/LCX.  2. If cardiac cath is negative, then recommend implantable loop recorder for long-term symptom rhythm correlation.  3. Continue Bblocker.  4. Discussed plan with patient and Dr. Knott, Dr. Fine, and Dr. Dailey.          REVIEW OF SYSTEMS:  Denies SOB, CP, NV, HA, dizziness, palpitations, site pain    PHYSICAL EXAM: A&Ox3 NAD Skin warm and dry  NEURO: Speech intact +gag +swallow Tongue midline GEORGE  NECK: No JVD, trachea midline. Eupneic  HEART: RRR S1S2 no g/m SR on tele  PLMONARY:  CTA adam Has dry cough states is from allergies  ABDOMEN: Soft nontender X4 +BS Vdg/eating  EXTREMITIES: Rt Radial site: Rt radial pulse + Marco Antonio's Aadm DP/PTs +/palpable no edema

## 2020-05-14 NOTE — DISCHARGE NOTE NURSING/CASE MANAGEMENT/SOCIAL WORK - NSPROEXTENSIONSOFSELF_GEN_A_NUR
69 yo M with h/o HTN, anxiety, prostate ca. drinks multiple alcoholic drinks daily. mother with colon ca age 50. c/o trouble swallowing. mainly to steaks.  denies trouble with liquids.  has had this for the past 3-4 years.  It happens 1-2 times per month.  he will have to jump up and down to get it to pass.  he has never had EGD before.  Last CN 2013 at [place] 5/16/2013 - CN - good prep. dimin polyp sigmoid s/p hot snare. sigmoid diverticulosis [facility] Path - sigmoid- TA I discussed risks (including but not limited to perforation, bleeding, missed lesions, death), benefits and alternatives of the procedures and he does wish to proceed. none

## 2020-05-14 NOTE — DISCHARGE NOTE PROVIDER - NSDCCPTREATMENT_GEN_ALL_CORE_FT
PRINCIPAL PROCEDURE  Procedure: Left heart cardiac cath  Findings and Treatment: PRINCIPAL PROCEDURE  Procedure: Left heart cardiac cath  Findings and Treatment: patent stents

## 2020-05-14 NOTE — DISCHARGE NOTE PROVIDER - CARE PROVIDER_API CALL
Mike Dailey (MD)  Cardiology; Internal Medicine; Interventional Cardiology  [address] [place] [address]  Phone: [phone]  Fax: [phone]  Follow Up Time: 1 month

## 2020-05-15 VITALS
DIASTOLIC BLOOD PRESSURE: 78 MMHG | SYSTOLIC BLOOD PRESSURE: 162 MMHG | HEART RATE: 68 BPM | RESPIRATION RATE: 16 BRPM | OXYGEN SATURATION: 99 %

## 2020-05-15 LAB — GLUCOSE BLDC GLUCOMTR-MCNC: 126 MG/DL — HIGH (ref 70–99)

## 2020-05-15 RX ORDER — LOSARTAN POTASSIUM 100 MG/1
50 TABLET, FILM COATED ORAL DAILY
Refills: 0 | Status: DISCONTINUED | OUTPATIENT
Start: 2020-05-15 | End: 2020-05-15

## 2020-05-15 RX ORDER — ALPRAZOLAM 0.25 MG
0.25 TABLET ORAL ONCE
Refills: 0 | Status: DISCONTINUED | OUTPATIENT
Start: 2020-05-15 | End: 2020-05-15

## 2020-05-15 RX ADMIN — Medication 50 MILLIGRAM(S): at 04:53

## 2020-05-15 RX ADMIN — LOSARTAN POTASSIUM 50 MILLIGRAM(S): 100 TABLET, FILM COATED ORAL at 08:09

## 2020-05-15 RX ADMIN — Medication 0.1 MILLIGRAM(S): at 04:53

## 2020-05-15 RX ADMIN — Medication 0.25 MILLIGRAM(S): at 08:53

## 2020-05-15 RX ADMIN — PANTOPRAZOLE SODIUM 40 MILLIGRAM(S): 20 TABLET, DELAYED RELEASE ORAL at 04:53

## 2020-05-15 NOTE — PROGRESS NOTE ADULT - SUBJECTIVE AND OBJECTIVE BOX
Decision made in conjunction with family (daughter RN in ER) and patient to remain hospitalized overnight for elevated BP.  Called by GUS Guido for patient's BP:181/95 two hours before discharge.  Patient received his BP meds in cath recovery, except his ACEI due reduced CrCl.  Patient remained asymptomatic overnight and family agreed to keep patient for safety reasons in observation.

## 2020-05-15 NOTE — CHART NOTE - NSCHARTNOTEFT_GEN_A_CORE
79M who presented with palpitations and dizziness with CT of the head negative for acute intracranial pathology and telemetry noting NSVT. Echocardiogram noted normal global left ventricular systolic function and dilated proximal ascending aorta. He underwent cardiac catheterization noting patent stents. He subsequently had implantation of a loop recorder. The patient was monitored overnight due to elevated blood pressure.    He was discharged home prior to my examination. Review of the records noted that he was evaluated by Cardiology. His blood pressure had improved and instructions were given for further follow up with Cardiology as well as his primary care physician to monitor his renal function.

## 2020-05-15 NOTE — PROGRESS NOTE ADULT - ASSESSMENT
79y Male with past medical history significant for CAD s/p PCI LAD/LCx 2018, preserved EF, HTN, HLD, DM, prostate CA, and former smoker who presented to [facility] ED with palpitations and dizziness of unclear etiology.  2D echo here showed EF remains preserved at 65-70%. Electrolytes K and Mg WNL. Renal function improved.  s/p cath for medical management    CAD  - Continue Plavix    HTN  - BP elevated this AM  - Increase clonidine to 0.2 mg BID'  - Increase losartan to 100 mg daily  - continue metoprolol    Dyslipidemia  - Continue statin    dizziness  - s/p ILR  - bradycardia on tele likely related to meds    If BP controlled today, can d/c from cardiac standpoint
LHC for near syncope with documented NSVT ST/SB on tele  ILR may be considered post cath

## 2020-05-15 NOTE — PROGRESS NOTE ADULT - SUBJECTIVE AND OBJECTIVE BOX
[facility], [facility]                              [address] [address]                                                 PHONE: [phone]                                                 FAX: [phone]  -----------------------------------------------------------------------------------------------  Pt seen and examined. FU for HTN    Overnight events/Complaints: Pt without complains. Laying in bed comfortably. BP elevated this AM.    Vital Signs Last 24 Hrs  T(C): 36.7 (15 May 2020 08:46), Max: 37 (14 May 2020 21:35)  T(F): 98.1 (15 May 2020 08:46), Max: 98.6 (14 May 2020 21:35)  HR: 68 (15 May 2020 08:46) (53 - 84)  BP: 177/84 (15 May 2020 08:46) (133/64 - 183/85)  BP(mean): --  RR: 16 (15 May 2020 08:46) (12 - 19)  SpO2: 98% (15 May 2020 08:46) (98% - 100%)  I&O's Summary    14 May 2020 07:01  -  15 May 2020 07:00  --------------------------------------------------------  IN: 1200 mL / OUT: 1200 mL / NET: 0 mL      PHYSICAL EXAM:  Constitutional: Appears well developed, well nourished; alert and co-operative  HEENT:     Head: Normocephalic and atraumatic  Neck: supple. No JVD  Cardiovascular: regular S1 S2  Respiratory: Lungs clear to auscultation; no crepitations, no wheeze  Gastrointestinal:  Soft, Non-tender, + BS	  Musculoskeletal: Normal range of motion. No edema  Skin: Warm and dry. No cyanosis . No diaphoresis.  Neurologic: Alert oriented to time place and person. Normal strength and no tremor.        LABS:    05-14    137  |  104  |  25.0<H>  ----------------------------<  126<H>  4.7   |  21.0<L>  |  1.52<H>    Ca    9.3      14 May 2020 06:28  Mg     2.1     05-14    PT/INR - ( 14 May 2020 09:34 )   PT: 11.2 sec;   INR: 0.99 ratio      PTT - ( 14 May 2020 09:34 )  PTT:34.2 sec    RADIOLOGY & ADDITIONAL STUDIES: (reviewed)  CXR was independently reviewed and demonstrated: clear lungs    CARDIOLOGY TESTING:(reviewed)     TELEMETRY was independently reviewed and demonstrated : sinus bradycardia    ECHOCARDIOGRAM was independently reviewed and demonstrated :   < from: TTE Echo Complete w/o Doppler (05.12.20 @ 10:37) >  Summary:   1. Normal global left ventricular systolic function.   2. Left ventricular ejection fraction, by visual estimation, is 65 to 70%.   3. Mildly increased LV wall thickness.   4. No obvious left ventricle regional wall motion abnormalities visualized.   5. Normal right ventricle size and normal systolic function.   6. The left atrium is normal in size.   7. The right atrium is normal in size.   8. Mild thickening and calcification of the anterior and posterior mitral valve leaflets.   9. Mild aortic valve leaflet calcification. Small, round echodensities on the aortic valve leaflet tips which may be consistent with calcification. No aortic stenosis.  10. Dilated proximal ascending aorta (3.8 cm; 2.1 cm/m^2).  11. Recommend clinical correlation with the above findings.    Stephanie Shankar MD Electronically signed on 5/12/2020 at 2:20:47 PM    < end of copied text >      CARDIAC CATH:    < from: Cardiac Cath Lab - Adult (05.14.20 @ 11:18) >  INTERVENTIONAL IMPRESSIONS: Patent mLAD stent.  Patent OM1 stent.  Mild mRCA stenosis  INTERVENTIONAL RECOMMENDATIONS: 150mL of NS for four hours  Continue medical management and lifestyle modification for CAD  Prepared and signed by  Mike Dailey MD  Signed 05/14/2020 11:58:00      MEDICATIONS:(reviewed)  MEDICATIONS  (STANDING):  atorvastatin 40 milliGRAM(s) Oral at bedtime  chlorhexidine 4% Liquid 1 Application(s) Topical once  cloNIDine 0.1 milliGRAM(s) Oral daily  clopidogrel Tablet 75 milliGRAM(s) Oral daily  dextrose 5%. 1000 milliLiter(s) (50 mL/Hr) IV Continuous <Continuous>  dextrose 50% Injectable 12.5 Gram(s) IV Push once  dextrose 50% Injectable 25 Gram(s) IV Push once  dextrose 50% Injectable 25 Gram(s) IV Push once  heparin   Injectable 5000 Unit(s) SubCutaneous every 8 hours  insulin lispro (HumaLOG) corrective regimen sliding scale   SubCutaneous three times a day before meals  insulin lispro (HumaLOG) corrective regimen sliding scale   SubCutaneous at bedtime  losartan 50 milliGRAM(s) Oral daily  metoprolol tartrate 50 milliGRAM(s) Oral every 8 hours  montelukast 10 milliGRAM(s) Oral daily  pantoprazole    Tablet 40 milliGRAM(s) Oral before breakfast  sodium chloride 0.9%. 1000 milliLiter(s) (150 mL/Hr) IV Continuous <Continuous>

## 2020-06-02 PROCEDURE — 80048 BASIC METABOLIC PNL TOTAL CA: CPT

## 2020-06-02 PROCEDURE — 83880 ASSAY OF NATRIURETIC PEPTIDE: CPT

## 2020-06-02 PROCEDURE — 85730 THROMBOPLASTIN TIME PARTIAL: CPT

## 2020-06-02 PROCEDURE — 93005 ELECTROCARDIOGRAM TRACING: CPT

## 2020-06-02 PROCEDURE — 36415 COLL VENOUS BLD VENIPUNCTURE: CPT

## 2020-06-02 PROCEDURE — 96361 HYDRATE IV INFUSION ADD-ON: CPT | Mod: XU

## 2020-06-02 PROCEDURE — 99285 EMERGENCY DEPT VISIT HI MDM: CPT | Mod: 25

## 2020-06-02 PROCEDURE — 87635 SARS-COV-2 COVID-19 AMP PRB: CPT

## 2020-06-02 PROCEDURE — 33285 INSJ SUBQ CAR RHYTHM MNTR: CPT

## 2020-06-02 PROCEDURE — 83735 ASSAY OF MAGNESIUM: CPT

## 2020-06-02 PROCEDURE — 84484 ASSAY OF TROPONIN QUANT: CPT

## 2020-06-02 PROCEDURE — 70551 MRI BRAIN STEM W/O DYE: CPT

## 2020-06-02 PROCEDURE — 85379 FIBRIN DEGRADATION QUANT: CPT

## 2020-06-02 PROCEDURE — 85027 COMPLETE CBC AUTOMATED: CPT

## 2020-06-02 PROCEDURE — C1764: CPT

## 2020-06-02 PROCEDURE — C1769: CPT

## 2020-06-02 PROCEDURE — 93458 L HRT ARTERY/VENTRICLE ANGIO: CPT

## 2020-06-02 PROCEDURE — 85610 PROTHROMBIN TIME: CPT

## 2020-06-02 PROCEDURE — 70450 CT HEAD/BRAIN W/O DYE: CPT

## 2020-06-02 PROCEDURE — C1887: CPT

## 2020-06-02 PROCEDURE — 82962 GLUCOSE BLOOD TEST: CPT

## 2020-06-02 PROCEDURE — 87086 URINE CULTURE/COLONY COUNT: CPT

## 2020-06-02 PROCEDURE — 80053 COMPREHEN METABOLIC PANEL: CPT

## 2020-06-02 PROCEDURE — C1894: CPT

## 2020-06-02 PROCEDURE — 96360 HYDRATION IV INFUSION INIT: CPT

## 2020-06-02 PROCEDURE — 71045 X-RAY EXAM CHEST 1 VIEW: CPT

## 2020-06-02 PROCEDURE — 93307 TTE W/O DOPPLER COMPLETE: CPT

## 2020-06-02 PROCEDURE — G0378: CPT

## 2020-06-02 PROCEDURE — 83690 ASSAY OF LIPASE: CPT

## 2020-06-02 PROCEDURE — 99152 MOD SED SAME PHYS/QHP 5/>YRS: CPT

## 2020-06-02 PROCEDURE — 81001 URINALYSIS AUTO W/SCOPE: CPT

## 2020-06-22 ENCOUNTER — OUTPATIENT (OUTPATIENT)
Dept: OUTPATIENT SERVICES | Facility: HOSPITAL | Age: 80
LOS: 1 days | End: 2020-06-22
Payer: MEDICARE

## 2020-06-22 ENCOUNTER — APPOINTMENT (OUTPATIENT)
Dept: ULTRASOUND IMAGING | Facility: CLINIC | Age: 80
End: 2020-06-22
Payer: MEDICARE

## 2020-06-22 DIAGNOSIS — Z90.79 ACQUIRED ABSENCE OF OTHER GENITAL ORGAN(S): Chronic | ICD-10-CM

## 2020-06-22 DIAGNOSIS — Z98.890 OTHER SPECIFIED POSTPROCEDURAL STATES: Chronic | ICD-10-CM

## 2020-06-22 DIAGNOSIS — Z95.5 PRESENCE OF CORONARY ANGIOPLASTY IMPLANT AND GRAFT: Chronic | ICD-10-CM

## 2020-06-22 DIAGNOSIS — N18.3 CHRONIC KIDNEY DISEASE, STAGE 3 (MODERATE): ICD-10-CM

## 2020-06-22 DIAGNOSIS — K40.90 UNILATERAL INGUINAL HERNIA, WITHOUT OBSTRUCTION OR GANGRENE, NOT SPECIFIED AS RECURRENT: Chronic | ICD-10-CM

## 2020-06-22 PROCEDURE — 76770 US EXAM ABDO BACK WALL COMP: CPT | Mod: 26

## 2020-06-22 PROCEDURE — 76770 US EXAM ABDO BACK WALL COMP: CPT

## 2020-07-07 NOTE — DISCHARGE NOTE ADULT - NURSING SECTION COMPLETE
Pt given verbal and written d/c instructions. Ambulated from ED in NAD.
Patient/Caregiver provided printed discharge information.

## 2021-02-12 NOTE — ED ADULT TRIAGE NOTE - NSWEIGHTCALCTOOLDRUG_GEN_A_CORE
SURGERY POST-OP NOTE:    S: Patient underwent and tolerated procedure without issue and sent to PACU. Patient denies chest pain, shortness of breath, nausea, vomiting, lightheadedness, or dizziness. Reports pain was well controlled. Denies flatus or BM. Has not had anything to eat or drink yet.     O:  T(C): 36.8 (02-12-21 @ 16:00), Max: 36.8 (02-12-21 @ 16:00)  HR: 56 (02-12-21 @ 15:30) (52 - 65)  BP: 146/86 (02-12-21 @ 16:30) (122/66 - 146/86)  RR: 14 (02-12-21 @ 16:30) (14 - 17)  SpO2: 99% (02-12-21 @ 16:30) (93% - 99%)  Wt(kg): --                        8.3    9.25  )-----------( 181      ( 12 Feb 2021 13:34 )             29.5        02-12    141  |  105  |  22  ----------------------------<  206<H>  3.8   |  25  |  1.03    Ca    8.8      12 Feb 2021 13:34  Phos  4.0     02-12  Mg     1.8     02-12      Gen: NAD, resting in bed, alert and responding appropriately  Resp: Non-labored respirations on RA  Abd: Soft, nontender, nondistended  :  support on. Wound covered with dressing c/d/i. Ely in place with vini urine. RICO bulb in place to suction with s/s output  Ext: Grossly moving all extremities    Assessment/Plan:  55y Male now POD#0 s/p excision of scrotal mass, open appendectomy, and open right inguinal hernia repair     - Pain control  - Regular diet  - Continue Ely  - Strict I&Os  - Miralax & Senna  - DVT ppx: Lovenox  - Incentive spirometry    Dispo: Floor  used

## 2021-07-06 ENCOUNTER — APPOINTMENT (OUTPATIENT)
Age: 81
End: 2021-07-06

## 2021-07-10 ENCOUNTER — APPOINTMENT (OUTPATIENT)
Age: 81
End: 2021-07-10
Payer: MEDICARE

## 2021-07-10 PROCEDURE — 0001A: CPT

## 2021-07-31 ENCOUNTER — APPOINTMENT (OUTPATIENT)
Age: 81
End: 2021-07-31
Payer: MEDICARE

## 2021-07-31 PROCEDURE — 0002A: CPT

## 2021-09-07 NOTE — ED PROVIDER NOTE - DISPOSITION TYPE
Price (Do Not Change): 0.00 Instructions: This plan will send the code FBSD to the PM system.  DO NOT or CHANGE the price. Detail Level: Simple ADMIT

## 2021-10-19 ENCOUNTER — EMERGENCY (EMERGENCY)
Facility: HOSPITAL | Age: 81
LOS: 1 days | Discharge: HOME | End: 2021-10-19
Attending: EMERGENCY MEDICINE
Payer: MEDICARE

## 2021-10-19 VITALS
DIASTOLIC BLOOD PRESSURE: 89 MMHG | HEIGHT: 64 IN | OXYGEN SATURATION: 98 % | WEIGHT: 156.09 LBS | SYSTOLIC BLOOD PRESSURE: 161 MMHG | TEMPERATURE: 98 F | RESPIRATION RATE: 20 BRPM | HEART RATE: 20 BPM

## 2021-10-19 VITALS
SYSTOLIC BLOOD PRESSURE: 177 MMHG | DIASTOLIC BLOOD PRESSURE: 89 MMHG | RESPIRATION RATE: 16 BRPM | OXYGEN SATURATION: 99 % | TEMPERATURE: 98 F

## 2021-10-19 DIAGNOSIS — Z98.890 OTHER SPECIFIED POSTPROCEDURAL STATES: Chronic | ICD-10-CM

## 2021-10-19 DIAGNOSIS — Z90.79 ACQUIRED ABSENCE OF OTHER GENITAL ORGAN(S): Chronic | ICD-10-CM

## 2021-10-19 DIAGNOSIS — Z95.5 PRESENCE OF CORONARY ANGIOPLASTY IMPLANT AND GRAFT: Chronic | ICD-10-CM

## 2021-10-19 DIAGNOSIS — K40.90 UNILATERAL INGUINAL HERNIA, WITHOUT OBSTRUCTION OR GANGRENE, NOT SPECIFIED AS RECURRENT: Chronic | ICD-10-CM

## 2021-10-19 LAB
ALBUMIN SERPL ELPH-MCNC: 4.1 G/DL — SIGNIFICANT CHANGE UP (ref 3.3–5.2)
ALP SERPL-CCNC: 106 U/L — SIGNIFICANT CHANGE UP (ref 40–120)
ALT FLD-CCNC: 19 U/L — SIGNIFICANT CHANGE UP
ANION GAP SERPL CALC-SCNC: 15 MMOL/L — SIGNIFICANT CHANGE UP (ref 5–17)
ANISOCYTOSIS BLD QL: SLIGHT — SIGNIFICANT CHANGE UP
AST SERPL-CCNC: 24 U/L — SIGNIFICANT CHANGE UP
BASOPHILS # BLD AUTO: 0.06 K/UL — SIGNIFICANT CHANGE UP (ref 0–0.2)
BASOPHILS NFR BLD AUTO: 0.8 % — SIGNIFICANT CHANGE UP (ref 0–2)
BILIRUB SERPL-MCNC: 0.8 MG/DL — SIGNIFICANT CHANGE UP (ref 0.4–2)
BUN SERPL-MCNC: 23.6 MG/DL — HIGH (ref 8–20)
BURR CELLS BLD QL SMEAR: PRESENT — SIGNIFICANT CHANGE UP
CALCIUM SERPL-MCNC: 9 MG/DL — SIGNIFICANT CHANGE UP (ref 8.6–10.2)
CHLORIDE SERPL-SCNC: 93 MMOL/L — LOW (ref 98–107)
CO2 SERPL-SCNC: 22 MMOL/L — SIGNIFICANT CHANGE UP (ref 22–29)
CREAT SERPL-MCNC: 1.57 MG/DL — HIGH (ref 0.5–1.3)
ELLIPTOCYTES BLD QL SMEAR: SLIGHT — SIGNIFICANT CHANGE UP
EOSINOPHIL # BLD AUTO: 0 K/UL — SIGNIFICANT CHANGE UP (ref 0–0.5)
EOSINOPHIL NFR BLD AUTO: 0 % — SIGNIFICANT CHANGE UP (ref 0–6)
GLUCOSE SERPL-MCNC: 167 MG/DL — HIGH (ref 70–99)
HCT VFR BLD CALC: 38.5 % — LOW (ref 39–50)
HGB BLD-MCNC: 13.2 G/DL — SIGNIFICANT CHANGE UP (ref 13–17)
HYPOCHROMIA BLD QL: SLIGHT — SIGNIFICANT CHANGE UP
LYMPHOCYTES # BLD AUTO: 1.57 K/UL — SIGNIFICANT CHANGE UP (ref 1–3.3)
LYMPHOCYTES # BLD AUTO: 19.8 % — SIGNIFICANT CHANGE UP (ref 13–44)
MACROCYTES BLD QL: SLIGHT — SIGNIFICANT CHANGE UP
MAGNESIUM SERPL-MCNC: 1.9 MG/DL — SIGNIFICANT CHANGE UP (ref 1.6–2.6)
MANUAL SMEAR VERIFICATION: SIGNIFICANT CHANGE UP
MCHC RBC-ENTMCNC: 25 PG — LOW (ref 27–34)
MCHC RBC-ENTMCNC: 34.3 GM/DL — SIGNIFICANT CHANGE UP (ref 32–36)
MCV RBC AUTO: 73.1 FL — LOW (ref 80–100)
MONOCYTES # BLD AUTO: 0.75 K/UL — SIGNIFICANT CHANGE UP (ref 0–0.9)
MONOCYTES NFR BLD AUTO: 9.5 % — SIGNIFICANT CHANGE UP (ref 2–14)
NEUTROPHILS # BLD AUTO: 5.46 K/UL — SIGNIFICANT CHANGE UP (ref 1.8–7.4)
NEUTROPHILS NFR BLD AUTO: 69 % — SIGNIFICANT CHANGE UP (ref 43–77)
NT-PROBNP SERPL-SCNC: 510 PG/ML — HIGH (ref 0–300)
OVALOCYTES BLD QL SMEAR: SLIGHT — SIGNIFICANT CHANGE UP
PLAT MORPH BLD: NORMAL — SIGNIFICANT CHANGE UP
PLATELET # BLD AUTO: 281 K/UL — SIGNIFICANT CHANGE UP (ref 150–400)
POIKILOCYTOSIS BLD QL AUTO: SLIGHT — SIGNIFICANT CHANGE UP
POLYCHROMASIA BLD QL SMEAR: SLIGHT — SIGNIFICANT CHANGE UP
POTASSIUM SERPL-MCNC: 3.7 MMOL/L — SIGNIFICANT CHANGE UP (ref 3.5–5.3)
POTASSIUM SERPL-SCNC: 3.7 MMOL/L — SIGNIFICANT CHANGE UP (ref 3.5–5.3)
PROT SERPL-MCNC: 7.7 G/DL — SIGNIFICANT CHANGE UP (ref 6.6–8.7)
RBC # BLD: 5.27 M/UL — SIGNIFICANT CHANGE UP (ref 4.2–5.8)
RBC # FLD: 18.8 % — HIGH (ref 10.3–14.5)
RBC BLD AUTO: ABNORMAL
SCHISTOCYTES BLD QL AUTO: SLIGHT — SIGNIFICANT CHANGE UP
SODIUM SERPL-SCNC: 129 MMOL/L — LOW (ref 135–145)
TARGETS BLD QL SMEAR: SLIGHT — SIGNIFICANT CHANGE UP
TROPONIN T SERPL-MCNC: <0.01 NG/ML — SIGNIFICANT CHANGE UP (ref 0–0.06)
TROPONIN T SERPL-MCNC: <0.01 NG/ML — SIGNIFICANT CHANGE UP (ref 0–0.06)
VARIANT LYMPHS # BLD: 0.9 % — SIGNIFICANT CHANGE UP (ref 0–6)
WBC # BLD: 7.91 K/UL — SIGNIFICANT CHANGE UP (ref 3.8–10.5)
WBC # FLD AUTO: 7.91 K/UL — SIGNIFICANT CHANGE UP (ref 3.8–10.5)

## 2021-10-19 PROCEDURE — 85025 COMPLETE CBC W/AUTO DIFF WBC: CPT

## 2021-10-19 PROCEDURE — 80053 COMPREHEN METABOLIC PANEL: CPT

## 2021-10-19 PROCEDURE — 36415 COLL VENOUS BLD VENIPUNCTURE: CPT

## 2021-10-19 PROCEDURE — 83735 ASSAY OF MAGNESIUM: CPT

## 2021-10-19 PROCEDURE — 71045 X-RAY EXAM CHEST 1 VIEW: CPT | Mod: 26

## 2021-10-19 PROCEDURE — 93005 ELECTROCARDIOGRAM TRACING: CPT

## 2021-10-19 PROCEDURE — 84484 ASSAY OF TROPONIN QUANT: CPT

## 2021-10-19 PROCEDURE — 93010 ELECTROCARDIOGRAM REPORT: CPT

## 2021-10-19 PROCEDURE — 99284 EMERGENCY DEPT VISIT MOD MDM: CPT | Mod: 25

## 2021-10-19 PROCEDURE — 83880 ASSAY OF NATRIURETIC PEPTIDE: CPT

## 2021-10-19 PROCEDURE — 99285 EMERGENCY DEPT VISIT HI MDM: CPT

## 2021-10-19 PROCEDURE — 71045 X-RAY EXAM CHEST 1 VIEW: CPT

## 2021-10-19 RX ORDER — SODIUM CHLORIDE 9 MG/ML
500 INJECTION INTRAMUSCULAR; INTRAVENOUS; SUBCUTANEOUS ONCE
Refills: 0 | Status: COMPLETED | OUTPATIENT
Start: 2021-10-19 | End: 2021-10-19

## 2021-10-19 RX ORDER — METOPROLOL TARTRATE 50 MG
50 TABLET ORAL ONCE
Refills: 0 | Status: COMPLETED | OUTPATIENT
Start: 2021-10-19 | End: 2021-10-19

## 2021-10-19 RX ORDER — METOPROLOL TARTRATE 50 MG
1 TABLET ORAL
Qty: 60 | Refills: 0
Start: 2021-10-19 | End: 2021-11-17

## 2021-10-19 RX ADMIN — SODIUM CHLORIDE 500 MILLILITER(S): 9 INJECTION INTRAMUSCULAR; INTRAVENOUS; SUBCUTANEOUS at 10:44

## 2021-10-19 RX ADMIN — Medication 50 MILLIGRAM(S): at 13:40

## 2021-10-19 NOTE — CONSULT NOTE ADULT - SUBJECTIVE AND OBJECTIVE BOX
[facility], [facility]                                                    [address], [address], [place] [address]                                                         PHONE: [phone]    FAX: [phone] [address], [address], [place] [address]                                                 PHONE: [phone]    FAX: [phone]  *******************************************************************************    Reason for Consult: Palpitations    HPI:  GUIDO MATUTE is a 80y Male with h/o CAD s/p PCI LAD/OM1 '18 with repeat cardiac catheterization in 5/20 demonstrating patent stents with mild, non-obstructive CAD, normal LV fxn, ILR placed 5/20, SVT, DM, HTN, HL a/w heart racing associated with presyncope.  Patient was having some palpitations the day prior.  No chest pain, SOB, syncope, orthopnea, PND, LE edema.  No fever, chills or cough.  Daughter at the bedside helped to translate.    PAST MEDICAL & SURGICAL HISTORY:  Hypertension    Hyperlipidemia    Diabetes mellitus    Cancer  protate    Other hemorrhoids    Asthma, mild intermittent, uncomplicated    Sickle cell anemia  Carrier of the disease, presently doesn&#x27;t have it    MI (myocardial infarction)    Inguinal hernia    S/P prostatectomy    S/P primary angioplasty with coronary stent    H/O colonoscopy  Normal as per patient 3 years ago.        aspirin (Stomach Upset)  EGGPLANT (Hives)  No Known Drug Allergies  strawberry (Hives)      MEDICATIONS  (STANDING):    MEDICATIONS  (PRN):      Social History: no active tobacco / EtOH / IVDA    Family History: Family history of prostate cancer    Family history of essential hypertension    Family history of heart disease    Family history of MI (myocardial infarction)        ROS: As noted above, otherwise unremarkable.    Vital Signs Last 24 Hrs  T(C): 36.8 (19 Oct 2021 16:00), Max: 36.8 (19 Oct 2021 16:00)  T(F): 98.2 (19 Oct 2021 16:00), Max: 98.2 (19 Oct 2021 16:00)  HR: 78 (19 Oct 2021 11:22) (20 - 78)  BP: 177/89 (19 Oct 2021 16:00) (161/89 - 177/89)  BP(mean): --  RR: 16 (19 Oct 2021 16:00) (16 - 20)  SpO2: 99% (19 Oct 2021 16:00) (98% - 100%)    I&O's Detail    I&O's Summary          PHYSICAL EXAM:  General: Appears well developed, well nourished, no acute distress  HEENT: Head: normocephalic, atraumatic  Eyes: Pupils equal and reactive  Neck: Supple, no carotid bruit, no JVD, no HJR  CARDIOVASCULAR: Normal S1 and S2, no murmur, rub, or gallop  LUNGS: Clear to auscultation bilaterally, no rales, rhonchi or wheeze  ABDOMEN: Soft, nontender, non-distended, positive bowel sounds, no mass or bruit  EXTREMITIES: No edema, distal pulses WNL  SKIN: Warm and dry with normal turgor  NEURO: Alert & oriented x 3, grossly intact  PSYCH: normal mood and affect    LABS:                        13.2   7.91  )-----------( 281      ( 19 Oct 2021 09:42 )             38.5     10-19    129<L>  |  93<L>  |  23.6<H>  ----------------------------<  167<H>  3.7   |  22.0  |  1.57<H>    Ca    9.0      19 Oct 2021 09:42  Mg     1.9     10-19    TPro  7.7  /  Alb  4.1  /  TBili  0.8  /  DBili  x   /  AST  24  /  ALT  19  /  AlkPhos  106  10-19    CARDIAC MARKERS ( 19 Oct 2021 13:57 )  x     / <0.01 ng/mL / x     / x     / x      CARDIAC MARKERS ( 19 Oct 2021 09:42 )  x     / <0.01 ng/mL / x     / x     / x                RADIOLOGY & ADDITIONAL STUDIES:    ECG: NSR @ 99 bpm, no acute ST changes    ECHO (5/12/20):  PHYSICIAN INTERPRETATION:  Left Ventricle: The left ventricular internal cavity size is normal. Left ventricular wall thickness is mildly increased.  Global LV systolic function was normal. Left ventricular ejection fraction, by visual estimation, is 65 to 70%. No obvious left ventricle regional wall motion abnormalities visualized.  Right Ventricle: The right ventricular size is normal. RV systolic function is normal.  Left Atrium: The left atrium is normal in size.  Right Atrium: The right atrium is normal in size.  Pericardium: There is no evidence of pericardial effusion.  Mitral Valve: Mild thickening and calcification of the anterior and posterior mitral valve leaflets. Trace mitral valve regurgitation is seen.  Tricuspid Valve: The tricuspid valve is normal in structure. Trivial tricuspid regurgitation is visualized.  Aortic Valve: Mild aortic valve leaflet calcification. Small, round echodensities on the aortic valve leaflet tips which may be consistent with calcification. No aortic stenosis.  Pulmonic Valve: The pulmonic valve is normal.  Aorta: The aortic root is normal in size and structure. Dilated proximal ascending aorta (3.8 cm; 2.1 cm/m^2).  Venous: The inferior vena cava was normal sized, with respiratory size variation greater than 50%.  Summary:   1. Normal global left ventricular systolic function.   2. Left ventricular ejection fraction, by visual estimation, is 65 to 70%.   3. Mildly increased LV wall thickness.   4. No obvious left ventricle regional wall motion abnormalities visualized.   5. Normal right ventricle size and normal systolic function.   6. The left atrium is normal in size.   7. The right atrium is normal in size.   8. Mild thickening and calcification of the anterior and posterior mitral valve leaflets.   9. Mild aortic valve leaflet calcification. Small, round echodensities on the aortic valve leaflet tips which may be consistent with calcification. No aortic stenosis.  10. Dilated proximal ascending aorta (3.8 cm; 2.1 cm/m^2).  11. Recommend clinical correlation with the above findings.      CARDIAC CATHETERIZATION (5/14/20):  HEMODYNAMICS: Hemodynamic assessment demonstrates normal LVEDP.  VENTRICLES: No left ventriculogram was performed. Ventriculogram not done  due to GFR <60  CORONARY VESSELS: The coronary circulation is right dominant.  LM:   --  Ostial LM: Angiography showed minor luminal irregularities with  no flow limiting lesions.  --  Proximal left main: Angiography showed minor luminal irregularities  with no flow limiting lesions.  --  Mid left main: Angiography showed minor luminal irregularities with no  flow limiting lesions.  LAD:   --  Proximal LAD: Angiography showed minor luminal irregularities  with no flow limiting lesions.  --  Mid LAD: There was a 0 % stenosis at the site of a prior stent.  --  Distal LAD: Angiography showed minor luminal irregularities with no  flow limiting lesions.  --  D1: Angiography showed minor luminal irregularities with no flow  limiting lesions.  --  D2: Angiography showed minor luminal irregularities with no flow  limiting lesions.  --  D3: Angiography showed minor luminal irregularities with no flow  limiting lesions.  CX:   --  Proximal circumflex: Angiography showed minor luminal  irregularities with no flow limiting lesions.  --  Mid circumflex: There was a diffuse 20 % stenosis.  --  OM1: There was a 10 % stenosis at the site of a prior stent.  RCA:   --  Proximal RCA: Angiography showed minor luminal irregularities  with no flow limiting lesions.  --  Mid RCA: There was a diffuse 20 % stenosis.  --  Distal RCA: Angiography showed minor luminal irregularities with no  flow limiting lesions.  --  RPDA: Angiography showed minor luminal irregularities with no flow  limiting lesions.  --  RPLS: Angiography showed minor luminal irregularities with no flow  limiting lesions.  COMPLICATIONS: There were no complications. No complications occurred  during the cath lab visit.  DIAGNOSTIC IMPRESSIONS: Patent mLAD stent.  Patent OM1 stent.  Mild mRCA stenosis  DIAGNOSTIC RECOMMENDATIONS: 150mL of NS for four hours  Continue medical management and lifestyle modification for CAD  INTERVENTIONAL IMPRESSIONS: Patent mLAD stent.  Patent OM1 stent.  Mild mRCA stenosis  INTERVENTIONAL RECOMMENDATIONS: 150mL of NS for four hours  Continue medical management and lifestyle modification for CAD        Assessment and Plan:  In summary, GUIDO MATUTE is a 80M a/w palpitations, 2 brief runs of tachycardia to 150s noted on ILR interrogation, h/o CAD s/p PCI LAD/OM1 '18 with repeat cardiac catheterization in 5/20 demonstrating patent stents with mild, non-obstructive CAD, normal LV fxn, ILR placed 5/20, SVT, DM, HTN, HL     - Stable cardiovascular status, no evidence of ischemia or CHF clinically, troponins negative, no chest pain or acute ischemic EKG changes.  Continue medical management of known severe CAD.  Outpatient ischemic evaluation (i.e. stress test) can be performed for further risk stratification.  Risks, benefits & alternatives discussed with the patient and his daughter.  They demonstrate understanding and agree with the current treatment plan.  - Cardiac catheterization 5/16/20 demonstrated mLAD 0% stenosis at site of prior stent, mLCx 20% stenosis, OM1 10% stenosis at site of prior stent, mRCA 20% stenosis and other arteries with minor luminal irregularities.  Continue medical management of known severe CAD.  - Echocardiogram 5/14/20 demonstrated normal LV fxn (EF 65-70%), trace MR, trace TR, dilated ascending aorta 3.8 cm  - Rhythm currently stable NSR, s/p 2 brief episodes of tachycardia to 150s noted on interrogation of ILR, BP increased.  Increase Lopressor to 100 bid and continue other anti-HTN medications for now.  Patient to follow-up with his primary cardiologist in [facility] for further evaluation later this week.  Further titration of his medications and treatment can be performed as clinically indicated.  - Continue ASA 81 daily & Lipitor 40 daily  - Plan for discharge home today on increased B-blocker dosing with outpatient follow-up with his primary cardiologist this week.  Please call with any additional cardiovascular questions/issues.    We will follow with you.  Thank you for allowing me to participate in the care of your patient.      Sincerely,    Adán Sanches MD

## 2021-10-19 NOTE — ED PROVIDER NOTE - PATIENT PORTAL LINK FT
You can access the FollowMyHealth Patient Portal offered by [facility] by registering at the following website: [email or web address]. By joining [employer]’s FollowMyHealth portal, you will also be able to view your health information using other applications (apps) compatible with our system.

## 2021-10-19 NOTE — ED ADULT NURSE NOTE - OBJECTIVE STATEMENT
Patient A&O x 3, presenting to the ED with complaints of palpations since yesterday. Patient denied any other symptoms. No distress observed.

## 2021-10-19 NOTE — ED PROVIDER NOTE - PROGRESS NOTE DETAILS
[facility] report that patient had 2 episode of tachycardia this morning, max , appears sinus tachycardia. will consult [facility] I spoke to , given HR and BP stable. recommend to double his metoprolol and follow up his cardiology, otherwise can see the patient in the afternoon. I spoke with family and patient, patient would prefer waiting and staying to see [id number] cards in the afternoon. metoprolol 50 mg PO and repeat trop ordered. patient seen by cardiology. sinus on monitor in 70s. recommend to increase the metoprolol dose. meds to pharmacy.

## 2021-10-19 NOTE — ED ADULT NURSE NOTE - NS ED NOTE ABUSE SUSPICION NEGLECT YN
Bogdan Rao is here for a return obstetrical visit. Today she is 36w0d weeks EGA. She is doing well and has no complaints. She  does not have vaginal bleeding, leaking of fluid, contractions. She does not have blurred vision, SOB, or increased swelling in legs or face. Pt does feel fetal movement regularly. Objective: Mother's Prenatal Vitals  BP: 120/73  Weight: 245 lb (111.1 kg)  Pulse: 95  Patient Position: Sitting  Prenatal Fetal Information  Movement: Present  Pt is A&Ox3, in no acute distress. Normocephalic, atraumatic. PERRL. Resp even and non-labored. Skin pink, warm & dry. Gravid abdomen. VICTOR's well. Gait steady. Assessment:  IUP at 36w0d wks      Diagnosis Orders   1. Encounter for supervision of other normal pregnancy in third trimester     2. 36 weeks gestation of pregnancy     3.  screening for streptococcus B  Culture, Strep B Screen, Vaginal/Rectal     Plan:Pt counseled on GHTN precautions, Kick count and  labor  Continue with routine prenatal care. RTC in 1 wk for prenatal visit    MEDICATIONS:  No orders of the defined types were placed in this encounter.       ORDERS:  Orders Placed This Encounter   Procedures    Culture, Strep B Screen, Vaginal/Rectal No

## 2021-10-19 NOTE — ED PROVIDER NOTE - OBJECTIVE STATEMENT
79 yo M hx of CAD s/p PCI LAD/LCx 2018, preserved EF, HTN, HLD, DM, prostate CA, NSVT s/p ILP and former smoker p/w palpitation and rapid heart beat started around 3 am in the morning that resolved upon arrival. patient report SOB during the episode. No chest pain or sob now. Patient complaint with his med at home. no fever. intermittent cough x 2 week. covid vaccinated in July. no nausea or vomiting. Last stress test about 1 year ago.    cath from 5/14/21: "Patent mLAD stent. Patent OM1 stent. Mild mRCA stenosis"     cards: [facility]

## 2021-10-19 NOTE — CONSULT NOTE ADULT - CONSULT REQUESTED DATE/TIME
[FreeTextEntry1] : Oral NSAIDs and take Nexium , start PT,  if no improvement after 2-4 weeks consider left L5/S1 ILESI
19-Oct-2021 17:15

## 2021-10-19 NOTE — ED PROVIDER NOTE - NS ED ROS FT
Review of Systems  •	CONSTITUTIONAL - no  fever, no diaphoresis, no weight change  •	SKIN - no rash  •	HEMATOLOGIC - no bleeding, no bruising  •	EYES - no eye pain, no blurred vision  •	ENT - no change in hearing, no pain  •	RESPIRATORY - no shortness of breath, no cough  •	CARDIAC - no chest pain, (+)  palpitations  •	GI - no abd pain, no nausea, no vomiting, no diarrhea, no constipation, no bleeding  •	GENITO-URINARY - no discharge, no dysuria; no hematuria,   •	ENDO - no polydipsia, no polyuria, no heat/no cold intolerance  •	MUSCULOSKELETAL - no joint pain, no swelling, no redness  •	NEUROLOGIC - no weakness, no headache, no anesthesia, no paresthesias  •	PSYCH - no anxiety, non suicidal, non homicidal, no hallucination, no depression

## 2021-10-19 NOTE — ED PROVIDER NOTE - CLINICAL SUMMARY MEDICAL DECISION MAKING FREE TEXT BOX
81 yo M p/w palpitation, found to have sinus tachycardia to 150s, non-sustained. trop negative. cxr clear. patient given metoprol 50 mg Po. sinus. seen by cardiology. recommend to increase his beta block. outpatient follow up.

## 2021-10-30 NOTE — ED ADULT NURSE NOTE - FALLEN IN THE PAST
pt will need hemiarthroplasty  will be transferred to [facility] for procedure on monday     team discussed case with Dr. Sarah patrick no

## 2021-11-30 ENCOUNTER — EMERGENCY (EMERGENCY)
Facility: HOSPITAL | Age: 81
LOS: 1 days | Discharge: HOME | End: 2021-11-30
Payer: MEDICARE

## 2021-11-30 VITALS
RESPIRATION RATE: 18 BRPM | HEIGHT: 64 IN | DIASTOLIC BLOOD PRESSURE: 83 MMHG | OXYGEN SATURATION: 96 % | TEMPERATURE: 98 F | SYSTOLIC BLOOD PRESSURE: 190 MMHG | HEART RATE: 63 BPM

## 2021-11-30 DIAGNOSIS — Z90.79 ACQUIRED ABSENCE OF OTHER GENITAL ORGAN(S): Chronic | ICD-10-CM

## 2021-11-30 DIAGNOSIS — Z95.5 PRESENCE OF CORONARY ANGIOPLASTY IMPLANT AND GRAFT: Chronic | ICD-10-CM

## 2021-11-30 DIAGNOSIS — Z98.890 OTHER SPECIFIED POSTPROCEDURAL STATES: Chronic | ICD-10-CM

## 2021-11-30 DIAGNOSIS — K40.90 UNILATERAL INGUINAL HERNIA, WITHOUT OBSTRUCTION OR GANGRENE, NOT SPECIFIED AS RECURRENT: Chronic | ICD-10-CM

## 2021-11-30 PROCEDURE — U0005: CPT

## 2021-11-30 PROCEDURE — U0003: CPT

## 2021-11-30 PROCEDURE — 99282 EMERGENCY DEPT VISIT SF MDM: CPT | Mod: CS

## 2021-11-30 PROCEDURE — 99283 EMERGENCY DEPT VISIT LOW MDM: CPT

## 2021-11-30 NOTE — ED PROVIDER NOTE - IV ALTEPLASE ADMIN OUTSIDE HIDDEN
How Severe Are Your Spot(S)?: moderate What Is The Reason For Today's Visit?: Full Body Skin Examination What Is The Reason For Today's Visit? (Being Monitored For X): the re-examination of lesions previously examined show

## 2021-11-30 NOTE — ED PROVIDER NOTE - CLINICAL SUMMARY MEDICAL DECISION MAKING FREE TEXT BOX
Pt nontoxic appearing, stable vitals, ambulatory with stable saturation without supplemental oxygen. PT does not meet criteria listed in most updated guidelines as per [facility] protocol/algorithm for admission at this time. pt advised about self-quarantine instructions until negative test results and/or symptom resolution. pt advised on hand hygiene, monitoring of symptoms, antipyretic use as well as and fu with primary care provider. Instructions given in pre-printed copy.

## 2021-11-30 NOTE — ED PROVIDER NOTE - PATIENT PORTAL LINK FT
You can access the FollowMyHealth Patient Portal offered by [facility] by registering at the following website: [email or web address]. By joining [employer]’s FollowMyHealth portal, you will also be able to view your health information using other applications (apps) compatible with our system.

## 2021-12-01 ENCOUNTER — TRANSCRIPTION ENCOUNTER (OUTPATIENT)
Age: 81
End: 2021-12-01

## 2021-12-01 LAB — SARS-COV-2 RNA SPEC QL NAA+PROBE: DETECTED

## 2021-12-02 ENCOUNTER — OUTPATIENT (OUTPATIENT)
Dept: OUTPATIENT SERVICES | Facility: HOSPITAL | Age: 81
LOS: 1 days | End: 2021-12-02
Payer: MEDICARE

## 2021-12-02 ENCOUNTER — APPOINTMENT (OUTPATIENT)
Dept: DISASTER EMERGENCY | Facility: HOSPITAL | Age: 81
End: 2021-12-02

## 2021-12-02 VITALS
HEIGHT: 60 IN | TEMPERATURE: 98 F | DIASTOLIC BLOOD PRESSURE: 69 MMHG | SYSTOLIC BLOOD PRESSURE: 166 MMHG | HEART RATE: 64 BPM | RESPIRATION RATE: 18 BRPM | OXYGEN SATURATION: 100 % | WEIGHT: 156.09 LBS

## 2021-12-02 VITALS
OXYGEN SATURATION: 98 % | HEART RATE: 58 BPM | RESPIRATION RATE: 18 BRPM | DIASTOLIC BLOOD PRESSURE: 80 MMHG | SYSTOLIC BLOOD PRESSURE: 136 MMHG | TEMPERATURE: 98 F

## 2021-12-02 DIAGNOSIS — Z90.79 ACQUIRED ABSENCE OF OTHER GENITAL ORGAN(S): Chronic | ICD-10-CM

## 2021-12-02 DIAGNOSIS — K40.90 UNILATERAL INGUINAL HERNIA, WITHOUT OBSTRUCTION OR GANGRENE, NOT SPECIFIED AS RECURRENT: Chronic | ICD-10-CM

## 2021-12-02 DIAGNOSIS — Z98.890 OTHER SPECIFIED POSTPROCEDURAL STATES: Chronic | ICD-10-CM

## 2021-12-02 DIAGNOSIS — U07.1 COVID-19: ICD-10-CM

## 2021-12-02 DIAGNOSIS — Z95.5 PRESENCE OF CORONARY ANGIOPLASTY IMPLANT AND GRAFT: Chronic | ICD-10-CM

## 2021-12-02 PROCEDURE — M0243: CPT

## 2021-12-02 RX ORDER — SODIUM CHLORIDE 9 MG/ML
250 INJECTION INTRAMUSCULAR; INTRAVENOUS; SUBCUTANEOUS
Refills: 0 | Status: COMPLETED | OUTPATIENT
Start: 2021-12-02 | End: 2021-12-02

## 2021-12-02 RX ADMIN — SODIUM CHLORIDE 310 MILLILITER(S): 9 INJECTION INTRAMUSCULAR; INTRAVENOUS; SUBCUTANEOUS at 11:44

## 2021-12-02 NOTE — CHART NOTE - NSCHARTNOTEFT_GEN_A_CORE
CC: Monoclonal Antibody Infusion/COVID 19 Positive       History: Patient presents for infusion of monoclonal antibody infusion. Patient has been screened and was deemed to be a candidate.    Symptoms/ Criteria: Fever, malaise, body aches, HA, loss of taste/ smell, GI Symptoms    Risk Profile includes :Age > 65, DM, HTN,         PMHx:  Infection due to severe acute respiratory syndrome coronavirus 2 (SARS-CoV-2)    Family history of prostate cancer    Family history of essential hypertension    Family history of heart disease    Family history of MI (myocardial infarction)    MEWS Score    Hypertension    Hyperlipidemia    Diabetes mellitus    Cancer    Other hemorrhoids    Asthma, mild intermittent, uncomplicated    Sickle cell anemia    MI (myocardial infarction)    MI (myocardial infarction)    Inguinal hernia    S/P prostatectomy    S/P primary angioplasty with coronary stent    H/O colonoscopy          T(C): 36.8 (12-02-21 @ 11:43), Max: 36.8 (12-02-21 @ 11:43)  HR: 61 (12-02-21 @ 11:43) (61 - 64)  BP: 101/67 (12-02-21 @ 11:43) (101/67 - 166/69)  RR: 18 (12-02-21 @ 11:43) (18 - 18)  SpO2: 99% (12-02-21 @ 11:43) (99% - 100%)      PE:   Appearance: NAD	  HEENT:   Normal oral mucosa.   Lymphatic: No lymphadenopathy  Cardiovascular: Normal S1 S2, No JVD, No murmurs, No edema  Respiratory: Lungs clear to auscultation	  Gastrointestinal:  Soft, Non-tender. No guarding   Skin: warm and dry  Neurologic: Non-focal  Extremities: Normal range of motion.     ASSESSMENT:  Pt is a 80y year old Male Covid +  referred to the infusion center for Monoclonal antibody infusion (Regeneron).  COVID Vaccination Status: Fully vaccinated with Pfizer-last dose 7/2021. Consent obtained via INterpretor -Skip julian-#[id number] (MAHAD machine).      PLAN:  - infusion procedure explained to patient   - Consent for monoclonal antibody infusion obtained   - Risk & benefits discussed/all questions answered  - infuse Regeneron over 21 minutes  - will observe patient for one hour post infusion  and then if stable discharge home with outpt follow up as planned by PMD.    POST INFUSION ASSESSMENT:   DISCHARGE at approximately  1  hour post infusion    - Patient tolerated infusion well denies complaints of chest pain/SOB/dizziness/ palps  - VSS for discharge home  - D/C instructions given/ fact sheet included.  - Patient to follow-up with PCP as needed.

## 2021-12-11 ENCOUNTER — EMERGENCY (EMERGENCY)
Facility: HOSPITAL | Age: 81
LOS: 1 days | Discharge: HOME | End: 2021-12-11
Payer: MEDICARE

## 2021-12-11 VITALS
DIASTOLIC BLOOD PRESSURE: 72 MMHG | HEIGHT: 60 IN | OXYGEN SATURATION: 98 % | HEART RATE: 88 BPM | TEMPERATURE: 98 F | SYSTOLIC BLOOD PRESSURE: 122 MMHG | WEIGHT: 119.93 LBS | RESPIRATION RATE: 70 BRPM

## 2021-12-11 DIAGNOSIS — Z98.890 OTHER SPECIFIED POSTPROCEDURAL STATES: Chronic | ICD-10-CM

## 2021-12-11 DIAGNOSIS — Z95.5 PRESENCE OF CORONARY ANGIOPLASTY IMPLANT AND GRAFT: Chronic | ICD-10-CM

## 2021-12-11 DIAGNOSIS — K40.90 UNILATERAL INGUINAL HERNIA, WITHOUT OBSTRUCTION OR GANGRENE, NOT SPECIFIED AS RECURRENT: Chronic | ICD-10-CM

## 2021-12-11 DIAGNOSIS — Z90.79 ACQUIRED ABSENCE OF OTHER GENITAL ORGAN(S): Chronic | ICD-10-CM

## 2021-12-11 LAB — SARS-COV-2 RNA SPEC QL NAA+PROBE: SIGNIFICANT CHANGE UP

## 2021-12-11 PROCEDURE — U0003: CPT

## 2021-12-11 PROCEDURE — U0005: CPT

## 2021-12-11 PROCEDURE — 99282 EMERGENCY DEPT VISIT SF MDM: CPT | Mod: CS

## 2021-12-11 PROCEDURE — 99283 EMERGENCY DEPT VISIT LOW MDM: CPT

## 2021-12-11 NOTE — ED PROVIDER NOTE - PATIENT PORTAL LINK FT
You can access the FollowMyHealth Patient Portal offered by [facility] by registering at the following website: [email or web address]. By joining [employer]’s FollowMyHealth portal, you will also be able to view your health information using other applications (apps) compatible with our system.

## 2021-12-11 NOTE — ED PROVIDER NOTE - OBJECTIVE STATEMENT
80 yr old M presented to ED for COVID19 testing. Pt states that he was here for COVID19 testing but he did not come in contact with another person with COVID19 infection. Pt denies any fever, chills , body aches or any other symptoms.

## 2021-12-11 NOTE — ED PROVIDER NOTE - CLINICAL SUMMARY MEDICAL DECISION MAKING FREE TEXT BOX
80 yr old M presented to ED for COVID19 testing. Pt states that he was here for COVID19 testing but he did not come in contact with another person with COVID19 infection. Pt denies any fever, chills , body aches or any other symptoms. Pt swabbed and D/C in stable condition

## 2021-12-17 NOTE — ED ADULT NURSE NOTE - CHIEF COMPLAINT QUOTE
I called Aissatou Nascimento and she said she was going to start [place]. Please advise. sweaty cold my b/p is high  denies pain

## 2021-12-20 NOTE — ED PROVIDER NOTE - CPE EDP ENMT NORM
normal... Cheiloplasty (Complex) Text: A decision was made to reconstruct the defect with a  cheiloplasty.  The defect was undermined extensively.  Additional obicularis oris muscle was excised with a 15 blade scalpel.  The defect was converted into a full thickness wedge to facilite a better cosmetic result.  Small vessels were then tied off with 5-0 monocyrl. The obicularis oris, superficial fascia, adipose and dermis were then reapproximated.  After the deeper layers were approximated the epidermis was reapproximated with particular care given to realign the vermilion border.

## 2022-04-13 ENCOUNTER — EMERGENCY (EMERGENCY)
Facility: HOSPITAL | Age: 82
LOS: 1 days | Discharge: HOME | End: 2022-04-13
Attending: EMERGENCY MEDICINE
Payer: MEDICARE

## 2022-04-13 VITALS
RESPIRATION RATE: 20 BRPM | WEIGHT: 145.95 LBS | HEART RATE: 77 BPM | TEMPERATURE: 98 F | DIASTOLIC BLOOD PRESSURE: 83 MMHG | SYSTOLIC BLOOD PRESSURE: 159 MMHG | OXYGEN SATURATION: 98 % | HEIGHT: 60 IN

## 2022-04-13 DIAGNOSIS — Z98.890 OTHER SPECIFIED POSTPROCEDURAL STATES: Chronic | ICD-10-CM

## 2022-04-13 DIAGNOSIS — Z95.5 PRESENCE OF CORONARY ANGIOPLASTY IMPLANT AND GRAFT: Chronic | ICD-10-CM

## 2022-04-13 DIAGNOSIS — K40.90 UNILATERAL INGUINAL HERNIA, WITHOUT OBSTRUCTION OR GANGRENE, NOT SPECIFIED AS RECURRENT: Chronic | ICD-10-CM

## 2022-04-13 DIAGNOSIS — Z90.79 ACQUIRED ABSENCE OF OTHER GENITAL ORGAN(S): Chronic | ICD-10-CM

## 2022-04-13 PROCEDURE — 72192 CT PELVIS W/O DYE: CPT | Mod: MA

## 2022-04-13 PROCEDURE — 72131 CT LUMBAR SPINE W/O DYE: CPT | Mod: MA

## 2022-04-13 PROCEDURE — 99284 EMERGENCY DEPT VISIT MOD MDM: CPT | Mod: 25

## 2022-04-13 PROCEDURE — 99285 EMERGENCY DEPT VISIT HI MDM: CPT

## 2022-04-13 PROCEDURE — 72131 CT LUMBAR SPINE W/O DYE: CPT | Mod: 26,MA

## 2022-04-13 PROCEDURE — 72192 CT PELVIS W/O DYE: CPT | Mod: 26,MA

## 2022-04-13 RX ORDER — OXYCODONE AND ACETAMINOPHEN 5; 325 MG/1; MG/1
1 TABLET ORAL ONCE
Refills: 0 | Status: DISCONTINUED | OUTPATIENT
Start: 2022-04-13 | End: 2022-04-13

## 2022-04-13 RX ORDER — LIDOCAINE 4 G/100G
1 CREAM TOPICAL
Qty: 1 | Refills: 0
Start: 2022-04-13

## 2022-04-13 RX ORDER — METHOCARBAMOL 500 MG/1
2 TABLET, FILM COATED ORAL
Qty: 28 | Refills: 0
Start: 2022-04-13 | End: 2022-04-19

## 2022-04-13 RX ADMIN — OXYCODONE AND ACETAMINOPHEN 1 TABLET(S): 5; 325 TABLET ORAL at 17:29

## 2022-04-13 NOTE — ED PROVIDER NOTE - CARE PROVIDERS DIRECT ADDRESSES
[email or web address]

## 2022-04-13 NOTE — ED PROVIDER NOTE - PHYSICAL EXAMINATION
VITAL SIGNS: I have reviewed vital signs  CONSTITUTIONAL:  in no acute distress.  SKIN: Warm, dry, no rash.  HEAD: Normocephalic, atraumatic.  EYES: PERRL, conjunctiva and sclera clear.  ENT: pink & moist mucosa, no pharyngeal erythema  NECK: Supple, non tender. No cervical lymphadenopathy.  CARD: Regular rate and rhythm. No murmurs.   RESP: No wheezes, rales or rhonchi.   ABD:  soft, non-distended, non-tender.   MSK:  Right hip w/o mass, deformity, erythema. PT W/ RIGHT HIP PAIN AT BASELINE but GOOD ROM IN LEFT AND RIGHT HIP. PT WITH WORSE PAIN WHEN HOLDING RIGHT HIP UP AGAINST GRAVITY but good strength testing in the b/l hip. Neurovascularly intact. Right knee WNL.   NEURO: Alert, oriented. Grossly unremarkable. No focal deficits.   PSYCH: Cooperative, alert & oriented x3

## 2022-04-13 NOTE — ED PROVIDER NOTE - CLINICAL SUMMARY MEDICAL DECISION MAKING FREE TEXT BOX
ASSESSMENT:   GUIDO MATUTE is a 82yo M who presented with RIGHT HIP PAIN. h/o prostate cancer. Good ROM and able to ambulated    Concerning for atraumatic fracture, metastases.     PLAN: pain control and imaging.   Medications  oxycodone    5 mG/acetaminophen 325 mG    Studies  CT Hip No Cont, Bilateral  CT Lumbar Spine No Cont

## 2022-04-13 NOTE — ED PROVIDER NOTE - NS ED ROS FT
Review of Systems  CONSTITUTIONAL: afebrile w/no diaphoresis or weight changes  SKIN: warm, dry w/ no rash or bleeding  EYES: no changes to vision  ENT: no changes in hearing, no sore throat  RESPIRATORY: no cough or SOB  CARDIAC: no chest pain & no palpitations  GI: no abd pain, nausea, vomiting, diarrhea, constipation, or blood in stool/bernice blood  GENITO-URINARY: no discharge, dysuria or hematuria,   MUSCULOSKELETAL:  no swelling or redness +RIGHT HIP PAIN  NEUROLOGIC: no weakness, headache, anesthesia or paresthesias  PSYCH: no anxiety, non suicidal, non homicidal, without hallucinations or depression

## 2022-04-13 NOTE — ED PROVIDER NOTE - CARE PROVIDER_API CALL
Omid Lilly ()  Orthopaedic Surgery  [address]  [place] [address]  Phone: [phone]  Fax: [phone]  Follow Up Time: 4-6 Days    rAt Lepe (MD)  Orthopaedic Surgery  [address] [address], [address]  [place] [address]  Phone: [phone]  Fax: [phone]  Follow Up Time: 4-6 Days

## 2022-04-13 NOTE — ED PROVIDER NOTE - OBJECTIVE STATEMENT
JOAN MATUTE is a 80yo M with PMH HTN, DM, Prostate CA who ambulated in c/o RIGHT HIP PAIN. States the pain started 22 days ago and has been getting progressively worse. Pt now using cane due to pain, also c/o of pain keeping him up at night.     He denies any other associated symptoms specifically denying Fever/Chills/N/V/Abdominal pain/Diarrhea/Constipation, CP/SOB, HA/Changes to vision/Fatigue/Weakness, Urinary symptoms.

## 2022-04-13 NOTE — ED PROVIDER NOTE - ATTENDING CONTRIBUTION TO CARE
Sulma: I performed a face to face evaluation of this patient and performed a full history and physical examination on the patient.  I agree with the resident's history, physical examination, and plan of the patient unless otherwise noted. My brief assessment is as follows: hx htn, dm, prostate cancer in remission per pt daughter c/o 22 days of atraumatic pain to right hip/buttocks region. not worse with palpation, slightly worse with movement. c/o mild tingling to proximal leg, no saddle paresthesias, no incontinence/retention, no weakness LE. Started using cane over past 1-2 days due to pain. ctab, rrr, abd benign, no midline spine ttp, no significant pain to hip, can range with mild discomfort, no shortening/rotation, neurovasuclalry intact. will check ct lumbar and hip given hx prostate cancer. pain control, reassess.

## 2022-04-13 NOTE — ED ADULT NURSE NOTE - NSIMPLEMENTINTERV_GEN_ALL_ED
Implemented All Fall Risk Interventions:  [place] to call system. Call bell, personal items and telephone within reach. Instruct patient to call for assistance. Room bathroom lighting operational. Non-slip footwear when patient is off stretcher. Physically safe environment: no spills, clutter or unnecessary equipment. Stretcher in lowest position, wheels locked, appropriate side rails in place. Provide visual cue, wrist band, yellow gown, etc. Monitor gait and stability. Monitor for mental status changes and reorient to person, place, and time. Review medications for side effects contributing to fall risk. Reinforce activity limits and safety measures with patient and family.

## 2022-04-13 NOTE — ED PROVIDER NOTE - PROVIDER TOKENS
PROVIDER:[TOKEN:[[id number]],FOLLOWUP:[4-6 Days]],PROVIDER:[TOKEN:[[id number]],FOLLOWUP:[4-6 Days]]

## 2022-04-13 NOTE — ED PROVIDER NOTE - PATIENT PORTAL LINK FT
You can access the FollowMyHealth Patient Portal offered by [facility] by registering at the following website: [email or web address]. By joining [employer]’s FollowMyHealth portal, you will also be able to view your health information using other applications (apps) compatible with our system.

## 2022-06-09 NOTE — ED ADULT NURSE NOTE - SUICIDE SCREENING QUESTION 3
Health Maintenance Due   Topic Date Due   • Pneumococcal Vaccine 0-64 (1 - PCV) Never done   • Lung Cancer Screening  Never done   • Shingles Vaccine (1 of 2) Never done   • COVID-19 Vaccine (3 - Booster for Moderna series) 03/21/2022   • Colonoscopy Risk  03/11/2022       Patient is due for topics as listed above but is not proceeding with Immunization(s) COVID-19, Pneumococcal and Shingles at this time.       Patient is here bladder and back pain follow up.   No

## 2022-07-29 ENCOUNTER — NON-APPOINTMENT (OUTPATIENT)
Age: 82
End: 2022-07-29

## 2022-07-29 ENCOUNTER — APPOINTMENT (OUTPATIENT)
Dept: CARDIOLOGY | Facility: CLINIC | Age: 82
End: 2022-07-29

## 2022-07-29 VITALS
HEART RATE: 62 BPM | HEIGHT: 63 IN | SYSTOLIC BLOOD PRESSURE: 124 MMHG | WEIGHT: 153 LBS | OXYGEN SATURATION: 99 % | TEMPERATURE: 98 F | DIASTOLIC BLOOD PRESSURE: 68 MMHG | BODY MASS INDEX: 27.11 KG/M2

## 2022-07-29 PROCEDURE — 93000 ELECTROCARDIOGRAM COMPLETE: CPT

## 2022-07-29 PROCEDURE — 93290 INTERROG DEV EVAL ICPMS IP: CPT

## 2022-07-29 PROCEDURE — 99204 OFFICE O/P NEW MOD 45 MIN: CPT

## 2022-07-29 RX ORDER — OXYCODONE AND ACETAMINOPHEN 5; 325 MG/1; MG/1
5-325 TABLET ORAL
Qty: 12 | Refills: 0 | Status: DISCONTINUED | COMMUNITY
Start: 2022-04-13

## 2022-07-29 RX ORDER — METHOCARBAMOL 500 MG/1
500 TABLET, FILM COATED ORAL
Qty: 28 | Refills: 0 | Status: DISCONTINUED | COMMUNITY
Start: 2022-04-13

## 2022-07-29 RX ORDER — VALSARTAN 80 MG/1
80 TABLET, COATED ORAL
Qty: 60 | Refills: 0 | Status: DISCONTINUED | COMMUNITY
Start: 2022-01-03

## 2022-07-29 RX ORDER — AMLODIPINE BESYLATE 5 MG/1
5 TABLET ORAL
Qty: 90 | Refills: 0 | Status: DISCONTINUED | COMMUNITY
Start: 2022-05-16

## 2022-07-29 RX ORDER — HYDROCHLOROTHIAZIDE 25 MG/1
25 TABLET ORAL
Qty: 90 | Refills: 0 | Status: DISCONTINUED | COMMUNITY
Start: 2022-01-31

## 2022-07-29 RX ORDER — METOPROLOL TARTRATE 50 MG/1
50 TABLET, FILM COATED ORAL
Qty: 540 | Refills: 0 | Status: DISCONTINUED | COMMUNITY
Start: 2022-01-31

## 2022-08-11 ENCOUNTER — APPOINTMENT (OUTPATIENT)
Dept: CARDIOLOGY | Facility: CLINIC | Age: 82
End: 2022-08-11

## 2022-08-11 PROCEDURE — 93925 LOWER EXTREMITY STUDY: CPT

## 2022-08-11 PROCEDURE — 93923 UPR/LXTR ART STDY 3+ LVLS: CPT

## 2022-08-15 ENCOUNTER — NON-APPOINTMENT (OUTPATIENT)
Age: 82
End: 2022-08-15

## 2022-08-15 NOTE — PHYSICAL EXAM
[Well Developed] : well developed [Well Nourished] : well nourished [No Acute Distress] : no acute distress [Normal Conjunctiva] : normal conjunctiva [Normal Venous Pressure] : normal venous pressure [No Carotid Bruit] : no carotid bruit [Normal S1, S2] : normal S1, S2 [No Murmur] : no murmur [No Rub] : no rub [No Gallop] : no gallop [Clear Lung Fields] : clear lung fields [Good Air Entry] : good air entry [No Respiratory Distress] : no respiratory distress  [Soft] : abdomen soft [Non Tender] : non-tender [No Masses/organomegaly] : no masses/organomegaly [Normal Bowel Sounds] : normal bowel sounds [Normal Gait] : normal gait [No Edema] : no edema [No Cyanosis] : no cyanosis [No Clubbing] : no clubbing [No Varicosities] : no varicosities [Diminished Pedal Pulses ___] : diminished pedal pulses [unfilled] [No Rash] : no rash [No Skin Lesions] : no skin lesions [Moves all extremities] : moves all extremities [No Focal Deficits] : no focal deficits [Normal Speech] : normal speech [Alert and Oriented] : alert and oriented [Normal memory] : normal memory [de-identified] : RLE: biphasic doppler pulse of the DP and triphasic of the PT LLE: monophasic doppler signal of the DP and biphasic doppler signal of the PT

## 2022-08-15 NOTE — REASON FOR VISIT
[Symptom and Test Evaluation] : symptom and test evaluation [Carotid, Aortic and Peripheral Vascular Disease] : carotid, aortic and peripheral vascular disease [FreeTextEntry1] :  82 y/o  Male with h/o CAD s/p PCI LAD/OM1 '18 with repeat cardiac catheterization in 5/20 demonstrating patent stents with mild, non-obstructive CAD, normal LV fxn, ILR placed 5/20 for SVT, DM, HTN, Dyslipidemia and PAD presents for initial eval \par Daughter at the bedside helped to translate.\par \par Patient was seeing Dr. Vergara  [address], [place] [address]. \par \par Patient notes that he has been having palpations, notes that this was a lot before, but currently has improved and doesn’t seem to be symptomatic. ILR interrogated in the office and no recent occurrence of SVT or AT  \par Patient notes no chest pain or shortness of breath at rest or upon exertion, no dizziness lightheadedness or passing out \par \par Notes to have cramping sensation in both lower extremities, describes as Cramping sensation in the legs, before was affected by going uphills and stairs but now walking on a flat surface and less than 1 block he notes bilateral cramping in the calves, unable to state which leg affects him more than the other. He states that the claudication has been worsening over the past 5-6 months \par \par Former Smoker: quit 2006

## 2022-09-01 ENCOUNTER — APPOINTMENT (OUTPATIENT)
Dept: RHEUMATOLOGY | Facility: CLINIC | Age: 82
End: 2022-09-01

## 2022-09-09 NOTE — ED ADULT NURSE REASSESSMENT NOTE - TEMPLATE LIST FOR HEAD TO TOE ASSESSMENT
Cardiac Please let her know her calcium is slightly elevated.  Sometimes this can be related to hydrochlorothiazide, but I would like to check some additional labs, which I have ordered.

## 2022-09-21 NOTE — ED STATDOCS - PROGRESS NOTE DETAILS
show impression with limitations explained and understood by patient, remains pain free, withl f/u with .

## 2022-09-22 NOTE — ED CDU PROVIDER SUBSEQUENT DAY NOTE - NS_EDPROVIDERDISPOUSERTYPE_ED_A_ED
[FreeTextEntry1] : he has no chest pain\par He has no  shortness of breath\par He has no palpitations\par He has no syncope\par He is neurologically intact\par He has no edema\par He has no GI symptoms\par  Attending Attestation (For Attendings USE Only)...

## 2022-09-30 ENCOUNTER — NON-APPOINTMENT (OUTPATIENT)
Age: 82
End: 2022-09-30

## 2022-10-05 RX ORDER — CHLORHEXIDINE GLUCONATE 213 G/1000ML
1 SOLUTION TOPICAL ONCE
Refills: 0 | Status: DISCONTINUED | OUTPATIENT
Start: 2022-10-06 | End: 2022-10-20

## 2022-10-05 NOTE — H&P PST ADULT - HISTORY OF PRESENT ILLNESS
Narrative: This is an 81 year old Hatian Wolof Creole  speaking gentleman with a PMH of CAD s/p PCI LAD/BX3jksitd LV function, ILR implantation (5/2020) for SVT, DM, HTN, HLD, and PAD, presenting to the cath holding area for a LLE angiogram and possible angioplasty with Dr Sal Ovalles  secondary to crampin sensations both lower extremities even while walking on flat surfaces and worsening claudication over the past 5-6 months.    Risk Factors for PAD       Age: yes       DM: yes       Smoking History: yes quit in 2006       Hyperlipidemia: yes       Hypertension: yes       Family History of PAD: N/A       Known Atherosclerotic Disease (coronary, carotid, subclavian, renal, mesenteric, AAA): yes    Subjective Complaints:        Claudication Kaitlin Class: IIb       Impaired Walking Function: yes       Ischemic Rest Pain: N/A       Non-healing Ulcers       Other Non-Joint Related Exertional Lower Extremity Symptoms (not typical of claudication): N/A    Objective Findings:        Lower Extremity Pulses: Unable to palpate DP pulses, doppler pulses noted.       Nonhealing Lower Extremity Wound/ Ulcers: N/A       Lower Extremity Gangrene: N/A       Vascular Bruit: N/A       Other Suggestive Lower Extremity Findings (elevation pallor, dependent rubor): N/A    Vascular Testing:        CHARLES (Normal/Borderline/Abnormal/Noncompressable):        Arterial Dopplers:        CTA/MRA:        Prior Peripheral Angiograms/ Interventions : (Date/ Findings)    Medical Management:       Antiplatelets: yes       Cilostazol:        Statin: yes    Echo: 5/12/2020- Normal global LV [id number]% , dilated proximal ascending aorta (3.8cm)    Kaitlin Claudication Classification:        I: Asymptomatic       IIa: Walking > 200 meters (2.5 blocks)       IIb: Walking < 200 meters (2.5 blocks)       III: Rest/nocturnal pain       IV: Necrosis/gangrene    Ankle-Brachial Index: Abnormal        Noncompressable: > 1.4       Normal: 1.0 to 1.4       Borderline: 0.91 to 0.99       Abnormal: < 0.91   Narrative: This is an 81 year old Hatian Belarusian Creole  speaking gentleman with a PMH of CAD s/p PCI LAD/WI3gdfxdr LV function, ILR implantation (5/2020) for SVT, DM, HTN, HLD, and PAD, presenting to the cath holding area for a LLE angiogram and possible angioplasty with Dr Sal Ovalles  secondary to crampin sensations both lower extremities even while walking on flat surfaces and worsening claudication over the past 5-6 months.    Risk Factors for PAD       Age: yes       DM: yes       Smoking History: yes quit in 2006       Hyperlipidemia: yes       Hypertension: yes       Family History of PAD: N/A       Known Atherosclerotic Disease (coronary, carotid, subclavian, renal, mesenteric, AAA): yes    Subjective Complaints:        Claudication Kaitlin Class: IIb       Impaired Walking Function: yes       Ischemic Rest Pain: N/A       Non-healing Ulcers       Other Non-Joint Related Exertional Lower Extremity Symptoms (not typical of claudication): N/A    Objective Findings:        Lower Extremity Pulses: Unable to palpate left DP/PT pulses, doppler pulses noted; +1 palpable right DP/PT pulses       Nonhealing Lower Extremity Wound/ Ulcers: N/A       Lower Extremity Gangrene: N/A       Vascular Bruit: N/A       Other Suggestive Lower Extremity Findings (elevation pallor, dependent rubor): N/A    Vascular Testing:        CHARLES (Normal/Borderline/Abnormal/Noncompressable): Right CHARLES 0.89/Left CHARLES 0.74       Arterial Dopplers: RLE-->moderate atherosclerosis with 20-49% stenosis in the prox superficial FA; LLE-->severe atherosclerosis with 50-75% stenosis in the popliteal artery       CTA/MRA: NONE       Prior Peripheral Angiograms/ Interventions : NONE    Medical Management:       Antiplatelets: yes       Statin: yes    Echo: 5/12/2020- Normal global LV [id number]% , dilated proximal ascending aorta (3.8cm)    Kaitlin Claudication Classification:        I: Asymptomatic       IIa: Walking > 200 meters (2.5 blocks)       IIb: Walking < 200 meters (2.5 blocks)       III: Rest/nocturnal pain       IV: Necrosis/gangrene    Ankle-Brachial Index: Abnormal        Noncompressable: > 1.4       Normal: 1.0 to 1.4       Borderline: 0.91 to 0.99       Abnormal: < 0.91

## 2022-10-05 NOTE — H&P PST ADULT - NSICDXPASTMEDICALHX_GEN_ALL_CORE_FT
PAST MEDICAL HISTORY:  Asthma, mild intermittent, uncomplicated     Cancer protate    Diabetes mellitus     Hyperlipidemia     Hypertension     MI (myocardial infarction)     Other hemorrhoids     Sickle cell anemia Carrier of the disease, presently doesn't have it     PAST MEDICAL HISTORY:  Asthma, mild intermittent, uncomplicated     CAD (coronary artery disease)     Cancer protate    Diabetes mellitus     Hyperlipidemia     Hypertension     MI (myocardial infarction)     Other hemorrhoids     PAD (peripheral artery disease)     Sickle cell anemia Carrier of the disease, presently doesn't have it    SVT (supraventricular tachycardia)

## 2022-10-05 NOTE — H&P PST ADULT - ASSESSMENT
An 81 year old Hatian Croatian Creole  speaking gentleman with a PMH of CAD s/p PCI LAD/IG0qlshll LV function, ILR implantation (5/2020) for SVT, DM, HTN, HLD, and PAD, presenting to the cath holding area for a LLE angiogram and possible angioplasty with Dr Sal Ovalles  secondary to crampin sensations both lower extremities even while walking on flat surfaces and worsening claudication over the past 5-6 months.  PRE-PROCEDURE ASSESSMENT  -NPO after midnight confirmed  -IV insert  -Patient seen and examined  -Labs reviewed  -Pre-procedure teaching completed with patient   -Consent obtained by Interventional Cardiologist  -Questions answered    ASA-  MALL-  BRA-  COVID swab-  Creat-  GFR-     An 81 year old Hatian Faroese Creole  speaking gentleman with a PMH of CAD s/p PCI LAD/OI1ggyjvl LV function, ILR implantation (5/2020) for SVT, DM, HTN, HLD, and PAD, presenting to the cath holding area for a LLE angiogram and possible angioplasty with Dr Sla Ovalles  secondary to crampin sensations both lower extremities even while walking on flat surfaces and worsening claudication over the past 5-6 months.  PRE-PROCEDURE ASSESSMENT  -NPO after midnight confirmed  -IV insert  -Patient seen and examined  -Labs reviewed  -Pre-procedure teaching completed with patient   -Consent obtained by Interventional Cardiologist  -Questions answered    ASA-  MALL-  BRA-  COVID swab-Negative 10/04/22  Creat-  GFR-     An 81 year old Hatian Bengali Creole  speaking gentleman with a PMH of CAD s/p PCI LAD/XQ7lkrhcl LV function, ILR implantation (5/2020) for SVT, DM, HTN, HLD, and PAD, presenting to the cath holding area for a LLE angiogram and possible angioplasty with Dr Sal Ovalles  secondary to crampin sensations both lower extremities even while walking on flat surfaces and worsening claudication over the past 5-6 months.  PRE-PROCEDURE ASSESSMENT  -NPO after midnight confirmed  -IV insert  -Patient seen and examined  -Labs reviewed--creat 2.25--> IVF hydration pre and post procedure to prevent ANTOINE  -Pre-procedure teaching completed with patient   -Consent obtained by Interventional Cardiologist  -Questions answered    ASA- 3  MALL- 2  BRA- 1.8%  COVID swab-Negative 10/04/22  Creat-2.25  GFR- 28

## 2022-10-05 NOTE — H&P PST ADULT - NSICDXPASTSURGICALHX_GEN_ALL_CORE_FT
PAST SURGICAL HISTORY:  H/O colonoscopy Normal as per patient 3 years ago.    Inguinal hernia     S/P primary angioplasty with coronary stent     S/P prostatectomy      PAST SURGICAL HISTORY:  H/O colonoscopy Normal as per patient 3 years ago.    Inguinal hernia     S/P primary angioplasty with coronary stent

## 2022-10-05 NOTE — H&P PST ADULT - NSANTHOSAYNRD_GEN_A_CORE
No. SISSY screening performed.  STOP BANG Legend: 0-2 = LOW Risk; 3-4 = INTERMEDIATE Risk; 5-8 = HIGH Risk

## 2022-10-06 ENCOUNTER — OUTPATIENT (OUTPATIENT)
Dept: OUTPATIENT SERVICES | Facility: HOSPITAL | Age: 82
LOS: 1 days | Discharge: HOME | End: 2022-10-06
Payer: MEDICARE

## 2022-10-06 ENCOUNTER — TRANSCRIPTION ENCOUNTER (OUTPATIENT)
Age: 82
End: 2022-10-06

## 2022-10-06 VITALS
SYSTOLIC BLOOD PRESSURE: 166 MMHG | TEMPERATURE: 98 F | OXYGEN SATURATION: 98 % | HEART RATE: 61 BPM | DIASTOLIC BLOOD PRESSURE: 81 MMHG | RESPIRATION RATE: 16 BRPM

## 2022-10-06 VITALS
SYSTOLIC BLOOD PRESSURE: 146 MMHG | DIASTOLIC BLOOD PRESSURE: 81 MMHG | OXYGEN SATURATION: 100 % | RESPIRATION RATE: 18 BRPM | HEART RATE: 58 BPM

## 2022-10-06 DIAGNOSIS — Z90.79 ACQUIRED ABSENCE OF OTHER GENITAL ORGAN(S): Chronic | ICD-10-CM

## 2022-10-06 DIAGNOSIS — Z95.5 PRESENCE OF CORONARY ANGIOPLASTY IMPLANT AND GRAFT: Chronic | ICD-10-CM

## 2022-10-06 DIAGNOSIS — K40.90 UNILATERAL INGUINAL HERNIA, WITHOUT OBSTRUCTION OR GANGRENE, NOT SPECIFIED AS RECURRENT: Chronic | ICD-10-CM

## 2022-10-06 DIAGNOSIS — I73.9 PERIPHERAL VASCULAR DISEASE, UNSPECIFIED: ICD-10-CM

## 2022-10-06 DIAGNOSIS — Z98.890 OTHER SPECIFIED POSTPROCEDURAL STATES: Chronic | ICD-10-CM

## 2022-10-06 LAB
A1C WITH ESTIMATED AVERAGE GLUCOSE RESULT: 7.9 % — HIGH (ref 4–5.6)
ALBUMIN SERPL ELPH-MCNC: 4 G/DL — SIGNIFICANT CHANGE UP (ref 3.3–5.2)
ALP SERPL-CCNC: 96 U/L — SIGNIFICANT CHANGE UP (ref 40–120)
ALT FLD-CCNC: 16 U/L — SIGNIFICANT CHANGE UP
ANION GAP SERPL CALC-SCNC: 13 MMOL/L — SIGNIFICANT CHANGE UP (ref 5–17)
AST SERPL-CCNC: 22 U/L — SIGNIFICANT CHANGE UP
BASOPHILS # BLD AUTO: 0.07 K/UL — SIGNIFICANT CHANGE UP (ref 0–0.2)
BASOPHILS NFR BLD AUTO: 0.9 % — SIGNIFICANT CHANGE UP (ref 0–2)
BILIRUB SERPL-MCNC: 0.5 MG/DL — SIGNIFICANT CHANGE UP (ref 0.4–2)
BUN SERPL-MCNC: 45.6 MG/DL — HIGH (ref 8–20)
CALCIUM SERPL-MCNC: 9.5 MG/DL — SIGNIFICANT CHANGE UP (ref 8.4–10.5)
CHLORIDE SERPL-SCNC: 94 MMOL/L — LOW (ref 98–107)
CHOLEST SERPL-MCNC: 133 MG/DL — SIGNIFICANT CHANGE UP
CO2 SERPL-SCNC: 27 MMOL/L — SIGNIFICANT CHANGE UP (ref 22–29)
CREAT SERPL-MCNC: 2.25 MG/DL — HIGH (ref 0.5–1.3)
EGFR: 28 ML/MIN/1.73M2 — LOW
EOSINOPHIL # BLD AUTO: 0.24 K/UL — SIGNIFICANT CHANGE UP (ref 0–0.5)
EOSINOPHIL NFR BLD AUTO: 3.2 % — SIGNIFICANT CHANGE UP (ref 0–6)
ESTIMATED AVERAGE GLUCOSE: 180 MG/DL — HIGH (ref 68–114)
GLUCOSE SERPL-MCNC: 180 MG/DL — HIGH (ref 70–99)
HCT VFR BLD CALC: 39 % — SIGNIFICANT CHANGE UP (ref 39–50)
HDLC SERPL-MCNC: 47 MG/DL — SIGNIFICANT CHANGE UP
HGB BLD-MCNC: 13.5 G/DL — SIGNIFICANT CHANGE UP (ref 13–17)
IMM GRANULOCYTES NFR BLD AUTO: 0.3 % — SIGNIFICANT CHANGE UP (ref 0–0.9)
LIPID PNL WITH DIRECT LDL SERPL: 67 MG/DL — SIGNIFICANT CHANGE UP
LYMPHOCYTES # BLD AUTO: 2.55 K/UL — SIGNIFICANT CHANGE UP (ref 1–3.3)
LYMPHOCYTES # BLD AUTO: 33.7 % — SIGNIFICANT CHANGE UP (ref 13–44)
MAGNESIUM SERPL-MCNC: 2.6 MG/DL — SIGNIFICANT CHANGE UP (ref 1.6–2.6)
MCHC RBC-ENTMCNC: 26.4 PG — LOW (ref 27–34)
MCHC RBC-ENTMCNC: 34.6 GM/DL — SIGNIFICANT CHANGE UP (ref 32–36)
MCV RBC AUTO: 76.3 FL — LOW (ref 80–100)
MONOCYTES # BLD AUTO: 0.92 K/UL — HIGH (ref 0–0.9)
MONOCYTES NFR BLD AUTO: 12.2 % — SIGNIFICANT CHANGE UP (ref 2–14)
NEUTROPHILS # BLD AUTO: 3.77 K/UL — SIGNIFICANT CHANGE UP (ref 1.8–7.4)
NEUTROPHILS NFR BLD AUTO: 49.7 % — SIGNIFICANT CHANGE UP (ref 43–77)
NON HDL CHOLESTEROL: 86 MG/DL — SIGNIFICANT CHANGE UP
PLATELET # BLD AUTO: 257 K/UL — SIGNIFICANT CHANGE UP (ref 150–400)
POTASSIUM SERPL-MCNC: 3.8 MMOL/L — SIGNIFICANT CHANGE UP (ref 3.5–5.3)
POTASSIUM SERPL-SCNC: 3.8 MMOL/L — SIGNIFICANT CHANGE UP (ref 3.5–5.3)
PROT SERPL-MCNC: 7.4 G/DL — SIGNIFICANT CHANGE UP (ref 6.6–8.7)
RBC # BLD: 5.11 M/UL — SIGNIFICANT CHANGE UP (ref 4.2–5.8)
RBC # FLD: 15.6 % — HIGH (ref 10.3–14.5)
SODIUM SERPL-SCNC: 134 MMOL/L — LOW (ref 135–145)
TRIGL SERPL-MCNC: 93 MG/DL — SIGNIFICANT CHANGE UP
WBC # BLD: 7.57 K/UL — SIGNIFICANT CHANGE UP (ref 3.8–10.5)
WBC # FLD AUTO: 7.57 K/UL — SIGNIFICANT CHANGE UP (ref 3.8–10.5)

## 2022-10-06 PROCEDURE — 75716 ARTERY X-RAYS ARMS/LEGS: CPT | Mod: 26,XU

## 2022-10-06 PROCEDURE — C1725: CPT

## 2022-10-06 PROCEDURE — 93010 ELECTROCARDIOGRAM REPORT: CPT

## 2022-10-06 PROCEDURE — 73630 X-RAY EXAM OF FOOT: CPT

## 2022-10-06 PROCEDURE — C1757: CPT

## 2022-10-06 PROCEDURE — 37225: CPT

## 2022-10-06 PROCEDURE — C1769: CPT

## 2022-10-06 PROCEDURE — 85025 COMPLETE CBC W/AUTO DIFF WBC: CPT

## 2022-10-06 PROCEDURE — 37225: CPT | Mod: LT

## 2022-10-06 PROCEDURE — C2623: CPT

## 2022-10-06 PROCEDURE — 73630 X-RAY EXAM OF FOOT: CPT | Mod: 26,RT

## 2022-10-06 PROCEDURE — 36415 COLL VENOUS BLD VENIPUNCTURE: CPT

## 2022-10-06 PROCEDURE — C1887: CPT

## 2022-10-06 PROCEDURE — 83735 ASSAY OF MAGNESIUM: CPT

## 2022-10-06 PROCEDURE — 83036 HEMOGLOBIN GLYCOSYLATED A1C: CPT

## 2022-10-06 PROCEDURE — 80053 COMPREHEN METABOLIC PANEL: CPT

## 2022-10-06 PROCEDURE — C1894: CPT

## 2022-10-06 PROCEDURE — 93005 ELECTROCARDIOGRAM TRACING: CPT

## 2022-10-06 PROCEDURE — 80061 LIPID PANEL: CPT

## 2022-10-06 PROCEDURE — 99152 MOD SED SAME PHYS/QHP 5/>YRS: CPT

## 2022-10-06 PROCEDURE — 75716 ARTERY X-RAYS ARMS/LEGS: CPT | Mod: XU

## 2022-10-06 RX ORDER — CLOPIDOGREL BISULFATE 75 MG/1
75 TABLET, FILM COATED ORAL ONCE
Refills: 0 | Status: COMPLETED | OUTPATIENT
Start: 2022-10-06 | End: 2022-10-06

## 2022-10-06 RX ORDER — DEXTROSE 50 % IN WATER 50 %
25 SYRINGE (ML) INTRAVENOUS ONCE
Refills: 0 | Status: DISCONTINUED | OUTPATIENT
Start: 2022-10-06 | End: 2022-10-20

## 2022-10-06 RX ORDER — CARVEDILOL PHOSPHATE 80 MG/1
1 CAPSULE, EXTENDED RELEASE ORAL
Qty: 60 | Refills: 1
Start: 2022-10-06 | End: 2022-12-04

## 2022-10-06 RX ORDER — ASPIRIN/CALCIUM CARB/MAGNESIUM 324 MG
81 TABLET ORAL ONCE
Refills: 0 | Status: COMPLETED | OUTPATIENT
Start: 2022-10-06 | End: 2022-10-06

## 2022-10-06 RX ORDER — GLUCAGON INJECTION, SOLUTION 0.5 MG/.1ML
1 INJECTION, SOLUTION SUBCUTANEOUS ONCE
Refills: 0 | Status: DISCONTINUED | OUTPATIENT
Start: 2022-10-06 | End: 2022-10-20

## 2022-10-06 RX ORDER — SODIUM CHLORIDE 9 MG/ML
250 INJECTION INTRAMUSCULAR; INTRAVENOUS; SUBCUTANEOUS
Refills: 0 | Status: DISCONTINUED | OUTPATIENT
Start: 2022-10-06 | End: 2022-10-20

## 2022-10-06 RX ORDER — DEXTROSE 50 % IN WATER 50 %
15 SYRINGE (ML) INTRAVENOUS ONCE
Refills: 0 | Status: DISCONTINUED | OUTPATIENT
Start: 2022-10-06 | End: 2022-10-20

## 2022-10-06 RX ORDER — LOSARTAN POTASSIUM 100 MG/1
0 TABLET, FILM COATED ORAL
Qty: 0 | Refills: 0 | DISCHARGE

## 2022-10-06 RX ORDER — CARVEDILOL PHOSPHATE 80 MG/1
2 CAPSULE, EXTENDED RELEASE ORAL
Qty: 0 | Refills: 1 | DISCHARGE
Start: 2022-10-06 | End: 2022-12-04

## 2022-10-06 RX ORDER — AZELASTINE 137 UG/1
0 SPRAY, METERED NASAL
Qty: 0 | Refills: 0 | DISCHARGE

## 2022-10-06 RX ORDER — SODIUM CHLORIDE 9 MG/ML
1000 INJECTION, SOLUTION INTRAVENOUS
Refills: 0 | Status: DISCONTINUED | OUTPATIENT
Start: 2022-10-06 | End: 2022-10-20

## 2022-10-06 RX ORDER — DEXTROSE 50 % IN WATER 50 %
12.5 SYRINGE (ML) INTRAVENOUS ONCE
Refills: 0 | Status: DISCONTINUED | OUTPATIENT
Start: 2022-10-06 | End: 2022-10-20

## 2022-10-06 RX ORDER — AMLODIPINE BESYLATE 2.5 MG/1
10 TABLET ORAL ONCE
Refills: 0 | Status: COMPLETED | OUTPATIENT
Start: 2022-10-06 | End: 2022-10-06

## 2022-10-06 RX ORDER — INSULIN LISPRO 100/ML
VIAL (ML) SUBCUTANEOUS
Refills: 0 | Status: DISCONTINUED | OUTPATIENT
Start: 2022-10-06 | End: 2022-10-20

## 2022-10-06 RX ORDER — INSULIN LISPRO 100/ML
VIAL (ML) SUBCUTANEOUS AT BEDTIME
Refills: 0 | Status: DISCONTINUED | OUTPATIENT
Start: 2022-10-06 | End: 2022-10-20

## 2022-10-06 RX ORDER — MONTELUKAST 4 MG/1
0 TABLET, CHEWABLE ORAL
Qty: 0 | Refills: 0 | DISCHARGE

## 2022-10-06 RX ORDER — PANTOPRAZOLE SODIUM 20 MG/1
40 TABLET, DELAYED RELEASE ORAL ONCE
Refills: 0 | Status: COMPLETED | OUTPATIENT
Start: 2022-10-06 | End: 2022-10-06

## 2022-10-06 RX ORDER — CLOPIDOGREL BISULFATE 75 MG/1
1 TABLET, FILM COATED ORAL
Qty: 90 | Refills: 3
Start: 2022-10-06 | End: 2023-02-02

## 2022-10-06 RX ORDER — SODIUM CHLORIDE 9 MG/ML
600 INJECTION INTRAMUSCULAR; INTRAVENOUS; SUBCUTANEOUS
Refills: 0 | Status: DISCONTINUED | OUTPATIENT
Start: 2022-10-06 | End: 2022-10-20

## 2022-10-06 RX ADMIN — CLOPIDOGREL BISULFATE 75 MILLIGRAM(S): 75 TABLET, FILM COATED ORAL at 08:36

## 2022-10-06 RX ADMIN — SODIUM CHLORIDE 250 MILLILITER(S): 9 INJECTION INTRAMUSCULAR; INTRAVENOUS; SUBCUTANEOUS at 08:36

## 2022-10-06 RX ADMIN — SODIUM CHLORIDE 100 MILLILITER(S): 9 INJECTION INTRAMUSCULAR; INTRAVENOUS; SUBCUTANEOUS at 12:05

## 2022-10-06 RX ADMIN — Medication 81 MILLIGRAM(S): at 08:35

## 2022-10-06 RX ADMIN — AMLODIPINE BESYLATE 10 MILLIGRAM(S): 2.5 TABLET ORAL at 16:52

## 2022-10-06 RX ADMIN — PANTOPRAZOLE SODIUM 40 MILLIGRAM(S): 20 TABLET, DELAYED RELEASE ORAL at 08:36

## 2022-10-06 NOTE — PROGRESS NOTE ADULT - ASSESSMENT
A/P: This is an 81 year old Hatian Yoruba Creole  speaking gentleman with a PMH of CAD s/p PCI LAD/LG5cmnsyc LV function, ILR implantation (5/2020) for SVT, DM, HTN, HLD, and PAD, presenting to the cath holding area for a LLE angiogram and possible angioplasty with Dr Sal Ovalles  secondary to cramping sensations both lower extremities even while walking on flat surfaces and worsening claudication over the past 5-6 months.  Now s/p bilateral LE angiogram/LLE angioplasty via RFA with Dr. Ovalles: 60% Right prox and distal SFA--mod to severe disease; 70% Left prox to mid SFA heavily calcified treated with JETSTREAM/atherectomy/DCB x 1 (LUTONIX .018 6.0mmx [id number]); 50% left popliteal artery; patent left PT artery; patent left peroneal artery; left anterior tibial artery with slow flow (prelim report; official report to follow).  Creat 2.25 today (last creat 1.57 10/2021).  -Bedrest with HOB < 30 degrees x 4 hours post removal of sheath then OOB  -May discharge to home at 1900 if remains stable  -IVF hydration post procedure with NS at 100cc/hr x 6hours to prevent ANTOINE  -Patient with prescheduled appointment with PMD Dr. Fischer Tuesday 10/11/22-->BMP level to be drawn to check kidney function  -Meds: Continue ASA/Plavix/statin  -Discontinue metoprolol and add Coreg for better blood pressure control  -Discontinue HCTZ secondary to renal insufficiency  -Med changes discussed with Dr. Breaux  -Right foot xray prior to discharge home secondary to patient chronic c/o Right heel pain at Dr. Breaux's request  -Follow up with Dr. Breaux 10/20/22 for prescheduled appointment  -Activity instructions discussed with patient and daughter with verbal understanding  -Benefits of DAPT emphasized with patient and daughter  verbal understanding  -Discussed with Dr. Ovalles and Dr. Breaux

## 2022-10-06 NOTE — DISCHARGE NOTE PROVIDER - NSDCFUSCHEDAPPT_GEN_ALL_CORE_FT
Juliana Fischer  [facility]  INTMED [address]  Scheduled Appointment: 10/11/2022    [facility]  CARDIOLOGY [address]  Scheduled Appointment: 11/03/2022

## 2022-10-06 NOTE — DISCHARGE NOTE PROVIDER - NSDCCPCAREPLAN_GEN_ALL_CORE_FT
PRINCIPAL DISCHARGE DIAGNOSIS  Diagnosis: PAD (peripheral artery disease)  Assessment and Plan of Treatment: PTA

## 2022-10-06 NOTE — DISCHARGE NOTE NURSING/CASE MANAGEMENT/SOCIAL WORK - NSDCPEFALRISK_GEN_ALL_CORE
For information on Fall & Injury Prevention, visit: [email or web address] OR  [email or web address] OR  https://www.cdc.gov/steadi/patient.html

## 2022-10-06 NOTE — PROGRESS NOTE ADULT - SUBJECTIVE AND OBJECTIVE BOX
Interventional Cardiology post procedure note:     -s/p bilateral LE angiogram/LLE angioplasty via RFA with Dr. Ovalles: 60% Right prox and distal SFA--mod to severe disease; 70% Left prox to mid SFA heavily calcified treated with JETSTREAM/atherectomy/DCB x 1 (LUTONIX .018 6.0mmx [id number]); 50% left popliteal artery; patent left PT artery; patent left peroneal artery; left anterior tibial artery with slow flow (prelim report; official report to follow)  Visipaque 80cc  Heparin 6,000 units IV  On ASA/plavix daily      TELE: [id number]    MEDICATIONS  (STANDING):  chlorhexidine 4% Liquid 1 Application(s) Topical Once  dextrose 5%. 1000 milliLiter(s) (50 mL/Hr) IV Continuous <Continuous>  dextrose 5%. 1000 milliLiter(s) (100 mL/Hr) IV Continuous <Continuous>  dextrose 50% Injectable 25 Gram(s) IV Push once  dextrose 50% Injectable 12.5 Gram(s) IV Push once  dextrose 50% Injectable 25 Gram(s) IV Push once  glucagon  Injectable 1 milliGRAM(s) IntraMuscular once  insulin lispro (ADMELOG) corrective regimen sliding scale   SubCutaneous three times a day before meals  insulin lispro (ADMELOG) corrective regimen sliding scale   SubCutaneous at bedtime  sodium chloride 0.9%. 600 milliLiter(s) (100 mL/Hr) IV Continuous <Continuous>  sodium chloride 0.9%. 250 milliLiter(s) (250 mL/Hr) IV Continuous <Continuous>    MEDICATIONS  (PRN):  dextrose Oral Gel 15 Gram(s) Oral once PRN Blood Glucose LESS THAN 70 milliGRAM(s)/deciliter      Allergies:  EGGPLANT (Hives)  No Known Drug Allergies  strawberry (Hives)    Intolerances  aspirin (Stomach Upset)    PAST MEDICAL & SURGICAL HISTORY:  Hypertension      Hyperlipidemia      Diabetes mellitus      Cancer  protate      Other hemorrhoids      Asthma, mild intermittent, uncomplicated      Sickle cell anemia  Carrier of the disease, presently doesn&#x27;t have it      MI (myocardial infarction)      CAD (coronary artery disease)      SVT (supraventricular tachycardia)      PAD (peripheral artery disease)      Inguinal hernia      S/P primary angioplasty with coronary stent      H/O colonoscopy  Normal as per patient 3 years ago.          Vital Signs Last 24 Hrs  T(C): 36.7 (06 Oct 2022 07:43), Max: 36.7 (06 Oct 2022 07:43)  T(F): 98.1 (06 Oct 2022 07:43), Max: 98.1 (06 Oct 2022 07:43)  HR: 51 (06 Oct 2022 14:15) (45 - 61)  BP: 150/77 (06 Oct 2022 14:15) (105/68 - 166/81)  BP(mean): 103 (06 Oct 2022 14:15) (103 - 105)  RR: 16 (06 Oct 2022 14:15) (14 - 16)  SpO2: 100% (06 Oct 2022 14:15) (98% - 100%)    Parameters below as of 06 Oct 2022 14:15  Patient On (Oxygen Delivery Method): room air        Physical Exam:  Constitutional: NAD, AAOx3  Cardiovascular: +S1S2 RRR  Pulmonary: CTA b/l, unlabored  GI: soft NTND +BS  Extremities: no pedal edema, +distal pulses b/l-->Right +PP via palpation and Left +PP/PT pulses via doppler  Neuro: non focal, GEORGE x4  Procedure site: RFA #7Fr sheath removed--manual compression held x 20 minutes with hemostasis obtained; 4x4 and tegaderm dressing; + right PP    LABS:                        13.5   7.57  )-----------( 257      ( 06 Oct 2022 07:59 )             39.0     10-06    134<L>  |  94<L>  |  45.6<H>  ----------------------------<  180<H>  3.8   |  27.0  |  2.25<H>    Ca    9.5      06 Oct 2022 07:59  Mg     2.6     10-06    TPro  7.4  /  Alb  4.0  /  TBili  0.5  /  DBili  x   /  AST  22  /  ALT  16  /  AlkPhos  96  10-06          RADIOLOGY & ADDITIONAL TESTS:

## 2022-10-06 NOTE — DISCHARGE NOTE PROVIDER - CARE PROVIDER_API CALL
Johanny Breaux (DO)  Cardiology; Internal Medicine  [address] [place] [address]  Phone: [phone]  Fax: [phone]  Established Patient  Scheduled Appointment: 10/20/2022

## 2022-10-06 NOTE — DISCHARGE NOTE PROVIDER - HOSPITAL COURSE
This is an 81 year old Hatian Spanish Creole  speaking gentleman with a PMH of CAD s/p PCI LAD/[id number] LV function, ILR implantation (5/2020) for SVT, DM, HTN, HLD, and PAD, presenting to the cath holding area for a LLE angiogram and possible angioplasty with Dr Sal Ovalles  secondary to cramping sensations both lower extremities even while walking on flat surfaces and worsening claudication over the past 5-6 months.  Now s/p bilateral LE angiogram/LLE angioplasty via RFA with Dr. Ovalles: 60% Right prox and distal SFA--mod to severe disease; 70% Left prox to mid SFA heavily calcified treated with JETSTREAM/atherectomy/DCB x 1 (LUTONIX .018 6.0mmx [id number]); 50% left popliteal artery; patent left PT artery; patent left peroneal artery; left anterior tibial artery with slow flow (prelim report; official report to follow).  Creat 2.25 today (last creat 1.57 10/2021).  -Bedrest with HOB < 30 degrees x 4 hours post removal of sheath then OOB  -May discharge to home at 1900 if remains stable  -IVF hydration post procedure with NS at 100cc/hr x 6hours to prevent ANTOINE  -Patient with prescheduled appointment with PMD Dr. Fischer Tuesday 10/11/22-->BMP level to be drawn to check kidney function  -Meds: Continue ASA/Plavix/statin  -Discontinue metoprolol and add Coreg for better blood pressure control  -Discontinue HCTZ secondary to renal insufficiency  -Med changes discussed with Dr. Breaux  -Right foot xray prior to discharge home secondary to patient chronic c/o Right heel pain at Dr. Breaux's request  -Follow up with Dr. Breaux 10/20/22 for prescheduled appointment  -Activity instructions discussed with patient and daughter with verbal understanding  -Benefits of DAPT emphasized with patient and daughter  verbal understanding  -Discussed with Dr. Ovalles and Dr. Breaux

## 2022-10-06 NOTE — DISCHARGE NOTE PROVIDER - NSDCCPTREATMENT_GEN_ALL_CORE_FT
PRINCIPAL PROCEDURE  Procedure: Angiography of peripheral vessel with percutaneous transluminal angioplasty (PTA) if indicated  Findings and Treatment: No heavy lifting, driving, sex, tub baths, swimming, or any activity that submerges the lower half of the body in water for 48 hours.  Limited walking and stairs for 48 hours.    Change the bandaid after 24 hours and every 24 hours after that.  Keep the puncture site dry and covered with a bandaid until a scab forms.    Observe the site frequently.  If bleeding or a large lump (the size of a golf ball or bigger) occurs lie flat, apply continuous direct pressure just above the puncture site for at least 10 minutes, and notify your physician immediately.  If the bleeding cannot be controlled, call 911 immediately for assistance.  Notify your physician of pain, swelling or any drainage.    Notify your physician immediately if coldness, numbness, discoloration or pain in your foot occurs.

## 2022-10-06 NOTE — DISCHARGE NOTE PROVIDER - NSDCFUADDAPPT_GEN_ALL_CORE_FT
Follow up with Dr. Breaux October 20, 2022 during scheduled office visit    Follow up with Dr. Amado Macdonald 10/11/22 to check kidney function

## 2022-10-06 NOTE — DISCHARGE NOTE NURSING/CASE MANAGEMENT/SOCIAL WORK - PATIENT PORTAL LINK FT
You can access the FollowMyHealth Patient Portal offered by [facility] by registering at the following website: [email or web address]. By joining [facility]’s FollowMyHealth portal, you will also be able to view your health information using other applications (apps) compatible with our system.

## 2022-10-07 ENCOUNTER — TRANSCRIPTION ENCOUNTER (OUTPATIENT)
Age: 82
End: 2022-10-07

## 2022-10-11 ENCOUNTER — APPOINTMENT (OUTPATIENT)
Dept: INTERNAL MEDICINE | Facility: CLINIC | Age: 82
End: 2022-10-11

## 2022-10-11 VITALS
HEART RATE: 70 BPM | WEIGHT: 157 LBS | BODY MASS INDEX: 27.82 KG/M2 | DIASTOLIC BLOOD PRESSURE: 83 MMHG | SYSTOLIC BLOOD PRESSURE: 153 MMHG | RESPIRATION RATE: 15 BRPM | TEMPERATURE: 97.2 F | OXYGEN SATURATION: 97 % | HEIGHT: 63 IN

## 2022-10-11 DIAGNOSIS — J30.9 ALLERGIC RHINITIS, UNSPECIFIED: ICD-10-CM

## 2022-10-11 DIAGNOSIS — R06.00 DYSPNEA, UNSPECIFIED: ICD-10-CM

## 2022-10-11 DIAGNOSIS — Z00.00 ENCOUNTER FOR GENERAL ADULT MEDICAL EXAMINATION W/OUT ABNORMAL FINDINGS: ICD-10-CM

## 2022-10-11 PROBLEM — I47.1 SUPRAVENTRICULAR TACHYCARDIA: Chronic | Status: ACTIVE | Noted: 2022-10-06

## 2022-10-11 PROBLEM — I73.9 PERIPHERAL VASCULAR DISEASE, UNSPECIFIED: Chronic | Status: ACTIVE | Noted: 2022-10-06

## 2022-10-11 PROBLEM — I25.10 ATHEROSCLEROTIC HEART DISEASE OF NATIVE CORONARY ARTERY WITHOUT ANGINA PECTORIS: Chronic | Status: ACTIVE | Noted: 2022-10-06

## 2022-10-11 PROCEDURE — 36415 COLL VENOUS BLD VENIPUNCTURE: CPT

## 2022-10-11 PROCEDURE — G0444 DEPRESSION SCREEN ANNUAL: CPT

## 2022-10-11 PROCEDURE — G0439: CPT

## 2022-10-11 RX ORDER — GLIMEPIRIDE 1 MG/1
1 TABLET ORAL
Qty: 90 | Refills: 0 | Status: DISCONTINUED | COMMUNITY
Start: 2022-04-22 | End: 2022-10-11

## 2022-10-11 RX ORDER — HYDROCHLOROTHIAZIDE 12.5 MG/1
12.5 TABLET ORAL
Qty: 90 | Refills: 0 | Status: DISCONTINUED | COMMUNITY
Start: 2022-03-29 | End: 2022-10-11

## 2022-10-11 NOTE — HEALTH RISK ASSESSMENT
[Former] : Former [20 or more] : 20 or more [No] : In the past 12 months have you used drugs other than those required for medical reasons? No [No falls in past year] : Patient reported no falls in the past year [0] : 2) Feeling down, depressed, or hopeless: Not at all (0) [PHQ-2 Negative - No further assessment needed] : PHQ-2 Negative - No further assessment needed [HIV Test offered] : HIV Test offered [Hepatitis C test offered] : Hepatitis C test offered [None] : None [With Family] : lives with family [# of Members in Household ___] :  household currently consist of [unfilled] member(s) [Retired] : retired [] :  [# Of Children ___] : has [unfilled] children [Feels Safe at Home] : Feels safe at home [Fully functional (bathing, dressing, toileting, transferring, walking, feeding)] : Fully functional (bathing, dressing, toileting, transferring, walking, feeding) [Fully functional (using the telephone, shopping, preparing meals, housekeeping, doing laundry, using] : Fully functional and needs no help or supervision to perform IADLs (using the telephone, shopping, preparing meals, housekeeping, doing laundry, using transportation, managing medications and managing finances) [Reports changes in hearing] : Reports changes in hearing [Reports normal functional visual acuity (ie: able to read med bottle)] : Reports normal functional visual acuity [Designated Healthcare Proxy] : Designated healthcare proxy [Name: ___] : Health Care Proxy's Name: [unfilled]  [Relationship: ___] : Relationship: [unfilled] [Good] : ~his/her~  mood as  good [Patient reported colonoscopy was normal] : Patient reported colonoscopy was normal [YearQuit] : 2008 [de-identified] : Limited due to bilateral leg pain  [de-identified] : Balanced diet  [[id number]] : 0 [Change in mental status noted] : No change in mental status noted [Language] : denies difficulty with language [Behavior] : denies difficulty with behavior [Learning/Retaining New Information] : denies difficulty learning/retaining new information [Handling Complex Tasks] : denies difficulty handling complex tasks [Reasoning] : denies difficulty with reasoning [Spatial Ability and Orientation] : denies difficulty with spatial ability and orientation [Sexually Active] : not sexually active [High Risk Behavior] : no high risk behavior [Reports changes in vision] : Reports no changes in vision [ColonoscopyComments] : polyps  [de-identified] : R sided chornic hearing loss  [Patient/Caregiver not ready to engage] : , patient/caregiver not ready to engage [I will adhere to the patient's wishes.] : I will adhere to the patient's wishes. [AdvancecareDate] : 10/2022

## 2022-10-11 NOTE — PHYSICAL EXAM
[No Acute Distress] : no acute distress [Well Nourished] : well nourished [Well Developed] : well developed [Well-Appearing] : well-appearing [Normal Sclera/Conjunctiva] : normal sclera/conjunctiva [PERRL] : pupils equal round and reactive to light [EOMI] : extraocular movements intact [Normal Outer Ear/Nose] : the outer ears and nose were normal in appearance [Normal Oropharynx] : the oropharynx was normal [No JVD] : no jugular venous distention [No Lymphadenopathy] : no lymphadenopathy [Supple] : supple [Thyroid Normal, No Nodules] : the thyroid was normal and there were no nodules present [No Respiratory Distress] : no respiratory distress  [No Accessory Muscle Use] : no accessory muscle use [Clear to Auscultation] : lungs were clear to auscultation bilaterally [Normal Rate] : normal rate  [Regular Rhythm] : with a regular rhythm [Normal S1, S2] : normal S1 and S2 [No Murmur] : no murmur heard [No Carotid Bruits] : no carotid bruits [No Abdominal Bruit] : a ~M bruit was not heard ~T in the abdomen [No Varicosities] : no varicosities [Pedal Pulses Present] : the pedal pulses are present [No Edema] : there was no peripheral edema [No Palpable Aorta] : no palpable aorta [No Extremity Clubbing/Cyanosis] : no extremity clubbing/cyanosis [Soft] : abdomen soft [Non Tender] : non-tender [Non-distended] : non-distended [No Masses] : no abdominal mass palpated [No HSM] : no HSM [Normal Bowel Sounds] : normal bowel sounds [Normal Posterior Cervical Nodes] : no posterior cervical lymphadenopathy [Normal Anterior Cervical Nodes] : no anterior cervical lymphadenopathy [No CVA Tenderness] : no CVA  tenderness [No Spinal Tenderness] : no spinal tenderness [No Joint Swelling] : no joint swelling [Grossly Normal Strength/Tone] : grossly normal strength/tone [Coordination Grossly Intact] : coordination grossly intact [No Focal Deficits] : no focal deficits [Normal Gait] : normal gait [Normal Affect] : the affect was normal [Alert and Oriented x3] : oriented to person, place, and time [Normal Insight/Judgement] : insight and judgment were intact

## 2022-10-11 NOTE — HISTORY OF PRESENT ILLNESS
[Family Member] : family member [FreeTextEntry1] : CPE/NP  [de-identified] : Shawn is yo M w PMHx CAD s/p PCI,  ILR placed 5/20 for SVT, T2DM, HTN, HLD and PAD and CKD stage IV  here to establish care. \par Patient recently referred for peripheral angiogram for severe popliteal disease of the left lower extremity this month. \par \par Specialist \par Dr. Johanny Breaux (Cardio/vascular)- \par Dr. Perlman (Endo) \par Dr. Ferreira (optho)- last diabetic eye exam 6 months ago, has appt for f/u- +cataracts s/p surgery, +glaucoma \par Dr. Daniel (urologist)- Hx prostate cancer s/p radiation therapy (2015)\par Dr. Bright (oncologist) \par Dr. Mcintyre (ENT)- Thraot "polpys"\par \par HCM \par Over the age of CRC screening- normal in the past, hx benign polyps \par Refuses PNA vaccine \par Refuses flu vaccine \par \par Has no complaints today.

## 2022-10-11 NOTE — ASSESSMENT
[FreeTextEntry1] : Shawn is yo M w PMHx CAD s/p PCI,  ILR placed 5/20 for SVT, T2DM, HTN, HLD and PAD and CKD stage IV here to establish care. \par \par #HCM\par - Over the age of CRC screening- normal in the past, hx benign polyps \par - Refuses PNA vaccine, discussed the benefits of getting the vaccine  \par - Refuses flu vaccine, discussed the benefits of getting the vaccine, as per pt he had a reaction to the flu vaccine in the past\par - Pt has gotten the COVID vaccine + booster, last dose in April 2022, discussed coming back next month in order to get the bivalent COVID booster as this will be 6 months after last dose \par - Will get routine blood work - cbc, cmp, thyroid, lipid panel HgbA1c\par Educated about importance of healthy diet, physical acitvity (45 min 5 days a week moderate intensity exercises), proper sleep (8 hours of sleep), importance of screening test for early detection and treatment of cancer. Importance of immunizations including COVID-19 and seasonal flu vaccine in order to prevent or lessen disease. \par  \par #CAD s/p PCI\par #SVT s/p ILR \par - Pt sees Cardio Dr. Johanny Breaux \par - Continue home ASA and Plavix \par - Had recent EKG done \par \par #T2DM \par - Pt sees Endo Dr. Perlman\par - Pt sees optho Dr. Ferreira (optho)- last diabetic eye exam 6 months ago, has appt for f/u coming up \par - Elevated HgbA1C 7.9 on recent blood work \par - Will recheck HgbA1C\par - Discussed dietary modifications such as low cab diet, pt admits to eating plantains every day, discussed risks of worsening diabetes \par - Continue home Farxiga and Tradjenta, will need to follow up with Endo as diabetes is not adequately controlled at this time \par Discussed diabetic diet. Carb allowance of around 130-140 g carbs daily. \par Educated about foot hygiene and need to see opthalmology and podiatry yearly.\par \par #HTN\par - High today 153/83, advised to check at home, will cont to monitor as might need BP med optimization \par - Discussed low salt diet \par - Continue home Clonidine and Amlodipine \par \par #HLD\par - Continue home Rosuvastatin\par - Discussed diet and exercise \par Educated eating whole grain foods rich in soluble fiber such as oats and Omega 3 rich fish such as salmon, tout, sardines and bean based meals such as kidney beans, chickpeas and lentils. Small protions of nuts. Limit sugars and alcohol.\par \par #PAD s/p recent angiogram\par - Follows vascular \par - Continue ASA and Plavix  \par \par #CKD stage IV\par - Cr: 2.25 on most recent blood work \par - Will check CMP \par - Referall given for Nephro Dr. Sadler \par \par #History of prostate cancer s/p radiation therapy (2015)\par - Pt sees Urology Dr. Daniel and Oncologist Dr. Mcintyre \par - Has PSA checked with urology \par \par rto 2 weeks to discuss bw

## 2022-10-14 LAB
25(OH)D3 SERPL-MCNC: 61.8 NG/ML
ALBUMIN SERPL ELPH-MCNC: 4.3 G/DL
ALP BLD-CCNC: 103 U/L
ALT SERPL-CCNC: 15 U/L
ANION GAP SERPL CALC-SCNC: 16 MMOL/L
APPEARANCE: CLEAR
AST SERPL-CCNC: 19 U/L
BACTERIA: NEGATIVE
BASOPHILS # BLD AUTO: 0.09 K/UL
BASOPHILS NFR BLD AUTO: 1 %
BILIRUB SERPL-MCNC: 0.6 MG/DL
BILIRUBIN URINE: NEGATIVE
BLOOD URINE: NEGATIVE
BUN SERPL-MCNC: 34 MG/DL
CALCIUM SERPL-MCNC: 9.7 MG/DL
CHLORIDE SERPL-SCNC: 99 MMOL/L
CHOLEST SERPL-MCNC: 160 MG/DL
CO2 SERPL-SCNC: 22 MMOL/L
COLOR: NORMAL
CREAT SERPL-MCNC: 1.86 MG/DL
EGFR: 35 ML/MIN/1.73M2
EOSINOPHIL # BLD AUTO: 0.35 K/UL
EOSINOPHIL NFR BLD AUTO: 3.9 %
ESTIMATED AVERAGE GLUCOSE: 183 MG/DL
GLUCOSE QUALITATIVE U: ABNORMAL
GLUCOSE SERPL-MCNC: 167 MG/DL
HBA1C MFR BLD HPLC: 8 %
HCT VFR BLD CALC: 38.5 %
HCV AB SER QL: NONREACTIVE
HCV S/CO RATIO: 0.18 S/CO
HDLC SERPL-MCNC: 53 MG/DL
HGB BLD-MCNC: 12.8 G/DL
HIV1+2 AB SPEC QL IA.RAPID: NONREACTIVE
HYALINE CASTS: 0 /LPF
IMM GRANULOCYTES NFR BLD AUTO: 0.2 %
KETONES URINE: NEGATIVE
LDLC SERPL CALC-MCNC: 88 MG/DL
LEUKOCYTE ESTERASE URINE: NEGATIVE
LYMPHOCYTES # BLD AUTO: 2.39 K/UL
LYMPHOCYTES NFR BLD AUTO: 26.3 %
MAN DIFF?: NORMAL
MCHC RBC-ENTMCNC: 26.3 PG
MCHC RBC-ENTMCNC: 33.2 GM/DL
MCV RBC AUTO: 79.1 FL
MICROSCOPIC-UA: NORMAL
MONOCYTES # BLD AUTO: 0.79 K/UL
MONOCYTES NFR BLD AUTO: 8.7 %
NEUTROPHILS # BLD AUTO: 5.44 K/UL
NEUTROPHILS NFR BLD AUTO: 59.9 %
NITRITE URINE: NEGATIVE
NONHDLC SERPL-MCNC: 107 MG/DL
PH URINE: 6
PLATELET # BLD AUTO: 252 K/UL
POTASSIUM SERPL-SCNC: 4.6 MMOL/L
PROT SERPL-MCNC: 7.5 G/DL
PROTEIN URINE: ABNORMAL
RBC # BLD: 4.87 M/UL
RBC # FLD: 16 %
RED BLOOD CELLS URINE: 2 /HPF
SODIUM SERPL-SCNC: 136 MMOL/L
SPECIFIC GRAVITY URINE: 1.01
SQUAMOUS EPITHELIAL CELLS: 0 /HPF
TRIGL SERPL-MCNC: 97 MG/DL
TSH SERPL-ACNC: 1.43 UIU/ML
UROBILINOGEN URINE: NORMAL
WBC # FLD AUTO: 9.08 K/UL
WHITE BLOOD CELLS URINE: 0 /HPF

## 2022-10-16 LAB
FOLATE SERPL-MCNC: >20 NG/ML
IRON SATN MFR SERPL: 10 %
IRON SERPL-MCNC: 42 UG/DL
TIBC SERPL-MCNC: 404 UG/DL
UIBC SERPL-MCNC: 362 UG/DL
VIT B12 SERPL-MCNC: 698 PG/ML

## 2022-10-17 ENCOUNTER — NON-APPOINTMENT (OUTPATIENT)
Age: 82
End: 2022-10-17

## 2022-10-19 ENCOUNTER — NON-APPOINTMENT (OUTPATIENT)
Age: 82
End: 2022-10-19

## 2022-10-19 DIAGNOSIS — I70.213 ATHEROSCLEROSIS OF NATIVE ARTERIES OF EXTREMITIES WITH INTERMITTENT CLAUDICATION, BILATERAL LEGS: ICD-10-CM

## 2022-10-20 ENCOUNTER — APPOINTMENT (OUTPATIENT)
Dept: CARDIOLOGY | Facility: CLINIC | Age: 82
End: 2022-10-20

## 2022-10-20 VITALS
BODY MASS INDEX: 27.57 KG/M2 | HEIGHT: 63 IN | OXYGEN SATURATION: 98 % | WEIGHT: 155.6 LBS | DIASTOLIC BLOOD PRESSURE: 82 MMHG | SYSTOLIC BLOOD PRESSURE: 150 MMHG | TEMPERATURE: 98.1 F | HEART RATE: 73 BPM

## 2022-10-20 PROCEDURE — 93925 LOWER EXTREMITY STUDY: CPT

## 2022-10-20 PROCEDURE — 99214 OFFICE O/P EST MOD 30 MIN: CPT

## 2022-10-20 RX ORDER — CLOPIDOGREL BISULFATE 75 MG/1
75 TABLET, FILM COATED ORAL
Qty: 90 | Refills: 0 | Status: DISCONTINUED | COMMUNITY
Start: 2022-01-31 | End: 2022-10-20

## 2022-10-20 NOTE — PHYSICAL EXAM
[General Appearance - Well Developed] : well developed [Normal Appearance] : normal appearance [Well Groomed] : well groomed [General Appearance - Well Nourished] : well nourished [No Deformities] : no deformities [General Appearance - In No Acute Distress] : no acute distress [Normal Conjunctiva] : the conjunctiva exhibited no abnormalities [Eyelids - No Xanthelasma] : the eyelids demonstrated no xanthelasmas [Normal Oral Mucosa] : normal oral mucosa [No Oral Pallor] : no oral pallor [No Oral Cyanosis] : no oral cyanosis [Normal Jugular Venous A Waves Present] : normal jugular venous A waves present [Normal Jugular Venous V Waves Present] : normal jugular venous V waves present [No Jugular Venous Maria A Waves] : no jugular venous maria A waves [Respiration, Rhythm And Depth] : normal respiratory rhythm and effort [Exaggerated Use Of Accessory Muscles For Inspiration] : no accessory muscle use [Auscultation Breath Sounds / Voice Sounds] : lungs were clear to auscultation bilaterally [Heart Rate And Rhythm] : heart rate and rhythm were normal [Heart Sounds] : normal S1 and S2 [Murmurs] : no murmurs present [Abdomen Soft] : soft [Abdomen Tenderness] : non-tender [Abdomen Mass (___ Cm)] : no abdominal mass palpated [Nail Clubbing] : no clubbing of the fingernails [Cyanosis, Localized] : no localized cyanosis [] : no ischemic changes [Skin Color & Pigmentation] : normal skin color and pigmentation [No Venous Stasis] : no venous stasis

## 2022-10-20 NOTE — HISTORY OF PRESENT ILLNESS
[FreeTextEntry1] : 82 y/o Male with h/o CAD s/p PCI LAD/OM1 '18 with repeat cardiac catheterization in 5/20 demonstrating patent stents with mild, non-obstructive CAD, normal LV fxn, ILR placed 5/20 for SVT, DM, HTN, Dyslipidemia and PAD s/p DCB to the prox mid and distal SFA on 10/6/2022, presents for follow up eval post peripheral angiogram \par \par Patient is accompanied by daughter. \par Notes that he has been doing better than prior to the procedure but notes some occasional tingling and paresthesia in the toes which have improved. \par Patient notes that he is walking with interval improvement in claudications symptoms \par Still notes that on the RLE there is pain at rest and upon exertion but has not worsened since last visit. \par \par Peripehral angiogram: 10/6/2022: s/p Jetsream atherectomy, balloon angioplasty and DCB angioplasty using 6 x 220 mm balloon of the prox mid and distal SFA \par

## 2022-10-20 NOTE — ASSESSMENT
[FreeTextEntry1] : 82 y/o Male with h/o CAD s/p PCI LAD/OM1 '18 with repeat cardiac catheterization in 5/20 demonstrating patent stents with mild, non-obstructive CAD, normal LV fxn, ILR placed 5/20 for SVT, DM, HTN, Dyslipidemia and PAD s/p DCB to the prox mid and distal SFA on 10/6/2022, presents for follow up eval post peripheral angiogram \par \par 1. PAD s/p DCB to the prox mid and distal SFA on 10/6/2022, with Kaitlin IIb classification \par - patinet notes interval improvement in LLE claudication \par - on DAPT therapy, based on the 1 month follow up appointment may consider Xarelto 2.5mg dosing (based on the COMPASS Trial)\par - bedside Doppler of the RLE: monophasic flow in the DP/PT and LLE: biphasic flow in the DP and PT \par - US arterial duplex done shows in the mid SFA \par - Continue with high intensity, Crestor 40mg po q daily \par - will follow up in 1 month \par \par 2. Hypertension \par - blood pressure is elevated in the office \par - on Clonidine 0.1 mg po q TID Norvasc 10mg po q daily and Coreg 12.5mg po q 12hrs (will increase Coreg to 25mg po 12hrs)\par - disucssed to check blood pressure \par - will follow up in 1 month\par \par 3. CAD s/p LAD/OM1 '18\par - no active chest pain or shortness of breath \par - on ASA Plavix and statin \par - discussed with daughter will obtain records from previous cardiologist \par  \par 4. SVT and possible syncope s/p ILR \par - ILR interrogated during this office visit \par - no evidence of recent SVT \par \par \par \par Johanny Breaux D.O. FACC\par Cardiology/Vascular Cardiology -[facility]\par Telephone # [phone]\par \par

## 2022-10-27 NOTE — PATIENT PROFILE ADULT. - FUNCTIONAL SCREEN CURRENT LEVEL: TOILETING, MLM
Labs from 10/25 reviewed by Eli MEYERS. Tacrolimus level 5.7. Orders received for tacrolimus 0.5mg now then tacrolimus 1 mg every 12hours and repeat labs on 10/31/22.  Called Mr Jennings and left voice message requesting call back for medication changes.    Mr Jennings returned call, reviewed above orders. He repeated orders and agreed to plan.   (0) independent

## 2022-10-30 ENCOUNTER — EMERGENCY (EMERGENCY)
Facility: HOSPITAL | Age: 82
LOS: 1 days | Discharge: HOME | End: 2022-10-30
Attending: EMERGENCY MEDICINE
Payer: MEDICARE

## 2022-10-30 VITALS
OXYGEN SATURATION: 98 % | HEIGHT: 60 IN | RESPIRATION RATE: 18 BRPM | TEMPERATURE: 98 F | HEART RATE: 70 BPM | SYSTOLIC BLOOD PRESSURE: 178 MMHG | DIASTOLIC BLOOD PRESSURE: 85 MMHG

## 2022-10-30 DIAGNOSIS — K40.90 UNILATERAL INGUINAL HERNIA, WITHOUT OBSTRUCTION OR GANGRENE, NOT SPECIFIED AS RECURRENT: Chronic | ICD-10-CM

## 2022-10-30 DIAGNOSIS — Z95.5 PRESENCE OF CORONARY ANGIOPLASTY IMPLANT AND GRAFT: Chronic | ICD-10-CM

## 2022-10-30 DIAGNOSIS — Z98.890 OTHER SPECIFIED POSTPROCEDURAL STATES: Chronic | ICD-10-CM

## 2022-10-30 PROCEDURE — 70450 CT HEAD/BRAIN W/O DYE: CPT | Mod: 26,MA

## 2022-10-30 PROCEDURE — 99284 EMERGENCY DEPT VISIT MOD MDM: CPT | Mod: 25

## 2022-10-30 PROCEDURE — 12052 INTMD RPR FACE/MM 2.6-5.0 CM: CPT

## 2022-10-30 RX ORDER — TETANUS TOXOID, REDUCED DIPHTHERIA TOXOID AND ACELLULAR PERTUSSIS VACCINE, ADSORBED 5; 2.5; 8; 8; 2.5 [IU]/.5ML; [IU]/.5ML; UG/.5ML; UG/.5ML; UG/.5ML
0.5 SUSPENSION INTRAMUSCULAR ONCE
Refills: 0 | Status: COMPLETED | OUTPATIENT
Start: 2022-10-30 | End: 2022-10-30

## 2022-10-30 RX ORDER — BACITRACIN ZINC 500 UNIT/G
1 OINTMENT IN PACKET (EA) TOPICAL ONCE
Refills: 0 | Status: COMPLETED | OUTPATIENT
Start: 2022-10-30 | End: 2022-10-30

## 2022-10-30 RX ORDER — LIDOCAINE HYDROCHLORIDE AND EPINEPHRINE 10; 10 MG/ML; UG/ML
6 INJECTION, SOLUTION INFILTRATION; PERINEURAL ONCE
Refills: 0 | Status: DISCONTINUED | OUTPATIENT
Start: 2022-10-30 | End: 2022-11-06

## 2022-10-30 RX ADMIN — Medication 1 APPLICATION(S): at 14:32

## 2022-10-30 RX ADMIN — TETANUS TOXOID, REDUCED DIPHTHERIA TOXOID AND ACELLULAR PERTUSSIS VACCINE, ADSORBED 0.5 MILLILITER(S): 5; 2.5; 8; 8; 2.5 SUSPENSION INTRAMUSCULAR at 12:30

## 2022-10-30 RX ADMIN — LIDOCAINE HYDROCHLORIDE AND EPINEPHRINE 6 MILLILITER(S): 10; 10 INJECTION, SOLUTION INFILTRATION; PERINEURAL at 12:41

## 2022-10-30 NOTE — ED ADULT NURSE NOTE - OBJECTIVE STATEMENT
Patient is alert and oriented X4 from home. Patient states they were walking their dog and the dog pulled on his leash and the patient hit head on the gate latch.  Patient has an approx 2 inch laceration to left eyebrow. The patients bleeding is controlled at this time. Patient states he is on aspirin and currently denies anticoagulants. Patient denies LOC and dizziness.

## 2022-10-30 NOTE — ED PROVIDER NOTE - NSICDXPASTSURGICALHX_GEN_ALL_CORE_FT
PAST SURGICAL HISTORY:  H/O colonoscopy Normal as per patient 3 years ago.    Inguinal hernia     S/P primary angioplasty with coronary stent

## 2022-10-30 NOTE — ED PROVIDER NOTE - CLINICAL SUMMARY MEDICAL DECISION MAKING FREE TEXT BOX
80 y/o male on Aspirin and Plavix, presents with head trauma, pt hit his head on a metal gate latch and sustained a 2cm laceration above his eyebrow. No LOC.     Will obtain head CT to r/o ICH and laceration repair. Will update tetanus, reassess. 80 y/o male on Aspirin and Plavix, presents with head trauma, pt hit his head on a metal gate latch and sustained a 3cm laceration above his eyebrow. No LOC.     Will obtain head CT to r/o ICH and laceration repair. Will update tetanus, reassess.

## 2022-10-30 NOTE — ED PROVIDER NOTE - NS ED ATTENDING STATEMENT MOD
This was a shared visit with the JOVANA. I reviewed and verified the documentation and independently performed the documented:

## 2022-10-30 NOTE — ED PROVIDER NOTE - PATIENT PORTAL LINK FT
You can access the FollowMyHealth Patient Portal offered by [facility] by registering at the following website: [email or web address]. By joining [facility]’s FollowMyHealth portal, you will also be able to view your health information using other applications (apps) compatible with our system.

## 2022-10-30 NOTE — ED PROCEDURE NOTE - ATTENDING APP SHARED VISIT CONTRIBUTION OF CARE
I, the attending physician, was present with the PA/NP/resident for the key portions of the procedure.  Edith Graves, DO

## 2022-10-30 NOTE — ED ADULT TRIAGE NOTE - CHIEF COMPLAINT QUOTE
was walking dog, dog pulled leash and pt hit head on gate latch.  approx 2 inch laceration to left eyebrow, bleeding controlled.  on aspirin plavix, denies anticoagulants. denies LOC, denies dizziness, ambulating well without assistance.

## 2022-10-30 NOTE — ED PROVIDER NOTE - ATTENDING APP SHARED VISIT CONTRIBUTION OF CARE
I performed the initial face to face bedside interview with this patient regarding history of present illness, review of symptoms and past medical, social and family history.  I completed an independent physical examination.  I was the initial provider who evaluated this patient.  The history, review of symptoms and examination was documented by the scribe in my presence and I attest to the accuracy of the documentation.  I have signed out the follow up of any pending tests (i.e. labs, radiological studies) to the JOVANA/resident.  I have discussed the patient’s plan of care and disposition with the JOVANA/resident.

## 2022-10-30 NOTE — ED PROVIDER NOTE - PHYSICAL EXAMINATION
Constitutional: Awake, Alert. NAD. Well appearing, well nourished. Cooperative. VSS.  HEAD: NC; (+) 2cm vertical laceration to left forehead crossing eyebrow, no eyelid involvement.   EYES: Conjunctiva and sclera are clear bilaterally. EOMI. No nystagmus. PERRL.  ENT: No rhinorrhea, normal pharynx, oropharynx patent, no tonsillar exudates or enlargement, MMM, no erythema, no drooling or stridor.   NECK: Supple, non-tender. No nuchal rigidity. FROM. No midline or paraspinal TTP.  CARDIOVASCULAR: RRR  RESPIRATORY: Speaking full sentences. Normal respiratory effort  ABDOMEN: Soft; NTND. No CVAT B/L. +BS x4 quadrants.  EXTREMITIES: Full active ROM in all extremities; non-tender to palpation; distal pulses palpable and symmetric, no edema, no crepitus or step off.  SKIN: Warm, dry; good skin turgor, no rashes, no ecchymosis, brisk capillary refill.   NEURO: AAOx3 follows commands. CN 2-12 grossly intact. No truncal ataxia. Steady gait. Moving all extremities spontaneously. Constitutional: Awake, Alert. NAD. Well appearing, well nourished. Cooperative. VSS.  HEAD: NC; (+) 3cm vertical laceration to left forehead crossing eyebrow, no eyelid involvement.   EYES: Conjunctiva and sclera are clear bilaterally. EOMI. No nystagmus. PERRL.  NECK: Supple, non-tender. No nuchal rigidity. FROM. No midline or paraspinal TTP.  CARDIOVASCULAR: RRR  RESPIRATORY: Speaking full sentences. Normal respiratory effort  ABDOMEN: Soft; NTND. No CVAT B/L. +BS x4 quadrants.  EXTREMITIES: Full active ROM in all extremities; non-tender to palpation; distal pulses palpable and symmetric, no edema, no crepitus or step off.  SKIN: Warm, dry; good skin turgor, no rashes, no ecchymosis, brisk capillary refill.   NEURO: AAOx3 follows commands. CN 2-12 grossly intact. No truncal ataxia. Steady gait. Moving all extremities spontaneously.

## 2022-10-30 NOTE — ED PROVIDER NOTE - NSICDXPASTMEDICALHX_GEN_ALL_CORE_FT
PAST MEDICAL HISTORY:  Asthma, mild intermittent, uncomplicated     CAD (coronary artery disease)     Cancer protate    Diabetes mellitus     Hyperlipidemia     Hypertension     MI (myocardial infarction)     Other hemorrhoids     PAD (peripheral artery disease)     Sickle cell anemia Carrier of the disease, presently doesn't have it    SVT (supraventricular tachycardia)

## 2022-10-30 NOTE — ED PROVIDER NOTE - OBJECTIVE STATEMENT
81 year old male with a PMHx of CAD s/p PCI LAD/OM1 normal LV function, ILR implantation (5/2020) for SVT, DM, HTN, HLD, and PAD, presents to the ED c/o laceration above his left eyebrow s/p head trauma today. Pt states he was walking his dog and when the dog pulled the leash the pt hit his head on the metal gate latch. Denies LOC. Did not fall to the ground. Pt is able to ambulate. On Aspirin and Plavix. Unknown last tetanus. 81 year old male on Aspirin and Plavix, with a PMHx of CAD s/p PCI LAD/OM1 normal LV function, ILR implantation (5/2020) for SVT, DM, HTN, HLD, and PAD, presents to the ED c/o laceration above his left eyebrow s/p head trauma today. Pt states he was walking his dog and when the dog pulled the leash the pt hit his head on the metal gate latch. Denies LOC. Did not fall to the ground. Pt is able to ambulate. On Aspirin and Plavix. Unknown last tetanus.

## 2022-10-31 ENCOUNTER — APPOINTMENT (OUTPATIENT)
Dept: CARDIOLOGY | Facility: CLINIC | Age: 82
End: 2022-10-31

## 2022-11-03 ENCOUNTER — APPOINTMENT (OUTPATIENT)
Dept: CARDIOLOGY | Facility: CLINIC | Age: 82
End: 2022-11-03

## 2022-11-04 ENCOUNTER — EMERGENCY (EMERGENCY)
Facility: HOSPITAL | Age: 82
LOS: 1 days | Discharge: HOME | End: 2022-11-04
Attending: STUDENT IN AN ORGANIZED HEALTH CARE EDUCATION/TRAINING PROGRAM
Payer: MEDICARE

## 2022-11-04 VITALS
HEIGHT: 60 IN | RESPIRATION RATE: 16 BRPM | OXYGEN SATURATION: 99 % | WEIGHT: 154.98 LBS | TEMPERATURE: 98 F | HEART RATE: 100 BPM | SYSTOLIC BLOOD PRESSURE: 126 MMHG | DIASTOLIC BLOOD PRESSURE: 74 MMHG

## 2022-11-04 DIAGNOSIS — K40.90 UNILATERAL INGUINAL HERNIA, WITHOUT OBSTRUCTION OR GANGRENE, NOT SPECIFIED AS RECURRENT: Chronic | ICD-10-CM

## 2022-11-04 DIAGNOSIS — Z98.890 OTHER SPECIFIED POSTPROCEDURAL STATES: Chronic | ICD-10-CM

## 2022-11-04 DIAGNOSIS — Z95.5 PRESENCE OF CORONARY ANGIOPLASTY IMPLANT AND GRAFT: Chronic | ICD-10-CM

## 2022-11-04 PROCEDURE — G0463: CPT

## 2022-11-04 PROCEDURE — 12052 INTMD RPR FACE/MM 2.6-5.0 CM: CPT

## 2022-11-04 PROCEDURE — 99284 EMERGENCY DEPT VISIT MOD MDM: CPT | Mod: 25

## 2022-11-04 PROCEDURE — L9995: CPT

## 2022-11-04 PROCEDURE — 70450 CT HEAD/BRAIN W/O DYE: CPT | Mod: MA

## 2022-11-04 PROCEDURE — 90715 TDAP VACCINE 7 YRS/> IM: CPT

## 2022-11-04 PROCEDURE — 90471 IMMUNIZATION ADMIN: CPT

## 2022-11-04 NOTE — ED PROVIDER NOTE - ATTENDING APP SHARED VISIT CONTRIBUTION OF CARE
NAGI Frey: suture removal, no other complaints, area appears clean / non-infected    I have personally performed a face to face diagnostic evaluation on this patient.  I have reviewed the ACP note and agree with the history, exam, and plan of care, except as noted.   My medical decision making and observations are found above.

## 2022-11-04 NOTE — ED PROVIDER NOTE - NSFOLLOWUPINSTRUCTIONS_ED_ALL_ED_FT
Please follow up with your doctor within 48 hours   SEEK IMMEDIATE MEDICAL CARE IF YOU HAVE ANY OF THE FOLLOWING SYMPTOMS: swelling around the wound, worsening pain, drainage from the wound, red streaking going away from your wound, inability to move finger or toe near the laceration, or discoloration of skin near the laceration.

## 2022-11-04 NOTE — H&P PST ADULT - FUNCTIONAL ASSESSMENT - BASIC MOBILITY PT AGE POP HIDDEN
Adult Opzelura Counseling:  I discussed with the patient the risks of Opzelura including but not limited to nasopharngitis, bronchitis, ear infection, eosinophila, hives, diarrhea, folliculitis, tonsillitis, and rhinorrhea.  Taken orally, this medication has been linked to serious infections; higher rate of mortality; malignancy and lymphoproliferative disorders; major adverse cardiovascular events; thrombosis; thrombocytopenia, anemia, and neutropenia; and lipid elevations.

## 2022-11-04 NOTE — ED PROVIDER NOTE - PATIENT PORTAL LINK FT
You can access the FollowMyHealth Patient Portal offered by [facility] by registering at the following website: [email or web address]. By joining [facility]’s FollowMyHealth portal, you will also be able to view your health information using other applications (apps) compatible with our system.

## 2022-11-07 ENCOUNTER — NON-APPOINTMENT (OUTPATIENT)
Age: 82
End: 2022-11-07

## 2022-11-07 ENCOUNTER — APPOINTMENT (OUTPATIENT)
Dept: CARDIOLOGY | Facility: CLINIC | Age: 82
End: 2022-11-07

## 2022-11-07 VITALS
OXYGEN SATURATION: 98 % | HEIGHT: 63 IN | HEART RATE: 61 BPM | TEMPERATURE: 98.6 F | SYSTOLIC BLOOD PRESSURE: 142 MMHG | DIASTOLIC BLOOD PRESSURE: 78 MMHG | BODY MASS INDEX: 26.93 KG/M2 | WEIGHT: 152 LBS

## 2022-11-07 PROCEDURE — 99214 OFFICE O/P EST MOD 30 MIN: CPT

## 2022-11-07 RX ORDER — CLOPIDOGREL 75 MG/1
75 TABLET, FILM COATED ORAL DAILY
Qty: 90 | Refills: 3 | Status: COMPLETED | COMMUNITY
Start: 2022-10-11 | End: 2022-11-07

## 2022-11-07 NOTE — CARDIOLOGY SUMMARY
[de-identified] : 10/7/2022: Sinus  Rhythm @ 61 bpm WITHIN NORMAL LIMITS [de-identified] : 5/12/2020:  1. Normal global left ventricular systolic function.\par  2. Left ventricular ejection fraction, by visual estimation, is 65 to 70%.\par  3. Mildly increased LV wall thickness.\par  4. No obvious left ventricle regional wall motion abnormalities visualized.\par  5. Normal right ventricle size and normal systolic function.\par  6. The left atrium is normal in size.\par  7. The right atrium is normal in size.\par  8. Mild thickening and calcification of the anterior and posterior mitral valve leaflets.\par  9. Mild aortic valve leaflet calcification. Small, round echodensities on the aortic valve leaflet tips which may be consistent with calcification. No aortic stenosis.\par 10. Dilated proximal ascending aorta (3.8 cm; 2.1 cm/m S 2).\par 11. Recommend clinical correlation with the above findings. [de-identified] : Peripheral Angiography: 10/6/2022: s/p Jetsream atherectomy, balloon angioplasty and DCB angioplasty using 6 x 220 mm balloon of the prox mid and distal SFA \par  [de-identified] : US arterial duplex: 10/10/2022: 1. Right Lower extremity  -  Moderate atherosclerosis with 20 - 49% stenosis in the proximal SFA.\par 2. Left Lower Extremity  Moderate atherosclerosis with 20 - 49% stenosis in the mid SFA and 20 - 49%  stenosis in the popliteal artery. 3. There is a small calcified mobile plaque measuring 0.14 cm in the left CFA measuring.\par \par \par US arterial duplex: 8/11/2022: 1. Ankle/brachial indices are in the ischemic range bilaterally. 2. Right Lower Extremity  -  Moderate atherosclerosis with 20 - 49% stenosis in the proximal superficial  femoral artery. \par 3. Left Lower Extremity  -   Severe atherosclerosis with 50 - 75% stenosis in the popliteal artery.\par

## 2022-11-07 NOTE — ASSESSMENT
[FreeTextEntry1] : 82 y/o Male with h/o CAD s/p PCI LAD/OM1 '18 with repeat cardiac catheterization in 5/20 demonstrating patent stents with mild, non-obstructive CAD, normal LV fxn, ILR placed 5/20 for SVT, DM, HTN, Dyslipidemia and PAD s/p DCB to the prox mid and distal SFA on 10/6/2022, presents for follow up eval post peripheral angiogram \par \par 1. PAD s/p DCB to the prox mid and distal SFA on 10/6/2022, with Kaitlin IIb classification \par - patient notes interval improvement but has bilateral lower extremity fatigue\par - based on COMPASS trial will STOP Plavix and start Xarelto 2.5mg dosing (based on the COMPASS Trial) and continue with ASA 81mg po q daily \par - bedside Doppler of the RLE: monophasic flow in the DP/PT and LLE: biphasic flow in the DP and PT \par - US arterial duplex done shows  Moderate atherosclerosis with 20 - 49% stenosis in the proximal SFA. Left Lower Extremity Moderate atherosclerosis with 20 - 49% stenosis in the mid SFA and 20 - 49% stenosis in the popliteal artery. 3. There is a small calcified mobile plaque measuring 0.14 cm in the left CFA measuring.\par - Continue with high intensity, Crestor 40mg po q daily \par - will follow up in 1 month \par \par 2. Hypertension \par - blood pressure is elevated in the office but notes at home since the blood pressure medication change but in the afternoon there is a spike in the blood pressure \par - on Clonidine 0.3 mg po q BID, discussed to start Clonidine 0.1 mg po at 1-2 pm,  Norvasc 10mg po q daily and  Coreg to 25mg po 12hrs\par - discussed to continue with ambulatory blood pressure moinitoing \par - will follow up in 1 month\par \par 3. CAD s/p LAD/OM1 '18\par - no active chest pain or shortness of breath \par - on ASA Plavix and statin \par - discussed with daughter will obtain records from previous cardiologist \par  \par 4. SVT and possible syncope s/p ILR \par - ILR interrogated during this office visit \par - no evidence of recent SVT \par \par \par \par Johanny Breaux D.O. FACC\par Cardiology/Vascular Cardiology -[facility]\par Telephone # [phone]\par

## 2022-11-07 NOTE — HISTORY OF PRESENT ILLNESS
[FreeTextEntry1] : 82 y/o Male with h/o CAD s/p PCI LAD/OM1 '18 with repeat cardiac catheterization in 5/20 demonstrating patent stents with mild, non-obstructive CAD, normal LV fxn, ILR placed 5/20 for SVT, DM, HTN, Dyslipidemia and PAD s/p DCB to the prox mid and distal SFA on 10/6/2022, presents for follow up eval post peripheral angiogram \par \par Patient is accompanied by daughter. \par Notes that he has been doing better but still notes fatigue of the bilateral lower extremities, this is equal and unable to differentiate which one affects more than the other. NOtes that the fatigue occurs after walking approx 15-20 minutes \par Encourage to walking \par Still notes that on the RLE there is pain at rest and upon exertion but has not worsened since last visit. \par \par Peripheral angiogram: 10/6/2022: s/p Jetsream atherectomy, balloon angioplasty and DCB angioplasty using 6 x 220 mm balloon of the prox mid and distal SFA \par \par Blood pressure elevated in the office but at home with ambulatory monitoring, the blood [pressure prior to office visit  and then he notes that there is increase in the blood pressure in the afternoon, on Clonidine 0.3mg po BID and so discussed to start Clonidine 0.1 mg po @ 1-2 pm in the afternoon \par

## 2022-11-08 ENCOUNTER — APPOINTMENT (OUTPATIENT)
Dept: ENDOCRINOLOGY | Facility: CLINIC | Age: 82
End: 2022-11-08

## 2022-11-08 ENCOUNTER — APPOINTMENT (OUTPATIENT)
Dept: NEPHROLOGY | Facility: CLINIC | Age: 82
End: 2022-11-08
Payer: MEDICARE

## 2022-11-08 VITALS
SYSTOLIC BLOOD PRESSURE: 152 MMHG | DIASTOLIC BLOOD PRESSURE: 82 MMHG | BODY MASS INDEX: 27.29 KG/M2 | OXYGEN SATURATION: 100 % | HEART RATE: 66 BPM | HEIGHT: 63 IN | WEIGHT: 154 LBS

## 2022-11-08 PROCEDURE — 99204 OFFICE O/P NEW MOD 45 MIN: CPT

## 2022-11-08 NOTE — ASSESSMENT
[FreeTextEntry1] : Chronic Kidney Disease Stage IIIB \par \par ETIO\par likely multifactorial 2/2 diabetic nephropathy, hypertensive nephrosclerosis, aging nephrosclerosis and renal atherosclerotic disease.\par \par DATA\par SCr:  1.86 mg/dl for an eGFR 35\par Proteinuria: 30 proteinuria, will check UPCR\par Renal US: NA\par \par GOALS\par Discussed goal HTN < 140/90, LDL <100, A1C<7.\par For HTN: controlled on current medications\par For hyperlipidemia: controlled on current medications, LDL 88\par For DM: failry controlled on current medications, last A1c 8\par \par COMPLICATIONS OF CKD:\par BMD w/ CKD: ordered labs.\par Anemia w/ CKD: minimal, Hb 12.8\par Edema: none\par Hyperuricemia: ordered labs\par \par RECOMMENDATIONS: \par 1. To continue on current dose of amlodipine, coreg, clonidine. Advised adding ACEi/ARB\par 2. To continue on current dose of rosuvastatin\par 3. To continue on stricter glycemic control w. help of pCP/endocrinologist. On farxiga. \par \par \par He should follow up with me in 3 months.\par I have spent a total of 55 minutes in which > 50% was spent in discussion with patient regarding CKD, HTN, DM.\par \par

## 2022-11-08 NOTE — HISTORY OF PRESENT ILLNESS
[FreeTextEntry1] : HPI: Patient is a 81 year old male with HTN, HLD, DM, prostate Ca, CAD here for initial visit for discussion of labs and evaluation of renal function. \par \par Denies dysuria, gross hematuria, SOB, CP, cough, expectoration, fever, chills, palpitation, syncope, urgency, hesitancy, swelling on feet, joint pain. No history of chronic NSAID use, nephrolithiasis or renal diseases in the family. \par \par REVIEW OF SYSTEM:  All systems were reviewed in detail.  Pertinent positive and negatives have been mentioned in history of present illness.  The rest are negative.\par \par \par \par \par \par

## 2022-12-08 ENCOUNTER — APPOINTMENT (OUTPATIENT)
Dept: CARDIOLOGY | Facility: CLINIC | Age: 82
End: 2022-12-08

## 2022-12-08 ENCOUNTER — NON-APPOINTMENT (OUTPATIENT)
Age: 82
End: 2022-12-08

## 2022-12-08 ENCOUNTER — RX CHANGE (OUTPATIENT)
Age: 82
End: 2022-12-08

## 2022-12-08 VITALS
SYSTOLIC BLOOD PRESSURE: 128 MMHG | WEIGHT: 155 LBS | HEART RATE: 59 BPM | TEMPERATURE: 97.9 F | HEIGHT: 63 IN | OXYGEN SATURATION: 97 % | BODY MASS INDEX: 27.46 KG/M2 | RESPIRATION RATE: 14 BRPM | DIASTOLIC BLOOD PRESSURE: 86 MMHG

## 2022-12-08 DIAGNOSIS — I65.23 OCCLUSION AND STENOSIS OF BILATERAL CAROTID ARTERIES: ICD-10-CM

## 2022-12-08 PROCEDURE — 99214 OFFICE O/P EST MOD 30 MIN: CPT

## 2022-12-08 PROCEDURE — 93000 ELECTROCARDIOGRAM COMPLETE: CPT

## 2022-12-08 NOTE — ASSESSMENT
[FreeTextEntry1] : 80 y/o Male with h/o CAD s/p PCI LAD/OM1 '18 with repeat cardiac catheterization in 5/20 demonstrating patent stents with mild, non-obstructive CAD, normal LV fxn, ILR placed 5/20 for SVT, DM, HTN, Dyslipidemia and PAD s/p DCB to the prox mid and distal SFA on 10/6/2022, presents for follow up \par \par 1. PAD s/p DCB to the prox mid and distal SFA on 10/6/2022, with Kaitlin IIb classification \par - patient notes no symtpoms or claudication of the bilateral lower extremities \par - based on COMPASS trial on Xarelto 2.5mg dosing (based on the COMPASS Trial) and continue with ASA 81mg po q daily \par - bedside Doppler of the RLE: monophasic flow in the DP/PT and LLE: biphasic flow in the DP and PT \par - US arterial duplex done shows  Moderate atherosclerosis with 20 - 49% stenosis in the proximal SFA. Left Lower Extremity Moderate atherosclerosis with 20 - 49% stenosis in the mid SFA and 20 - 49% stenosis in the popliteal artery. 3. There is a small calcified mobile plaque measuring 0.14 cm in the left CFA measuring.\par - Continue with high intensity, Crestor 40mg po q daily \par - will follow up in 4 months  \par \par 2. Hypertension \par - blood pressure is better controlled in the office today \par - on Clonidine 0.3 mg po q BID, Clonidine 0.1 mg po at 1-2 pm,  Norvasc 10mg po q daily and  Coreg to 25mg po 12hrs\par - follows with Nephrology -suggesting starting ACEi/ ARB will follow up with Dr. Kramer \par - discussed to continue with ambulatory blood pressure monitoring \par - will follow up in 1 month\par \par 3. CAD s/p LAD/OM1 '18\par - no active chest pain or shortness of breath \par - on ASA Xarelto 2.5 and statin \par - EKG reviewed shows Sinus bradycardia @ 59 bpm \par - discussed with daughter will obtain records from previous cardiologist \par  \par 4. SVT and possible syncope s/p ILR \par - ILR interrogated during this office visit \par - no evidence of recent SVT \par \par Follow up in 4 months \par \par Johanny Breaux D.O. FAC\par Cardiology/Vascular Cardiology -[facility]\par Telephone # [phone]\par

## 2022-12-08 NOTE — HISTORY OF PRESENT ILLNESS
[FreeTextEntry1] : 82 y/o Male with h/o CAD s/p PCI LAD/OM1 '18 with repeat cardiac catheterization in 5/20 demonstrating patent stents with mild, non-obstructive CAD, normal LV fxn, ILR placed 5/20 for SVT, DM, HTN, Dyslipidemia and PAD s/p DCB to the prox mid and distal SFA on 10/6/2022, presents for follow up eval post peripheral angiogram \par \par Patient is accompanied by daughter. \par Notes that he has been doing better and interval improvement in bilateral lower extremities with no fatigue and claudication symptoms, notes that he is walkng and no worsening of symtpoms \par He was started on Xarelto 2.5mg BID based on COMPASS trial and notes no interval bleeding via urine or stool \par Encourage to walking \par Still notes that on the RLE is better with no worsening symptoms since last visit. \par \par Peripheral angiogram: 10/6/2022: s/p Jetsream atherectomy, balloon angioplasty and DCB angioplasty using 6 x 220 mm balloon of the prox mid and distal SFA \par \par Cardiac: Notes to have better control of the blood pressure with starting Clonidine 0.1 mg po q midday; has since seen Nephrology and possibly wants to start ACEi / ARB and will follow up and make a decision then. \par Otherwise no chest pain or shortness of breath at rest or upon exertion, no dizziness lightheadedness or palpitations

## 2022-12-08 NOTE — CARDIOLOGY SUMMARY
[de-identified] : 12/8/2022: Sinus  Bradycardia @ 59 bpm WITHIN NORMAL LIMITS\par 10/7/2022: Sinus  Rhythm @ 61 bpm WITHIN NORMAL LIMITS [de-identified] : 5/12/2020:  1. Normal global left ventricular systolic function.\par  2. Left ventricular ejection fraction, by visual estimation, is 65 to 70%.\par  3. Mildly increased LV wall thickness.\par  4. No obvious left ventricle regional wall motion abnormalities visualized.\par  5. Normal right ventricle size and normal systolic function.\par  6. The left atrium is normal in size.\par  7. The right atrium is normal in size.\par  8. Mild thickening and calcification of the anterior and posterior mitral valve leaflets.\par  9. Mild aortic valve leaflet calcification. Small, round echodensities on the aortic valve leaflet tips which may be consistent with calcification. No aortic stenosis.\par 10. Dilated proximal ascending aorta (3.8 cm; 2.1 cm/m S 2).\par 11. Recommend clinical correlation with the above findings. [de-identified] : Peripheral Angiography: 10/6/2022: s/p Jetsream atherectomy, balloon angioplasty and DCB angioplasty using 6 x 220 mm balloon of the prox mid and distal SFA \par  [de-identified] : US arterial duplex: 10/10/2022: 1. Right Lower extremity  -  Moderate atherosclerosis with 20 - 49% stenosis in the proximal SFA.\par 2. Left Lower Extremity  Moderate atherosclerosis with 20 - 49% stenosis in the mid SFA and 20 - 49%  stenosis in the popliteal artery. 3. There is a small calcified mobile plaque measuring 0.14 cm in the left CFA measuring.\par \par \par US arterial duplex: 8/11/2022: 1. Ankle/brachial indices are in the ischemic range bilaterally. 2. Right Lower Extremity  -  Moderate atherosclerosis with 20 - 49% stenosis in the proximal superficial  femoral artery. \par 3. Left Lower Extremity  -   Severe atherosclerosis with 50 - 75% stenosis in the popliteal artery.\par

## 2022-12-08 NOTE — PHYSICAL EXAM
[Well Developed] : well developed [Well Nourished] : well nourished [No Acute Distress] : no acute distress [Normal Conjunctiva] : normal conjunctiva [Normal Venous Pressure] : normal venous pressure [No Carotid Bruit] : no carotid bruit [Normal S1, S2] : normal S1, S2 [No Murmur] : no murmur [No Rub] : no rub [No Gallop] : no gallop [Clear Lung Fields] : clear lung fields [Good Air Entry] : good air entry [No Respiratory Distress] : no respiratory distress  [Soft] : abdomen soft [Non Tender] : non-tender [No Masses/organomegaly] : no masses/organomegaly [Normal Bowel Sounds] : normal bowel sounds [Normal Gait] : normal gait [No Edema] : no edema [No Cyanosis] : no cyanosis [No Clubbing] : no clubbing [No Varicosities] : no varicosities [No Rash] : no rash [No Skin Lesions] : no skin lesions [Moves all extremities] : moves all extremities [No Focal Deficits] : no focal deficits [Normal Speech] : normal speech [Alert and Oriented] : alert and oriented [Normal memory] : normal memory [de-identified] : Non palpabale DP/PT pulses bilaterally; LLE: + doppler signal of the PT and DP RLE: + doppler signal of the DPT/PT

## 2023-01-10 ENCOUNTER — APPOINTMENT (OUTPATIENT)
Dept: INTERNAL MEDICINE | Facility: CLINIC | Age: 83
End: 2023-01-10
Payer: MEDICARE

## 2023-01-10 VITALS
TEMPERATURE: 97.9 F | HEART RATE: 72 BPM | WEIGHT: 158 LBS | HEIGHT: 63 IN | OXYGEN SATURATION: 99 % | DIASTOLIC BLOOD PRESSURE: 90 MMHG | RESPIRATION RATE: 14 BRPM | BODY MASS INDEX: 28 KG/M2 | SYSTOLIC BLOOD PRESSURE: 160 MMHG

## 2023-01-10 DIAGNOSIS — I87.2 VENOUS INSUFFICIENCY (CHRONIC) (PERIPHERAL): ICD-10-CM

## 2023-01-10 PROCEDURE — 99214 OFFICE O/P EST MOD 30 MIN: CPT

## 2023-01-10 RX ORDER — CLONIDINE HYDROCHLORIDE 0.1 MG/1
0.1 TABLET ORAL
Refills: 0 | Status: DISCONTINUED | COMMUNITY
Start: 2022-11-07 | End: 2023-01-10

## 2023-01-10 NOTE — ASSESSMENT
[FreeTextEntry1] : Shawn is yo M w PMHx CAD s/p PCI, ILR placed 5/20 for SVT, T2DM, HTN, HLD and PAD and CKD stage IV here for follow up \par C/o diarrhea for the past 10 days. No recent abx use, About two loose stools a day, No new medications other than xarelto which he has been on since 11/2022. No other sick members in household. Has been eating a lot of greens. Has eliminated rice and starchy carbs. \par Has been checking BP at home and admits that they have been on the higher side. SBP around 150 DPB . \par \par Denies chest pain, palpitations, shortness of breath or ant other acute complaints. \par \par Diarrhea \par Likely associated to changed in dietary habits, eating more fiber and decreasing starches in order to improve glycemic control. Low likelihood of infectious diarrhea but will check stool studies in order to rule out. \par \par #CAD s/p PCI\par #SVT s/p ILR \par - Pt sees Cardio Dr. Johanny Breaux \par - Continue  ASA and Plavix \par \par \par #T2DM \par - Pt sees Endo Dr. Perlman\par - Pt sees optho Dr. Ferreira (optho)- last diabetic eye exam 6 months ago, has appt for f/u coming up \par - Will recheck HgbA1C\par - Discussed dietary modifications such as low cab diet, pt admits to eating plantains every day, discussed risks of worsening diabetes \par - Continue home Farxiga and Tradjenta, will need to follow up with Endo as diabetes is not adequately controlled at this time \par Discussed diabetic diet. Carb allowance of around 130-140 g carbs daily. \par Educated about foot hygiene and need to see opthalmology and podiatry yearly.\par \par #HTN\par - High today will need BP med optimization \par - Discussed low salt diet \par - Clonidine increase .2 mg TID mg, cont amlodipine 10 mg qd,  carvedilol 25 mg bid \par \par Iron def anemia \par Cont iron \par Fu cbc \par No signs of symptoms of bleeding \par \par #HLD\par - Continue home Rosuvastatin\par - Discussed diet and exercise \par Educated eating whole grain foods rich in soluble fiber such as oats and Omega 3 rich fish such as salmon, tout, sardines and bean based meals such as kidney beans, chickpeas and lentils. Small protions of nuts. Limit sugars and alcohol.\par \par #PAD s/p recent angiogram\par - Follows vascular \par - Continue ASA and Plavix \par - Xarelto added  \par \par #CKD stage IV\par - Will check CMP \par - Following with nephro \par \par #History of prostate cancer s/p radiation therapy (2015)\par - Pt sees Urology Dr. Daniel and Oncologist Dr. Mcintyre \par - Has PSA checked with urology \par \par rto 3 mo sooner if needed \par \par Refuses vaccines \par

## 2023-01-10 NOTE — HISTORY OF PRESENT ILLNESS
[FreeTextEntry1] : FU HTN, T2DM, HLD, ANEMIA, CKD  [de-identified] : Shawn is yo M w PMHx CAD s/p PCI, ILR placed 5/20 for SVT, T2DM, HTN, HLD and PAD and CKD stage IV here for follow up \par C/o diarrhea for the past 10 days. No recent abx use, About two loose stools a day, No new medications other than xarelto which he has been on since 11/2022. No other sick members in household. Has been eating a lot of greens. Has eliminated rice and starchy carbs. \par Has been checking BP at home and admits that they have been on the higher side. SBP around 150 DPB . \par \par Denies chest pain, palpitations, shortness of breath or ant other acute complaints.

## 2023-01-16 ENCOUNTER — RX RENEWAL (OUTPATIENT)
Age: 83
End: 2023-01-16

## 2023-01-16 LAB
ALBUMIN SERPL ELPH-MCNC: 4 G/DL
ALP BLD-CCNC: 93 U/L
ALT SERPL-CCNC: 15 U/L
ANION GAP SERPL CALC-SCNC: 10 MMOL/L
AST SERPL-CCNC: 17 U/L
BASOPHILS # BLD AUTO: 0.07 K/UL
BASOPHILS NFR BLD AUTO: 1 %
BILIRUB SERPL-MCNC: 0.5 MG/DL
BUN SERPL-MCNC: 33 MG/DL
CALCIUM SERPL-MCNC: 9.2 MG/DL
CDIFF BY PCR: NOT DETECTED
CHLORIDE SERPL-SCNC: 99 MMOL/L
CO2 SERPL-SCNC: 26 MMOL/L
CREAT SERPL-MCNC: 1.7 MG/DL
EGFR: 39 ML/MIN/1.73M2
EOSINOPHIL # BLD AUTO: 0.3 K/UL
EOSINOPHIL NFR BLD AUTO: 4.3 %
ESTIMATED AVERAGE GLUCOSE: 163 MG/DL
GLUCOSE SERPL-MCNC: 164 MG/DL
HBA1C MFR BLD HPLC: 7.3 %
HCT VFR BLD CALC: 40.5 %
HGB BLD-MCNC: 13.6 G/DL
IMM GRANULOCYTES NFR BLD AUTO: 0.3 %
LYMPHOCYTES # BLD AUTO: 2.29 K/UL
LYMPHOCYTES NFR BLD AUTO: 32.7 %
MAN DIFF?: NORMAL
MCHC RBC-ENTMCNC: 26 PG
MCHC RBC-ENTMCNC: 33.6 GM/DL
MCV RBC AUTO: 77.3 FL
MONOCYTES # BLD AUTO: 0.74 K/UL
MONOCYTES NFR BLD AUTO: 10.6 %
NEUTROPHILS # BLD AUTO: 3.58 K/UL
NEUTROPHILS NFR BLD AUTO: 51.1 %
PLATELET # BLD AUTO: 231 K/UL
POTASSIUM SERPL-SCNC: 4.7 MMOL/L
PROT SERPL-MCNC: 6.4 G/DL
RBC # BLD: 5.24 M/UL
RBC # FLD: 16.8 %
SODIUM SERPL-SCNC: 135 MMOL/L
WBC # FLD AUTO: 7 K/UL

## 2023-01-17 LAB
BACTERIA STL CULT: NORMAL
DEPRECATED O AND P PREP STL: NORMAL

## 2023-01-18 ENCOUNTER — NON-APPOINTMENT (OUTPATIENT)
Age: 83
End: 2023-01-18

## 2023-01-24 LAB
25(OH)D3 SERPL-MCNC: 71.7 NG/ML
ALBUMIN SERPL ELPH-MCNC: 4 G/DL
ALP BLD-CCNC: 95 U/L
ALT SERPL-CCNC: 13 U/L
ANION GAP SERPL CALC-SCNC: 10 MMOL/L
APPEARANCE: CLEAR
AST SERPL-CCNC: 20 U/L
BACTERIA: NEGATIVE
BILIRUB SERPL-MCNC: 0.5 MG/DL
BILIRUBIN URINE: NEGATIVE
BLOOD URINE: NEGATIVE
BUN SERPL-MCNC: 33 MG/DL
CALCIUM SERPL-MCNC: 9.4 MG/DL
CALCIUM SERPL-MCNC: 9.4 MG/DL
CHLORIDE SERPL-SCNC: 99 MMOL/L
CO2 SERPL-SCNC: 26 MMOL/L
COLOR: NORMAL
CREAT SERPL-MCNC: 1.69 MG/DL
CREAT SPEC-SCNC: 72 MG/DL
EGFR: 40 ML/MIN/1.73M2
GLUCOSE QUALITATIVE U: ABNORMAL
GLUCOSE SERPL-MCNC: 165 MG/DL
HYALINE CASTS: 0 /LPF
KETONES URINE: NEGATIVE
LEUKOCYTE ESTERASE URINE: NEGATIVE
MICROALBUMIN 24H UR DL<=1MG/L-MCNC: 74.6 MG/DL
MICROALBUMIN/CREAT 24H UR-RTO: 1032 MG/G
MICROSCOPIC-UA: NORMAL
NITRITE URINE: NEGATIVE
PARATHYROID HORMONE INTACT: 186 PG/ML
PH URINE: 7
POTASSIUM SERPL-SCNC: 4.7 MMOL/L
PROT SERPL-MCNC: 6.7 G/DL
PROTEIN URINE: ABNORMAL
RED BLOOD CELLS URINE: 0 /HPF
SODIUM SERPL-SCNC: 135 MMOL/L
SPECIFIC GRAVITY URINE: 1.01
SQUAMOUS EPITHELIAL CELLS: 0 /HPF
URATE SERPL-MCNC: 5.2 MG/DL
UROBILINOGEN URINE: NORMAL
WHITE BLOOD CELLS URINE: 0 /HPF

## 2023-02-02 ENCOUNTER — APPOINTMENT (OUTPATIENT)
Dept: NEPHROLOGY | Facility: CLINIC | Age: 83
End: 2023-02-02

## 2023-02-21 ENCOUNTER — APPOINTMENT (OUTPATIENT)
Dept: NEPHROLOGY | Facility: CLINIC | Age: 83
End: 2023-02-21
Payer: MEDICARE

## 2023-02-21 VITALS
RESPIRATION RATE: 14 BRPM | OXYGEN SATURATION: 98 % | HEIGHT: 63 IN | HEART RATE: 60 BPM | SYSTOLIC BLOOD PRESSURE: 120 MMHG | BODY MASS INDEX: 27.82 KG/M2 | DIASTOLIC BLOOD PRESSURE: 84 MMHG | WEIGHT: 157 LBS

## 2023-02-21 PROCEDURE — 99214 OFFICE O/P EST MOD 30 MIN: CPT

## 2023-02-21 NOTE — ASSESSMENT
[FreeTextEntry1] : Chronic Kidney Disease Stage IIIB \par \par ETIO\par likely multifactorial 2/2 diabetic nephropathy, hypertensive nephrosclerosis, aging nephrosclerosis and renal atherosclerotic disease.\par \par DATA\par SCr: STABLE last labs 13 jan 23 shows Scr 1.7 mg/dl for an eGFR 39\par Proteinuria: 30 proteinuria, UACR 1032 mg/g\par Renal US: NA\par \par GOALS\par Discussed goal HTN < 140/90, LDL <100, A1C<7.\par For HTN: controlled on current medications\par For hyperlipidemia: controlled on current medications, LDL 88\par For DM: failry controlled on current medications, last A1c 7.3\par \par COMPLICATIONS OF CKD:\par BMD w/ CKD: high PTH w/ normal vitamin D, ? secondary hyperparathyroidism. Will repeat and if remained high will start on calcitriol.  \par Anemia w/ CKD: none\par Edema: none\par Hyperuricemia: none\par \par RECOMMENDATIONS: \par 1. To continue on current dose of amlodipine, coreg, clonidine. Advised adding ACEi/ARB\par 2. To continue on current dose of rosuvastatin\par 3. To continue on stricter glycemic control w. help of pCP/endocrinologist. On farxiga. \par \par \par He should follow up with me in 3 months..\par \par

## 2023-02-21 NOTE — HISTORY OF PRESENT ILLNESS
[FreeTextEntry1] : 82 year old male with HTN, HLD, DM, prostate Ca, CAD here for F/u visit for Chronic kidney disease stage 3.\par Patient reports no health related adverse event since last clinic visit. \par NO ER/Hospitalization/urgent care visit. \par Denies any cardiac or urinary complaints\par No dysuria, gross hematuria, SOB, LUTS.\par Reports BP has been well controlled. \par

## 2023-02-22 NOTE — PATIENT PROFILE ADULT - HOW PATIENT ADDRESSED, PROFILE
lela Orbicularis Oris Muscle Flap Text: The defect edges were debeveled with a #15 scalpel blade.  Given that the defect affected the competency of the oral sphincter an obicularis oris muscle flap was deemed most appropriate to restore this competency and normal muscle function.  Using a sterile surgical marker, an appropriate flap was drawn incorporating the defect. The area thus outlined was incised with a #15 scalpel blade.

## 2023-03-02 NOTE — ED ADULT NURSE NOTE - DISCHARGE DATE/TIME
50
30
30-Oct-2022 14:37
Birth Control Pills Counseling: Birth Control Pill Counseling: I discussed with the patient the potential side effects of OCPs including but not limited to increased risk of stroke, heart attack, thrombophlebitis, deep venous thrombosis, hepatic adenomas, breast changes, GI upset, headaches, and depression.  The patient verbalized understanding of the proper use and possible adverse effects of OCPs. All of the patient's questions and concerns were addressed.

## 2023-03-10 NOTE — ED ADULT TRIAGE NOTE - STATUS:
----- Message from Neville Navarrete MD sent at 3/10/2023  8:19 AM CST -----  Osbaldo, Do you recall being treated for this type of bacteria? This can sometimes require a different antibiotic than the usual. Although I still believe there is an underlying structural issue at this point, a course of Cipro or Levaquin would be reasonable if you have never been treated for this.    Neville Navarrete MD  Otolaryngology - Head and Neck Surgery  Office: [phone]  Cell: [phone]  Fax: [phone]    This message was generated using voice dictation. Please excuse any errors that may have been created by the transcription software.       [employer]

## 2023-03-14 ENCOUNTER — INPATIENT (INPATIENT)
Facility: HOSPITAL | Age: 83
LOS: 2 days | Discharge: HOME | DRG: 372 | End: 2023-03-17
Attending: INTERNAL MEDICINE | Admitting: FAMILY MEDICINE
Payer: MEDICARE

## 2023-03-14 VITALS
TEMPERATURE: 98 F | RESPIRATION RATE: 20 BRPM | HEIGHT: 63 IN | WEIGHT: 154.1 LBS | DIASTOLIC BLOOD PRESSURE: 88 MMHG | SYSTOLIC BLOOD PRESSURE: 178 MMHG | OXYGEN SATURATION: 98 % | HEART RATE: 66 BPM

## 2023-03-14 DIAGNOSIS — E11.9 TYPE 2 DIABETES MELLITUS WITHOUT COMPLICATIONS: ICD-10-CM

## 2023-03-14 DIAGNOSIS — Z95.5 PRESENCE OF CORONARY ANGIOPLASTY IMPLANT AND GRAFT: Chronic | ICD-10-CM

## 2023-03-14 DIAGNOSIS — K52.9 NONINFECTIVE GASTROENTERITIS AND COLITIS, UNSPECIFIED: ICD-10-CM

## 2023-03-14 DIAGNOSIS — A04.72 ENTEROCOLITIS DUE TO CLOSTRIDIUM DIFFICILE, NOT SPECIFIED AS RECURRENT: ICD-10-CM

## 2023-03-14 DIAGNOSIS — R19.7 DIARRHEA, UNSPECIFIED: ICD-10-CM

## 2023-03-14 DIAGNOSIS — K40.90 UNILATERAL INGUINAL HERNIA, WITHOUT OBSTRUCTION OR GANGRENE, NOT SPECIFIED AS RECURRENT: Chronic | ICD-10-CM

## 2023-03-14 DIAGNOSIS — Z95.5 PRESENCE OF CORONARY ANGIOPLASTY IMPLANT AND GRAFT: ICD-10-CM

## 2023-03-14 DIAGNOSIS — Z98.890 OTHER SPECIFIED POSTPROCEDURAL STATES: Chronic | ICD-10-CM

## 2023-03-14 DIAGNOSIS — I10 ESSENTIAL (PRIMARY) HYPERTENSION: ICD-10-CM

## 2023-03-14 DIAGNOSIS — N18.30 CHRONIC KIDNEY DISEASE, STAGE 3 UNSPECIFIED: ICD-10-CM

## 2023-03-14 LAB
ALBUMIN SERPL ELPH-MCNC: 3.8 G/DL — SIGNIFICANT CHANGE UP (ref 3.3–5.2)
ALP SERPL-CCNC: 92 U/L — SIGNIFICANT CHANGE UP (ref 40–120)
ALT FLD-CCNC: 13 U/L — SIGNIFICANT CHANGE UP
ANION GAP SERPL CALC-SCNC: 10 MMOL/L — SIGNIFICANT CHANGE UP (ref 5–17)
APPEARANCE UR: CLEAR — SIGNIFICANT CHANGE UP
AST SERPL-CCNC: 20 U/L — SIGNIFICANT CHANGE UP
BASOPHILS # BLD AUTO: 0.06 K/UL — SIGNIFICANT CHANGE UP (ref 0–0.2)
BASOPHILS NFR BLD AUTO: 0.6 % — SIGNIFICANT CHANGE UP (ref 0–2)
BILIRUB SERPL-MCNC: 0.6 MG/DL — SIGNIFICANT CHANGE UP (ref 0.4–2)
BILIRUB UR-MCNC: NEGATIVE — SIGNIFICANT CHANGE UP
BUN SERPL-MCNC: 30.5 MG/DL — HIGH (ref 8–20)
CALCIUM SERPL-MCNC: 9.2 MG/DL — SIGNIFICANT CHANGE UP (ref 8.4–10.5)
CHLORIDE SERPL-SCNC: 99 MMOL/L — SIGNIFICANT CHANGE UP (ref 96–108)
CO2 SERPL-SCNC: 24 MMOL/L — SIGNIFICANT CHANGE UP (ref 22–29)
COLOR SPEC: YELLOW — SIGNIFICANT CHANGE UP
CREAT SERPL-MCNC: 2.04 MG/DL — HIGH (ref 0.5–1.3)
DIFF PNL FLD: ABNORMAL
EGFR: 32 ML/MIN/1.73M2 — LOW
EOSINOPHIL # BLD AUTO: 0.09 K/UL — SIGNIFICANT CHANGE UP (ref 0–0.5)
EOSINOPHIL NFR BLD AUTO: 0.9 % — SIGNIFICANT CHANGE UP (ref 0–6)
GLUCOSE BLDC GLUCOMTR-MCNC: 156 MG/DL — HIGH (ref 70–99)
GLUCOSE SERPL-MCNC: 172 MG/DL — HIGH (ref 70–99)
GLUCOSE UR QL: 1000 MG/DL
HCT VFR BLD CALC: 39.8 % — SIGNIFICANT CHANGE UP (ref 39–50)
HGB BLD-MCNC: 13.2 G/DL — SIGNIFICANT CHANGE UP (ref 13–17)
IMM GRANULOCYTES NFR BLD AUTO: 0.3 % — SIGNIFICANT CHANGE UP (ref 0–0.9)
KETONES UR-MCNC: NEGATIVE — SIGNIFICANT CHANGE UP
LEUKOCYTE ESTERASE UR-ACNC: NEGATIVE — SIGNIFICANT CHANGE UP
LIDOCAIN IGE QN: 30 U/L — SIGNIFICANT CHANGE UP (ref 22–51)
LYMPHOCYTES # BLD AUTO: 1.48 K/UL — SIGNIFICANT CHANGE UP (ref 1–3.3)
LYMPHOCYTES # BLD AUTO: 14.3 % — SIGNIFICANT CHANGE UP (ref 13–44)
MCHC RBC-ENTMCNC: 25.3 PG — LOW (ref 27–34)
MCHC RBC-ENTMCNC: 33.2 GM/DL — SIGNIFICANT CHANGE UP (ref 32–36)
MCV RBC AUTO: 76.2 FL — LOW (ref 80–100)
MONOCYTES # BLD AUTO: 0.56 K/UL — SIGNIFICANT CHANGE UP (ref 0–0.9)
MONOCYTES NFR BLD AUTO: 5.4 % — SIGNIFICANT CHANGE UP (ref 2–14)
NEUTROPHILS # BLD AUTO: 8.15 K/UL — HIGH (ref 1.8–7.4)
NEUTROPHILS NFR BLD AUTO: 78.5 % — HIGH (ref 43–77)
NITRITE UR-MCNC: NEGATIVE — SIGNIFICANT CHANGE UP
PH UR: 6.5 — SIGNIFICANT CHANGE UP (ref 5–8)
PLATELET # BLD AUTO: 223 K/UL — SIGNIFICANT CHANGE UP (ref 150–400)
POTASSIUM SERPL-MCNC: 4.5 MMOL/L — SIGNIFICANT CHANGE UP (ref 3.5–5.3)
POTASSIUM SERPL-SCNC: 4.5 MMOL/L — SIGNIFICANT CHANGE UP (ref 3.5–5.3)
PROT SERPL-MCNC: 6.8 G/DL — SIGNIFICANT CHANGE UP (ref 6.6–8.7)
PROT UR-MCNC: 30 MG/DL
RAPID RVP RESULT: SIGNIFICANT CHANGE UP
RBC # BLD: 5.22 M/UL — SIGNIFICANT CHANGE UP (ref 4.2–5.8)
RBC # FLD: 16.6 % — HIGH (ref 10.3–14.5)
RBC CASTS # UR COMP ASSIST: SIGNIFICANT CHANGE UP /HPF (ref 0–4)
SARS-COV-2 RNA SPEC QL NAA+PROBE: SIGNIFICANT CHANGE UP
SODIUM SERPL-SCNC: 133 MMOL/L — LOW (ref 135–145)
SP GR SPEC: 1.01 — SIGNIFICANT CHANGE UP (ref 1.01–1.02)
UROBILINOGEN FLD QL: NEGATIVE MG/DL — SIGNIFICANT CHANGE UP
WBC # BLD: 10.37 K/UL — SIGNIFICANT CHANGE UP (ref 3.8–10.5)
WBC # FLD AUTO: 10.37 K/UL — SIGNIFICANT CHANGE UP (ref 3.8–10.5)
WBC UR QL: SIGNIFICANT CHANGE UP /HPF (ref 0–5)

## 2023-03-14 PROCEDURE — 99223 1ST HOSP IP/OBS HIGH 75: CPT

## 2023-03-14 PROCEDURE — 99285 EMERGENCY DEPT VISIT HI MDM: CPT

## 2023-03-14 PROCEDURE — 74176 CT ABD & PELVIS W/O CONTRAST: CPT | Mod: 26,MA

## 2023-03-14 RX ORDER — ATORVASTATIN CALCIUM 80 MG/1
40 TABLET, FILM COATED ORAL AT BEDTIME
Refills: 0 | Status: DISCONTINUED | OUTPATIENT
Start: 2023-03-14 | End: 2023-03-17

## 2023-03-14 RX ORDER — INSULIN LISPRO 100/ML
VIAL (ML) SUBCUTANEOUS
Refills: 0 | Status: DISCONTINUED | OUTPATIENT
Start: 2023-03-14 | End: 2023-03-17

## 2023-03-14 RX ORDER — SODIUM CHLORIDE 9 MG/ML
1000 INJECTION, SOLUTION INTRAVENOUS
Refills: 0 | Status: DISCONTINUED | OUTPATIENT
Start: 2023-03-14 | End: 2023-03-17

## 2023-03-14 RX ORDER — GLUCAGON INJECTION, SOLUTION 0.5 MG/.1ML
1 INJECTION, SOLUTION SUBCUTANEOUS ONCE
Refills: 0 | Status: DISCONTINUED | OUTPATIENT
Start: 2023-03-14 | End: 2023-03-17

## 2023-03-14 RX ORDER — DEXTROSE 50 % IN WATER 50 %
15 SYRINGE (ML) INTRAVENOUS ONCE
Refills: 0 | Status: DISCONTINUED | OUTPATIENT
Start: 2023-03-14 | End: 2023-03-17

## 2023-03-14 RX ORDER — CLOPIDOGREL BISULFATE 75 MG/1
75 TABLET, FILM COATED ORAL DAILY
Refills: 0 | Status: DISCONTINUED | OUTPATIENT
Start: 2023-03-15 | End: 2023-03-17

## 2023-03-14 RX ORDER — DEXTROSE 50 % IN WATER 50 %
12.5 SYRINGE (ML) INTRAVENOUS ONCE
Refills: 0 | Status: DISCONTINUED | OUTPATIENT
Start: 2023-03-14 | End: 2023-03-17

## 2023-03-14 RX ORDER — PANTOPRAZOLE SODIUM 20 MG/1
40 TABLET, DELAYED RELEASE ORAL
Refills: 0 | Status: DISCONTINUED | OUTPATIENT
Start: 2023-03-14 | End: 2023-03-17

## 2023-03-14 RX ORDER — INSULIN LISPRO 100/ML
VIAL (ML) SUBCUTANEOUS AT BEDTIME
Refills: 0 | Status: DISCONTINUED | OUTPATIENT
Start: 2023-03-14 | End: 2023-03-17

## 2023-03-14 RX ORDER — ONDANSETRON 8 MG/1
4 TABLET, FILM COATED ORAL EVERY 6 HOURS
Refills: 0 | Status: DISCONTINUED | OUTPATIENT
Start: 2023-03-14 | End: 2023-03-14

## 2023-03-14 RX ORDER — METRONIDAZOLE 500 MG
500 TABLET ORAL EVERY 8 HOURS
Refills: 0 | Status: DISCONTINUED | OUTPATIENT
Start: 2023-03-14 | End: 2023-03-15

## 2023-03-14 RX ORDER — SODIUM CHLORIDE 9 MG/ML
1000 INJECTION, SOLUTION INTRAVENOUS
Refills: 0 | Status: DISCONTINUED | OUTPATIENT
Start: 2023-03-14 | End: 2023-03-14

## 2023-03-14 RX ORDER — LORATADINE 10 MG/1
10 TABLET ORAL DAILY
Refills: 0 | Status: DISCONTINUED | OUTPATIENT
Start: 2023-03-14 | End: 2023-03-17

## 2023-03-14 RX ORDER — SODIUM CHLORIDE 9 MG/ML
500 INJECTION INTRAMUSCULAR; INTRAVENOUS; SUBCUTANEOUS ONCE
Refills: 0 | Status: COMPLETED | OUTPATIENT
Start: 2023-03-14 | End: 2023-03-14

## 2023-03-14 RX ORDER — ALBUTEROL 90 UG/1
2 AEROSOL, METERED ORAL EVERY 6 HOURS
Refills: 0 | Status: DISCONTINUED | OUTPATIENT
Start: 2023-03-14 | End: 2023-03-17

## 2023-03-14 RX ORDER — SODIUM CHLORIDE 9 MG/ML
1000 INJECTION INTRAMUSCULAR; INTRAVENOUS; SUBCUTANEOUS
Refills: 0 | Status: COMPLETED | OUTPATIENT
Start: 2023-03-14 | End: 2023-03-14

## 2023-03-14 RX ORDER — DEXTROSE 50 % IN WATER 50 %
25 SYRINGE (ML) INTRAVENOUS ONCE
Refills: 0 | Status: DISCONTINUED | OUTPATIENT
Start: 2023-03-14 | End: 2023-03-17

## 2023-03-14 RX ORDER — AMLODIPINE BESYLATE 2.5 MG/1
10 TABLET ORAL DAILY
Refills: 0 | Status: DISCONTINUED | OUTPATIENT
Start: 2023-03-14 | End: 2023-03-17

## 2023-03-14 RX ORDER — ASPIRIN/CALCIUM CARB/MAGNESIUM 324 MG
81 TABLET ORAL DAILY
Refills: 0 | Status: DISCONTINUED | OUTPATIENT
Start: 2023-03-14 | End: 2023-03-17

## 2023-03-14 RX ORDER — CARVEDILOL PHOSPHATE 80 MG/1
25 CAPSULE, EXTENDED RELEASE ORAL EVERY 12 HOURS
Refills: 0 | Status: DISCONTINUED | OUTPATIENT
Start: 2023-03-14 | End: 2023-03-17

## 2023-03-14 RX ORDER — METRONIDAZOLE 500 MG
500 TABLET ORAL ONCE
Refills: 0 | Status: COMPLETED | OUTPATIENT
Start: 2023-03-14 | End: 2023-03-14

## 2023-03-14 RX ORDER — HEPARIN SODIUM 5000 [USP'U]/ML
5000 INJECTION INTRAVENOUS; SUBCUTANEOUS EVERY 12 HOURS
Refills: 0 | Status: DISCONTINUED | OUTPATIENT
Start: 2023-03-14 | End: 2023-03-17

## 2023-03-14 RX ORDER — ONDANSETRON 8 MG/1
4 TABLET, FILM COATED ORAL EVERY 6 HOURS
Refills: 0 | Status: DISCONTINUED | OUTPATIENT
Start: 2023-03-14 | End: 2023-03-17

## 2023-03-14 RX ADMIN — ATORVASTATIN CALCIUM 40 MILLIGRAM(S): 80 TABLET, FILM COATED ORAL at 21:34

## 2023-03-14 RX ADMIN — SODIUM CHLORIDE 100 MILLILITER(S): 9 INJECTION INTRAMUSCULAR; INTRAVENOUS; SUBCUTANEOUS at 21:32

## 2023-03-14 RX ADMIN — CARVEDILOL PHOSPHATE 25 MILLIGRAM(S): 80 CAPSULE, EXTENDED RELEASE ORAL at 21:33

## 2023-03-14 RX ADMIN — AMLODIPINE BESYLATE 10 MILLIGRAM(S): 2.5 TABLET ORAL at 21:33

## 2023-03-14 RX ADMIN — Medication 0.3 MILLIGRAM(S): at 21:32

## 2023-03-14 RX ADMIN — HEPARIN SODIUM 5000 UNIT(S): 5000 INJECTION INTRAVENOUS; SUBCUTANEOUS at 21:34

## 2023-03-14 RX ADMIN — Medication 100 MILLIGRAM(S): at 21:32

## 2023-03-14 RX ADMIN — Medication 100 MILLIGRAM(S): at 14:41

## 2023-03-14 RX ADMIN — SODIUM CHLORIDE 500 MILLILITER(S): 9 INJECTION INTRAMUSCULAR; INTRAVENOUS; SUBCUTANEOUS at 14:29

## 2023-03-14 NOTE — ED STATDOCS - ATTENDING APP SHARED VISIT CONTRIBUTION OF CARE
I, Viktor Connors, performed the initial face to face bedside interview with this patient regarding history of present illness, review of symptoms and relevant past medical, social and family history.  I completed an independent physical examination.  I was the initial provider who evaluated this patient. I have signed out the follow up of any pending tests (i.e. labs, radiological studies) to the ACP.  I have communicated the patient’s plan of care and disposition with the ACP.  The history, relevant review of systems, past medical and surgical history, medical decision making, and physical examination was documented by the scribe in my presence and I attest to the accuracy of the documentation.

## 2023-03-14 NOTE — ED STATDOCS - CLINICAL SUMMARY MEDICAL DECISION MAKING FREE TEXT BOX
83 y/o male with diarrhea since Jan. Concerned for gastroenterics vs parasites. Will get labs, IV fluids, and CT scan.

## 2023-03-14 NOTE — H&P ADULT - PROBLEM SELECTOR PLAN 2
ongoing loose stool with watery diarrhea sometimes, concern for c-diff.   follow stool studies and c-diff  GI consult.

## 2023-03-14 NOTE — H&P ADULT - PROBLEM SELECTOR PLAN 1
diffuse colitis on ct abdomen and concern for c-diff  will keep on iv flagyl , avoid antibiotics due to ongoing diarrhea and suspicion for c diff  follow stool studies  clear liquid diet for now  GI/ ID consults requested by ER.

## 2023-03-14 NOTE — ED ADULT TRIAGE NOTE - CHIEF COMPLAINT QUOTE
Pt brought in by daughter c/o diarrhea for two months . AS   per daughter pt under a lot of stress . today symptoms got worse als endorsing lower abd cramping . Denies fevers

## 2023-03-14 NOTE — ED STATDOCS - OBJECTIVE STATEMENT
83 y/o male pmhx DM, HTN, HLD, loop recorder and CAD, c/o diarrhea since Jan after wife passed away. Daughter states they thought diarrhea was caused by personal situations like wife passing and younger brother kidnapped back in he's country. Pt was given imodium with no relief. Denies vomiting. Denies recent travel. Denies chest pain or SOB. Denies recent antibiotics use. Pt has cat and dogs home.

## 2023-03-14 NOTE — H&P ADULT - ASSESSMENT
83 y/o male with ongoing diarrhea for past 6 weeks , ct scan of abdomen with diffuse colitis, r/o c-diff.

## 2023-03-14 NOTE — H&P ADULT - NSHPPHYSICALEXAM_GEN_ALL_CORE
Vital Signs Last 24 Hrs  T(C): 36.8 (14 Mar 2023 15:59), Max: 36.8 (14 Mar 2023 15:59)  T(F): 98.2 (14 Mar 2023 15:59), Max: 98.2 (14 Mar 2023 15:59)  HR: 77 (14 Mar 2023 15:59) (66 - 77)  BP: 135/62 (14 Mar 2023 15:59) (135/62 - 178/88)  BP(mean): --  RR: 20 (14 Mar 2023 15:59) (20 - 20)  SpO2: 98% (14 Mar 2023 15:59) (98% - 98%)    Parameters below as of 14 Mar 2023 15:59  Patient On (Oxygen Delivery Method): room air    General: pt. in bed not in distress  eyes : . PERRL. intact EOM  HENT: AT, NC. no throat erythema or exudate.   Neck: supple. no JVD.   Chest: air entry fair bilaterally  Heart: S1,S2. RRR. no heart murmur. no edema.   Abdomen: soft. non-tender. non-distended. + BS.   Ext: no calf tenderness, FROM of all ext  vascular : distal pulses 2 +  Neuro: AAO x3. no focal weakness. no speech disorder, cns ii to xii intact  Skin: warm and dry  psych : mood ok, no si/hi.

## 2023-03-14 NOTE — H&P ADULT - NSICDXPASTMEDICALHX_GEN_ALL_CORE_FT
PAST MEDICAL HISTORY:  Asthma, mild intermittent, uncomplicated     CAD (coronary artery disease)     Cancer protate    Chronic kidney disease (CKD)     Diabetes mellitus     Hyperlipidemia     Hypertension     MI (myocardial infarction)     Other hemorrhoids     PAD (peripheral artery disease)     Sickle cell anemia Carrier of the disease, presently doesn't have it    SVT (supraventricular tachycardia)

## 2023-03-14 NOTE — H&P ADULT - HISTORY OF PRESENT ILLNESS
83 y/o male with h/o htn, dm, hyperlipidemia, cad s/p stents 2018, prostate cancer in remission had radiation in 2015, s/p loop recorder who has been having loose stools and sometimes watery diarrhea usually few episodes a day for past 6 weeks was brought in today as he had multiple episodes of diarrhea ( slightly formed and loose watery ) with lower abd. cramping and mild pain, this is changed from what he has been experiencing for past 6 weeks so was brought to the er. No blood in diarrhea reported. no n/v. no fever. no travel or antibiotic use before diarrhea started. pt's wife who  of cancer was having diarrhea before she passed away but he was not involved in his wife's care ( daughter was taking care of her who does not have any diarrhea ). pt's daughter was giving him immodium but pt. did not get better. no cp, no sob. no urinary symptoms. pt's ct abdomen has shown diffuse colitis.  81 y/o male with h/o htn, dm, hyperlipidemia, cad s/p stents 2018, prostate cancer in remission had radiation in 2015, s/p loop recorder who has been having loose stools and sometimes watery diarrhea usually few episodes a day for past 6 weeks was brought in today as he had multiple episodes of diarrhea ( slightly formed and loose watery ) with lower abd. cramping and mild pain, this is changed from what he has been experiencing for past 6 weeks so was brought to the er. No blood in diarrhea reported. no n/v. no fever. no travel or antibiotic use before diarrhea started. pt's wife who  of cancer was having diarrhea before she passed away but he was not involved in his wife's care ( daughter was taking care of her who does not have any diarrhea ). pt's daughter was giving him immodium but pt. did not get better. no cp, no sob. no urinary symptoms. colonoscopy in the past has been ok.  pt's ct abdomen has shown diffuse colitis.

## 2023-03-14 NOTE — ED ADULT NURSE NOTE - OBJECTIVE STATEMENT
Assumed care of pt at 1440 in . Pt A&Ox4 c/o diarrhea for the last 2 months. Pt denies any bloody stools. Pt endorses to abdominal cramping along with the diarrhea. Pt denies CP and SOB. RR even and unlabored.

## 2023-03-14 NOTE — H&P ADULT - NSHPSOURCEINFOTX_GEN_ALL_CORE
pt's daughter Tanesha at bedside , works in admitting department for our [employer]

## 2023-03-14 NOTE — H&P ADULT - PROBLEM SELECTOR PLAN 6
possible some acute component from ongoing diarrhea, gentle iv fluids, avoid nephrotoxic meds, follow am labs.

## 2023-03-14 NOTE — ED STATDOCS - NS ED ATTENDING STATEMENT MOD
Attending Only This was a shared visit with the JOVANA. I reviewed and verified the documentation and independently performed the documented:

## 2023-03-15 ENCOUNTER — TRANSCRIPTION ENCOUNTER (OUTPATIENT)
Age: 83
End: 2023-03-15

## 2023-03-15 LAB
A1C WITH ESTIMATED AVERAGE GLUCOSE RESULT: 7.6 % — HIGH (ref 4–5.6)
ALBUMIN SERPL ELPH-MCNC: 3.4 G/DL — SIGNIFICANT CHANGE UP (ref 3.3–5.2)
ALP SERPL-CCNC: 81 U/L — SIGNIFICANT CHANGE UP (ref 40–120)
ALT FLD-CCNC: 11 U/L — SIGNIFICANT CHANGE UP
ANION GAP SERPL CALC-SCNC: 10 MMOL/L — SIGNIFICANT CHANGE UP (ref 5–17)
AST SERPL-CCNC: 15 U/L — SIGNIFICANT CHANGE UP
BASOPHILS # BLD AUTO: 0.05 K/UL — SIGNIFICANT CHANGE UP (ref 0–0.2)
BASOPHILS NFR BLD AUTO: 0.6 % — SIGNIFICANT CHANGE UP (ref 0–2)
BILIRUB SERPL-MCNC: 0.7 MG/DL — SIGNIFICANT CHANGE UP (ref 0.4–2)
BUN SERPL-MCNC: 25 MG/DL — HIGH (ref 8–20)
CALCIUM SERPL-MCNC: 9 MG/DL — SIGNIFICANT CHANGE UP (ref 8.4–10.5)
CAMPYLOBACTER DNA SPEC NAA+PROBE: DETECTED
CHLORIDE SERPL-SCNC: 105 MMOL/L — SIGNIFICANT CHANGE UP (ref 96–108)
CO2 SERPL-SCNC: 23 MMOL/L — SIGNIFICANT CHANGE UP (ref 22–29)
CREAT SERPL-MCNC: 1.71 MG/DL — HIGH (ref 0.5–1.3)
EGFR: 39 ML/MIN/1.73M2 — LOW
EOSINOPHIL # BLD AUTO: 0.15 K/UL — SIGNIFICANT CHANGE UP (ref 0–0.5)
EOSINOPHIL NFR BLD AUTO: 1.9 % — SIGNIFICANT CHANGE UP (ref 0–6)
ESTIMATED AVERAGE GLUCOSE: 171 MG/DL — HIGH (ref 68–114)
GI PCR PANEL: DETECTED
GLUCOSE BLDC GLUCOMTR-MCNC: 111 MG/DL — HIGH (ref 70–99)
GLUCOSE BLDC GLUCOMTR-MCNC: 126 MG/DL — HIGH (ref 70–99)
GLUCOSE BLDC GLUCOMTR-MCNC: 145 MG/DL — HIGH (ref 70–99)
GLUCOSE BLDC GLUCOMTR-MCNC: 165 MG/DL — HIGH (ref 70–99)
GLUCOSE SERPL-MCNC: 141 MG/DL — HIGH (ref 70–99)
HCT VFR BLD CALC: 37.5 % — LOW (ref 39–50)
HGB BLD-MCNC: 12.8 G/DL — LOW (ref 13–17)
IMM GRANULOCYTES NFR BLD AUTO: 0.3 % — SIGNIFICANT CHANGE UP (ref 0–0.9)
LYMPHOCYTES # BLD AUTO: 2.29 K/UL — SIGNIFICANT CHANGE UP (ref 1–3.3)
LYMPHOCYTES # BLD AUTO: 29.1 % — SIGNIFICANT CHANGE UP (ref 13–44)
MCHC RBC-ENTMCNC: 25.8 PG — LOW (ref 27–34)
MCHC RBC-ENTMCNC: 34.1 GM/DL — SIGNIFICANT CHANGE UP (ref 32–36)
MCV RBC AUTO: 75.5 FL — LOW (ref 80–100)
MONOCYTES # BLD AUTO: 0.87 K/UL — SIGNIFICANT CHANGE UP (ref 0–0.9)
MONOCYTES NFR BLD AUTO: 11 % — SIGNIFICANT CHANGE UP (ref 2–14)
NEUTROPHILS # BLD AUTO: 4.5 K/UL — SIGNIFICANT CHANGE UP (ref 1.8–7.4)
NEUTROPHILS NFR BLD AUTO: 57.1 % — SIGNIFICANT CHANGE UP (ref 43–77)
PLATELET # BLD AUTO: 210 K/UL — SIGNIFICANT CHANGE UP (ref 150–400)
POTASSIUM SERPL-MCNC: 4.1 MMOL/L — SIGNIFICANT CHANGE UP (ref 3.5–5.3)
POTASSIUM SERPL-SCNC: 4.1 MMOL/L — SIGNIFICANT CHANGE UP (ref 3.5–5.3)
PROT SERPL-MCNC: 6 G/DL — LOW (ref 6.6–8.7)
RBC # BLD: 4.97 M/UL — SIGNIFICANT CHANGE UP (ref 4.2–5.8)
RBC # FLD: 16.5 % — HIGH (ref 10.3–14.5)
SODIUM SERPL-SCNC: 138 MMOL/L — SIGNIFICANT CHANGE UP (ref 135–145)
WBC # BLD: 7.88 K/UL — SIGNIFICANT CHANGE UP (ref 3.8–10.5)
WBC # FLD AUTO: 7.88 K/UL — SIGNIFICANT CHANGE UP (ref 3.8–10.5)

## 2023-03-15 PROCEDURE — 99239 HOSP IP/OBS DSCHRG MGMT >30: CPT

## 2023-03-15 PROCEDURE — 99232 SBSQ HOSP IP/OBS MODERATE 35: CPT

## 2023-03-15 PROCEDURE — 99223 1ST HOSP IP/OBS HIGH 75: CPT

## 2023-03-15 RX ORDER — CIPROFLOXACIN LACTATE 400MG/40ML
1 VIAL (ML) INTRAVENOUS
Qty: 20 | Refills: 0
Start: 2023-03-15 | End: 2023-03-24

## 2023-03-15 RX ORDER — METRONIDAZOLE 500 MG
500 TABLET ORAL EVERY 8 HOURS
Refills: 0 | Status: DISCONTINUED | OUTPATIENT
Start: 2023-03-15 | End: 2023-03-16

## 2023-03-15 RX ORDER — METRONIDAZOLE 500 MG
1 TABLET ORAL
Qty: 30 | Refills: 0
Start: 2023-03-15 | End: 2023-03-24

## 2023-03-15 RX ORDER — SODIUM CHLORIDE 9 MG/ML
1000 INJECTION, SOLUTION INTRAVENOUS
Refills: 0 | Status: DISCONTINUED | OUTPATIENT
Start: 2023-03-15 | End: 2023-03-16

## 2023-03-15 RX ORDER — CARVEDILOL PHOSPHATE 80 MG/1
1 CAPSULE, EXTENDED RELEASE ORAL
Qty: 0 | Refills: 0 | DISCHARGE
Start: 2023-03-15

## 2023-03-15 RX ORDER — BNT162B2 ORIGINAL AND OMICRON BA.4/BA.5 .1125; .1125 MG/2.25ML; MG/2.25ML
0.3 INJECTION, SUSPENSION INTRAMUSCULAR ONCE
Refills: 0 | Status: DISCONTINUED | OUTPATIENT
Start: 2023-03-15 | End: 2023-03-17

## 2023-03-15 RX ORDER — INFLUENZA VIRUS VACCINE 15; 15; 15; 15 UG/.5ML; UG/.5ML; UG/.5ML; UG/.5ML
0.7 SUSPENSION INTRAMUSCULAR ONCE
Refills: 0 | Status: DISCONTINUED | OUTPATIENT
Start: 2023-03-15 | End: 2023-03-17

## 2023-03-15 RX ORDER — CIPROFLOXACIN LACTATE 400MG/40ML
500 VIAL (ML) INTRAVENOUS EVERY 12 HOURS
Refills: 0 | Status: DISCONTINUED | OUTPATIENT
Start: 2023-03-15 | End: 2023-03-16

## 2023-03-15 RX ADMIN — CLOPIDOGREL BISULFATE 75 MILLIGRAM(S): 75 TABLET, FILM COATED ORAL at 13:30

## 2023-03-15 RX ADMIN — HEPARIN SODIUM 5000 UNIT(S): 5000 INJECTION INTRAVENOUS; SUBCUTANEOUS at 17:27

## 2023-03-15 RX ADMIN — Medication 0.3 MILLIGRAM(S): at 10:36

## 2023-03-15 RX ADMIN — Medication 0.3 MILLIGRAM(S): at 17:27

## 2023-03-15 RX ADMIN — Medication 500 MILLIGRAM(S): at 21:19

## 2023-03-15 RX ADMIN — ATORVASTATIN CALCIUM 40 MILLIGRAM(S): 80 TABLET, FILM COATED ORAL at 21:19

## 2023-03-15 RX ADMIN — SODIUM CHLORIDE 100 MILLILITER(S): 9 INJECTION, SOLUTION INTRAVENOUS at 16:35

## 2023-03-15 RX ADMIN — Medication 81 MILLIGRAM(S): at 13:30

## 2023-03-15 RX ADMIN — CARVEDILOL PHOSPHATE 25 MILLIGRAM(S): 80 CAPSULE, EXTENDED RELEASE ORAL at 10:37

## 2023-03-15 RX ADMIN — Medication 500 MILLIGRAM(S): at 17:27

## 2023-03-15 RX ADMIN — Medication 100 MILLIGRAM(S): at 06:06

## 2023-03-15 RX ADMIN — LORATADINE 10 MILLIGRAM(S): 10 TABLET ORAL at 13:31

## 2023-03-15 RX ADMIN — PANTOPRAZOLE SODIUM 40 MILLIGRAM(S): 20 TABLET, DELAYED RELEASE ORAL at 06:05

## 2023-03-15 RX ADMIN — HEPARIN SODIUM 5000 UNIT(S): 5000 INJECTION INTRAVENOUS; SUBCUTANEOUS at 10:37

## 2023-03-15 RX ADMIN — Medication 500 MILLIGRAM(S): at 13:30

## 2023-03-15 RX ADMIN — CARVEDILOL PHOSPHATE 25 MILLIGRAM(S): 80 CAPSULE, EXTENDED RELEASE ORAL at 17:27

## 2023-03-15 NOTE — DISCHARGE NOTE PROVIDER - ATTENDING DISCHARGE PHYSICAL EXAMINATION:
General: NAD, A/O x 3  ENT: MMM, no thrush  Neck: Supple, No JVD  Lungs: Clear to auscultation bilaterally, good air entry, non-labored breathing  Cardio: +s1/s2  Abdomen: Soft, Nontender, Nondistended; Bowel sounds present  Extremities: No calf tenderness

## 2023-03-15 NOTE — CONSULT NOTE ADULT - NS ATTEND AMEND GEN_ALL_CORE FT
83 y/o male with h/o htn, dm, hyperlipidemia, cad s/p stents 2018 (ASA), prostate cancer in remission had radiation in , s/p loop recorder presented to ED with diarrhea, chronic diarrhea x 2 months, now worsened  to several episodes of nonbloody diarrhea yesterday.   CT abdomen shows mild diffuse wall thickening of the colon. Pt's wife  recently, diarrhea for past 2 mo initially thought by fam to be stress-related, increased freq yesterday possibly due to  food (3/11) which was a change from what he had been eating prior.   Plan  stool studies  would not have started antibiotics since afebrile, normal WBC  would check stool calpro considering chronicity of diarrhea - also blood CRP, ESR  agree with regular diet

## 2023-03-15 NOTE — PROGRESS NOTE ADULT - ASSESSMENT
83 y/o male with ongoing diarrhea for past 6 weeks , ct scan of abdomen with diffuse colitis, r/o c-diff.     #Colitis.   - cipro and flagyl x 10 days   - gi consult appreciated   - follow up gi pcr if negative will d/c patient   - cdiff unable to be done given stool not liquid    #HTN- Essential   - monitor blood pressure   - amlodipine, coreg, clonidine    #CAD  - aspirin plavix coreg and statin     #Type 2 Diabetes  - a1c 7.6   - sliding scale  - monitor fingersticks    #CKD3b  - monitor cr   - avoid nephrotoxic meds    #DVT Prophylaxis  - heparin SC    plan of care d/w patient daughter on patient phone-> if gi pcr negative will d/c home

## 2023-03-15 NOTE — CONSULT NOTE ADULT - PROBLEM SELECTOR RECOMMENDATION 9
CT abdomen shows mild diffuse colitis. No leucocytosis, remain afebrile.   Stool culture negative. GI PCR pending.  C diff sample was deferred after consistency of the stool sample. Denies use of antibiotic use.   Started on Flagyl and Cipro  KENTRELL, improving, on IN fluids, continue to monitor renal function, avoid hypotension, dehydration   can resume diet as tolerated  If diarrhea continue to worsen, consider C diff  _________________________________________________________________  Assessment and recommendations are final when note is signed by the attending physician.

## 2023-03-15 NOTE — CONSULT NOTE ADULT - ASSESSMENT
83 y/o male with h/o htn, dm, hyperlipidemia, cad s/p stents 2018, prostate cancer in remission had radiation in 2015, s/p loop recorder presented to ED with diarrhea, chronic diarrhea x 2 months, now worsened  to several episodes of nonbloody diarrhea yesterday.   CT abdomen shows mild diffuse wall thickening of the colon.

## 2023-03-15 NOTE — DISCHARGE NOTE PROVIDER - NSDCMRMEDTOKEN_GEN_ALL_CORE_FT
amLODIPine 10 mg oral tablet: 1 tab(s) orally once a day  aspirin 81 mg oral tablet, chewable: 1 tab(s) orally once a day  carvedilol 25 mg oral tablet: 1 tab(s) orally every 12 hours  ciprofloxacin 500 mg oral tablet: 1 tab(s) orally every 12 hours  cloNIDine 0.1 mg oral tablet: 3  orally 2 times a day  Farxiga 10 mg oral tablet: 1 tab(s) orally once a day  ferrous gluconate 324 mg (37.5 mg elemental iron) oral tablet (obsolete): orally once a day  metroNIDAZOLE 500 mg oral tablet: 1 tab(s) orally every 8 hours  Plavix 75 mg oral tablet: 1 tab(s) orally once a day   Protonix 20 mg oral delayed release tablet: 1 tab(s) orally once a day   rosuvastatin 40 mg oral tablet: 1 tab(s) orally once a day  Tradjenta 5 mg oral tablet: 1 tab(s) orally once a day  Ventolin HFA 90 mcg/inh inhalation aerosol: 2 puff(s) inhaled every 6 hours, As Needed  ZyrTEC 10 mg oral tablet: 1 tab(s) orally once a day   amLODIPine 10 mg oral tablet: 1 tab(s) orally once a day  aspirin 81 mg oral tablet, chewable: 1 tab(s) orally once a day  carvedilol 25 mg oral tablet: 1 tab(s) orally every 12 hours  cloNIDine 0.1 mg oral tablet: 3  orally 2 times a day  Farxiga 10 mg oral tablet: 1 tab(s) orally once a day  ferrous gluconate 324 mg (37.5 mg elemental iron) oral tablet (obsolete): orally once a day  Plavix 75 mg oral tablet: 1 tab(s) orally once a day   Protonix 20 mg oral delayed release tablet: 1 tab(s) orally once a day   rosuvastatin 40 mg oral tablet: 1 tab(s) orally once a day  Tradjenta 5 mg oral tablet: 1 tab(s) orally once a day  Ventolin HFA 90 mcg/inh inhalation aerosol: 2 puff(s) inhaled every 6 hours, As Needed  ZyrTEC 10 mg oral tablet: 1 tab(s) orally once a day   amLODIPine 10 mg oral tablet: 1 tab(s) orally once a day  aspirin 81 mg oral tablet, chewable: 1 tab(s) orally once a day  azithromycin 500 mg oral tablet: 1 tab(s) orally once a day   carvedilol 25 mg oral tablet: 1 tab(s) orally every 12 hours  cloNIDine 0.1 mg oral tablet: 3  orally 2 times a day  Farxiga 10 mg oral tablet: 1 tab(s) orally once a day  ferrous gluconate 324 mg (37.5 mg elemental iron) oral tablet (obsolete): orally once a day  Plavix 75 mg oral tablet: 1 tab(s) orally once a day   Protonix 20 mg oral delayed release tablet: 1 tab(s) orally once a day   rosuvastatin 40 mg oral tablet: 1 tab(s) orally once a day  Tradjenta 5 mg oral tablet: 1 tab(s) orally once a day  Ventolin HFA 90 mcg/inh inhalation aerosol: 2 puff(s) inhaled every 6 hours, As Needed  ZyrTEC 10 mg oral tablet: 1 tab(s) orally once a day

## 2023-03-15 NOTE — PATIENT PROFILE ADULT - FUNCTIONAL ASSESSMENT - BASIC MOBILITY 2.
Suzan Toussaint  [address] [facility] [id number]        July 31, 2017          Dear ,    We are writing to inform you of your test results.    No masses or other abnormalities were noted on your ultrasound.     Resulted Orders   US Head Neck Soft Tissue    Narrative    ULTRASOUND HEAD NECK SOFT TISSUE  7/27/2017 10:20 AM     HISTORY:  Acute laryngitis. Other somatoform disorders.      Impression    IMPRESSION: No suspicious neck mass or lymph node is appreciated.  Clinical followup recommended.    ANDERSON GAYTAN MD       If you have any questions or concerns, please call the clinic at the number listed above.       Sincerely,        Marino Lugo MD/son                
4 = No assist / stand by assistance

## 2023-03-15 NOTE — DISCHARGE NOTE PROVIDER - CARE PROVIDER_API CALL
Juliana Barros  [address] [place] [address]  Phone: [phone]  Follow Up Time:     Diana Holder)  Gastroenterology  [address], [place] [id number]  Phone: [phone]  Fax: [phone]  Follow Up Time:

## 2023-03-15 NOTE — DISCHARGE NOTE PROVIDER - NSDCCPCAREPLAN_GEN_ALL_CORE_FT
PRINCIPAL DISCHARGE DIAGNOSIS  Diagnosis: Colitis  Assessment and Plan of Treatment: - take medications as rx  - follow up with primary care doctor and gastroenterology       PRINCIPAL DISCHARGE DIAGNOSIS  Diagnosis: Colitis  Assessment and Plan of Treatment: Colitis Secondary to Campylobacter  - Azithromycin 500mg OD x  5 days   - Follow up with PMD in 1 week        SECONDARY DISCHARGE DIAGNOSES  Diagnosis: Acute kidney injury superimposed on CKD  Assessment and Plan of Treatment: Secondary to diarrhea now improved with iv hydration  Follow up with your PMD in 1 week for follow up blood work

## 2023-03-15 NOTE — DISCHARGE NOTE PROVIDER - HOSPITAL COURSE
83 y/o male with h/o htn, dm, hyperlipidemia, cad s/p stents 2018, prostate cancer in remission had radiation in 2015, s/p loop recorder coming to hospital with diarrhea. s/p ct scan showing colitis and started on abx. seen by gi while admitted. diarrhea improved on abx and diet upgraded to full. now being d/c in stable condition    81 y/o male with ongoing diarrhea for past 6 weeks , ct scan of abdomen with diffuse colitis. Stool PCR positive for Campylobacter. Also noted to have KENTRELL with underlying CKD improved with IVF. Switched to PO Azithromycin x 5 days on didhscarge.

## 2023-03-15 NOTE — CONSULT NOTE ADULT - SUBJECTIVE AND OBJECTIVE BOX
HISTORY OF PRESENT ILLNESS: 83 y/o male with h/o htn, dm, hyperlipidemia, cad s/p stents 2018, prostate cancer in remission had radiation in , s/p loop recorder presented to ED with diarrhea. Reports having chronic diarrhea x 2 months, now worsened  to several episodes of nonbloody diarrhea yesterday.  Denies abdominal pain, nausea, vomiting, fever, chills, shortness of breath. Patient took Imodium with no relief. 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 No EGD in the past.  HPI obtained from his daughter Lashawn . CT abdomen shows mild diffuse wall thickening of the colon. No leucocytosis, remain afebrile. Stool culture negative. GI PCR pending.      Review of Systems:  . Constitutional: No fever, chills  . HEENT: Negative  · Respiratory and Thorax: No shortness of breath, no cough  · Cardiovascular: No chest pain, palpitation, no dizziness  · Gastrointestinal: see above  · Genitourinary: No hematuria  · Musculoskeletal: Negative  · Neurological: negative  · Psychiatric: no agitation, no anxiety      PAST MEDICAL/SURGICAL HISTORY:  Hypertension    Hyperlipidemia    Diabetes mellitus    Cancer  protate    Other hemorrhoids    Asthma, mild intermittent, uncomplicated    Sickle cell anemia  Carrier of the disease, presently doesn&#x27;t have it    MI (myocardial infarction)    CAD (coronary artery disease)    SVT (supraventricular tachycardia)    PAD (peripheral artery disease)    Chronic kidney disease (CKD)    Inguinal hernia    S/P primary angioplasty with coronary stent    H/O colonoscopy  Normal as per patient 3 years ago.      SOCIAL HISTORY:  - TOBACCO: Denies  - ALCOHOL: Denies  - ILLICIT DRUG USE: Denies    FAMILY HISTORY:  No known history of gastrointestinal or liver disease;  Family history of prostate cancer    Family history of essential hypertension    Family history of heart disease  father    Family history of MI (myocardial infarction)        HOME MEDICATIONS:  amLODIPine 10 mg oral tablet: 1 tab(s) orally once a day (06 Oct 2022 09:26)  aspirin 81 mg oral tablet, chewable: 1 tab(s) orally once a day (06 Oct 2022 09:26)  cloNIDine 0.1 mg oral tablet: 3  orally 2 times a day (06 Oct 2022 09:26)  Coreg 12.5 mg oral tablet: 2 tab(s) orally 2 times a day (14 Mar 2023 19:34)  Farxiga 10 mg oral tablet: 1 tab(s) orally once a day (06 Oct 2022 09:26)  ferrous gluconate 324 mg (37.5 mg elemental iron) oral tablet (obsolete): orally once a day (14 Mar 2023 19:34)  rosuvastatin 40 mg oral tablet: 1 tab(s) orally once a day (06 Oct 2022 09:26)  Tradjenta 5 mg oral tablet: 1 tab(s) orally once a day (06 Oct 2022 09:26)  Ventolin HFA 90 mcg/inh inhalation aerosol: 2 puff(s) inhaled every 6 hours, As Needed (14 Mar 2023 19:52)  ZyrTEC 10 mg oral tablet: 1 tab(s) orally once a day (06 Oct 2022 09:26)    INPATIENT MEDICATIONS:  MEDICATIONS  (STANDING):  amLODIPine   Tablet 10 milliGRAM(s) Oral daily  aspirin enteric coated 81 milliGRAM(s) Oral daily  atorvastatin 40 milliGRAM(s) Oral at bedtime  carvedilol 25 milliGRAM(s) Oral every 12 hours  cloNIDine 0.3 milliGRAM(s) Oral two times a day  clopidogrel Tablet 75 milliGRAM(s) Oral daily  coronavirus bivalent (EUA) Booster Vaccine (PFIZER) 0.3 milliLiter(s) IntraMuscular once  dextrose 5%. 1000 milliLiter(s) (100 mL/Hr) IV Continuous <Continuous>  dextrose 5%. 1000 milliLiter(s) (50 mL/Hr) IV Continuous <Continuous>  dextrose 50% Injectable 25 Gram(s) IV Push once  dextrose 50% Injectable 12.5 Gram(s) IV Push once  dextrose 50% Injectable 25 Gram(s) IV Push once  glucagon  Injectable 1 milliGRAM(s) IntraMuscular once  heparin   Injectable 5000 Unit(s) SubCutaneous every 12 hours  influenza  Vaccine (HIGH DOSE) 0.7 milliLiter(s) IntraMuscular once  insulin lispro (ADMELOG) corrective regimen sliding scale   SubCutaneous three times a day before meals  insulin lispro (ADMELOG) corrective regimen sliding scale   SubCutaneous at bedtime  loratadine 10 milliGRAM(s) Oral daily  metroNIDAZOLE  IVPB 500 milliGRAM(s) IV Intermittent every 8 hours  pantoprazole    Tablet 40 milliGRAM(s) Oral before breakfast    MEDICATIONS  (PRN):  albuterol    90 MICROgram(s) HFA Inhaler 2 Puff(s) Inhalation every 6 hours PRN Shortness of Breath and/or Wheezing  dextrose Oral Gel 15 Gram(s) Oral once PRN Blood Glucose LESS THAN 70 milliGRAM(s)/deciliter  ondansetron Injectable 4 milliGRAM(s) IV Push every 6 hours PRN Nausea and/or Vomiting    ALLERGIES:  EGGPLANT (Hives)  No Known Drug Allergies  strawberry (Hives)    T(C): 36.9 (03-15-23 @ 07:54), Max: 37.2 (23 @ 21:00)  HR: 59 (03-15-23 @ 07:54) (59 - 81)  BP: 120/68 (03-15-23 @ 07:54) (120/68 - 178/88)  RR: 18 (03-15-23 @ 07:54) (18 - 20)  SpO2: 95% (03-15-23 @ 07:54) (95% - 99%)      PHYSICAL EXAM:    Constitutional: No acute distress  Neuro: Awake alert, oriented to person, place and situation, speech clear and intact  HEENT: anicteric sclerae  Neck: supple, no JVD  CV: regular rate, regular rhythm  Pulm/chest: lung sounds CTA and equal bilaterally, no accessory muscle use noted  Abd: soft, nontender, nondistended +bowel sounds. No rigidity, rebound tenderness, or guarding  Ext: no Cyanosis, clubbing or edema  Skin: warm, no jaundice   Psych: calm, cooperative        LABS:             12.8   7.88  )-----------( 210      ( 03-15 @ 04:25 )             37.5                13.2   10.37 )-----------( 223      (  @ 15:04 )             39.8         03-15    138  |  105  |  25.0<H>  ----------------------------<  141<H>  4.1   |  23.0  |  1.71<H>    Ca    9.0      15 Mar 2023 04:25    TPro  6.0<L>  /  Alb  3.4  /  TBili  0.7  /  DBili  x   /  AST  15  /  ALT  11  /  AlkPhos  81  15    LIVER FUNCTIONS - ( 15 Mar 2023 04:25 )  Alb: 3.4 g/dL / Pro: 6.0 g/dL / ALK PHOS: 81 U/L / ALT: 11 U/L / AST: 15 U/L / GGT: x             Lipase, Serum: 30 U/L (23 @ 15:04)      Urinalysis Basic - ( 14 Mar 2023 14:48 )    Color: Yellow / Appearance: Clear / S.010 / pH: x  Gluc: x / Ketone: Negative  / Bili: Negative / Urobili: Negative mg/dL   Blood: x / Protein: 30 mg/dL / Nitrite: Negative   Leuk Esterase: Negative / RBC: 0-2 /HPF / WBC 0-2 /HPF   Sq Epi: x / Non Sq Epi: x / Bacteria: x      < from: CT Abdomen and Pelvis No Cont (23 @ 16:44) >  FINDINGS:  LOWER CHEST: Heart size is normal. Coronary and aortic calcifications are   noted. Stable bilateral pulmonary nodules which measure up to 6 mm. Given   long-term stability this would likely be benign. Linear atelectasis   versus scarring is seen at the visualized lung bases.    LIVER: Within normal limits.  BILE DUCTS: Normal caliber.  GALLBLADDER: Within normal limits.  SPLEEN: Within normal limits.  PANCREAS: Within normal limits.  ADRENALS: Within normal limits.  KIDNEYS/URETERS: Multiple bilateral renal stones are appreciated which   measure up to 4 mm. There is no urinary tract obstruction..    BLADDER: Underdistended but appears mildly thick-walled.  REPRODUCTIVE ORGANS: Prostate gland centrally enlarged. Brachytherapy   seeds are noted. The seminal vesicles are symmetric.    BOWEL: No bowel obstruction. Visualized portions of the appendix are   normal. The appendiceal tip is not well seen amongst postsurgical changes   related to right herniorrhaphy. Mild diffuse wall thickening of the colon   is noted. The stomach is underdistended, limiting itsevaluation.  .PERITONEUM: No ascites.  VESSELS: Atherosclerotic changes.  RETROPERITONEUM/LYMPH NODES: No lymphadenopathy.  ABDOMINAL WALL: Within normal limits.  BONES: Degenerative changes are appreciated.    IMPRESSION:  Findings compatible with diffuse colitis. Given the distribution, C.   difficile colitis should be considered. Other infectious or inflammatory   etiologies may also be considered. Ischemia would be considered less   likely given multiple vascular territories.    Question wallthickening of the urinary bladder was appearance is likely   related to underdistention versus sequelae of chronic outlet obstruction   from prostate enlargement or cystitis will appear similar. Correlate with   urinalysis.    < end of copied text >       HISTORY OF PRESENT ILLNESS: 81 y/o male with h/o htn, dm, hyperlipidemia, cad s/p stents 2018, prostate cancer in remission had radiation in , s/p loop recorder presented to ED with diarrhea. Reports having chronic diarrhea x 2 months, now worsened  to several episodes of nonbloody diarrhea yesterday.  Denies abdominal pain, nausea, vomiting, fever, chills, shortness of breath. Patient took Imodium with no relief. 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 No EGD in the past.  HPI obtained from his daughter Tanesha . CT abdomen shows mild diffuse wall thickening of the colon. No leucocytosis, remain afebrile. Stool culture negative. GI PCR pending.      Review of Systems:  . Constitutional: No fever, chills  . HEENT: Negative  · Respiratory and Thorax: No shortness of breath, no cough  · Cardiovascular: No chest pain, palpitation, no dizziness  · Gastrointestinal: see above  · Genitourinary: No hematuria  · Musculoskeletal: Negative  · Neurological: negative  · Psychiatric: no agitation, no anxiety      PAST MEDICAL/SURGICAL HISTORY:  Hypertension    Hyperlipidemia    Diabetes mellitus    Cancer  protate    Other hemorrhoids    Asthma, mild intermittent, uncomplicated    Sickle cell anemia  Carrier of the disease, presently doesn&#x27;t have it    MI (myocardial infarction)    CAD (coronary artery disease)    SVT (supraventricular tachycardia)    PAD (peripheral artery disease)    Chronic kidney disease (CKD)    Inguinal hernia    S/P primary angioplasty with coronary stent    H/O colonoscopy  Normal as per patient 3 years ago.      SOCIAL HISTORY:  - TOBACCO: Denies  - ALCOHOL: Denies  - ILLICIT DRUG USE: Denies    FAMILY HISTORY:  No known history of gastrointestinal or liver disease;  Family history of prostate cancer    Family history of essential hypertension    Family history of heart disease  father    Family history of MI (myocardial infarction)        HOME MEDICATIONS:  amLODIPine 10 mg oral tablet: 1 tab(s) orally once a day (06 Oct 2022 09:26)  aspirin 81 mg oral tablet, chewable: 1 tab(s) orally once a day (06 Oct 2022 09:26)  cloNIDine 0.1 mg oral tablet: 3  orally 2 times a day (06 Oct 2022 09:26)  Coreg 12.5 mg oral tablet: 2 tab(s) orally 2 times a day (14 Mar 2023 19:34)  Farxiga 10 mg oral tablet: 1 tab(s) orally once a day (06 Oct 2022 09:26)  ferrous gluconate 324 mg (37.5 mg elemental iron) oral tablet (obsolete): orally once a day (14 Mar 2023 19:34)  rosuvastatin 40 mg oral tablet: 1 tab(s) orally once a day (06 Oct 2022 09:26)  Tradjenta 5 mg oral tablet: 1 tab(s) orally once a day (06 Oct 2022 09:26)  Ventolin HFA 90 mcg/inh inhalation aerosol: 2 puff(s) inhaled every 6 hours, As Needed (14 Mar 2023 19:52)  ZyrTEC 10 mg oral tablet: 1 tab(s) orally once a day (06 Oct 2022 09:26)    INPATIENT MEDICATIONS:  MEDICATIONS  (STANDING):  amLODIPine   Tablet 10 milliGRAM(s) Oral daily  aspirin enteric coated 81 milliGRAM(s) Oral daily  atorvastatin 40 milliGRAM(s) Oral at bedtime  carvedilol 25 milliGRAM(s) Oral every 12 hours  cloNIDine 0.3 milliGRAM(s) Oral two times a day  clopidogrel Tablet 75 milliGRAM(s) Oral daily  coronavirus bivalent (EUA) Booster Vaccine (PFIZER) 0.3 milliLiter(s) IntraMuscular once  dextrose 5%. 1000 milliLiter(s) (100 mL/Hr) IV Continuous <Continuous>  dextrose 5%. 1000 milliLiter(s) (50 mL/Hr) IV Continuous <Continuous>  dextrose 50% Injectable 25 Gram(s) IV Push once  dextrose 50% Injectable 12.5 Gram(s) IV Push once  dextrose 50% Injectable 25 Gram(s) IV Push once  glucagon  Injectable 1 milliGRAM(s) IntraMuscular once  heparin   Injectable 5000 Unit(s) SubCutaneous every 12 hours  influenza  Vaccine (HIGH DOSE) 0.7 milliLiter(s) IntraMuscular once  insulin lispro (ADMELOG) corrective regimen sliding scale   SubCutaneous three times a day before meals  insulin lispro (ADMELOG) corrective regimen sliding scale   SubCutaneous at bedtime  loratadine 10 milliGRAM(s) Oral daily  metroNIDAZOLE  IVPB 500 milliGRAM(s) IV Intermittent every 8 hours  pantoprazole    Tablet 40 milliGRAM(s) Oral before breakfast    MEDICATIONS  (PRN):  albuterol    90 MICROgram(s) HFA Inhaler 2 Puff(s) Inhalation every 6 hours PRN Shortness of Breath and/or Wheezing  dextrose Oral Gel 15 Gram(s) Oral once PRN Blood Glucose LESS THAN 70 milliGRAM(s)/deciliter  ondansetron Injectable 4 milliGRAM(s) IV Push every 6 hours PRN Nausea and/or Vomiting    ALLERGIES:  EGGPLANT (Hives)  No Known Drug Allergies  strawberry (Hives)    T(C): 36.9 (03-15-23 @ 07:54), Max: 37.2 (23 @ 21:00)  HR: 59 (03-15-23 @ 07:54) (59 - 81)  BP: 120/68 (03-15-23 @ 07:54) (120/68 - 178/88)  RR: 18 (03-15-23 @ 07:54) (18 - 20)  SpO2: 95% (03-15-23 @ 07:54) (95% - 99%)      PHYSICAL EXAM:    Constitutional: No acute distress  Neuro: Awake alert, oriented to person, place and situation, speech clear and intact  HEENT: anicteric sclerae  Neck: supple, no JVD  CV: regular rate, regular rhythm  Pulm/chest: lung sounds CTA and equal bilaterally, no accessory muscle use noted  Abd: soft, nontender, nondistended +bowel sounds. No rigidity, rebound tenderness, or guarding  Ext: no Cyanosis, clubbing or edema  Skin: warm, no jaundice   Psych: calm, cooperative        LABS:             12.8   7.88  )-----------( 210      ( 03-15 @ 04:25 )             37.5                13.2   10.37 )-----------( 223      (  @ 15:04 )             39.8         03-15    138  |  105  |  25.0<H>  ----------------------------<  141<H>  4.1   |  23.0  |  1.71<H> 2.25 on admission, highest value for him in our system    Ca    9.0      15 Mar 2023 04:25    TPro  6.0<L>  /  Alb  3.4  /  TBili  0.7  /  DBili  x   /  AST  15  /  ALT  11  /  AlkPhos  81  -15    LIVER FUNCTIONS - ( 15 Mar 2023 04:25 )  Alb: 3.4 g/dL / Pro: 6.0 g/dL / ALK PHOS: 81 U/L / ALT: 11 U/L / AST: 15 U/L / GGT: x             Lipase, Serum: 30 U/L (23 @ 15:04)      Urinalysis Basic - ( 14 Mar 2023 14:48 )    Color: Yellow / Appearance: Clear / S.010 / pH: x  Gluc: x / Ketone: Negative  / Bili: Negative / Urobili: Negative mg/dL   Blood: x / Protein: 30 mg/dL / Nitrite: Negative   Leuk Esterase: Negative / RBC: 0-2 /HPF / WBC 0-2 /HPF   Sq Epi: x / Non Sq Epi: x / Bacteria: x      < from: CT Abdomen and Pelvis No Cont (23 @ 16:44) >  FINDINGS:  LOWER CHEST: Heart size is normal. Coronary and aortic calcifications are   noted. Stable bilateral pulmonary nodules which measure up to 6 mm. Given   long-term stability this would likely be benign. Linear atelectasis   versus scarring is seen at the visualized lung bases.    LIVER: Within normal limits.  BILE DUCTS: Normal caliber.  GALLBLADDER: Within normal limits.  SPLEEN: Within normal limits.  PANCREAS: Within normal limits.  ADRENALS: Within normal limits.  KIDNEYS/URETERS: Multiple bilateral renal stones are appreciated which   measure up to 4 mm. There is no urinary tract obstruction..    BLADDER: Underdistended but appears mildly thick-walled.  REPRODUCTIVE ORGANS: Prostate gland centrally enlarged. Brachytherapy   seeds are noted. The seminal vesicles are symmetric.    BOWEL: No bowel obstruction. Visualized portions of the appendix are   normal. The appendiceal tip is not well seen amongst postsurgical changes   related to right herniorrhaphy. Mild diffuse wall thickening of the colon   is noted. The stomach is underdistended, limiting itsevaluation.  .PERITONEUM: No ascites.  VESSELS: Atherosclerotic changes.  RETROPERITONEUM/LYMPH NODES: No lymphadenopathy.  ABDOMINAL WALL: Within normal limits.  BONES: Degenerative changes are appreciated.    IMPRESSION:  Findings compatible with diffuse colitis. Given the distribution, C.   difficile colitis should be considered. Other infectious or inflammatory   etiologies may also be considered. Ischemia would be considered less   likely given multiple vascular territories.    Question wallthickening of the urinary bladder was appearance is likely   related to underdistention versus sequelae of chronic outlet obstruction   from prostate enlargement or cystitis will appear similar. Correlate with   urinalysis.    < end of copied text >

## 2023-03-15 NOTE — DISCHARGE NOTE PROVIDER - NSDCFUSCHEDAPPT_GEN_ALL_CORE_FT
Juliana Fischer  [facility]  INTMED [address]  Scheduled Appointment: 04/10/2023    Johanny Breaux  [facility]  CARDIOLOGY [address] [facility]  Scheduled Appointment: 04/10/2023

## 2023-03-16 DIAGNOSIS — R19.7 DIARRHEA, UNSPECIFIED: ICD-10-CM

## 2023-03-16 LAB
ALBUMIN SERPL ELPH-MCNC: 3.3 G/DL — SIGNIFICANT CHANGE UP (ref 3.3–5.2)
ALP SERPL-CCNC: 81 U/L — SIGNIFICANT CHANGE UP (ref 40–120)
ALT FLD-CCNC: 10 U/L — SIGNIFICANT CHANGE UP
ANION GAP SERPL CALC-SCNC: 11 MMOL/L — SIGNIFICANT CHANGE UP (ref 5–17)
AST SERPL-CCNC: 15 U/L — SIGNIFICANT CHANGE UP
BILIRUB SERPL-MCNC: 0.6 MG/DL — SIGNIFICANT CHANGE UP (ref 0.4–2)
BUN SERPL-MCNC: 28.2 MG/DL — HIGH (ref 8–20)
CALCIUM SERPL-MCNC: 9.1 MG/DL — SIGNIFICANT CHANGE UP (ref 8.4–10.5)
CHLORIDE SERPL-SCNC: 102 MMOL/L — SIGNIFICANT CHANGE UP (ref 96–108)
CO2 SERPL-SCNC: 24 MMOL/L — SIGNIFICANT CHANGE UP (ref 22–29)
CREAT SERPL-MCNC: 1.66 MG/DL — HIGH (ref 0.5–1.3)
CRP SERPL-MCNC: <4 MG/L — SIGNIFICANT CHANGE UP
CULTURE RESULTS: SIGNIFICANT CHANGE UP
CULTURE RESULTS: SIGNIFICANT CHANGE UP
EGFR: 40 ML/MIN/1.73M2 — LOW
ERYTHROCYTE [SEDIMENTATION RATE] IN BLOOD: 20 MM/HR — SIGNIFICANT CHANGE UP (ref 0–20)
GLUCOSE BLDC GLUCOMTR-MCNC: 111 MG/DL — HIGH (ref 70–99)
GLUCOSE BLDC GLUCOMTR-MCNC: 136 MG/DL — HIGH (ref 70–99)
GLUCOSE BLDC GLUCOMTR-MCNC: 159 MG/DL — HIGH (ref 70–99)
GLUCOSE BLDC GLUCOMTR-MCNC: 170 MG/DL — HIGH (ref 70–99)
GLUCOSE SERPL-MCNC: 123 MG/DL — HIGH (ref 70–99)
HCT VFR BLD CALC: 39.2 % — SIGNIFICANT CHANGE UP (ref 39–50)
HGB BLD-MCNC: 13.2 G/DL — SIGNIFICANT CHANGE UP (ref 13–17)
MCHC RBC-ENTMCNC: 25.5 PG — LOW (ref 27–34)
MCHC RBC-ENTMCNC: 33.7 GM/DL — SIGNIFICANT CHANGE UP (ref 32–36)
MCV RBC AUTO: 75.8 FL — LOW (ref 80–100)
PLATELET # BLD AUTO: 206 K/UL — SIGNIFICANT CHANGE UP (ref 150–400)
POTASSIUM SERPL-MCNC: 3.9 MMOL/L — SIGNIFICANT CHANGE UP (ref 3.5–5.3)
POTASSIUM SERPL-SCNC: 3.9 MMOL/L — SIGNIFICANT CHANGE UP (ref 3.5–5.3)
PROT SERPL-MCNC: 6.1 G/DL — LOW (ref 6.6–8.7)
RBC # BLD: 5.17 M/UL — SIGNIFICANT CHANGE UP (ref 4.2–5.8)
RBC # FLD: 16.8 % — HIGH (ref 10.3–14.5)
SODIUM SERPL-SCNC: 136 MMOL/L — SIGNIFICANT CHANGE UP (ref 135–145)
SPECIMEN SOURCE: SIGNIFICANT CHANGE UP
SPECIMEN SOURCE: SIGNIFICANT CHANGE UP
WBC # BLD: 6.15 K/UL — SIGNIFICANT CHANGE UP (ref 3.8–10.5)
WBC # FLD AUTO: 6.15 K/UL — SIGNIFICANT CHANGE UP (ref 3.8–10.5)

## 2023-03-16 PROCEDURE — 99232 SBSQ HOSP IP/OBS MODERATE 35: CPT

## 2023-03-16 PROCEDURE — 99233 SBSQ HOSP IP/OBS HIGH 50: CPT

## 2023-03-16 RX ORDER — AZITHROMYCIN 500 MG/1
500 TABLET, FILM COATED ORAL EVERY 24 HOURS
Refills: 0 | Status: DISCONTINUED | OUTPATIENT
Start: 2023-03-17 | End: 2023-03-17

## 2023-03-16 RX ORDER — AZITHROMYCIN 500 MG/1
500 TABLET, FILM COATED ORAL ONCE
Refills: 0 | Status: COMPLETED | OUTPATIENT
Start: 2023-03-16 | End: 2023-03-16

## 2023-03-16 RX ORDER — AZITHROMYCIN 500 MG/1
TABLET, FILM COATED ORAL
Refills: 0 | Status: DISCONTINUED | OUTPATIENT
Start: 2023-03-16 | End: 2023-03-17

## 2023-03-16 RX ADMIN — Medication 81 MILLIGRAM(S): at 11:47

## 2023-03-16 RX ADMIN — ATORVASTATIN CALCIUM 40 MILLIGRAM(S): 80 TABLET, FILM COATED ORAL at 21:26

## 2023-03-16 RX ADMIN — HEPARIN SODIUM 5000 UNIT(S): 5000 INJECTION INTRAVENOUS; SUBCUTANEOUS at 05:42

## 2023-03-16 RX ADMIN — CARVEDILOL PHOSPHATE 25 MILLIGRAM(S): 80 CAPSULE, EXTENDED RELEASE ORAL at 17:40

## 2023-03-16 RX ADMIN — AZITHROMYCIN 255 MILLIGRAM(S): 500 TABLET, FILM COATED ORAL at 10:01

## 2023-03-16 RX ADMIN — Medication 0.3 MILLIGRAM(S): at 05:42

## 2023-03-16 RX ADMIN — SODIUM CHLORIDE 100 MILLILITER(S): 9 INJECTION, SOLUTION INTRAVENOUS at 05:43

## 2023-03-16 RX ADMIN — PANTOPRAZOLE SODIUM 40 MILLIGRAM(S): 20 TABLET, DELAYED RELEASE ORAL at 05:43

## 2023-03-16 RX ADMIN — CLOPIDOGREL BISULFATE 75 MILLIGRAM(S): 75 TABLET, FILM COATED ORAL at 11:47

## 2023-03-16 RX ADMIN — Medication 0.3 MILLIGRAM(S): at 17:40

## 2023-03-16 RX ADMIN — Medication 500 MILLIGRAM(S): at 05:42

## 2023-03-16 RX ADMIN — Medication 1: at 11:57

## 2023-03-16 RX ADMIN — HEPARIN SODIUM 5000 UNIT(S): 5000 INJECTION INTRAVENOUS; SUBCUTANEOUS at 17:39

## 2023-03-16 RX ADMIN — SODIUM CHLORIDE 100 MILLILITER(S): 9 INJECTION, SOLUTION INTRAVENOUS at 10:00

## 2023-03-16 RX ADMIN — LORATADINE 10 MILLIGRAM(S): 10 TABLET ORAL at 11:47

## 2023-03-16 RX ADMIN — AMLODIPINE BESYLATE 10 MILLIGRAM(S): 2.5 TABLET ORAL at 05:42

## 2023-03-16 NOTE — PROGRESS NOTE ADULT - ASSESSMENT
83 y/o male with ongoing diarrhea for past 6 weeks , ct scan of abdomen with diffuse colitis. Stool PCR positive for Campylobacter. Also noted to have KENTRELL with underlying CKD    Assessment/Plan:    1. Colitis Secondary to Campylobacter  - cipro and flagyl x 10 days   - gi consult appreciated   - cdiff unable to be done given stool not liquid    2. KENTRELL with CKD stage 2  - Baseline creatinine of ~1.5  - Secondary to GI loss  - Improving  _ Trend BMp    3. HTN- Essential   - monitor blood pressure   - amlodipine, coreg, clonidine    4. CAD  - aspirin plavix coreg and statin     5. Type 2 Diabetes  - a1c 7.6   - sliding scale  - monitor fingersticks    DVT Prophylaxis- heparin SC    Discharge disposition: HOme pending improvement in diarrhea and KENTRELL  81 y/o male with ongoing diarrhea for past 6 weeks , ct scan of abdomen with diffuse colitis. Stool PCR positive for Campylobacter. Also noted to have KENTRELL with underlying CKD    Assessment/Plan:    1. Colitis Secondary to Campylobacter  - Stop cipro and flagyl  - Azithromycin 500mg OD x 3 days   - cdiff unable to be done given stool not liquid    2. KENTRELL with CKD stage 2  - Baseline creatinine of ~1.5  - Secondary to GI loss  - Improving  _ Trend BMp    3. HTN- Essential   - monitor blood pressure   - amlodipine, coreg, clonidine    4. CAD  - aspirin plavix coreg and statin     5. Type 2 Diabetes  - a1c 7.6   - sliding scale  - monitor fingersticks    DVT Prophylaxis- heparin SC    Discharge disposition: HOme pending improvement in diarrhea and KENTRELL

## 2023-03-16 NOTE — PROGRESS NOTE ADULT - PROBLEM SELECTOR PLAN 1
Patient with gastroenteritis - Campylobacter   antibiotics switched to zithromax X 5 days   advance to low fiber diet   will sign off

## 2023-03-16 NOTE — CDI QUERY NOTE - NSCDIOTHERTXTBX_GEN_ALL_CORE_HH
Documentation on the stage and acuity of this patient's kidney disease is needed as both stages 2 and 3 are documented as well as KENTRELL.     -CKD Stage 2  -CKD Stage 2 with KENTRELL  -CKD Stage 3  -CKD Stage 3 with KENTRELL  -Other   -Clinically Insignificant Findings    SUPPORTING DOCUMENTATION/CLINICAL EVIDENCE:    -3/14 H&P: Chronic kidney disease (CKD)... Stage 3 chronic kidney disease. ·  Plan: possible some acute component from ongoing diarrhea, gentle iv fluids, avoid nephrotoxic meds, follow am labs    -3/15 Hospitalist: [email or web address]    -3/16 Hospitalist: KENTRELL with CKD stage 2 - Baseline creatinine of ~1.5 - Secondary to GI loss - Improving    -3/16 DC: noted to have KENTRELL with underlying CKD improved with IVF... Acute kidney injury superimposed on CKD    IVF:  dextrose 5%. 1000 milliLiter(s) (100 mL/Hr) IV Continuous <Continuous>  dextrose 5%. 1000 milliLiter(s) (50 mL/Hr) IV Continuous <Continuous>  lactated ringers. 1000 milliLiter(s) (100 mL/Hr) IV Continuous <Continuous>    LABS:  Creatinine, Serum: 1.66 mg/dL (03.16.23 @ 06:56)   Creatinine, Serum: 1.71 mg/dL (03.15.23 @ 04:25)   Creatinine, Serum: 2.04 mg/dL (03.14.23 @ 15:04)    eGFR: 40 (03.16.23 @ 06:56)  eGFR: 39 (03.15.23 @ 04:25)

## 2023-03-17 ENCOUNTER — TRANSCRIPTION ENCOUNTER (OUTPATIENT)
Age: 83
End: 2023-03-17

## 2023-03-17 VITALS
SYSTOLIC BLOOD PRESSURE: 126 MMHG | DIASTOLIC BLOOD PRESSURE: 78 MMHG | TEMPERATURE: 98 F | HEART RATE: 70 BPM | RESPIRATION RATE: 18 BRPM | OXYGEN SATURATION: 99 %

## 2023-03-17 LAB
ANION GAP SERPL CALC-SCNC: 12 MMOL/L — SIGNIFICANT CHANGE UP (ref 5–17)
BUN SERPL-MCNC: 29 MG/DL — HIGH (ref 8–20)
CALCIUM SERPL-MCNC: 9.3 MG/DL — SIGNIFICANT CHANGE UP (ref 8.4–10.5)
CHLORIDE SERPL-SCNC: 104 MMOL/L — SIGNIFICANT CHANGE UP (ref 96–108)
CO2 SERPL-SCNC: 22 MMOL/L — SIGNIFICANT CHANGE UP (ref 22–29)
CREAT SERPL-MCNC: 1.64 MG/DL — HIGH (ref 0.5–1.3)
EGFR: 41 ML/MIN/1.73M2 — LOW
GLUCOSE BLDC GLUCOMTR-MCNC: 134 MG/DL — HIGH (ref 70–99)
GLUCOSE BLDC GLUCOMTR-MCNC: 182 MG/DL — HIGH (ref 70–99)
GLUCOSE SERPL-MCNC: 109 MG/DL — HIGH (ref 70–99)
HCT VFR BLD CALC: 39.4 % — SIGNIFICANT CHANGE UP (ref 39–50)
HGB BLD-MCNC: 13.3 G/DL — SIGNIFICANT CHANGE UP (ref 13–17)
MCHC RBC-ENTMCNC: 25.3 PG — LOW (ref 27–34)
MCHC RBC-ENTMCNC: 33.8 GM/DL — SIGNIFICANT CHANGE UP (ref 32–36)
MCV RBC AUTO: 75 FL — LOW (ref 80–100)
PLATELET # BLD AUTO: 228 K/UL — SIGNIFICANT CHANGE UP (ref 150–400)
POTASSIUM SERPL-MCNC: 4 MMOL/L — SIGNIFICANT CHANGE UP (ref 3.5–5.3)
POTASSIUM SERPL-SCNC: 4 MMOL/L — SIGNIFICANT CHANGE UP (ref 3.5–5.3)
RBC # BLD: 5.25 M/UL — SIGNIFICANT CHANGE UP (ref 4.2–5.8)
RBC # FLD: 16.7 % — HIGH (ref 10.3–14.5)
SODIUM SERPL-SCNC: 138 MMOL/L — SIGNIFICANT CHANGE UP (ref 135–145)
WBC # BLD: 6.65 K/UL — SIGNIFICANT CHANGE UP (ref 3.8–10.5)
WBC # FLD AUTO: 6.65 K/UL — SIGNIFICANT CHANGE UP (ref 3.8–10.5)

## 2023-03-17 PROCEDURE — 87493 C DIFF AMPLIFIED PROBE: CPT

## 2023-03-17 PROCEDURE — 82962 GLUCOSE BLOOD TEST: CPT

## 2023-03-17 PROCEDURE — 96374 THER/PROPH/DIAG INJ IV PUSH: CPT

## 2023-03-17 PROCEDURE — 87077 CULTURE AEROBIC IDENTIFY: CPT

## 2023-03-17 PROCEDURE — 80053 COMPREHEN METABOLIC PANEL: CPT

## 2023-03-17 PROCEDURE — 86140 C-REACTIVE PROTEIN: CPT

## 2023-03-17 PROCEDURE — 81001 URINALYSIS AUTO W/SCOPE: CPT

## 2023-03-17 PROCEDURE — 85025 COMPLETE CBC W/AUTO DIFF WBC: CPT

## 2023-03-17 PROCEDURE — 99239 HOSP IP/OBS DSCHRG MGMT >30: CPT

## 2023-03-17 PROCEDURE — 80048 BASIC METABOLIC PNL TOTAL CA: CPT

## 2023-03-17 PROCEDURE — 87046 STOOL CULTR AEROBIC BACT EA: CPT

## 2023-03-17 PROCEDURE — 87507 IADNA-DNA/RNA PROBE TQ 12-25: CPT

## 2023-03-17 PROCEDURE — 83036 HEMOGLOBIN GLYCOSYLATED A1C: CPT

## 2023-03-17 PROCEDURE — 87045 FECES CULTURE AEROBIC BACT: CPT

## 2023-03-17 PROCEDURE — 85027 COMPLETE CBC AUTOMATED: CPT

## 2023-03-17 PROCEDURE — 87177 OVA AND PARASITES SMEARS: CPT

## 2023-03-17 PROCEDURE — 99285 EMERGENCY DEPT VISIT HI MDM: CPT | Mod: 25

## 2023-03-17 PROCEDURE — 74176 CT ABD & PELVIS W/O CONTRAST: CPT | Mod: MA

## 2023-03-17 PROCEDURE — 83690 ASSAY OF LIPASE: CPT

## 2023-03-17 PROCEDURE — 85652 RBC SED RATE AUTOMATED: CPT

## 2023-03-17 PROCEDURE — 0225U NFCT DS DNA&RNA 21 SARSCOV2: CPT

## 2023-03-17 PROCEDURE — 36415 COLL VENOUS BLD VENIPUNCTURE: CPT

## 2023-03-17 RX ORDER — AZITHROMYCIN 500 MG/1
1 TABLET, FILM COATED ORAL
Qty: 3 | Refills: 0
Start: 2023-03-17 | End: 2023-03-19

## 2023-03-17 RX ORDER — PETROLATUM,WHITE
1 JELLY (GRAM) TOPICAL ONCE
Refills: 0 | Status: DISCONTINUED | OUTPATIENT
Start: 2023-03-17 | End: 2023-03-17

## 2023-03-17 RX ADMIN — HEPARIN SODIUM 5000 UNIT(S): 5000 INJECTION INTRAVENOUS; SUBCUTANEOUS at 06:17

## 2023-03-17 RX ADMIN — AZITHROMYCIN 255 MILLIGRAM(S): 500 TABLET, FILM COATED ORAL at 09:40

## 2023-03-17 RX ADMIN — Medication 81 MILLIGRAM(S): at 11:18

## 2023-03-17 RX ADMIN — AMLODIPINE BESYLATE 10 MILLIGRAM(S): 2.5 TABLET ORAL at 06:17

## 2023-03-17 RX ADMIN — Medication 1: at 11:19

## 2023-03-17 RX ADMIN — CLOPIDOGREL BISULFATE 75 MILLIGRAM(S): 75 TABLET, FILM COATED ORAL at 11:18

## 2023-03-17 RX ADMIN — LORATADINE 10 MILLIGRAM(S): 10 TABLET ORAL at 11:18

## 2023-03-17 RX ADMIN — CARVEDILOL PHOSPHATE 25 MILLIGRAM(S): 80 CAPSULE, EXTENDED RELEASE ORAL at 06:17

## 2023-03-17 RX ADMIN — Medication 0.3 MILLIGRAM(S): at 06:17

## 2023-03-17 RX ADMIN — PANTOPRAZOLE SODIUM 40 MILLIGRAM(S): 20 TABLET, DELAYED RELEASE ORAL at 06:17

## 2023-03-17 NOTE — PROGRESS NOTE ADULT - REASON FOR ADMISSION
colitis / chronic diarrhea

## 2023-03-17 NOTE — DISCHARGE NOTE NURSING/CASE MANAGEMENT/SOCIAL WORK - PATIENT PORTAL LINK FT
You can access the FollowMyHealth Patient Portal offered by [facility] by registering at the following website: [email or web address]. By joining [facility]’s FollowMyHealth portal, you will also be able to view your health information using other applications (apps) compatible with our system.

## 2023-03-17 NOTE — PROGRESS NOTE ADULT - SUBJECTIVE AND OBJECTIVE BOX
Patient is a 82y old  Male who presents with a chief complaint of colitis / chronic diarrhea (16 Mar 2023 08:29)      HPI:  83 y/o male with h/o htn, dm, hyperlipidemia, cad s/p stents 2018, prostate cancer in remission had radiation in 2015, s/p loop recorder .   AS per nurse, pt had no BM overnight. Noted to have  3 BM during the day yesterday. Sed rate normal. No  leukocytosis. NO abdominal pain. Stool studies showed Campylobacter.         REVIEW OF SYSTEMS:  Constitutional: No fever, weight loss or fatigue  ENMT:  No difficulty hearing, tinnitus, vertigo; No sinus or throat pain  Respiratory: No cough, wheezing, chills or hemoptysis  Cardiovascular: No chest pain, palpitations, dizziness or leg swelling  Gastrointestinal: No abdominal or epigastric pain. No nausea, vomiting or hematemesis; + diarrhea . No melena or hematochezia.  Skin: No itching, burning, rashes or lesions   Musculoskeletal: No joint pain or swelling; No muscle, back or extremity pain    PAST MEDICAL & SURGICAL HISTORY:  Hypertension      Hyperlipidemia      Diabetes mellitus      Cancer  protate      Other hemorrhoids      Asthma, mild intermittent, uncomplicated      Sickle cell anemia  Carrier of the disease, presently doesn&#x27;t have it      MI (myocardial infarction)      CAD (coronary artery disease)      SVT (supraventricular tachycardia)      PAD (peripheral artery disease)      Chronic kidney disease (CKD)      Inguinal hernia      S/P primary angioplasty with coronary stent      H/O colonoscopy  Normal as per patient 3 years ago.          FAMILY HISTORY:  Family history of prostate cancer    Family history of essential hypertension    Family history of heart disease  father    Family history of MI (myocardial infarction)        SOCIAL HISTORY:  Smoking Status: [ ] Current, [ ] Former, [ ] Never  Pack Years:  [  ] EtOH-no  [  ] IVDA    MEDICATIONS:  MEDICATIONS  (STANDING):  amLODIPine   Tablet 10 milliGRAM(s) Oral daily  aspirin enteric coated 81 milliGRAM(s) Oral daily  atorvastatin 40 milliGRAM(s) Oral at bedtime  azithromycin  IVPB      azithromycin  IVPB 500 milliGRAM(s) IV Intermittent once  carvedilol 25 milliGRAM(s) Oral every 12 hours  cloNIDine 0.3 milliGRAM(s) Oral two times a day  clopidogrel Tablet 75 milliGRAM(s) Oral daily  coronavirus bivalent (EUA) Booster Vaccine (PFIZER) 0.3 milliLiter(s) IntraMuscular once  dextrose 5%. 1000 milliLiter(s) (100 mL/Hr) IV Continuous <Continuous>  dextrose 5%. 1000 milliLiter(s) (50 mL/Hr) IV Continuous <Continuous>  dextrose 50% Injectable 25 Gram(s) IV Push once  dextrose 50% Injectable 12.5 Gram(s) IV Push once  dextrose 50% Injectable 25 Gram(s) IV Push once  glucagon  Injectable 1 milliGRAM(s) IntraMuscular once  heparin   Injectable 5000 Unit(s) SubCutaneous every 12 hours  influenza  Vaccine (HIGH DOSE) 0.7 milliLiter(s) IntraMuscular once  insulin lispro (ADMELOG) corrective regimen sliding scale   SubCutaneous three times a day before meals  insulin lispro (ADMELOG) corrective regimen sliding scale   SubCutaneous at bedtime  lactated ringers. 1000 milliLiter(s) (100 mL/Hr) IV Continuous <Continuous>  loratadine 10 milliGRAM(s) Oral daily  pantoprazole    Tablet 40 milliGRAM(s) Oral before breakfast    MEDICATIONS  (PRN):  albuterol    90 MICROgram(s) HFA Inhaler 2 Puff(s) Inhalation every 6 hours PRN Shortness of Breath and/or Wheezing  dextrose Oral Gel 15 Gram(s) Oral once PRN Blood Glucose LESS THAN 70 milliGRAM(s)/deciliter  ondansetron Injectable 4 milliGRAM(s) IV Push every 6 hours PRN Nausea and/or Vomiting      Allergies    EGGPLANT (Hives)  No Known Drug Allergies  strawberry (Hives)    Intolerances    aspirin (Stomach Upset)      Vital Signs Last 24 Hrs  T(C): 36.4 (16 Mar 2023 08:57), Max: 36.9 (15 Mar 2023 11:27)  T(F): 97.6 (16 Mar 2023 08:57), Max: 98.4 (15 Mar 2023 11:27)  HR: 65 (16 Mar 2023 08:57) (51 - 68)  BP: 128/72 (16 Mar 2023 08:57) (102/64 - 162/81)  BP(mean): 79 (15 Mar 2023 19:11) (79 - 79)  RR: 18 (16 Mar 2023 08:57) (18 - 18)  SpO2: 95% (16 Mar 2023 08:57) (95% - 98%)    Parameters below as of 16 Mar 2023 08:57  Patient On (Oxygen Delivery Method): room air        03-15 @ 07:01  -  03-16 @ 07:00  --------------------------------------------------------  IN: 0 mL / OUT: 250 mL / NET: -250 mL          PHYSICAL EXAM:    General: ; in no acute distress  HEENT: MMM, conjunctiva and sclera clear  H-RRR  L-CTA  Gastrointestinal: Soft, non-tender non-distended; Normal bowel sounds; No rebound or guarding  Extremities: Normal range of motion, No clubbing, cyanosis or edema  Neurological: Alert and oriented x3  Skin: Warm and dry. No obvious rash      LABS:                        13.2   6.15  )-----------( 206      ( 16 Mar 2023 06:56 )             39.2     16 Mar 2023 06:56    136    |  102    |  28.2   ----------------------------<  123    3.9     |  24.0   |  1.66     Ca    9.1        16 Mar 2023 06:56    TPro  6.1    /  Alb  3.3    /  TBili  0.6    /  DBili  x      /  AST  15     /  ALT  10     /  AlkPhos  81     / Amylase x      /Lipase x      16 Mar 2023 06:56          C-Reactive Protein, Serum: <4 mg/L (03-16-23 @ 06:56)  Sedimentation Rate, Erythrocyte: 20 mm/hr (03-16-23 @ 06:56)      RADIOLOGY & ADDITIONAL STUDIES:   
CC: Follow up     INTERVAL HPI/OVERNIGHT EVENTS: Patient seen and examined, no further episodes of diarrhea. Afebrile.       Vital Signs Last 24 Hrs  T(C): 36.4 (17 Mar 2023 08:48), Max: 36.8 (16 Mar 2023 19:20)  T(F): 97.6 (17 Mar 2023 08:48), Max: 98.3 (16 Mar 2023 19:20)  HR: 71 (17 Mar 2023 08:48) (58 - 71)  BP: 119/80 (17 Mar 2023 08:48) (119/80 - 165/75)  BP(mean): --  RR: 18 (17 Mar 2023 08:48) (18 - 18)  SpO2: 100% (17 Mar 2023 08:48) (95% - 100%)    Parameters below as of 17 Mar 2023 08:48  Patient On (Oxygen Delivery Method): room air        PHYSICAL EXAM:    GENERAL: NAD, AOX3  HEAD:  Atraumatic, Normocephalic  EYES:  conjunctiva and sclera clear  ENMT: Moist mucous membranes  NECK: Supple  CHEST/LUNG: Clear to auscultation bilaterally; No rales, rhonchi, wheezing, or rubs  HEART: Regular rate and rhythm; No murmurs, rubs, or gallops  ABDOMEN: Soft, Nontender, Nondistended; Bowel sounds present  EXTREMITIES:  2+ Peripheral Pulses, No clubbing, cyanosis, or edema        MEDICATIONS  (STANDING):  amLODIPine   Tablet 10 milliGRAM(s) Oral daily  aspirin enteric coated 81 milliGRAM(s) Oral daily  atorvastatin 40 milliGRAM(s) Oral at bedtime  azithromycin  IVPB      azithromycin  IVPB 500 milliGRAM(s) IV Intermittent every 24 hours  carvedilol 25 milliGRAM(s) Oral every 12 hours  cloNIDine 0.3 milliGRAM(s) Oral two times a day  clopidogrel Tablet 75 milliGRAM(s) Oral daily  coronavirus bivalent (EUA) Booster Vaccine (PFIZER) 0.3 milliLiter(s) IntraMuscular once  dextrose 5%. 1000 milliLiter(s) (100 mL/Hr) IV Continuous <Continuous>  dextrose 5%. 1000 milliLiter(s) (50 mL/Hr) IV Continuous <Continuous>  dextrose 50% Injectable 25 Gram(s) IV Push once  dextrose 50% Injectable 12.5 Gram(s) IV Push once  dextrose 50% Injectable 25 Gram(s) IV Push once  glucagon  Injectable 1 milliGRAM(s) IntraMuscular once  heparin   Injectable 5000 Unit(s) SubCutaneous every 12 hours  influenza  Vaccine (HIGH DOSE) 0.7 milliLiter(s) IntraMuscular once  insulin lispro (ADMELOG) corrective regimen sliding scale   SubCutaneous three times a day before meals  insulin lispro (ADMELOG) corrective regimen sliding scale   SubCutaneous at bedtime  loratadine 10 milliGRAM(s) Oral daily  pantoprazole    Tablet 40 milliGRAM(s) Oral before breakfast    MEDICATIONS  (PRN):  albuterol    90 MICROgram(s) HFA Inhaler 2 Puff(s) Inhalation every 6 hours PRN Shortness of Breath and/or Wheezing  dextrose Oral Gel 15 Gram(s) Oral once PRN Blood Glucose LESS THAN 70 milliGRAM(s)/deciliter  ondansetron Injectable 4 milliGRAM(s) IV Push every 6 hours PRN Nausea and/or Vomiting      Allergies    EGGPLANT (Hives)  No Known Drug Allergies  strawberry (Hives)    Intolerances    aspirin (Stomach Upset)        LABS:                          13.3   6.65  )-----------( 228      ( 17 Mar 2023 05:05 )             39.4     03-17    138  |  104  |  29.0<H>  ----------------------------<  109<H>  4.0   |  22.0  |  1.64<H>    Ca    9.3      17 Mar 2023 05:05    TPro  6.1<L>  /  Alb  3.3  /  TBili  0.6  /  DBili  x   /  AST  15  /  ALT  10  /  AlkPhos  81  03-16          RADIOLOGY & ADDITIONAL TESTS:  
Patient is a 82y old  Male who presents with a chief complaint of colitis / chronic diarrhea (15 Mar 2023 09:56)    Patient seen and examined at bedside.      ALLERGIES:  aspirin (Stomach Upset)  EGGPLANT (Hives)  No Known Drug Allergies  strawberry (Hives)    MEDICATIONS  (STANDING):  amLODIPine   Tablet 10 milliGRAM(s) Oral daily  aspirin enteric coated 81 milliGRAM(s) Oral daily  atorvastatin 40 milliGRAM(s) Oral at bedtime  carvedilol 25 milliGRAM(s) Oral every 12 hours  ciprofloxacin     Tablet 500 milliGRAM(s) Oral every 12 hours  cloNIDine 0.3 milliGRAM(s) Oral two times a day  clopidogrel Tablet 75 milliGRAM(s) Oral daily  coronavirus bivalent (EUA) Booster Vaccine (PFIZER) 0.3 milliLiter(s) IntraMuscular once  dextrose 5%. 1000 milliLiter(s) (100 mL/Hr) IV Continuous <Continuous>  dextrose 5%. 1000 milliLiter(s) (50 mL/Hr) IV Continuous <Continuous>  dextrose 50% Injectable 25 Gram(s) IV Push once  dextrose 50% Injectable 12.5 Gram(s) IV Push once  dextrose 50% Injectable 25 Gram(s) IV Push once  glucagon  Injectable 1 milliGRAM(s) IntraMuscular once  heparin   Injectable 5000 Unit(s) SubCutaneous every 12 hours  influenza  Vaccine (HIGH DOSE) 0.7 milliLiter(s) IntraMuscular once  insulin lispro (ADMELOG) corrective regimen sliding scale   SubCutaneous three times a day before meals  insulin lispro (ADMELOG) corrective regimen sliding scale   SubCutaneous at bedtime  lactated ringers. 1000 milliLiter(s) (100 mL/Hr) IV Continuous <Continuous>  loratadine 10 milliGRAM(s) Oral daily  metroNIDAZOLE    Tablet 500 milliGRAM(s) Oral every 8 hours  pantoprazole    Tablet 40 milliGRAM(s) Oral before breakfast    MEDICATIONS  (PRN):  albuterol    90 MICROgram(s) HFA Inhaler 2 Puff(s) Inhalation every 6 hours PRN Shortness of Breath and/or Wheezing  dextrose Oral Gel 15 Gram(s) Oral once PRN Blood Glucose LESS THAN 70 milliGRAM(s)/deciliter  ondansetron Injectable 4 milliGRAM(s) IV Push every 6 hours PRN Nausea and/or Vomiting    Vital Signs Last 24 Hrs  T(F): 98.4 (15 Mar 2023 07:54), Max: 98.9 (14 Mar 2023 21:00)  HR: 68 (15 Mar 2023 10:32) (59 - 81)  BP: 147/63 (15 Mar 2023 10:32) (120/68 - 178/88)  RR: 18 (15 Mar 2023 07:54) (18 - 20)  SpO2: 95% (15 Mar 2023 07:54) (95% - 99%)  I&O's Summary    PHYSICAL EXAM:  General: NAD, A/O x 3  ENT: MMM, no thrush  Neck: Supple, No JVD  Lungs: Clear to auscultation bilaterally, good air entry, non-labored breathing  Cardio: +s1/s2  Abdomen: Soft, Nontender, Nondistended; Bowel sounds present  Extremities: No calf tenderness     LABS:                        12.8   7.88  )-----------( 210      ( 15 Mar 2023 04:25 )             37.5     03-15    138  |  105  |  25.0  ----------------------------<  141  4.1   |  23.0  |  1.71    Ca    9.0      15 Mar 2023 04:25    TPro  6.0  /  Alb  3.4  /  TBili  0.7  /  DBili  x   /  AST  15  /  ALT  11  /  AlkPhos  81  03-15      Lipase, Serum: 30 U/L (23 @ 15:04)    Glucose  POCT Blood Glucose.: 111 mg/dL (15 Mar 2023 08:58)  POCT Blood Glucose.: 156 mg/dL (14 Mar 2023 21:39)    Urinalysis Basic - ( 14 Mar 2023 14:48 )  Color: Yellow / Appearance: Clear / S.010 / pH: x  Gluc: x / Ketone: Negative  / Bili: Negative / Urobili: Negative mg/dL   Blood: x / Protein: 30 mg/dL / Nitrite: Negative   Leuk Esterase: Negative / RBC: 0-2 /HPF / WBC 0-2 /HPF   Sq Epi: x / Non Sq Epi: x / Bacteria: x    RADIOLOGY & ADDITIONAL TESTS:  - no new tests    Care Discussed with Consultants/Other Providers:   Gastroenterology
CC: Follow up     INTERVAL HPI/OVERNIGHT EVENTS: Patient seen and examined daughter bedside for translation> Overall feels better. Denies nausea vomiting or abdominal pain. NO Bm since  yesterday       Vital Signs Last 24 Hrs  T(C): 36.4 (16 Mar 2023 08:57), Max: 36.7 (15 Mar 2023 15:44)  T(F): 97.6 (16 Mar 2023 08:57), Max: 98 (15 Mar 2023 15:44)  HR: 58 (16 Mar 2023 11:46) (51 - 66)  BP: 128/68 (16 Mar 2023 11:46) (102/64 - 162/81)  BP(mean): 79 (15 Mar 2023 19:11) (79 - 79)  RR: 18 (16 Mar 2023 08:57) (18 - 18)  SpO2: 95% (16 Mar 2023 08:57) (95% - 98%)    Parameters below as of 16 Mar 2023 08:57  Patient On (Oxygen Delivery Method): room air        PHYSICAL EXAM:    GENERAL: NAD, AOX3  HEAD:  Atraumatic, Normocephalic  EYES: conjunctiva and sclera clear  ENMT: Moist mucous membranes  NECK: Supple  CHEST/LUNG: Clear to auscultation bilaterally; No rales, rhonchi, wheezing, or rubs  HEART: Regular rate and rhythm; No murmurs, rubs, or gallops  ABDOMEN: Soft, Nontender, Nondistended; Bowel sounds present  EXTREMITIES:  2+ Peripheral Pulses, No clubbing, cyanosis, or edema        MEDICATIONS  (STANDING):  amLODIPine   Tablet 10 milliGRAM(s) Oral daily  aspirin enteric coated 81 milliGRAM(s) Oral daily  atorvastatin 40 milliGRAM(s) Oral at bedtime  azithromycin  IVPB      carvedilol 25 milliGRAM(s) Oral every 12 hours  cloNIDine 0.3 milliGRAM(s) Oral two times a day  clopidogrel Tablet 75 milliGRAM(s) Oral daily  coronavirus bivalent (EUA) Booster Vaccine (PFIZER) 0.3 milliLiter(s) IntraMuscular once  dextrose 5%. 1000 milliLiter(s) (100 mL/Hr) IV Continuous <Continuous>  dextrose 5%. 1000 milliLiter(s) (50 mL/Hr) IV Continuous <Continuous>  dextrose 50% Injectable 25 Gram(s) IV Push once  dextrose 50% Injectable 12.5 Gram(s) IV Push once  dextrose 50% Injectable 25 Gram(s) IV Push once  glucagon  Injectable 1 milliGRAM(s) IntraMuscular once  heparin   Injectable 5000 Unit(s) SubCutaneous every 12 hours  influenza  Vaccine (HIGH DOSE) 0.7 milliLiter(s) IntraMuscular once  insulin lispro (ADMELOG) corrective regimen sliding scale   SubCutaneous three times a day before meals  insulin lispro (ADMELOG) corrective regimen sliding scale   SubCutaneous at bedtime  lactated ringers. 1000 milliLiter(s) (100 mL/Hr) IV Continuous <Continuous>  loratadine 10 milliGRAM(s) Oral daily  pantoprazole    Tablet 40 milliGRAM(s) Oral before breakfast    MEDICATIONS  (PRN):  albuterol    90 MICROgram(s) HFA Inhaler 2 Puff(s) Inhalation every 6 hours PRN Shortness of Breath and/or Wheezing  dextrose Oral Gel 15 Gram(s) Oral once PRN Blood Glucose LESS THAN 70 milliGRAM(s)/deciliter  ondansetron Injectable 4 milliGRAM(s) IV Push every 6 hours PRN Nausea and/or Vomiting      Allergies    EGGPLANT (Hives)  No Known Drug Allergies  strawberry (Hives)    Intolerances    aspirin (Stomach Upset)        LABS:                          13.2   6.15  )-----------( 206      ( 16 Mar 2023 06:56 )             39.2     03-16    136  |  102  |  28.2<H>  ----------------------------<  123<H>  3.9   |  24.0  |  1.66<H>    Ca    9.1      16 Mar 2023 06:56    TPro  6.1<L>  /  Alb  3.3  /  TBili  0.6  /  DBili  x   /  AST  15  /  ALT  10  /  AlkPhos  81  03-16      Urinalysis Basic - ( 14 Mar 2023 14:48 )    Color: Yellow / Appearance: Clear / S.010 / pH: x  Gluc: x / Ketone: Negative  / Bili: Negative / Urobili: Negative mg/dL   Blood: x / Protein: 30 mg/dL / Nitrite: Negative   Leuk Esterase: Negative / RBC: 0-2 /HPF / WBC 0-2 /HPF   Sq Epi: x / Non Sq Epi: x / Bacteria: x        RADIOLOGY & ADDITIONAL TESTS:

## 2023-03-17 NOTE — ED ADULT NURSE NOTE - DOES PATIENT HAVE ADVANCE DIRECTIVE
Physician Discharge Summary     Patient ID:  Humaira Olson  [id number]  70year old  1959    Admit date: 3/16/2023    Discharge date and time: 3/18/23    Admitting Physician: Samantha Zelaya MD     Discharge Physician:  Dr. Mariama Jin    Admission Diagnoses: Persistent atrial fibrillation (CMD) [I48.19]    Discharge Diagnoses:   â¢ PVCs  ? First noted on ECG 1/8/22Â   ? 24 hour Holter 5/13/22: PVC burden 7.05%, questionable NSVT  ? On Toprol for GDMT, increased for palpitationsÂ   ? Seen in consultation by Dr. Moon Andrews while inpatient 02/04/2023, Toprol changed to diltiazem, current dose 300 mg daily with marked suppression of PVCs  ? When seen in follow-up on 02/27 reported worsening of symptoms, dofetilide discontinued and started on Toprol  mg daily with goal toÂ uptitrate as able. ? 3/16/23: Admitted for dofetilide loading  â¢ High risk medication on dofetilide 500 mcg BID  ? Initiated 3/16/23   ? On tizanidine, 2 tablets at HS. Uses loperamide p.r.n.  ? ECG after 5th dose:   ? 3/18/23:   â¢ Nonischemic cardiomyopathy with HFrEF  ? Suspect to be arrhythmia mediated per Cardiology   ? GDMT: Marina Yoder  ? Cardiologist: Dr. Diana Mckeon  â¢ Minimal nonobstructive coronary arteryÂ disease  â¢ Sleep apnea per patient report, unable to tolerate CPAP  â¢ Cardiac Imaging/testing  ? Echo 2/4/23:Â EF 64%, grade 1 diastolic dysfunction, mild MVR, IVS 1.2, LVIW 1.0  ? Cardiac monitor 8/29/2022: Â Sinus rhythm average rate 70 beats per minute, 0% atrial fibrillation/flutter noted, 1% PACs, 5% PVCs. Â Five beat run of nonsustained VT. Â Thirty-one symptom episodes correlated with sinus rhythm with PVCs  ? Cardiac MRI 8/24/2022: Rajani Tabor depressed LV systolic function withÂ EF 44%, no evidence of myocardial fibrosis or edema. Â Trace MR  ? Cardiac cath 6/8/22: mid RCA to dist RCA lesion with 20% stenosis.    ? 24 hour Holter 5/13/22: SR, rates  bpm, avg HR 88 bpm. PAC burden 1.99% with short runs of SVT "longest 3 beats. PVC burden 7.05%, tracings listed as VT do not appear to be classic monomorphic VT  ? Echo 4/1/22: EF 35-40%, grade II diastolic dysfunction, IVS 1.2 cm, LVPW 1.0 cm, ELIJAH 21.8Â ml/mÂ²    Admission Condition: good    Discharged Condition: good    Indication for Admission:  Symptomatic PVCs, in the setting of low ejection fraction, admitted for dofetilide loading    Hospital Course:     Discharge Exam:  Visit Vitals  /75 (BP Location: LUE - Left upper extremity, Patient Position: Semi-Mendoza's)   Pulse 69   Temp 98 Â°F (36.7 Â°C) (Oral)   Resp 18   Ht 6' 3"" (1.905 m)   Wt 134.9 kg (297 lb 8 oz)   SpO2 96%   BMI 37.18 kg/mÂ²     General Appearance:    Alert, cooperative, no distress, appears stated age   Head:    Normocephalic, without obvious abnormality, atraumatic   Eyes:    Pupils eqiual, round, reactive to light, conjunctivae/corneas    clear, extraocular movements intact, both eyes    Ears:    Normal external ear both     ears   Neck:   Supple, symmetrical, trachea midline, no adenopathy;        thyroid:  No enlargement/tenderness/nodules; no carotid    bruit or jugular venous distention   Lungs:     Clear to auscultation bilaterally, respirations unlabored   Chest wall:    No tenderness or deformity   Heart:    Regular rate and rhythm, S1 and S2 normal, no murmur, rub   or gallop   Abdomen:     Soft, non-tender, bowel sounds active all four quadrants,     no masses, no organomegaly   Extremities:   Extremities normal, atraumatic, no cyanosis or edema   Pulses:   2+ and symmetric all extremities   Skin:   Skin color, texture, turgor normal, no rashes or lesions   Neurologic:   Cranial nerves II-XII intact. Normal strength, sensation and      reflexes throughout       Discharge rhythm  VS- normal sinus rhythm     GENERAL:  Cooperative, sitting comfortably, in no acute distress. HEENT:  No external ear or nasal discharge.   RESPIRATORY:  Muscles symmetrical, no use of accessory muscles with " breathing noted. Breath sounds clear. CARDIOVASCULAR:  Auscultation: Regular rate and rhythm, normal S1/S2, no murmur. No edema bilateral lower extremities. NEURO: Alert and oriented to person, place, and time. No obvious deficits. PSYCHIATRIC:  No anxiety, affect and mood appropriate. INTEGUMENTARY:  Warm and dry to touch. Disposition: Home    Patient Instructions:    Activity: activity as tolerated  Diet: resume prior diet  Wound Care: none needed    Follow-up with EP as scheduled No

## 2023-03-17 NOTE — PROGRESS NOTE ADULT - ASSESSMENT
81 y/o male with ongoing diarrhea for past 6 weeks , ct scan of abdomen with diffuse colitis. Stool PCR positive for Campylobacter. Also noted to have KENTRELL with underlying CKD    Assessment/Plan:    1. Colitis Secondary to Campylobacter  - Stop cipro and flagyl  - Azithromycin 500mg OD x 5 days   - cdiff unable to be done given stool not liquid    2. KENTRELL with CKD stage 2  - Baseline creatinine of ~1.5  - Improved  - Secondary to GI loss  _ Trend BMp    3. HTN- Essential   - monitor blood pressure   - amlodipine, coreg, clonidine    4. CAD  - aspirin plavix coreg and statin     5. Type 2 Diabetes  - a1c 7.6   - sliding scale  - monitor fingersticks    DVT Prophylaxis- heparin SC    Stable for discharge home

## 2023-03-17 NOTE — DISCHARGE NOTE NURSING/CASE MANAGEMENT/SOCIAL WORK - NSDCPEFALRISK_GEN_ALL_CORE
For information on Fall & Injury Prevention, visit: [email or web address] OR  [email or web address] OR  https://www.cdc.gov/steadi/patient.html

## 2023-03-17 NOTE — DISCHARGE NOTE NURSING/CASE MANAGEMENT/SOCIAL WORK - NSDCVIVACCINE_GEN_ALL_CORE_FT
Tdap; 30-Oct-2022 12:30; Geena Jackson (RN); [employer]; [id number]   (Exp. Date: 08-Jan-2024); IntraMuscular; Deltoid Left.; 0.5 milliLiter(s); VIS (VIS Published: 09-May-2013, VIS Presented: 30-Oct-2022);

## 2023-03-23 NOTE — ED PROVIDER NOTE - MDM ORDERS SUBMITTED SELECTION
[FreeTextEntry1] : 64 year old man with history of HTN HLD DM and FH of CAD (brother with MI at 57) who comes in to [facility] care. He states that he has been experiencing occasional left shoulder and chest pain. He had LHC in 2018 that minor luminal abnormalities in D1 otherwise normal coronaries.\par \par Had CT Cor in January with positive FFR of diag lesion\par Had cardiac cath March 2023  (report not available but discussed with interventionalist) that showed nonobstructive CAD, no intervention performed\par Here for followup \par EKG normal\par #CAD- Patient does have CAD, would continue ASA and statin therapy\par #HTN- continue Amlodipine/Benazepril BP at goal\par #HLD- continue statin therapy Labs/EKG/Medications

## 2023-04-10 ENCOUNTER — APPOINTMENT (OUTPATIENT)
Dept: INTERNAL MEDICINE | Facility: CLINIC | Age: 83
End: 2023-04-10
Payer: MEDICARE

## 2023-04-10 VITALS
WEIGHT: 155 LBS | SYSTOLIC BLOOD PRESSURE: 146 MMHG | RESPIRATION RATE: 15 BRPM | BODY MASS INDEX: 27.46 KG/M2 | DIASTOLIC BLOOD PRESSURE: 81 MMHG | OXYGEN SATURATION: 98 % | HEIGHT: 63 IN | TEMPERATURE: 97.5 F | HEART RATE: 65 BPM

## 2023-04-10 LAB — HBA1C MFR BLD HPLC: 8.1

## 2023-04-10 PROCEDURE — 36415 COLL VENOUS BLD VENIPUNCTURE: CPT

## 2023-04-10 PROCEDURE — 83036 HEMOGLOBIN GLYCOSYLATED A1C: CPT | Mod: QW

## 2023-04-10 PROCEDURE — 99214 OFFICE O/P EST MOD 30 MIN: CPT | Mod: 25

## 2023-04-10 NOTE — HISTORY OF PRESENT ILLNESS
[FreeTextEntry1] : FU HTN, T2DM, HLD, ANEMIA, CKD. \par DIARRHEA S/P DISCHARGE \par  [de-identified] : Shawn is a 81 yo M w PMHx CAD s/p PCI, ILR placed 5/20 for SVT, T2DM, HTN, HLD and PAD and CKD stage IV here for follow up\par Patient was admitted to [facility] on 3/14 for colitis 2/2 campylobacter, treated with cipro/flagyl x4 days and then treated with azithromycin for 3 days (daughter states they only received 3 days, hospital dishcarge says 5 days)  \par Diarrhea had improved but have started once again. Daughter states it was never solid but for the past 2-3 days stools are more liquid. Denies blood in the stool. Denies cramping. Having 3 episodes in a day. \par

## 2023-04-10 NOTE — ASSESSMENT
[FreeTextEntry1] : Shawn is a 83 yo M w PMHx CAD s/p PCI, ILR placed 5/20 for SVT, T2DM, HTN, HLD and PAD and CKD stage IV here for follow up\par Patient was admitted to [facility] on 3/14 for colitis 2/2 campylobacter, treated with cipro/flagyl x4 days and then treated with azithromycin for 3 days (daughter states they only received 3 days, hospital dishcarge says 5 days)  \par Diarrhea had improved but have started once again. Daughter states it was never solid but for the past 2-3 days stools are more liquid. Denies blood in the stool. Denies cramping. Having 3 episodes in a day. \par \par Colitis \par Will treat with 5 days of azithromycin \par If fails to improve rto \par \par #CAD s/p PCI\par #SVT s/p ILR \par - Pt sees Cardio Dr. Johanny Breaux \par - Continue ASA and Plavix \par \par \par #T2DM \par - Pt sees Endo Dr. Perlman\par - Pt sees optho Dr. Ferreira (optho)- last diabetic eye exam 6 months ago, has appt for f/u coming up \par - A1c 8.1, worsening glycemic control \par - Discussed dietary modifications such as low cab diet, pt admits to eating plantains every day, discussed risks of worsening diabetes \par - Continue home Farxiga and Tradjenta, add glipizide 2.5 mg qd \par Discussed diabetic diet. Carb allowance of around 130-140 g carbs daily. \par Educated about foot hygiene and need to see opthalmology and podiatry yearly.\par \par #HTN\par - better control, just took bp meds  \par - Discussed low salt diet \par - Clonidine .2 mg TID mg, cont amlodipine 10 mg qd, carvedilol 25 mg bid \par \par Iron def anemia - improved \par Cont iron \par Fu cbc \par No signs of symptoms of bleeding \par \par #HLD\par - Continue home Rosuvastatin\par - Discussed diet and exercise \par Educated eating whole grain foods rich in soluble fiber such as oats and Omega 3 rich fish such as salmon, tout, sardines and bean based meals such as kidney beans, chickpeas and lentils. Small protions of nuts. Limit sugars and alcohol.\par \par #PAD s/p recent angiogram\par - Follows vascular \par - Continue ASA and Plavix \par - Xarelto added \par \par #CKD stage IV\par - Will check CMP \par - Following with nephro \par \par #History of prostate cancer s/p radiation therapy (2015)\par - Pt sees Urology Dr. Daniel and Oncologist Dr. Mcintyre \par - Has PSA checked with urology \par \par rto 3 mo sooner if needed \par \par

## 2023-04-12 LAB
ALBUMIN SERPL ELPH-MCNC: 3.9 G/DL
ALP BLD-CCNC: 105 U/L
ALT SERPL-CCNC: 15 U/L
ANION GAP SERPL CALC-SCNC: 12 MMOL/L
AST SERPL-CCNC: 18 U/L
BASOPHILS # BLD AUTO: 0.06 K/UL
BASOPHILS NFR BLD AUTO: 1 %
BILIRUB SERPL-MCNC: 0.4 MG/DL
BUN SERPL-MCNC: 26 MG/DL
CALCIUM SERPL-MCNC: 9.6 MG/DL
CHLORIDE SERPL-SCNC: 101 MMOL/L
CHOLEST SERPL-MCNC: 159 MG/DL
CO2 SERPL-SCNC: 24 MMOL/L
CREAT SERPL-MCNC: 2 MG/DL
EGFR: 32 ML/MIN/1.73M2
EOSINOPHIL # BLD AUTO: 0.34 K/UL
EOSINOPHIL NFR BLD AUTO: 5.6 %
ESTIMATED AVERAGE GLUCOSE: 177 MG/DL
GLUCOSE SERPL-MCNC: 170 MG/DL
HBA1C MFR BLD HPLC: 7.8 %
HCT VFR BLD CALC: 41.2 %
HDLC SERPL-MCNC: 57 MG/DL
HGB BLD-MCNC: 13.4 G/DL
IMM GRANULOCYTES NFR BLD AUTO: 0.2 %
LDLC SERPL CALC-MCNC: 70 MG/DL
LYMPHOCYTES # BLD AUTO: 2.17 K/UL
LYMPHOCYTES NFR BLD AUTO: 35.9 %
MAN DIFF?: NORMAL
MCHC RBC-ENTMCNC: 25.6 PG
MCHC RBC-ENTMCNC: 32.5 GM/DL
MCV RBC AUTO: 78.8 FL
MONOCYTES # BLD AUTO: 0.63 K/UL
MONOCYTES NFR BLD AUTO: 10.4 %
NEUTROPHILS # BLD AUTO: 2.83 K/UL
NEUTROPHILS NFR BLD AUTO: 46.9 %
NONHDLC SERPL-MCNC: 102 MG/DL
PLATELET # BLD AUTO: 197 K/UL
POTASSIUM SERPL-SCNC: 5 MMOL/L
PROT SERPL-MCNC: 6.5 G/DL
RBC # BLD: 5.23 M/UL
RBC # FLD: 18 %
SODIUM SERPL-SCNC: 138 MMOL/L
TRIGL SERPL-MCNC: 160 MG/DL
WBC # FLD AUTO: 6.04 K/UL

## 2023-04-14 ENCOUNTER — NON-APPOINTMENT (OUTPATIENT)
Age: 83
End: 2023-04-14

## 2023-04-17 NOTE — ED POST DISCHARGE NOTE - ADDITIONAL DOCUMENTATION
Patient's daughter (health proxy) contacted me due to concern of gradual worsening renal function and chronic diarrhea symptoms. No other urgent symptoms. Discussed importance of close f/u with MD and GI. Contacted Ms. Parks for assistance for GI appointment. Patient has scheduled urology appt in AM.

## 2023-04-18 ENCOUNTER — APPOINTMENT (OUTPATIENT)
Dept: CARDIOLOGY | Facility: CLINIC | Age: 83
End: 2023-04-18
Payer: MEDICARE

## 2023-04-18 ENCOUNTER — NON-APPOINTMENT (OUTPATIENT)
Age: 83
End: 2023-04-18

## 2023-04-18 VITALS
HEIGHT: 63 IN | TEMPERATURE: 98.1 F | BODY MASS INDEX: 27.46 KG/M2 | SYSTOLIC BLOOD PRESSURE: 122 MMHG | WEIGHT: 155 LBS | HEART RATE: 58 BPM | DIASTOLIC BLOOD PRESSURE: 76 MMHG | OXYGEN SATURATION: 99 %

## 2023-04-18 PROBLEM — N18.9 CHRONIC KIDNEY DISEASE, UNSPECIFIED: Chronic | Status: ACTIVE | Noted: 2023-03-14

## 2023-04-18 PROCEDURE — 93000 ELECTROCARDIOGRAM COMPLETE: CPT

## 2023-04-18 PROCEDURE — 99214 OFFICE O/P EST MOD 30 MIN: CPT

## 2023-04-18 RX ORDER — AMMONIUM LACTATE 5 %
5 LOTION (GRAM) TOPICAL TWICE DAILY
Qty: 1 | Refills: 3 | Status: DISCONTINUED | COMMUNITY
Start: 2022-10-20 | End: 2023-04-18

## 2023-04-18 RX ORDER — AZITHROMYCIN 250 MG/1
250 TABLET, FILM COATED ORAL
Qty: 14 | Refills: 0 | Status: DISCONTINUED | COMMUNITY
Start: 2023-04-10 | End: 2023-04-18

## 2023-04-18 RX ORDER — ESOMEPRAZOLE MAGNESIUM 20 MG
4 CAPSULE,DELAYED RELEASE (ENTERIC COATED) ORAL
Qty: 5 | Refills: 0 | Status: DISCONTINUED | COMMUNITY
Start: 2022-04-13 | End: 2023-04-18

## 2023-04-19 ENCOUNTER — RX RENEWAL (OUTPATIENT)
Age: 83
End: 2023-04-19

## 2023-04-19 DIAGNOSIS — Z85.46 PERSONAL HISTORY OF MALIGNANT NEOPLASM OF PROSTATE: ICD-10-CM

## 2023-05-01 ENCOUNTER — EMERGENCY (EMERGENCY)
Facility: HOSPITAL | Age: 83
LOS: 1 days | Discharge: HOME | End: 2023-05-01
Attending: EMERGENCY MEDICINE
Payer: MEDICARE

## 2023-05-01 VITALS
WEIGHT: 160.06 LBS | HEIGHT: 63 IN | TEMPERATURE: 98 F | OXYGEN SATURATION: 98 % | HEART RATE: 64 BPM | RESPIRATION RATE: 16 BRPM | SYSTOLIC BLOOD PRESSURE: 148 MMHG | DIASTOLIC BLOOD PRESSURE: 83 MMHG

## 2023-05-01 VITALS
DIASTOLIC BLOOD PRESSURE: 77 MMHG | SYSTOLIC BLOOD PRESSURE: 132 MMHG | HEART RATE: 72 BPM | RESPIRATION RATE: 16 BRPM | OXYGEN SATURATION: 99 %

## 2023-05-01 DIAGNOSIS — K40.90 UNILATERAL INGUINAL HERNIA, WITHOUT OBSTRUCTION OR GANGRENE, NOT SPECIFIED AS RECURRENT: Chronic | ICD-10-CM

## 2023-05-01 DIAGNOSIS — Z95.5 PRESENCE OF CORONARY ANGIOPLASTY IMPLANT AND GRAFT: Chronic | ICD-10-CM

## 2023-05-01 DIAGNOSIS — Z98.890 OTHER SPECIFIED POSTPROCEDURAL STATES: Chronic | ICD-10-CM

## 2023-05-01 LAB
ALBUMIN SERPL ELPH-MCNC: 3.9 G/DL — SIGNIFICANT CHANGE UP (ref 3.3–5.2)
ALP SERPL-CCNC: 123 U/L — HIGH (ref 40–120)
ALT FLD-CCNC: 22 U/L — SIGNIFICANT CHANGE UP
ANION GAP SERPL CALC-SCNC: 10 MMOL/L — SIGNIFICANT CHANGE UP (ref 5–17)
ANION GAP SERPL CALC-SCNC: 9 MMOL/L — SIGNIFICANT CHANGE UP (ref 5–17)
AST SERPL-CCNC: 26 U/L — SIGNIFICANT CHANGE UP
BASOPHILS # BLD AUTO: 0.03 K/UL — SIGNIFICANT CHANGE UP (ref 0–0.2)
BASOPHILS NFR BLD AUTO: 0.5 % — SIGNIFICANT CHANGE UP (ref 0–2)
BILIRUB SERPL-MCNC: 0.8 MG/DL — SIGNIFICANT CHANGE UP (ref 0.4–2)
BUN SERPL-MCNC: 21.6 MG/DL — HIGH (ref 8–20)
BUN SERPL-MCNC: 22.5 MG/DL — HIGH (ref 8–20)
CALCIUM SERPL-MCNC: 8.7 MG/DL — SIGNIFICANT CHANGE UP (ref 8.4–10.5)
CALCIUM SERPL-MCNC: 9.4 MG/DL — SIGNIFICANT CHANGE UP (ref 8.4–10.5)
CHLORIDE SERPL-SCNC: 102 MMOL/L — SIGNIFICANT CHANGE UP (ref 96–108)
CHLORIDE SERPL-SCNC: 97 MMOL/L — SIGNIFICANT CHANGE UP (ref 96–108)
CO2 SERPL-SCNC: 25 MMOL/L — SIGNIFICANT CHANGE UP (ref 22–29)
CO2 SERPL-SCNC: 27 MMOL/L — SIGNIFICANT CHANGE UP (ref 22–29)
CREAT SERPL-MCNC: 1.84 MG/DL — HIGH (ref 0.5–1.3)
CREAT SERPL-MCNC: 1.94 MG/DL — HIGH (ref 0.5–1.3)
EGFR: 34 ML/MIN/1.73M2 — LOW
EGFR: 36 ML/MIN/1.73M2 — LOW
EOSINOPHIL # BLD AUTO: 0.26 K/UL — SIGNIFICANT CHANGE UP (ref 0–0.5)
EOSINOPHIL NFR BLD AUTO: 4.3 % — SIGNIFICANT CHANGE UP (ref 0–6)
GI PCR PANEL: SIGNIFICANT CHANGE UP
GLUCOSE SERPL-MCNC: 112 MG/DL — HIGH (ref 70–99)
GLUCOSE SERPL-MCNC: 95 MG/DL — SIGNIFICANT CHANGE UP (ref 70–99)
HCT VFR BLD CALC: 42.1 % — SIGNIFICANT CHANGE UP (ref 39–50)
HGB BLD-MCNC: 14.1 G/DL — SIGNIFICANT CHANGE UP (ref 13–17)
IMM GRANULOCYTES NFR BLD AUTO: 0.2 % — SIGNIFICANT CHANGE UP (ref 0–0.9)
LYMPHOCYTES # BLD AUTO: 2.4 K/UL — SIGNIFICANT CHANGE UP (ref 1–3.3)
LYMPHOCYTES # BLD AUTO: 39.5 % — SIGNIFICANT CHANGE UP (ref 13–44)
MAGNESIUM SERPL-MCNC: 2.9 MG/DL — HIGH (ref 1.6–2.6)
MCHC RBC-ENTMCNC: 26 PG — LOW (ref 27–34)
MCHC RBC-ENTMCNC: 33.5 GM/DL — SIGNIFICANT CHANGE UP (ref 32–36)
MCV RBC AUTO: 77.5 FL — LOW (ref 80–100)
MONOCYTES # BLD AUTO: 0.74 K/UL — SIGNIFICANT CHANGE UP (ref 0–0.9)
MONOCYTES NFR BLD AUTO: 12.2 % — SIGNIFICANT CHANGE UP (ref 2–14)
NEUTROPHILS # BLD AUTO: 2.64 K/UL — SIGNIFICANT CHANGE UP (ref 1.8–7.4)
NEUTROPHILS NFR BLD AUTO: 43.3 % — SIGNIFICANT CHANGE UP (ref 43–77)
PLATELET # BLD AUTO: 208 K/UL — SIGNIFICANT CHANGE UP (ref 150–400)
POTASSIUM SERPL-MCNC: 4.7 MMOL/L — SIGNIFICANT CHANGE UP (ref 3.5–5.3)
POTASSIUM SERPL-MCNC: 5.4 MMOL/L — HIGH (ref 3.5–5.3)
POTASSIUM SERPL-SCNC: 4.7 MMOL/L — SIGNIFICANT CHANGE UP (ref 3.5–5.3)
POTASSIUM SERPL-SCNC: 5.4 MMOL/L — HIGH (ref 3.5–5.3)
PROT SERPL-MCNC: 7.2 G/DL — SIGNIFICANT CHANGE UP (ref 6.6–8.7)
RBC # BLD: 5.43 M/UL — SIGNIFICANT CHANGE UP (ref 4.2–5.8)
RBC # FLD: 17.2 % — HIGH (ref 10.3–14.5)
SODIUM SERPL-SCNC: 134 MMOL/L — LOW (ref 135–145)
SODIUM SERPL-SCNC: 136 MMOL/L — SIGNIFICANT CHANGE UP (ref 135–145)
WBC # BLD: 6.08 K/UL — SIGNIFICANT CHANGE UP (ref 3.8–10.5)
WBC # FLD AUTO: 6.08 K/UL — SIGNIFICANT CHANGE UP (ref 3.8–10.5)

## 2023-05-01 PROCEDURE — 85025 COMPLETE CBC W/AUTO DIFF WBC: CPT

## 2023-05-01 PROCEDURE — 99283 EMERGENCY DEPT VISIT LOW MDM: CPT | Mod: 25

## 2023-05-01 PROCEDURE — 80048 BASIC METABOLIC PNL TOTAL CA: CPT

## 2023-05-01 PROCEDURE — 83735 ASSAY OF MAGNESIUM: CPT

## 2023-05-01 PROCEDURE — 87507 IADNA-DNA/RNA PROBE TQ 12-25: CPT

## 2023-05-01 PROCEDURE — 99284 EMERGENCY DEPT VISIT MOD MDM: CPT

## 2023-05-01 PROCEDURE — 80053 COMPREHEN METABOLIC PANEL: CPT

## 2023-05-01 PROCEDURE — 96360 HYDRATION IV INFUSION INIT: CPT

## 2023-05-01 PROCEDURE — 36415 COLL VENOUS BLD VENIPUNCTURE: CPT

## 2023-05-01 RX ORDER — SODIUM CHLORIDE 9 MG/ML
1000 INJECTION INTRAMUSCULAR; INTRAVENOUS; SUBCUTANEOUS ONCE
Refills: 0 | Status: COMPLETED | OUTPATIENT
Start: 2023-05-01 | End: 2023-05-01

## 2023-05-01 RX ORDER — SODIUM ZIRCONIUM CYCLOSILICATE 10 G/10G
5 POWDER, FOR SUSPENSION ORAL ONCE
Refills: 0 | Status: COMPLETED | OUTPATIENT
Start: 2023-05-01 | End: 2023-05-01

## 2023-05-01 RX ADMIN — SODIUM CHLORIDE 1000 MILLILITER(S): 9 INJECTION INTRAMUSCULAR; INTRAVENOUS; SUBCUTANEOUS at 14:02

## 2023-05-01 RX ADMIN — SODIUM ZIRCONIUM CYCLOSILICATE 5 GRAM(S): 10 POWDER, FOR SUSPENSION ORAL at 16:52

## 2023-05-01 NOTE — ED PROVIDER NOTE - NSFOLLOWUPINSTRUCTIONS_ED_ALL_ED_FT
1. Return to ED for worsening, progressive or any other concerning symptoms   2. Follow up with your primary care doctor in 2-3days   3. Follow up with gastroenterology and renal   4. Avoid foods high in potassium like bananas, tomatoes, kale     Diarrhea    Diarrhea is frequent loose or watery bowel movements that has many causes. Diarrhea can make you feel weak and cause you to become dehydrated. Diarrhea typically lasts 2–3 days, but can last longer if it is a sign of something more serious. Drink clear fluids to prevent dehydration. Eat bland, easy-to-digest foods as tolerated.     SEEK IMMEDIATE MEDICAL CARE IF YOU HAVE ANY OF THE FOLLOWING SYMPTOMS: high fevers, lightheadedness/dizziness, chest pain, black or bloody stools, shortness of breath, severe abdominal or back pain, or any signs of dehydration.

## 2023-05-01 NOTE — ED ADULT NURSE NOTE - NSIMPLEMENTINTERV_GEN_ALL_ED
Implemented All Fall Risk Interventions:  [place] to call system. Call bell, personal items and telephone within reach. Instruct patient to call for assistance. Room bathroom lighting operational. Non-slip footwear when patient is off stretcher. Physically safe environment: no spills, clutter or unnecessary equipment. Stretcher in lowest position, wheels locked, appropriate side rails in place. Provide visual cue, wrist band, yellow gown, etc. Monitor gait and stability. Monitor for mental status changes and reorient to person, place, and time. Review medications for side effects contributing to fall risk. Reinforce activity limits and safety measures with patient and family.

## 2023-05-01 NOTE — ED PROVIDER NOTE - CARE PROVIDERS DIRECT ADDRESSES
[email or web address]

## 2023-05-01 NOTE — ED PROVIDER NOTE - CLINICAL SUMMARY MEDICAL DECISION MAKING FREE TEXT BOX
82 year old male presenting with acutely worsening diarrhea - described as 4 episodes of watery diarrhea since last night with episode of feeling as if he was going to pass out today. Concern for but not limited to - dehydration, c.diff, KENTRELL, electrolyte abnormalities. Plan - C.diff isolation precautions, labs, IV hydration, reassess.

## 2023-05-01 NOTE — ED PROVIDER NOTE - OBJECTIVE STATEMENT
82 year old male with PMHx HTN, HLD, DM, CAD s/p stents 2018, prostate CA in remission s/p radiation 2015, s/p loop recorder presenting for evaluation of worsening diarrhea. Hx mostly from daughter at bedside. She states patient with diarrhea since January 2023 described as ~3 episodes of loose/soft stools per day. Admitted here 3/14/23-3/17/23 for colitis with KENTRELL on CKD, found to be positive for Campylobacter, d/c with 5 days Azithromycin PO. On 4/10/23 was give Azithromycin PO x 6 days. Reports during this time, despite treatment, there was no change in diarrhea. States since last night has had acute worsening of diarrhea described as 4 brown watery nonbloody stools. Also notes episode of dizziness today described as feeling as if he was going to pass out and general weakness that improved with sitting and resting. Denies nausea, vomiting, abdominal pain, fever, chills, chest pain, difficulty breathing. Patient and daughter concerned for pt's kidney function given hx of CKD and previous admission.

## 2023-05-01 NOTE — ED PROVIDER NOTE - NS ED ROS FT
Gen: No fever, no change in activity level  ENT: No congestion, no rhinorrhea  Resp: No cough, no trouble breathing  Cardiovascular: No chest pain, no palpitation  Gastrointestinal: No nausea, no vomiting, (+) diarrhea  :  No change in urine output; no dysuria, no hematuria  MS: No joint or muscle pain  Skin: No rashes  Neuro: No headache; no abnormal movements  Remainder negative, except as per the HPI

## 2023-05-01 NOTE — ED ADULT TRIAGE NOTE - CHIEF COMPLAINT QUOTE
Patient ambulated into ED with steady gait, Pt c/o diarrhea x 4 months, and almost passed out today, has had increase in episodes

## 2023-05-01 NOTE — ED PROVIDER NOTE - CARE PROVIDER_API CALL
Charlotte Fountain)  Internal Medicine; Nephrology  [address] [place] [address]  Phone: [phone]  Fax: [phone]  Follow Up Time: 4-6 Days    Thania Epperson (DO)  Gastroenterology  [address], [address]  [place] [address]  Phone: [phone]  Fax: [phone]  Follow Up Time: 4-6 Days

## 2023-05-01 NOTE — ED PROVIDER NOTE - ATTENDING CONTRIBUTION TO CARE
I, Teodora Scott, have personally seen and examined this patient. I have fully participated in the care of this patient. I have reviewed all pertinent clinical information, including history, physical exam, plan and the Resident's note and agree except as noted below.     Pt with CKD, chronic diarrhea, had been on axithro x2 recently with rutherford ein stool consistency and weakness with decresed PO intake so monet tot ED for eval. mild KENTRELL. improved with hydraiton. hyperK resolved with hydration. lokelma given. outpatient nephrology follow up. patient with GI Follow up scheduled. aware of pending GI PCR. C diff canceled as specimen too formed. I, Teodora Scott, have personally seen and examined this patient. I have fully participated in the care of this patient. I have reviewed all pertinent clinical information, including history, physical exam, plan and the Resident's note and agree except as noted below.     Pt with CKD, chronic diarrhea, had been on axithro x2 recently with rutherford ein stool consistency and weakness with decresed PO intake so monet tot ED for eval.   PE- NAD, abd soft and non tender   mild KENTRELL. improved with hydraiton. hyperK resolved with hydration. lokelma given. outpatient nephrology follow up. patient with GI Follow up scheduled. aware of pending GI PCR. C diff canceled as specimen too formed.

## 2023-05-01 NOTE — ED PROVIDER NOTE - PHYSICAL EXAMINATION
Gen: well appearing, no acute distress  Head: normocephalic, atraumatic  EENT: EOMI, PERRLA, moist mucous membranes, no scleral icterus, no JVD  Lung: no increased work of breathing, clear to auscultation bilaterally, no wheezing, rales, rhonchi, speaking in full sentences  CV: regular rate, regular rhythm, normal s1/s2, 2+ radial pulses bilaterally  Abd: soft, non-tender, non-distended, no rebound tenderness or guarding; no CVA tenderness  MSK: No edema, no visible deformities, full range of motion in all 4 extremities  Neuro: Awake, alert, no focal neurologic deficits  Skin: No obvious rash, no jaundice  Psych: normal affect, normal speech

## 2023-05-01 NOTE — ED PROVIDER NOTE - PROVIDER TOKENS
PROVIDER:[TOKEN:[[id number]],FOLLOWUP:[4-6 Days]],PROVIDER:[TOKEN:[[id number]],FOLLOWUP:[4-6 Days]]

## 2023-05-01 NOTE — ED STATDOCS - PROGRESS NOTE DETAILS
Tracey HUTSON for ED attending, Dr. Chase: 81 y/o male with PMHx of CAD, prostate cancer, CKD, DM, HLD, HTN, MI, PVD and  presents to ED c/o diarrhea since January. Pt was given antibiotics (completed) in March for this problem and has not relieved symptoms. Pt had multiple episodes of diarrhea to the point of feeling like he is going to pass out. Amount of episodes varies by day. No pain associated, notes some abdominal discomfort. Stool is now watery, unlike previous episodes. No fevers.   Interpreted by daughter- Creole. Focused eval, protocol orders entered. Pt to be moved to main ED for further evaluation by another provider. Tracey HUTSON for ED attending, Dr. Chase: 83 y/o male with PMHx of CAD, prostate cancer, CKD, DM, HLD, HTN, MI, PVD and  presents to ED c/o diarrhea since January. Pt was given antibiotics (completed) in March for this problem and has not relieved symptoms. Pt had multiple episodes of diarrhea to the point of feeling like he is going to pass out. Amount of episodes varies by day. No pain associated, notes some abdominal discomfort. Stool is now watery, unlike previous episodes. No fevers.   Interpreted by daughter- Creole. Pt to be moved to main ED for further evaluation by another provider.

## 2023-05-01 NOTE — ED PROVIDER NOTE - PATIENT PORTAL LINK FT
You can access the FollowMyHealth Patient Portal offered by [facility] by registering at the following website: [email or web address]. By joining [employer]’s FollowMyHealth portal, you will also be able to view your health information using other applications (apps) compatible with our system.

## 2023-05-02 ENCOUNTER — APPOINTMENT (OUTPATIENT)
Dept: NEPHROLOGY | Facility: CLINIC | Age: 83
End: 2023-05-02
Payer: MEDICARE

## 2023-05-02 VITALS
BODY MASS INDEX: 27.46 KG/M2 | SYSTOLIC BLOOD PRESSURE: 152 MMHG | OXYGEN SATURATION: 97 % | WEIGHT: 155 LBS | HEART RATE: 57 BPM | DIASTOLIC BLOOD PRESSURE: 84 MMHG | HEIGHT: 63 IN

## 2023-05-02 PROCEDURE — 99214 OFFICE O/P EST MOD 30 MIN: CPT

## 2023-05-02 RX ORDER — FERROUS GLUCONATE 324(37.5)
324 (37.5 FE) TABLET ORAL
Qty: 90 | Refills: 0 | Status: DISCONTINUED | COMMUNITY
Start: 2022-10-16 | End: 2023-05-02

## 2023-05-03 ENCOUNTER — NON-APPOINTMENT (OUTPATIENT)
Age: 83
End: 2023-05-03

## 2023-05-03 ENCOUNTER — APPOINTMENT (OUTPATIENT)
Dept: GASTROENTEROLOGY | Facility: CLINIC | Age: 83
End: 2023-05-03
Payer: MEDICARE

## 2023-05-03 VITALS
DIASTOLIC BLOOD PRESSURE: 90 MMHG | SYSTOLIC BLOOD PRESSURE: 150 MMHG | HEART RATE: 60 BPM | WEIGHT: 155 LBS | BODY MASS INDEX: 27.46 KG/M2 | RESPIRATION RATE: 16 BRPM | HEIGHT: 63 IN | OXYGEN SATURATION: 98 % | TEMPERATURE: 98.1 F

## 2023-05-03 DIAGNOSIS — F41.9 ANXIETY DISORDER, UNSPECIFIED: ICD-10-CM

## 2023-05-03 DIAGNOSIS — R19.7 DIARRHEA, UNSPECIFIED: ICD-10-CM

## 2023-05-03 PROCEDURE — 99214 OFFICE O/P EST MOD 30 MIN: CPT

## 2023-05-03 RX ORDER — ALPRAZOLAM 0.25 MG/1
0.25 TABLET ORAL
Qty: 9 | Refills: 0 | Status: DISCONTINUED | COMMUNITY
Start: 2023-02-06 | End: 2023-05-03

## 2023-05-04 NOTE — HISTORY OF PRESENT ILLNESS
[FreeTextEntry1] : 82-year-old white male with history of hypertension hyperlipidemia peripheral artery disease claudication type 2 diabetes coronary artery disease with stents requiring placement since 5/20.  Stage IV chronic renal insufficiency.  Chronically maintained on aspirin and Xarelto.  Recently seen on 1/23/2023 at [facility] ED for complaints of diarrhea there is a remote history of iron deficiency anemia.  Last colonoscopy was done 8/19 by me.  At the ascending colon tubular adenoma was removed.  Polyp was small and completely excised.  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  \par \par Prior presentation to ED with diarrhea multiple stool studies were performed.  Stool for C. difficile was negative for parasites negative and C/S of the stool was initially negative.  GI PCR detected Campylobacter jejuni.  Hemoglobin was 13.  CAT scan had shown universal colitis, also showed wall thickening of the colon diffusely.  Treated with IV hydration and IV Zithromax and PO Zithromax was completed on an outpatient basis.\par Due to complaints of persistent diarrhea a second course of oral Zithromax was prescribed by PCP recently.  Patient has completed all oral antibiotics 2 weeks ago.  Daughter states that diarrhea recurs episodically for the past 2 months, mostly after meals.  Borborygmi persist.

## 2023-05-04 NOTE — ED ADULT TRIAGE NOTE - WEIGHT IN KG
Patient has been erroneously marked as diabetic. Based on the available clinical information, she does not have diabetes and should therefore be excluded from diabetic health maintenance and quality measures for the remainder of the reporting period.     70.8

## 2023-05-04 NOTE — REASON FOR VISIT
[Post Hospitalization] : a post hospitalization visit [FreeTextEntry1] : Personal history of colon polyps.  Recent development of colitis lasting for 2 months.  Hospitalized and treated for Campylobacter infection

## 2023-05-04 NOTE — ASSESSMENT
[FreeTextEntry1] : Persistent diarrhea after 2 courses of Zithromax for Campylobacter jejuni acute infectious colitis.\par Seems well-hydrated.  Recent blood work shows some improvement in chronic renal insufficiency.  No leukocytosis or anemia.\par Plan trial of alternate antibiotic, Cipro 500 mg p.o. twice daily x1 week.  Refill x1 \par Follow-up in 2 weeks with update of clinical status.  Low residue diet advised.

## 2023-05-04 NOTE — PHYSICAL EXAM
[Alert] : alert [Normal Voice/Communication] : normal voice/communication [Healthy Appearing] : healthy appearing [No Acute Distress] : no acute distress [Sclera] : the sclera and conjunctiva were normal [Hearing Threshold Finger Rub Not [place]] : hearing was normal [Normal Lips/Gums] : the lips and gums were normal [Oropharynx] : the oropharynx was normal [Normal Appearance] : the appearance of the neck was normal [No Neck Mass] : no neck mass was observed [No Respiratory Distress] : no respiratory distress [No Acc Muscle Use] : no accessory muscle use [Respiration, Rhythm And Depth] : normal respiratory rhythm and effort [Auscultation Breath Sounds / Voice Sounds] : lungs were clear to auscultation bilaterally [Heart Rate And Rhythm] : heart rate was normal and rhythm regular [Normal S1, S2] : normal S1 and S2 [Murmurs] : no murmurs [Bowel Sounds] : normal bowel sounds [Abdomen Tenderness] : non-tender [No Masses] : no abdominal mass palpated [Abdomen Soft] : soft [] : no hepatosplenomegaly [Oriented To Time, Place, And Person] : oriented to person, place, and time [Normal Sphincter Tone] : normal sphincter tone [No External Hemorrhoid] : no external hemorrhoids [No Rectal Mass] : no rectal mass [Occult Blood] : positive occult blood [de-identified] : No cyanosis clubbing or edema. [de-identified] : Slightly obese.  Bowel sounds present.  No organomegaly.  No mass tenderness or rebound.  No adenopathy. [de-identified] : No lesions.  No BPH.  Flecks of brown stool mixed with superficial blood.

## 2023-05-05 ENCOUNTER — APPOINTMENT (OUTPATIENT)
Dept: CARDIOLOGY | Facility: CLINIC | Age: 83
End: 2023-05-05
Payer: MEDICARE

## 2023-05-05 PROCEDURE — 93923 UPR/LXTR ART STDY 3+ LVLS: CPT

## 2023-05-05 PROCEDURE — 93925 LOWER EXTREMITY STUDY: CPT

## 2023-05-06 ENCOUNTER — RX RENEWAL (OUTPATIENT)
Age: 83
End: 2023-05-06

## 2023-05-08 ENCOUNTER — NON-APPOINTMENT (OUTPATIENT)
Age: 83
End: 2023-05-08

## 2023-05-08 NOTE — HISTORY OF PRESENT ILLNESS
[FreeTextEntry1] : 82 year old male with HTN, HLD, DM, prostate Ca, CAD here for F/u visit for Chronic kidney disease stage 3.\par \par Patient reports diarrhea for last 2 months prompting 2 ER visits\par Denies any cardiac or urinary complaints\par No dysuria, gross hematuria, SOB, LUTS.\par Reports BP has been well controlled. \par \par

## 2023-05-08 NOTE — ASSESSMENT
[FreeTextEntry1] : Chronic Kidney Disease Stage IIIB \par \par ETIO\par likely multifactorial 2/2 diabetic nephropathy, hypertensive nephrosclerosis, aging nephrosclerosis and renal atherosclerotic disease.\par \par DATA\par SCr:  Last Scr worsened to 2 w/ a drop in eGFR to 32 likely associated w/ diarrhea\par Proteinuria: 30 proteinuria, UACR 1032 mg/g\par Renal US: NA\par \par GOALS\par Discussed goal HTN < 140/90, LDL <100, A1C<7.\par For HTN: controlled on current medications\par For hyperlipidemia: controlled on current medications, LDL 88\par For DM: failry controlled on current medications, last A1c 7.8\par \par COMPLICATIONS OF CKD:\par BMD w/ CKD: high PTH w/ normal vitamin D, ? secondary hyperparathyroidism. Will repeat and if remained high will start on calcitriol.  \par Anemia w/ CKD: none\par Edema: none\par Hyperuricemia: none\par \par RECOMMENDATIONS: \par 1. To continue on current dose of amlodipine, coreg, clonidine. \par 2. To continue on current dose of rosuvastatin\par 3. To continue on stricter glycemic control w. help of pCP/endocrinologist. On farxiga. \par 4. Repeat labs after diarrhea improves. Patient have a appointment w/ GI tomorrow\par \par \par He should follow up with me in 3 months..\par \par

## 2023-05-10 ENCOUNTER — APPOINTMENT (OUTPATIENT)
Dept: ELECTROPHYSIOLOGY | Facility: CLINIC | Age: 83
End: 2023-05-10
Payer: MEDICARE

## 2023-05-10 ENCOUNTER — NON-APPOINTMENT (OUTPATIENT)
Age: 83
End: 2023-05-10

## 2023-05-10 VITALS
SYSTOLIC BLOOD PRESSURE: 138 MMHG | HEIGHT: 63 IN | BODY MASS INDEX: 26.93 KG/M2 | OXYGEN SATURATION: 97 % | DIASTOLIC BLOOD PRESSURE: 72 MMHG | TEMPERATURE: 98.3 F | HEART RATE: 62 BPM | WEIGHT: 152 LBS

## 2023-05-10 PROCEDURE — 99203 OFFICE O/P NEW LOW 30 MIN: CPT

## 2023-05-10 PROCEDURE — 93000 ELECTROCARDIOGRAM COMPLETE: CPT | Mod: 59

## 2023-05-10 PROCEDURE — 93290 INTERROG DEV EVAL ICPMS IP: CPT

## 2023-05-10 NOTE — HISTORY OF PRESENT ILLNESS
[FreeTextEntry1] : Mr. Braswell is a pleasant 82 year old male referred for arrhythmia and device management. He has a medical history of CAD post percutaneous coronary intervention (PCI) to the LAD and the first OMl branch in 2018. A repeat cardiac catheterization in May 2020 confirmed the patency of the stents, revealed mild, non-obstructive CAD, and normal left ventricular function. The patient also underwent implantable loop recorder (ILR) placement in May 2020 for supraventricular tachycardia (SVT). Other medical conditions include diabetes mellitus, hypertension, dyslipidemia, and peripheral arterial disease post drug-coated balloon (DCB) angioplasty to the proximal, middle, and distal superficial femoral artery (SFA) on October 6, 2022. \par \par The patient is here to establish care. As noted above the ILR was implanted in 2020 for monitoring of SVT/palpitations (Dr. Oscar). He denies any new symptoms of palpitations, dizziness, syncope, shortness of breath and chest pain. He has not seen Dr. Oscar in the last year or so.

## 2023-05-10 NOTE — DISCUSSION/SUMMARY
[FreeTextEntry1] : In summary, Mr. Braswell is a pleasant 82 year old female with a history of CAD s/p PCI, SVT/palpitations s/p ILR in 2020 (Dr. Oscar), PAD s/p percutaneous intervention, HTN and DM. The patient reports to be doing well in terms of his arrhythmia and denies any new palpitations. ILR was interrogated and no tachy or bradyarrhythmias were seen. Battery remains good. We will switch remote monitoring of the ILR to us in the future. He will return for follow up in 6 months. \par \par  [EKG obtained to assist in diagnosis and management of assessed problem(s)] : EKG obtained to assist in diagnosis and management of assessed problem(s)

## 2023-05-15 ENCOUNTER — NON-APPOINTMENT (OUTPATIENT)
Age: 83
End: 2023-05-15

## 2023-05-17 ENCOUNTER — APPOINTMENT (OUTPATIENT)
Dept: ELECTROPHYSIOLOGY | Facility: CLINIC | Age: 83
End: 2023-05-17

## 2023-05-22 ENCOUNTER — RX RENEWAL (OUTPATIENT)
Age: 83
End: 2023-05-22

## 2023-06-07 ENCOUNTER — APPOINTMENT (OUTPATIENT)
Dept: GASTROENTEROLOGY | Facility: CLINIC | Age: 83
End: 2023-06-07
Payer: MEDICARE

## 2023-06-07 VITALS
DIASTOLIC BLOOD PRESSURE: 70 MMHG | SYSTOLIC BLOOD PRESSURE: 130 MMHG | RESPIRATION RATE: 16 BRPM | HEIGHT: 63 IN | WEIGHT: 147 LBS | OXYGEN SATURATION: 98 % | HEART RATE: 70 BPM | BODY MASS INDEX: 26.05 KG/M2

## 2023-06-07 DIAGNOSIS — A09 INFECTIOUS GASTROENTERITIS AND COLITIS, UNSPECIFIED: ICD-10-CM

## 2023-06-07 DIAGNOSIS — K62.5 HEMORRHAGE OF ANUS AND RECTUM: ICD-10-CM

## 2023-06-07 DIAGNOSIS — K52.9 NONINFECTIVE GASTROENTERITIS AND COLITIS, UNSPECIFIED: ICD-10-CM

## 2023-06-07 DIAGNOSIS — Z86.010 PERSONAL HISTORY OF COLONIC POLYPS: ICD-10-CM

## 2023-06-07 PROCEDURE — 99214 OFFICE O/P EST MOD 30 MIN: CPT

## 2023-06-07 RX ORDER — DAPAGLIFLOZIN 10 MG/1
10 TABLET, FILM COATED ORAL DAILY
Refills: 0 | Status: ACTIVE | COMMUNITY
Start: 2022-10-11

## 2023-06-07 RX ORDER — ASPIRIN ENTERIC COATED TABLETS 81 MG 81 MG/1
81 TABLET, DELAYED RELEASE ORAL
Refills: 0 | Status: ACTIVE | COMMUNITY
Start: 2022-10-11

## 2023-06-07 NOTE — REASON FOR VISIT
[Follow-up] : a follow-up of an existing diagnosis [FreeTextEntry1] : Infectious colitis persistent despite courses of antibiotics

## 2023-06-07 NOTE — PHYSICAL EXAM
[Alert] : alert [Normal Voice/Communication] : normal voice/communication [Healthy Appearing] : healthy appearing [No Acute Distress] : no acute distress [Sclera] : the sclera and conjunctiva were normal [Hearing Threshold Finger Rub Not [place]] : hearing was normal [Normal Lips/Gums] : the lips and gums were normal [Oropharynx] : the oropharynx was normal [Normal Appearance] : the appearance of the neck was normal [No Neck Mass] : no neck mass was observed [No Respiratory Distress] : no respiratory distress [No Acc Muscle Use] : no accessory muscle use [Respiration, Rhythm And Depth] : normal respiratory rhythm and effort [Auscultation Breath Sounds / Voice Sounds] : lungs were clear to auscultation bilaterally [Heart Rate And Rhythm] : heart rate was normal and rhythm regular [Normal S1, S2] : normal S1 and S2 [Murmurs] : no murmurs [Bowel Sounds] : normal bowel sounds [Abdomen Tenderness] : non-tender [No Masses] : no abdominal mass palpated [Abdomen Soft] : soft [] : no hepatosplenomegaly [Normal Sphincter Tone] : normal sphincter tone [No External Hemorrhoid] : no external hemorrhoids [No Rectal Mass] : no rectal mass [Occult Blood] : positive occult blood [Oriented To Time, Place, And Person] : oriented to person, place, and time [de-identified] : Frame and build.  No acute distress. [FreeTextEntry1] : [place] sclera. [de-identified] : Mucosa.  Moist [de-identified] : No JVD.  No adenopathy. [de-identified] : Clear to A&P. [de-identified] : Regular rhythm.  No MRG. [de-identified] : No cyanosis clubbing or edema. [de-identified] : Slightly obese.  Bowel sounds present.  No organomegaly.  No mass tenderness or rebound.  No adenopathy. [de-identified] : Normal. [de-identified] : Normal. [de-identified] : Normal.  No CCE

## 2023-06-07 NOTE — ASSESSMENT
[FreeTextEntry1] : Persistent diarrhea moderate severity, prior Campylobacter enteritis.  Status post 3 courses of appropriate antibiotics without resolution.  Possible IBD.  No further rectal bleeding.  History of radiation proctopathy treated with APC 4 years ago.  History of colon polyp, benign.  Will check repeat stool studies first, GI PCR and C. difficile.\par Reasonable candidate for repeat colonoscopy to exclude IBD.\par Procedure, risk benefits and prep, were explained to the patient and daughter who understand and are agreeable to proceed.  Somewhat increased risk due to age frailty and cardiac issues.  Cardiology clearance requested from Dr. Breaux.  We will need to hold Xarelto for 2 days prior to procedure.  Farxiga to be held 3 days prior to procedure.  Other diabetes medications Tradjenta and glipizide to be held 1 day prior to procedure.  Blood work requested.  Gavilyte   prep to be employed all on preparation day.  ASA #3.  Patient appears to be in optimal medical condition to undergo planned procedure.  Arrangements made.  Results to follow.

## 2023-06-09 ENCOUNTER — EMERGENCY (EMERGENCY)
Facility: HOSPITAL | Age: 83
LOS: 1 days | Discharge: HOME | End: 2023-06-09
Attending: EMERGENCY MEDICINE
Payer: MEDICARE

## 2023-06-09 VITALS
HEIGHT: 63 IN | TEMPERATURE: 98 F | DIASTOLIC BLOOD PRESSURE: 84 MMHG | SYSTOLIC BLOOD PRESSURE: 170 MMHG | HEART RATE: 75 BPM | OXYGEN SATURATION: 97 % | RESPIRATION RATE: 16 BRPM

## 2023-06-09 DIAGNOSIS — Z98.890 OTHER SPECIFIED POSTPROCEDURAL STATES: Chronic | ICD-10-CM

## 2023-06-09 DIAGNOSIS — Z95.5 PRESENCE OF CORONARY ANGIOPLASTY IMPLANT AND GRAFT: Chronic | ICD-10-CM

## 2023-06-09 DIAGNOSIS — K40.90 UNILATERAL INGUINAL HERNIA, WITHOUT OBSTRUCTION OR GANGRENE, NOT SPECIFIED AS RECURRENT: Chronic | ICD-10-CM

## 2023-06-09 PROCEDURE — 99053 MED SERV 10PM-8AM 24 HR FAC: CPT

## 2023-06-09 PROCEDURE — 93010 ELECTROCARDIOGRAM REPORT: CPT

## 2023-06-09 PROCEDURE — 99285 EMERGENCY DEPT VISIT HI MDM: CPT

## 2023-06-09 PROCEDURE — 71045 X-RAY EXAM CHEST 1 VIEW: CPT | Mod: 26

## 2023-06-09 NOTE — ED PROVIDER NOTE - OBJECTIVE STATEMENT
82y Male with history of HTN, HLD, DM, CAD s/p stents 2018, prostate CA s/p radiation 2015, s/p loop recorder presenting with elevated blood pressure and palpitations. Patient states symptoms started before 9PM and briefly resolved. Denies chest pain, shortness of breath. Patient reports generalized weakness and heaviness in legs for several months. Denies fevers, chills, headache, cough, nausea, vomiting, diarrhea, hematuria, dysuria, dark stools. Follows with Dr. Breaux - cardiology.

## 2023-06-09 NOTE — ED PROVIDER NOTE - WR INTERPRETED BY 1
[facility]  Certificate of Child Health Examination  Student's Name:   Miguel Ángel Skaggs Birth Date  2017  Sex  male  Race/Ethnicity   School/Grade Level/ID#     Address:   [address]  [facility] [address]  Parent/Guardian             Telephone#                                            Home:                               Work:   IMMUNIZATIONS: To be completed by health care provider. The mo/da/yr for every dose administered is required. If a specific vaccine is medically contraindicated, a separate written statement must be attached by the health care responsible for completing the health examination explaining the medical reason for the contraindication.      Immunization History   Administered Date(s) Administered   • DTaP(INFANRIX) 04/27/2016   • DTaP, 5 Pertussis Antigens (DAPTACEL) 04/27/2016   • DTaP/HIB/IPV 2015, 2015, 2015   • DTaP/IPV 05/18/2019   • Hep A, Unspecified formulation 01/08/2016, 08/16/2016   • Hep A, ped/adol, 2 dose 01/08/2016   • Hep B, adolescent or pediatric 2015, 2015, 2015   • Hep B, adult 2015   • Hepatitis A - Adult 08/16/2016   • Hib (PRP-OMP) 04/27/2016   • Hib (PRP-T) 04/27/2016   • MMR 01/08/2016   • Measles Mumps Rubella Varicella 05/18/2019   • Pneumococcal Conjugate 13 valent 2015, 2015, 2015, 04/27/2016   • Pneumococcal polysaccharide, adult, 23 valent 2015, 04/27/2016   • Rotavirus - monovalent 2015, 2015, 2015   • Varicella 01/08/2016      Immunizations given 8/2/2021: None      Health care provider (MD, DO, APN, PA, school health professional, health official) verifying above immunization history must sign below. If adding dates to the above immunization history section, put your initials by date(s) and sign here.)  Signature                                                                 Title                                                Date  ___________                 Pediatrician                               08/02/2021  __________________________________________________________________________  Signature                                                                 Title                                                Date  _____________________________________________________________________________________   ALTERNATIVE PROOF OF IMMUNITY   1. Clinical diagnosis (measles, mumps, hepatitis B) is allowed when verified by physician and supported with lab confirmation.  Attach copy of lab result.    * MEASLES (Rubeola)  MO   DA  YR          **MUMPS  MO   DA  YR             HEPATITIS B  MO   DA   YR           VARICELLA  MO   DA  YR      2. History of varicella (chickenpox) disease is acceptable if verified by health care provider, school health professional or health official.  Person signing below verifies that the parent/guardian’s description of varicella disease history is indicative of past infection and is accepting such history as documentation of disease.  Date of Disease                                  Signature                                                           Title   3. Laboratory Evidence of Immunity (check one)      __ Measles*         __ Mumps**        __ Rubella        __Varicella    (Attach copy of lab result)         *All measles cases diagnosed on or after July 1, 2002, must be confirmed by laboratory evidence.      **All mumps cases diagnosed on or after July 1, 2013, must be confirmed by laboratory evidence.     Completion of Alternative 1 or 3 MUST be accompanied by Labs & Physician Signature: ___________________________  Physician Statements of Immunity MUST be submitted to ID for review.     Certificates of [employer] Exemption to Immunizations or Physician Medical Statements of Medical Contraindication Are Reviewed   and Maintained by the School Authority     (11/2015)                                         (COMPLETE BOTH SIDES)                                   Approved [facility] 3/2016      Student's Name:  Miguel Ángel Skaggs Birth Date 2017 Sex male School Grade Level/ID#     Page 2 of 3   HEALTH HISTORY      TO BE COMPLETED AND SIGNED BY PARENT/GUARDIAN AND VERIFIED BY HEALTH CARE PROVIDER  ALLERGIES: has No Known Allergies.   MEDICATION: (List all prescribed or taken on a regular basis.)   Yes   MEDICATION FOR ATTENTION DEFICIT HYPERACTIVITY DISORDER   Diagnosis of asthma?  Child wakes during night coughing? No  No  Loss of function of one of paired  organs?(eye/ear/kidney/testicle) No    Birth defects? No  Hospitalizations? No    Developmental delay? No   When? What for? No    Blood disorders? Hemophilia,  Sickle Cell, Other? Explain. No  Surgery? (List all.)  When? What for? No    Diabetes? No  Serious injury or illness? No    Head injury/Concussion/Passed out? No  TB skin test positive (past/present)? * No *If yes, [facility]   Seizures? What are they like?  No  TB disease (past or present)? * No *If yes, refer to [facility]   Heart problem/Shortness of breath?  No  Tobacco use (type, frequency)?  No    Heart murmur/High blood pressure? No  Alcohol/Drug use?  No    Dizziness or chest pain with exercise? No  Family history of sudden death  before age 50? (Cause?) No    Eye/Vision problems? ______           __Glasses          __Contacts  Last exam by eye doctor ______  Other concerns? (crossed eye, drooping lids, squinting, difficulty reading) Dental?   __ Braces     __ Bridge     __ Plate   __Other   Ear/Hearing problems? No  Information may be shared with appropriate personnel for health and educational purposes.   Bone/Joint problem/injury/scoliosis? No  Parent/Guardian  Signature                                               Date   PHYSICAL EXAMINATION REQUIREMENTS   Entire section below to be completed by MD/DO/APN/PA Head Circumference if <2-3 years old - /75   Pulse 73   Temp 97.2 °F (36.2 °C)   Ht 3' 11.25\" (1.2 m)    Wt 20.5 kg (45 lb 3.1 oz)   BMI 14.23 kg/m²   BSA 0.83 m²    14 %ile (Z= -1.06) based on CDC (Boys, 2-20 Years) BMI-for-age based on BMI available as of 8/2/2021.   DIABETES SCREENING (NOT REQUIRED FOR ) BMI>85% age/sex No     And any two of the following: Family History: No  Ethnic Minority: Yes    Signs of Insulin Resistance (hypertension, dyslipidemia, polycystic ovarian syndrome, acanthosis nigricans): No  At Risk: No   LEAD RISK QUESTIONNAIRE Required for children age 6 months through 6 years enrolled in licensed or public school operated day care, , nursery school and/or . (Blood test required if resides in [place] or high risk zip code.)  Questionnaire Administered? Yes  Blood Test Indicated? No   Blood Test Date _____   Blood Test Result ______________   TB SKIN OR BLOOD TEST Recommended only for children in high-risk groups including children immunosuppressed due to HIV infection or other conditions, frequent travel to or born in high prevalence countries or those exposed to adults in high-risk categories. See CDC guidelines. http://www.cdc.gov/tb/publications/factsheets/testing/TB_testing.htm.  Test Needed: No     Test performed: No                          Skin Test:    Date Read              /      /             Result:   Positive__      Negative__        mm________                          Blood Test: Date Reported       /      /               Result:  Positive__      Negative__      Value________  LAB TESTS (recommended) Date/Result  Date/Results   Hemoglobin or Hematocrit    12.0 / 35.6 Sickle Cell (when indicated)    Pending   Urinalysis    Negative  Developmental Screening Tool    Hyperactive        SYSTEM REVIEW Normal  Comments/Follow-up/Needs  Normal Comments/Follow-up/Needs   Skin  Yes  Endocrine Yes    Ears Yes                  Screening Result Gastrointestinal Yes    Eyes Yes                  Screening Result: Genito-Urinary Yes                                             LMP   Nose Yes  Neurologic Yes    Throat Yes  Musculoskeletal Yes    Mouth/Dental    NO DOUBLE ROW OF LOWER TEETH Spinal Exam Yes    Cardiovascular/HTN Yes  Nutritional status Yes    Respiratory Yes Diagnosis of Asthma: No   Mental Health Yes    Currently Prescribed Asthma Medication:        No  Quick-relief medication (e.g. Short Acting Beta Antagonist)        No   Controller medication (e.g. inhaled corticosteroid) Other     NEEDS/MODIFICATIONS required in the school setting: No restrictions DIETARY Needs/Restrictions: No restrictions   SPECIAL INSTRUCTIONS/DEVICES e.g. safety glasses, glass eye, chest protector for arrhythmia, pacemaker, prosthetic device, dental bridge, false teeth, athletic support/cup: No restrictions   MENTAL HEALTH/OTHER Is there anything else the school should know about this student?  If you would like to discuss this student’s health with school or school health personnel:  Not needed   EMERGENCY ACTION needed while at school due to child’s health condition (e.g. ,seizures, asthma, insect sting, food, peanut allergy, bleeding problem, diabetes, heart problem)?   ATTENTION DEFICIT HYPERACTIVITY DISORDER   On the basis of the examination on this day, I approve this child’s participation in                                (If No or Modified please attach explanation.)  PHYSICAL EDUCATION:  Yes                               INTERSCHOLASTIC SPORTS   Yes   Print Name  Mayte Marley MD         Signature                       Pediatrician                                                                                                                                                                                                             Date: 8/2/2021   Address:  [facility] [place] [id number] [facility]  [facility] [place] [id number] [id number] [phone]         (Complete Both  Shivani)     Approved [facility] 3/2016   Selwyn Louis

## 2023-06-09 NOTE — ED PROVIDER NOTE - CARE PROVIDER_API CALL
Johanny Breaux  Cardiology  [address] [place] [address]  Phone: [phone]  Fax: [phone]  Follow Up Time:

## 2023-06-09 NOTE — ED PROVIDER NOTE - ATTENDING CONTRIBUTION TO CARE
Asymptomatic hypertension, no lab derangements requiring intervention, provided extra dose of amlodipine can f/u outpatient for further medication management as needed.

## 2023-06-09 NOTE — ED ADULT TRIAGE NOTE - CHIEF COMPLAINT QUOTE
Pt. complaining of HTN and palpations. Pt. states it was 195 systolic. Pt. states he felt shaky. pt. denies headache, lightheadedness or nausea. Pt. took his antihypertensive medications today, rook clonidine tonight.

## 2023-06-09 NOTE — ED PROVIDER NOTE - NSFOLLOWUPINSTRUCTIONS_ED_ALL_ED_FT
Please follow-up with your primary care doctor.  Please call for an appointment in the next 48 hours but if you cannot follow-up with your primary care doctor please return to the Emergency Department for any urgent issues.    You were given a copy of the tests performed today.  Please bring the results with you and review them with your primary care doctor.    If you have any worsening of symptoms or any other concerns please return to the Emergency Department immediately.    Please continue taking your home medications as directed.    Hypertension    Hypertension, commonly called high blood pressure, is when the force of blood pumping through your arteries is too strong. Hypertension forces your heart to work harder to pump blood. Your arteries may become narrow or stiff. Having untreated or uncontrolled hypertension for a long period of time can cause heart attack, stroke, kidney disease, and other problems. If started on a medication, take exactly as prescribed by your health care professional. Maintain a healthy lifestyle and follow up with your primary care physician.    SEEK IMMEDIATE MEDICAL CARE IF YOU HAVE ANY OF THE FOLLOWING SYMPTOMS: severe headache, confusion, chest pain, abdominal pain, vomiting, or shortness of breath.

## 2023-06-09 NOTE — ED PROVIDER NOTE - PHYSICAL EXAMINATION
General: Well appearing in no acute distress, alert and cooperative  Head: Normocephalic, atraumatic  Eyes: PERRLA, no conjunctival injection, no scleral icterus, EOMI  ENMT: Atraumatic external nose and ears  Neck: Soft and supple, full ROM without pain  Cardiac: Regular rate and regular rhythm, no murmurs, peripheral pulses 2+ and symmetric in all extremities, no LE edema.  Resp: Unlabored respiratory effort, lungs CTAB  Abd: Soft, non-tender, non-distended  MSK: Spine midline and non-tender  Skin: Warm and dry  Neuro: AO x 3, moves all extremities symmetrically, Motor strength 5/5 bilaterally UE and LE, sensation grossly intact

## 2023-06-09 NOTE — ED PROVIDER NOTE - PATIENT PORTAL LINK FT
You can access the FollowMyHealth Patient Portal offered by [facility] by registering at the following website: [email or web address]. By joining [employer]’s FollowMyHealth portal, you will also be able to view your health information using other applications (apps) compatible with our system.

## 2023-06-09 NOTE — ED PROVIDER NOTE - CLINICAL SUMMARY MEDICAL DECISION MAKING FREE TEXT BOX
82y Male with elevated blood pressure and palpitations in the setting of CAD and HTN. No chest pain or shortness of breath. Asymptomatic now. Hemodynamically stable, neurovascularly intact, lungs clear, no LE edema. Differential diagnosis includes but not limited to asymptomatic HTN, ACS, electrolyte derangement. 82y Male with elevated blood pressure and palpitations in the setting of CAD and HTN. No chest pain or shortness of breath. Asymptomatic now. Hemodynamically stable, neurovascularly intact, lungs clear, no LE edema. Differential diagnosis includes but not limited to asymptomatic HTN, ACS, electrolyte derangement. Labs and imaging independently reviewed and interpreted with no acute pathology noted. Discharge with cardiology follow up.

## 2023-06-09 NOTE — ED PROVIDER NOTE - PROGRESS NOTE DETAILS
stable on reassessment. remains asymptomatic. Conversation had with patient regarding results of lab work and imaging. Patient agrees with plan for discharge at this time. Patient agrees to comply with follow up to primary care doctor/specalist. Return to ED precautions and discharge instructions discussed with patient with verbal understanding. -DO Missy

## 2023-06-10 LAB
ALBUMIN SERPL ELPH-MCNC: 3.9 G/DL
ALP BLD-CCNC: 102 U/L
ALT SERPL-CCNC: 15 U/L
ANION GAP SERPL CALC-SCNC: 11 MMOL/L — SIGNIFICANT CHANGE UP (ref 5–17)
ANION GAP SERPL CALC-SCNC: 15 MMOL/L
AST SERPL-CCNC: 20 U/L
BASOPHILS # BLD AUTO: 0.05 K/UL — SIGNIFICANT CHANGE UP (ref 0–0.2)
BASOPHILS NFR BLD AUTO: 0.7 % — SIGNIFICANT CHANGE UP (ref 0–2)
BILIRUB SERPL-MCNC: 0.6 MG/DL
BUN SERPL-MCNC: 24 MG/DL
BUN SERPL-MCNC: 29.1 MG/DL — HIGH (ref 8–20)
CALCIUM SERPL-MCNC: 8.8 MG/DL — SIGNIFICANT CHANGE UP (ref 8.4–10.5)
CALCIUM SERPL-MCNC: 9.3 MG/DL
CHLORIDE SERPL-SCNC: 97 MMOL/L
CHLORIDE SERPL-SCNC: 99 MMOL/L — SIGNIFICANT CHANGE UP (ref 96–108)
CO2 SERPL-SCNC: 22 MMOL/L
CO2 SERPL-SCNC: 22 MMOL/L — SIGNIFICANT CHANGE UP (ref 22–29)
CREAT SERPL-MCNC: 1.85 MG/DL
CREAT SERPL-MCNC: 1.94 MG/DL — HIGH (ref 0.5–1.3)
EGFR: 34 ML/MIN/1.73M2 — LOW
EGFR: 35 ML/MIN/1.73M2
EOSINOPHIL # BLD AUTO: 0.27 K/UL — SIGNIFICANT CHANGE UP (ref 0–0.5)
EOSINOPHIL NFR BLD AUTO: 3.9 % — SIGNIFICANT CHANGE UP (ref 0–6)
FERRITIN SERPL-MCNC: 31 NG/ML
GLUCOSE SERPL-MCNC: 169 MG/DL
GLUCOSE SERPL-MCNC: 216 MG/DL — HIGH (ref 70–99)
HCT VFR BLD CALC: 38.2 % — LOW (ref 39–50)
HGB BLD-MCNC: 13.1 G/DL — SIGNIFICANT CHANGE UP (ref 13–17)
IMM GRANULOCYTES NFR BLD AUTO: 0.4 % — SIGNIFICANT CHANGE UP (ref 0–0.9)
INR PPP: 1.04 RATIO
LYMPHOCYTES # BLD AUTO: 1.87 K/UL — SIGNIFICANT CHANGE UP (ref 1–3.3)
LYMPHOCYTES # BLD AUTO: 27.1 % — SIGNIFICANT CHANGE UP (ref 13–44)
MCHC RBC-ENTMCNC: 26.5 PG — LOW (ref 27–34)
MCHC RBC-ENTMCNC: 34.3 GM/DL — SIGNIFICANT CHANGE UP (ref 32–36)
MCV RBC AUTO: 77.2 FL — LOW (ref 80–100)
MONOCYTES # BLD AUTO: 0.6 K/UL — SIGNIFICANT CHANGE UP (ref 0–0.9)
MONOCYTES NFR BLD AUTO: 8.7 % — SIGNIFICANT CHANGE UP (ref 2–14)
NEUTROPHILS # BLD AUTO: 4.07 K/UL — SIGNIFICANT CHANGE UP (ref 1.8–7.4)
NEUTROPHILS NFR BLD AUTO: 59.2 % — SIGNIFICANT CHANGE UP (ref 43–77)
PLATELET # BLD AUTO: 198 K/UL — SIGNIFICANT CHANGE UP (ref 150–400)
POTASSIUM SERPL-MCNC: 5 MMOL/L — SIGNIFICANT CHANGE UP (ref 3.5–5.3)
POTASSIUM SERPL-SCNC: 5 MMOL/L
POTASSIUM SERPL-SCNC: 5 MMOL/L — SIGNIFICANT CHANGE UP (ref 3.5–5.3)
PROT SERPL-MCNC: 6.6 G/DL
PT BLD: 12.3 SEC
RBC # BLD: 4.95 M/UL — SIGNIFICANT CHANGE UP (ref 4.2–5.8)
RBC # FLD: 16.4 % — HIGH (ref 10.3–14.5)
SODIUM SERPL-SCNC: 132 MMOL/L — LOW (ref 135–145)
SODIUM SERPL-SCNC: 134 MMOL/L
TROPONIN T SERPL-MCNC: <0.01 NG/ML — SIGNIFICANT CHANGE UP (ref 0–0.06)
WBC # BLD: 6.89 K/UL — SIGNIFICANT CHANGE UP (ref 3.8–10.5)
WBC # FLD AUTO: 6.89 K/UL — SIGNIFICANT CHANGE UP (ref 3.8–10.5)

## 2023-06-10 PROCEDURE — 71045 X-RAY EXAM CHEST 1 VIEW: CPT

## 2023-06-10 PROCEDURE — 84484 ASSAY OF TROPONIN QUANT: CPT

## 2023-06-10 PROCEDURE — 85025 COMPLETE CBC W/AUTO DIFF WBC: CPT

## 2023-06-10 PROCEDURE — 93005 ELECTROCARDIOGRAM TRACING: CPT

## 2023-06-10 PROCEDURE — 99285 EMERGENCY DEPT VISIT HI MDM: CPT | Mod: 25

## 2023-06-10 PROCEDURE — 80048 BASIC METABOLIC PNL TOTAL CA: CPT

## 2023-06-10 PROCEDURE — 36415 COLL VENOUS BLD VENIPUNCTURE: CPT

## 2023-06-10 RX ORDER — AMLODIPINE BESYLATE 2.5 MG/1
10 TABLET ORAL ONCE
Refills: 0 | Status: COMPLETED | OUTPATIENT
Start: 2023-06-10 | End: 2023-06-10

## 2023-06-10 RX ADMIN — AMLODIPINE BESYLATE 10 MILLIGRAM(S): 2.5 TABLET ORAL at 01:49

## 2023-06-10 NOTE — ED ADULT NURSE NOTE - NSFALLUNIVINTERV_ED_ALL_ED
Bed/Stretcher in lowest position, wheels locked, appropriate side rails in place/Call bell, personal items and telephone in reach/Instruct patient to call for assistance before getting out of bed/chair/stretcher/Non-slip footwear applied when patient is off stretcher/[place] to call system/Physically safe environment - no spills, clutter or unnecessary equipment/Purposeful proactive rounding/Room/bathroom lighting operational, light cord in reach

## 2023-06-11 ENCOUNTER — RX RENEWAL (OUTPATIENT)
Age: 83
End: 2023-06-11

## 2023-06-13 ENCOUNTER — NON-APPOINTMENT (OUTPATIENT)
Age: 83
End: 2023-06-13

## 2023-06-13 ENCOUNTER — APPOINTMENT (OUTPATIENT)
Dept: ELECTROPHYSIOLOGY | Facility: CLINIC | Age: 83
End: 2023-06-13
Payer: MEDICARE

## 2023-06-13 PROCEDURE — G2066: CPT

## 2023-06-13 PROCEDURE — 93298 REM INTERROG DEV EVAL SCRMS: CPT

## 2023-06-17 LAB
CDIFF BY PCR: NOT DETECTED
GI PCR PANEL: NOT DETECTED

## 2023-06-22 ENCOUNTER — APPOINTMENT (OUTPATIENT)
Dept: CARDIOLOGY | Facility: CLINIC | Age: 83
End: 2023-06-22
Payer: MEDICARE

## 2023-06-22 VITALS
OXYGEN SATURATION: 100 % | HEIGHT: 63 IN | DIASTOLIC BLOOD PRESSURE: 66 MMHG | BODY MASS INDEX: 26.22 KG/M2 | SYSTOLIC BLOOD PRESSURE: 120 MMHG | WEIGHT: 148 LBS | HEART RATE: 67 BPM | TEMPERATURE: 97.7 F

## 2023-06-22 DIAGNOSIS — I25.10 ATHEROSCLEROTIC HEART DISEASE OF NATIVE CORONARY ARTERY W/OUT ANGINA PECTORIS: ICD-10-CM

## 2023-06-22 DIAGNOSIS — R06.09 OTHER FORMS OF DYSPNEA: ICD-10-CM

## 2023-06-22 PROCEDURE — 93000 ELECTROCARDIOGRAM COMPLETE: CPT

## 2023-06-22 PROCEDURE — 99213 OFFICE O/P EST LOW 20 MIN: CPT

## 2023-06-22 RX ORDER — CIPROFLOXACIN HYDROCHLORIDE 500 MG/1
500 TABLET, FILM COATED ORAL TWICE DAILY
Qty: 14 | Refills: 1 | Status: DISCONTINUED | COMMUNITY
Start: 2023-05-03 | End: 2023-06-22

## 2023-06-22 NOTE — HISTORY OF PRESENT ILLNESS
[FreeTextEntry1] : 81 y/o Male with h/o CAD s/p PCI LAD/OM1 '18 with repeat cardiac catheterization in 5/20 demonstrating patent stents with mild, non-obstructive CAD, normal LV fxn, ILR placed 5/20 for SVT, DM, HTN, Dyslipidemia and PAD s/p DCB to the prox mid and distal SFA on 10/6/2022, presents for follow up \par \par Pt is accompanied by his daughter Lashawn who provides interpretation and translation\par Pt reports intermittent pain in both feet, right > left , worse with walking x 2 weeks. \par He denies thigh claudication which he reports previously experiencing prior to  peripheral angioplasty in 10/2022. \par Reports compliance with ASA, Xarelto\par Pt denies chest pain, dyspnea, LE edema, palpitations, lightheadedness, dizziness, falls, near syncope\par PCP increase Clonidine to 0.2 mg TID with good success,  although has had 2-3 elevated readings of  prior to meds since last visit, overall better control \par Recent labs noted, LDL 70 (4/12/2023)\par \par \par Prior visit 12/8/2022\par Patient is accompanied by daughter. \par Notes that he has been doing better and interval improvement in bilateral lower extremities with no fatigue and claudication symptoms, notes that he is walkng and no worsening of symtpoms \par He was started on Xarelto 2.5mg BID based on COMPASS trial and notes no interval bleeding via urine or stool \par Encourage to walking \par Still notes that on the RLE is better with no worsening symptoms since last visit. \par \par Peripheral angiogram: 10/6/2022: s/p Jetsream atherectomy, balloon angioplasty and DCB angioplasty using 6 x 220 mm balloon of the prox mid and distal SFA \par \par Cardiac: Notes to have better control of the blood pressure with starting Clonidine 0.1 mg po q midday; has since seen Nephrology and possibly wants to start ACEi / ARB and will follow up and make a decision then. \par Otherwise no chest pain or shortness of breath at rest or upon exertion, no dizziness lightheadedness or palpitations

## 2023-06-22 NOTE — REVIEW OF SYSTEMS
[SOB] : no shortness of breath [Dyspnea on exertion] : not dyspnea during exertion [Chest Discomfort] : no chest discomfort [Lower Ext Edema] : no extremity edema [Leg Claudication] : intermittent leg claudication [Palpitations] : no palpitations [Orthopnea] : no orthopnea [PND] : no PND [Syncope] : no syncope [Abdominal Pain] : no abdominal pain [Diarrhea] : diarrhea [Hematuria] : no hematuria [Easy Bleeding] : no tendency for easy bleeding [Negative] : Psychiatric

## 2023-06-22 NOTE — DISCUSSION/SUMMARY
[FreeTextEntry1] : A/P\par 83 y/o Male with h/o CAD s/p PCI LAD/OM1 '18 with repeat cardiac catheterization in 5/20 demonstrating patent stents with mild, non-obstructive CAD, normal LV fxn, ILR placed 5/20 for SVT, DM, HTN, Dyslipidemia and PAD s/p DCB to the prox mid and distal SFA on 10/6/2022, presents for follow up, reporting Bilateral foot pain , R> L, worse with ambulation\par \par 1. PAD s/p DCB to the prox mid and distal SFA on 10/6/2022, with Kaitlin IIb classification \par -  on Xarelto 2.5mg BID, ASA 81mg daily, Crestor 40mg po daily \par - Bilateral pulses are appreciated by doppler  \par - will order CHARLES/PVR and Arterial Duplex to assess the foot pain\par  \par 2. Hypertension: blood pressure is well controlled and reports better control on home monitoring\par - on Clonidine 0.2 mg TID, Norvasc 10mg po daily and  Coreg to 25mg q12hrs\par - follows with Nephrology -following with Dr. Kramer who has suggested starting ACEi/ ARB will \par - continue to follow BP at home\par \par 3. CAD s/p LAD/OM1 '18\par -denies chest pain or shortness of breath \par - on ASA Xarelto 2.5 and statin \par - EKG reviewed shows Sinus bradycardia @ 59 bpm \par - discussed with daughter will obtain records from previous cardiologist \par  \par 4. NSVT/ SVT near syncope in the past s/p ILR in 2020: recommend EP consultation, and ILR follow-up and monitoring. EP consult placed\par \par Follow-up with Dr. Breaux in 2 months\par \par Roxie Cee PA-C

## 2023-06-22 NOTE — CARDIOLOGY SUMMARY
[de-identified] : 4/18/2023 Sinus bradycardia 56 WNL \par 12/8/2022: Sinus  Bradycardia @ 59 bpm WITHIN NORMAL LIMITS\par 10/7/2022: Sinus  Rhythm @ 61 bpm WITHIN NORMAL LIMITS [de-identified] : 5/12/2020:  1. Normal global left ventricular systolic function.\par  2. Left ventricular ejection fraction, by visual estimation, is 65 to 70%.\par  3. Mildly increased LV wall thickness.\par  4. No obvious left ventricle regional wall motion abnormalities visualized.\par  5. Normal right ventricle size and normal systolic function.\par  6. The left atrium is normal in size.\par  7. The right atrium is normal in size.\par  8. Mild thickening and calcification of the anterior and posterior mitral valve leaflets.\par  9. Mild aortic valve leaflet calcification. Small, round echodensities on the aortic valve leaflet tips which may be consistent with calcification. No aortic stenosis.\par 10. Dilated proximal ascending aorta (3.8 cm; 2.1 cm/m S 2).\par 11. Recommend clinical correlation with the above findings. [de-identified] : Peripheral Angiography: 10/6/2022: s/p Jetsream atherectomy, balloon angioplasty and DCB angioplasty using 6 x 220 mm balloon of the prox mid and distal SFA \par  [de-identified] : US arterial duplex: 10/10/2022: 1. Right Lower extremity  -  Moderate atherosclerosis with 20 - 49% stenosis in the proximal SFA.\par 2. Left Lower Extremity  Moderate atherosclerosis with 20 - 49% stenosis in the mid SFA and 20 - 49%  stenosis in the popliteal artery. 3. There is a small calcified mobile plaque measuring 0.14 cm in the left CFA measuring.\par \par \par US arterial duplex: 8/11/2022: 1. Ankle/brachial indices are in the ischemic range bilaterally. 2. Right Lower Extremity  -  Moderate atherosclerosis with 20 - 49% stenosis in the proximal superficial  femoral artery. \par 3. Left Lower Extremity  -   Severe atherosclerosis with 50 - 75% stenosis in the popliteal artery.\par

## 2023-06-22 NOTE — PHYSICAL EXAM
[Well Developed] : well developed [Well Nourished] : well nourished [No Acute Distress] : no acute distress [Normal Conjunctiva] : normal conjunctiva [Normal Venous Pressure] : normal venous pressure [No Carotid Bruit] : no carotid bruit [Normal S1, S2] : normal S1, S2 [No Murmur] : no murmur [No Rub] : no rub [No Gallop] : no gallop [Clear Lung Fields] : clear lung fields [Good Air Entry] : good air entry [No Respiratory Distress] : no respiratory distress  [Soft] : abdomen soft [Non Tender] : non-tender [Normal Bowel Sounds] : normal bowel sounds [Normal Gait] : normal gait [No Edema] : no edema [No Cyanosis] : no cyanosis [No Clubbing] : no clubbing [No Varicosities] : no varicosities [Normal Radial B/L] : normal radial B/L [No Rash] : no rash [Moves all extremities] : moves all extremities [No Focal Deficits] : no focal deficits [Normal Speech] : normal speech [Alert and Oriented] : alert and oriented [Normal memory] : normal memory [de-identified] : Non palpable DP/PT pulses bilaterally;  + doppler signal bilaterally of the PT and DP

## 2023-07-12 ENCOUNTER — APPOINTMENT (OUTPATIENT)
Dept: INTERNAL MEDICINE | Facility: CLINIC | Age: 83
End: 2023-07-12
Payer: MEDICARE

## 2023-07-12 VITALS
BODY MASS INDEX: 25.87 KG/M2 | TEMPERATURE: 97.6 F | SYSTOLIC BLOOD PRESSURE: 123 MMHG | OXYGEN SATURATION: 98 % | HEIGHT: 63 IN | RESPIRATION RATE: 16 BRPM | WEIGHT: 146 LBS | DIASTOLIC BLOOD PRESSURE: 63 MMHG | HEART RATE: 68 BPM

## 2023-07-12 DIAGNOSIS — I73.9 PERIPHERAL VASCULAR DISEASE, UNSPECIFIED: ICD-10-CM

## 2023-07-12 DIAGNOSIS — K52.9 NONINFECTIVE GASTROENTERITIS AND COLITIS, UNSPECIFIED: ICD-10-CM

## 2023-07-12 DIAGNOSIS — D50.9 IRON DEFICIENCY ANEMIA, UNSPECIFIED: ICD-10-CM

## 2023-07-12 LAB — HBA1C MFR BLD HPLC: 7.4

## 2023-07-12 PROCEDURE — 36415 COLL VENOUS BLD VENIPUNCTURE: CPT

## 2023-07-12 PROCEDURE — 83036 HEMOGLOBIN GLYCOSYLATED A1C: CPT | Mod: QW

## 2023-07-12 PROCEDURE — 99214 OFFICE O/P EST MOD 30 MIN: CPT | Mod: 25

## 2023-07-12 NOTE — ASSESSMENT
[FreeTextEntry1] : Shawn is a 81 yo M w PMHx CAD s/p PCI, ILR placed 5/20 for SVT, T2DM, HTN, HLD and PAD and CKD stage IV here for follow up\par Campylobacter colitis- saw GI, will be going for colonoscopy to exclude IBD. States diarrhea stopped for 15 days and just started yesterday. Having 2 episodes today.  \par Started on trulicity. Discontinued glipizide and tradjenta and now on trulicity. \par \par Patient otherwise feels well and denies any other acute complaints\par \par Colitis \par Will be going for colonoscopy \par \par #CAD s/p PCI\par #SVT s/p ILR \par - Pt sees Cardio Dr. Johanny Breaux \par - Continue ASA and Plavix \par \par #T2DM \par - Pt sees Endo Dr. Perlman\par - Pt sees optho Dr. Ferreira (optho)- last diabetic eye exam 6 months ago, has appt for f/u coming up \par - A1c 7.4, cont current tx \par - Discussed dietary modifications such as low cab diet, pt admits to eating plantains every day, discussed risks of worsening diabetes \par - Continue home Farxiga and Trulicity (added recently) \par Discussed diabetic diet. Carb allowance of around 130-140 g carbs daily. \par Educated about foot hygiene and need to see opthalmology and podiatry yearly.\par \par #HTN\par Well controlled \par - Discussed low salt diet \par - Clonidine.2 mg TID mg, cont amlodipine 10 mg qd, carvedilol 25 mg bid \par \par Iron def anemia - improved \par Hg 13 \par Cont iron \par Fu cbc \par No signs of symptoms of bleeding \par \par #HLD\par - Continue home Rosuvastatin\par - Discussed diet and exercise \par Educated eating whole grain foods rich in soluble fiber such as oats and Omega 3 rich fish such as salmon, tout, sardines and bean based meals such as kidney beans, chickpeas and lentils. Small portions of nuts. Limit sugars and alcohol.\par \par #PAD s/p recent angiogram\par - Follows vascular \par - Continue ASA and Plavix \par - Xarelto \par \par #CKD stage IV\par - Will check CMP\par - Renal indices had worsen likely 2/2 diarrhea (dehydration), will cont to trend  \par - Following with nephro \par \par #History of prostate cancer s/p radiation therapy (2015)\par - Pt sees Urology Dr. Daniel and Oncologist Dr. Mcintyre \par - Has PSA checked with urology \par \par rto 3 mo sooner if needed \par \par . \par

## 2023-07-12 NOTE — HISTORY OF PRESENT ILLNESS
[FreeTextEntry1] : FU HTN, T2DM, HLD, ANEMIA, CKD [de-identified] : Shawn is a 81 yo M w PMHx CAD s/p PCI, ILR placed 5/20 for SVT, T2DM, HTN, HLD and PAD and CKD stage IV here for follow up\par Campylobacter colitis- saw GI, will be going for colonoscopy to exclude IBD. States diarrhea stopped for 15 days and just started yesterday. Having 2 episodes today.  \par Started on trulicity. Discontinued glipizide and tradjenta and now on trulicity. \par \par Patient otherwise feels well and denies any other acute complaints\par

## 2023-07-13 LAB
ALBUMIN SERPL ELPH-MCNC: 4 G/DL
ALP BLD-CCNC: 107 U/L
ALT SERPL-CCNC: 19 U/L
ANION GAP SERPL CALC-SCNC: 12 MMOL/L
AST SERPL-CCNC: 23 U/L
BILIRUB SERPL-MCNC: 0.5 MG/DL
BUN SERPL-MCNC: 22 MG/DL
CALCIUM SERPL-MCNC: 9.4 MG/DL
CHLORIDE SERPL-SCNC: 100 MMOL/L
CO2 SERPL-SCNC: 25 MMOL/L
CREAT SERPL-MCNC: 1.88 MG/DL
CREAT SPEC-SCNC: 69 MG/DL
EGFR: 35 ML/MIN/1.73M2
ESTIMATED AVERAGE GLUCOSE: 163 MG/DL
GLUCOSE SERPL-MCNC: 126 MG/DL
HBA1C MFR BLD HPLC: 7.3 %
MICROALBUMIN 24H UR DL<=1MG/L-MCNC: 43.3 MG/DL
MICROALBUMIN/CREAT 24H UR-RTO: 631 MG/G
POTASSIUM SERPL-SCNC: 5 MMOL/L
PROT SERPL-MCNC: 6.6 G/DL
SODIUM SERPL-SCNC: 137 MMOL/L

## 2023-07-14 ENCOUNTER — APPOINTMENT (OUTPATIENT)
Dept: CARDIOLOGY | Facility: CLINIC | Age: 83
End: 2023-07-14
Payer: MEDICARE

## 2023-07-14 PROCEDURE — 93306 TTE W/DOPPLER COMPLETE: CPT

## 2023-07-16 ENCOUNTER — RX RENEWAL (OUTPATIENT)
Age: 83
End: 2023-07-16

## 2023-07-17 ENCOUNTER — RX RENEWAL (OUTPATIENT)
Age: 83
End: 2023-07-17

## 2023-07-17 ENCOUNTER — RX CHANGE (OUTPATIENT)
Age: 83
End: 2023-07-17

## 2023-07-19 ENCOUNTER — NON-APPOINTMENT (OUTPATIENT)
Age: 83
End: 2023-07-19

## 2023-07-19 ENCOUNTER — APPOINTMENT (OUTPATIENT)
Dept: ELECTROPHYSIOLOGY | Facility: CLINIC | Age: 83
End: 2023-07-19
Payer: MEDICARE

## 2023-07-19 PROCEDURE — G2066: CPT

## 2023-07-19 PROCEDURE — 93298 REM INTERROG DEV EVAL SCRMS: CPT

## 2023-07-21 ENCOUNTER — NON-APPOINTMENT (OUTPATIENT)
Age: 83
End: 2023-07-21

## 2023-08-08 ENCOUNTER — APPOINTMENT (OUTPATIENT)
Dept: NEPHROLOGY | Facility: CLINIC | Age: 83
End: 2023-08-08

## 2023-08-11 ENCOUNTER — APPOINTMENT (OUTPATIENT)
Dept: CARDIOLOGY | Facility: CLINIC | Age: 83
End: 2023-08-11
Payer: MEDICARE

## 2023-08-11 ENCOUNTER — RX RENEWAL (OUTPATIENT)
Age: 83
End: 2023-08-11

## 2023-08-11 PROCEDURE — 78452 HT MUSCLE IMAGE SPECT MULT: CPT

## 2023-08-11 PROCEDURE — A9500: CPT

## 2023-08-11 PROCEDURE — 93015 CV STRESS TEST SUPVJ I&R: CPT

## 2023-08-13 ENCOUNTER — RX RENEWAL (OUTPATIENT)
Age: 83
End: 2023-08-13

## 2023-08-16 ENCOUNTER — RX CHANGE (OUTPATIENT)
Age: 83
End: 2023-08-16

## 2023-08-20 ENCOUNTER — RX RENEWAL (OUTPATIENT)
Age: 83
End: 2023-08-20

## 2023-08-23 ENCOUNTER — APPOINTMENT (OUTPATIENT)
Dept: ELECTROPHYSIOLOGY | Facility: CLINIC | Age: 83
End: 2023-08-23
Payer: MEDICARE

## 2023-08-23 ENCOUNTER — NON-APPOINTMENT (OUTPATIENT)
Age: 83
End: 2023-08-23

## 2023-08-23 PROCEDURE — G2066: CPT

## 2023-08-23 PROCEDURE — 93298 REM INTERROG DEV EVAL SCRMS: CPT

## 2023-08-29 ENCOUNTER — NON-APPOINTMENT (OUTPATIENT)
Age: 83
End: 2023-08-29

## 2023-08-29 ENCOUNTER — APPOINTMENT (OUTPATIENT)
Dept: CARDIOLOGY | Facility: CLINIC | Age: 83
End: 2023-08-29
Payer: MEDICARE

## 2023-08-29 VITALS
DIASTOLIC BLOOD PRESSURE: 64 MMHG | OXYGEN SATURATION: 99 % | BODY MASS INDEX: 25.87 KG/M2 | HEIGHT: 63 IN | HEART RATE: 60 BPM | SYSTOLIC BLOOD PRESSURE: 116 MMHG | TEMPERATURE: 98.3 F | WEIGHT: 146 LBS

## 2023-08-29 PROCEDURE — 93000 ELECTROCARDIOGRAM COMPLETE: CPT

## 2023-08-29 PROCEDURE — 99214 OFFICE O/P EST MOD 30 MIN: CPT

## 2023-09-01 ENCOUNTER — EMERGENCY (EMERGENCY)
Facility: HOSPITAL | Age: 83
LOS: 1 days | Discharge: HOME | End: 2023-09-01
Attending: EMERGENCY MEDICINE
Payer: MEDICARE

## 2023-09-01 VITALS
TEMPERATURE: 98 F | OXYGEN SATURATION: 98 % | SYSTOLIC BLOOD PRESSURE: 142 MMHG | DIASTOLIC BLOOD PRESSURE: 77 MMHG | RESPIRATION RATE: 20 BRPM | HEART RATE: 70 BPM | WEIGHT: 147.71 LBS

## 2023-09-01 DIAGNOSIS — K40.90 UNILATERAL INGUINAL HERNIA, WITHOUT OBSTRUCTION OR GANGRENE, NOT SPECIFIED AS RECURRENT: Chronic | ICD-10-CM

## 2023-09-01 DIAGNOSIS — Z98.890 OTHER SPECIFIED POSTPROCEDURAL STATES: Chronic | ICD-10-CM

## 2023-09-01 DIAGNOSIS — Z95.5 PRESENCE OF CORONARY ANGIOPLASTY IMPLANT AND GRAFT: Chronic | ICD-10-CM

## 2023-09-01 PROCEDURE — 99053 MED SERV 10PM-8AM 24 HR FAC: CPT

## 2023-09-01 PROCEDURE — 99284 EMERGENCY DEPT VISIT MOD MDM: CPT

## 2023-09-01 RX ORDER — SODIUM CHLORIDE 9 MG/ML
1000 INJECTION, SOLUTION INTRAVENOUS ONCE
Refills: 0 | Status: COMPLETED | OUTPATIENT
Start: 2023-09-01 | End: 2023-09-01

## 2023-09-01 NOTE — ED ADULT TRIAGE NOTE - CHIEF COMPLAINT QUOTE
My dad has been having diarrhea for a long time, before January, was here before tx he has scheduled colonoscopy this Nov 2nd, but today he has been going a lot & feeling weak,  denies Vomiting & abdominal pain

## 2023-09-02 LAB
ALBUMIN SERPL ELPH-MCNC: 3.7 G/DL — SIGNIFICANT CHANGE UP (ref 3.3–5.2)
ALP SERPL-CCNC: 106 U/L — SIGNIFICANT CHANGE UP (ref 40–120)
ALT FLD-CCNC: 18 U/L — SIGNIFICANT CHANGE UP
ANION GAP SERPL CALC-SCNC: 10 MMOL/L — SIGNIFICANT CHANGE UP (ref 5–17)
APTT BLD: 32.8 SEC — SIGNIFICANT CHANGE UP (ref 24.5–35.6)
AST SERPL-CCNC: 17 U/L — SIGNIFICANT CHANGE UP
BASOPHILS # BLD AUTO: 0.03 K/UL — SIGNIFICANT CHANGE UP (ref 0–0.2)
BASOPHILS NFR BLD AUTO: 0.5 % — SIGNIFICANT CHANGE UP (ref 0–2)
BILIRUB SERPL-MCNC: 0.5 MG/DL — SIGNIFICANT CHANGE UP (ref 0.4–2)
BUN SERPL-MCNC: 18.5 MG/DL — SIGNIFICANT CHANGE UP (ref 8–20)
C DIFF BY PCR RESULT: SIGNIFICANT CHANGE UP
CALCIUM SERPL-MCNC: 9.3 MG/DL — SIGNIFICANT CHANGE UP (ref 8.4–10.5)
CHLORIDE SERPL-SCNC: 97 MMOL/L — SIGNIFICANT CHANGE UP (ref 96–108)
CO2 SERPL-SCNC: 27 MMOL/L — SIGNIFICANT CHANGE UP (ref 22–29)
CREAT SERPL-MCNC: 1.57 MG/DL — HIGH (ref 0.5–1.3)
EGFR: 43 ML/MIN/1.73M2 — LOW
EOSINOPHIL # BLD AUTO: 0.21 K/UL — SIGNIFICANT CHANGE UP (ref 0–0.5)
EOSINOPHIL NFR BLD AUTO: 3.3 % — SIGNIFICANT CHANGE UP (ref 0–6)
GI PCR PANEL: SIGNIFICANT CHANGE UP
GLUCOSE SERPL-MCNC: 181 MG/DL — HIGH (ref 70–99)
HCT VFR BLD CALC: 38.3 % — LOW (ref 39–50)
HGB BLD-MCNC: 13.1 G/DL — SIGNIFICANT CHANGE UP (ref 13–17)
IMM GRANULOCYTES NFR BLD AUTO: 0.2 % — SIGNIFICANT CHANGE UP (ref 0–0.9)
INR BLD: 0.88 RATIO — SIGNIFICANT CHANGE UP (ref 0.85–1.18)
LYMPHOCYTES # BLD AUTO: 2.74 K/UL — SIGNIFICANT CHANGE UP (ref 1–3.3)
LYMPHOCYTES # BLD AUTO: 42.7 % — SIGNIFICANT CHANGE UP (ref 13–44)
MAGNESIUM SERPL-MCNC: 3 MG/DL — HIGH (ref 1.6–2.6)
MCHC RBC-ENTMCNC: 27.4 PG — SIGNIFICANT CHANGE UP (ref 27–34)
MCHC RBC-ENTMCNC: 34.2 GM/DL — SIGNIFICANT CHANGE UP (ref 32–36)
MCV RBC AUTO: 80.1 FL — SIGNIFICANT CHANGE UP (ref 80–100)
MONOCYTES # BLD AUTO: 0.78 K/UL — SIGNIFICANT CHANGE UP (ref 0–0.9)
MONOCYTES NFR BLD AUTO: 12.2 % — SIGNIFICANT CHANGE UP (ref 2–14)
NEUTROPHILS # BLD AUTO: 2.64 K/UL — SIGNIFICANT CHANGE UP (ref 1.8–7.4)
NEUTROPHILS NFR BLD AUTO: 41.1 % — LOW (ref 43–77)
PHOSPHATE SERPL-MCNC: 2.6 MG/DL — SIGNIFICANT CHANGE UP (ref 2.4–4.7)
PLATELET # BLD AUTO: 234 K/UL — SIGNIFICANT CHANGE UP (ref 150–400)
POTASSIUM SERPL-MCNC: 5 MMOL/L — SIGNIFICANT CHANGE UP (ref 3.5–5.3)
POTASSIUM SERPL-SCNC: 5 MMOL/L — SIGNIFICANT CHANGE UP (ref 3.5–5.3)
PROT SERPL-MCNC: 6.5 G/DL — LOW (ref 6.6–8.7)
PROTHROM AB SERPL-ACNC: 9.8 SEC — SIGNIFICANT CHANGE UP (ref 9.5–13)
RBC # BLD: 4.78 M/UL — SIGNIFICANT CHANGE UP (ref 4.2–5.8)
RBC # FLD: 15.9 % — HIGH (ref 10.3–14.5)
SODIUM SERPL-SCNC: 134 MMOL/L — LOW (ref 135–145)
WBC # BLD: 6.41 K/UL — SIGNIFICANT CHANGE UP (ref 3.8–10.5)
WBC # FLD AUTO: 6.41 K/UL — SIGNIFICANT CHANGE UP (ref 3.8–10.5)

## 2023-09-02 PROCEDURE — 36415 COLL VENOUS BLD VENIPUNCTURE: CPT

## 2023-09-02 PROCEDURE — 71045 X-RAY EXAM CHEST 1 VIEW: CPT

## 2023-09-02 PROCEDURE — 84100 ASSAY OF PHOSPHORUS: CPT

## 2023-09-02 PROCEDURE — 85610 PROTHROMBIN TIME: CPT

## 2023-09-02 PROCEDURE — 85730 THROMBOPLASTIN TIME PARTIAL: CPT

## 2023-09-02 PROCEDURE — 87507 IADNA-DNA/RNA PROBE TQ 12-25: CPT

## 2023-09-02 PROCEDURE — 87493 C DIFF AMPLIFIED PROBE: CPT

## 2023-09-02 PROCEDURE — 80053 COMPREHEN METABOLIC PANEL: CPT

## 2023-09-02 PROCEDURE — 99285 EMERGENCY DEPT VISIT HI MDM: CPT | Mod: 25

## 2023-09-02 PROCEDURE — 93005 ELECTROCARDIOGRAM TRACING: CPT

## 2023-09-02 PROCEDURE — 93010 ELECTROCARDIOGRAM REPORT: CPT

## 2023-09-02 PROCEDURE — 71045 X-RAY EXAM CHEST 1 VIEW: CPT | Mod: 26

## 2023-09-02 PROCEDURE — 85025 COMPLETE CBC W/AUTO DIFF WBC: CPT

## 2023-09-02 PROCEDURE — 83735 ASSAY OF MAGNESIUM: CPT

## 2023-09-02 RX ADMIN — SODIUM CHLORIDE 1000 MILLILITER(S): 9 INJECTION, SOLUTION INTRAVENOUS at 01:02

## 2023-09-02 NOTE — ED PROVIDER NOTE - NS ED ROS FT
Review of Systems  GEN: +GENERALIZED WEAKNESS  SKIN: warm, dry w/ no rash or bleeding  RESPIRATORY: no cough or SOB  CARDIAC: no chest pain & no palpitations  GI: no abd pain, nausea, vomiting +DIARRHEA  MUSCULOSKELETAL:  no joint pain, swelling or redness  NEURO: Alert, oriented x3. No weakness, numbness.

## 2023-09-02 NOTE — ED PROVIDER NOTE - CLINICAL SUMMARY MEDICAL DECISION MAKING FREE TEXT BOX
ASSESSMENT:   GUIDO MATUTE is a 83yo M who presented with DIARRHEA AND WEAKNESS x months but worse today and a/w weakness. Vitals stable. Physical exam non contributory.     PLAN: IV hydration. Check electrolytes, GI pcr and c-diff panel.

## 2023-09-02 NOTE — ED PROVIDER NOTE - ATTENDING CONTRIBUTION TO CARE
I, Abram Singleton, personally saw the patient with the resident, and completed the key components of the history and physical exam. I then discussed the management plan with the resident.    81yo Male with PMH HTN, HLD, DM, Prostate cancer, CAD, SVT, PAD, CKD p/w Chronic diarrhea since October 2022.  patient tolerating p.o. at home, no fever.  Patient has outpatient colonoscopy scheduled for November.   Daughter reports diarrhea getting worse the past few days and patient feeling weak.  Labs did not show any significant dehydration.  wbc 6.1.   Stool sample sent.  IV fluids given.  Patient tolerating p.o.  Daughter comfortable taking patient home and follow-up with GI outpatient.

## 2023-09-02 NOTE — ED ADULT NURSE NOTE - NSFALLUNIVINTERV_ED_ALL_ED
Bed/Stretcher in lowest position, wheels locked, appropriate side rails in place/Call bell, personal items and telephone in reach/Instruct patient to call for assistance before getting out of bed/chair/stretcher/Non-slip footwear applied when patient is off stretcher/[place] to call system/Physically safe environment - no spills, clutter or unnecessary equipment/Purposeful proactive rounding/Room/bathroom lighting operational, light cord in reach

## 2023-09-02 NOTE — ED PROVIDER NOTE - PHYSICAL EXAMINATION
Gen: Well appearing in NAD  Head: NC/AT  Neck: trachea midline  Resp:  No distress  Abd: soft, nd, nt  Ext: no deformities  Neuro:  A&Ox3 appears non focal  Skin:  Warm and dry as visualized  Psych:  Normal affect and mood

## 2023-09-02 NOTE — ED PROVIDER NOTE - CARE PROVIDER_API CALL
Thania Epperson  Gastroenterology  [address], [address]  [place] [address]  Phone: [phone]  Fax: [phone]  Follow Up Time: 1-3 Days

## 2023-09-02 NOTE — ED ADULT NURSE NOTE - NSFALLRISKFACTORS_ED_ALL_ED
Pt continues to c/o 9/10 neck pain, states headache is gone, but no change to pain in neck after pain medication. Dr. Barrera aware and to room.   
No indicators present

## 2023-09-02 NOTE — ED PROVIDER NOTE - PATIENT PORTAL LINK FT
You can access the FollowMyHealth Patient Portal offered by [facility] by registering at the following website: [email or web address]. By joining [employer]’s FollowMyHealth portal, you will also be able to view your health information using other applications (apps) compatible with our system.

## 2023-09-02 NOTE — ED PROVIDER NOTE - OBJECTIVE STATEMENT
GUIDO MATUTE is a 83yo Male with PMH HTN, HLD, DM, Prostate cancer, CAD, SVT, PAD, CKD who presents c/o weakness in setting of diarrhea. Daughter is bedside and state pt has very watery stools this morning and is complaining of generalized weakness and fatigue. She reports pt has been having diarrhea since October of last year but today was worse. PT sees a gastroenterologist and is scheduled for colonoscopy in November. PT has no chest pain or sob, no recent fevers. Describes non-bloody diarrhea. No abdominal pain.

## 2023-09-02 NOTE — ED ADULT NURSE NOTE - OBJECTIVE STATEMENT
Patient presents to ED c/o watery diarrhea and related weakness.  As per daughter, "this has been going on for one year.  He has a colonoscopy scheduled in November." Patient presents to ED c/o watery diarrhea and related weakness.  As per daughter, "this has been going on for one year.  He has a colonoscopy scheduled in November."  Denies c/p, SOB, n/v/d.  No further complaints at this time.

## 2023-09-02 NOTE — ED PROVIDER NOTE - NSFOLLOWUPINSTRUCTIONS_ED_ALL_ED_FT
If the stool sample is positive for any infection you will be called.     Diarrhea    Diarrhea is frequent loose or watery bowel movements that has many causes. Diarrhea can make you feel weak and cause you to become dehydrated. Diarrhea typically lasts 2–3 days, but can last longer if it is a sign of something more serious. Drink clear fluids to prevent dehydration. Eat bland, easy-to-digest foods as tolerated.     SEEK IMMEDIATE MEDICAL CARE IF YOU HAVE ANY OF THE FOLLOWING SYMPTOMS: high fevers, lightheadedness/dizziness, chest pain, black or bloody stools, shortness of breath, severe abdominal or back pain, or any signs of dehydration.

## 2023-09-06 ENCOUNTER — NON-APPOINTMENT (OUTPATIENT)
Age: 83
End: 2023-09-06

## 2023-09-08 ENCOUNTER — RX RENEWAL (OUTPATIENT)
Age: 83
End: 2023-09-08

## 2023-09-25 ENCOUNTER — APPOINTMENT (OUTPATIENT)
Dept: CARDIOLOGY | Facility: CLINIC | Age: 83
End: 2023-09-25

## 2023-09-26 NOTE — H&P ADULT - SPO2 (%)
BMI Counseling: Body mass index is 41.04 kg/m². The BMI is above normal. Nutrition recommendations include reducing portion sizes, decreasing overall calorie intake and 3-5 servings of fruits/vegetables daily. Exercise recommendations include vigorous aerobic physical activity for 75 minutes/week.
Dad had breast cancer
Mom had thyroid cancer
Patient had colonoscopy in 2020  Due in 2025 for next scope.
100

## 2023-09-27 ENCOUNTER — NON-APPOINTMENT (OUTPATIENT)
Age: 83
End: 2023-09-27

## 2023-09-27 ENCOUNTER — APPOINTMENT (OUTPATIENT)
Dept: ELECTROPHYSIOLOGY | Facility: CLINIC | Age: 83
End: 2023-09-27
Payer: MEDICARE

## 2023-09-27 PROCEDURE — 93298 REM INTERROG DEV EVAL SCRMS: CPT

## 2023-09-27 PROCEDURE — G2066: CPT

## 2023-10-13 ENCOUNTER — RX RENEWAL (OUTPATIENT)
Age: 83
End: 2023-10-13

## 2023-10-13 RX ORDER — ROSUVASTATIN CALCIUM 40 MG/1
40 TABLET, FILM COATED ORAL
Qty: 90 | Refills: 3 | Status: ACTIVE | COMMUNITY
Start: 2022-07-11 | End: 1900-01-01

## 2023-10-20 ENCOUNTER — APPOINTMENT (OUTPATIENT)
Dept: INTERNAL MEDICINE | Facility: CLINIC | Age: 83
End: 2023-10-20
Payer: MEDICARE

## 2023-10-20 VITALS
HEART RATE: 72 BPM | RESPIRATION RATE: 16 BRPM | WEIGHT: 154 LBS | HEIGHT: 63 IN | BODY MASS INDEX: 27.29 KG/M2 | OXYGEN SATURATION: 96 % | TEMPERATURE: 97.3 F

## 2023-10-20 VITALS — WEIGHT: 143 LBS | HEIGHT: 63 IN | BODY MASS INDEX: 25.34 KG/M2

## 2023-10-20 DIAGNOSIS — B35.6 TINEA CRURIS: ICD-10-CM

## 2023-10-20 PROCEDURE — 36415 COLL VENOUS BLD VENIPUNCTURE: CPT

## 2023-10-20 PROCEDURE — 99214 OFFICE O/P EST MOD 30 MIN: CPT | Mod: 25

## 2023-10-23 PROBLEM — I25.10 NONOBSTRUCTIVE ATHEROSCLEROSIS OF CORONARY ARTERY: Status: ACTIVE | Noted: 2022-08-01

## 2023-10-24 ENCOUNTER — APPOINTMENT (OUTPATIENT)
Dept: CARDIOLOGY | Facility: CLINIC | Age: 83
End: 2023-10-24

## 2023-10-25 LAB
ALBUMIN SERPL ELPH-MCNC: 4 G/DL
ALP BLD-CCNC: 124 U/L
ALT SERPL-CCNC: 10 U/L
ANION GAP SERPL CALC-SCNC: 10 MMOL/L
AST SERPL-CCNC: 14 U/L
BASOPHILS # BLD AUTO: 0.05 K/UL
BASOPHILS NFR BLD AUTO: 0.8 %
BILIRUB SERPL-MCNC: 0.5 MG/DL
BUN SERPL-MCNC: 17 MG/DL
CALCIUM SERPL-MCNC: 9.4 MG/DL
CHLORIDE SERPL-SCNC: 98 MMOL/L
CHOLEST SERPL-MCNC: 127 MG/DL
CO2 SERPL-SCNC: 25 MMOL/L
CREAT SERPL-MCNC: 1.71 MG/DL
CREAT SPEC-SCNC: 73 MG/DL
EGFR: 39 ML/MIN/1.73M2
EOSINOPHIL # BLD AUTO: 0.28 K/UL
EOSINOPHIL NFR BLD AUTO: 4.6 %
ESTIMATED AVERAGE GLUCOSE: 160 MG/DL
GLUCOSE SERPL-MCNC: 146 MG/DL
HBA1C MFR BLD HPLC: 7.2 %
HCT VFR BLD CALC: 43.7 %
HDLC SERPL-MCNC: 44 MG/DL
HGB BLD-MCNC: 14.1 G/DL
IMM GRANULOCYTES NFR BLD AUTO: 0.3 %
LDLC SERPL CALC-MCNC: 63 MG/DL
LYMPHOCYTES # BLD AUTO: 2.19 K/UL
LYMPHOCYTES NFR BLD AUTO: 35.8 %
MAN DIFF?: NORMAL
MCHC RBC-ENTMCNC: 27.1 PG
MCHC RBC-ENTMCNC: 32.3 GM/DL
MCV RBC AUTO: 84 FL
MICROALBUMIN 24H UR DL<=1MG/L-MCNC: 34.7 MG/DL
MICROALBUMIN/CREAT 24H UR-RTO: 475 MG/G
MONOCYTES # BLD AUTO: 0.52 K/UL
MONOCYTES NFR BLD AUTO: 8.5 %
NEUTROPHILS # BLD AUTO: 3.05 K/UL
NEUTROPHILS NFR BLD AUTO: 50 %
NONHDLC SERPL-MCNC: 83 MG/DL
PLATELET # BLD AUTO: 250 K/UL
POTASSIUM SERPL-SCNC: 4.7 MMOL/L
PROT SERPL-MCNC: 6.9 G/DL
RBC # BLD: 5.2 M/UL
RBC # FLD: 16.6 %
SODIUM SERPL-SCNC: 134 MMOL/L
TRIGL SERPL-MCNC: 114 MG/DL
WBC # FLD AUTO: 6.11 K/UL

## 2023-10-31 ENCOUNTER — NON-APPOINTMENT (OUTPATIENT)
Age: 83
End: 2023-10-31

## 2023-11-01 ENCOUNTER — NON-APPOINTMENT (OUTPATIENT)
Age: 83
End: 2023-11-01

## 2023-11-01 ENCOUNTER — APPOINTMENT (OUTPATIENT)
Dept: ELECTROPHYSIOLOGY | Facility: CLINIC | Age: 83
End: 2023-11-01
Payer: MEDICARE

## 2023-11-01 PROCEDURE — 93298 REM INTERROG DEV EVAL SCRMS: CPT | Mod: NC

## 2023-11-01 PROCEDURE — G2066: CPT | Mod: NC

## 2023-11-02 ENCOUNTER — APPOINTMENT (OUTPATIENT)
Dept: GASTROENTEROLOGY | Facility: GI CENTER | Age: 83
End: 2023-11-02

## 2023-11-08 ENCOUNTER — APPOINTMENT (OUTPATIENT)
Dept: ELECTROPHYSIOLOGY | Facility: CLINIC | Age: 83
End: 2023-11-08
Payer: MEDICARE

## 2023-11-08 VITALS
HEART RATE: 82 BPM | OXYGEN SATURATION: 98 % | DIASTOLIC BLOOD PRESSURE: 72 MMHG | SYSTOLIC BLOOD PRESSURE: 108 MMHG | HEIGHT: 63 IN | BODY MASS INDEX: 24.63 KG/M2 | WEIGHT: 139 LBS

## 2023-11-08 DIAGNOSIS — R00.2 PALPITATIONS: ICD-10-CM

## 2023-11-08 PROCEDURE — 99214 OFFICE O/P EST MOD 30 MIN: CPT

## 2023-11-08 PROCEDURE — 93000 ELECTROCARDIOGRAM COMPLETE: CPT

## 2023-11-08 RX ORDER — DULAGLUTIDE 1.5 MG/.5ML
1.5 INJECTION, SOLUTION SUBCUTANEOUS
Refills: 0 | Status: DISCONTINUED | COMMUNITY
Start: 2023-07-12 | End: 2023-11-08

## 2023-11-08 RX ORDER — POLYETHYLENE GLYCOL-3350 AND ELECTROLYTES WITH FLAVOR PACK 240; 5.84; 2.98; 6.72; 22.72 G/278.26G; G/278.26G; G/278.26G; G/278.26G; G/278.26G
240 POWDER, FOR SOLUTION ORAL
Qty: 4000 | Refills: 0 | Status: DISCONTINUED | COMMUNITY
Start: 2023-06-07 | End: 2023-11-08

## 2023-12-04 ENCOUNTER — TRANSCRIPTION ENCOUNTER (OUTPATIENT)
Age: 83
End: 2023-12-04

## 2023-12-04 ENCOUNTER — OUTPATIENT (OUTPATIENT)
Dept: OUTPATIENT SERVICES | Facility: HOSPITAL | Age: 83
LOS: 1 days | End: 2023-12-04
Payer: MEDICARE

## 2023-12-04 VITALS
HEART RATE: 59 BPM | OXYGEN SATURATION: 100 % | HEIGHT: 62 IN | DIASTOLIC BLOOD PRESSURE: 90 MMHG | TEMPERATURE: 99 F | WEIGHT: 139.99 LBS | SYSTOLIC BLOOD PRESSURE: 162 MMHG | RESPIRATION RATE: 18 BRPM

## 2023-12-04 VITALS
SYSTOLIC BLOOD PRESSURE: 137 MMHG | OXYGEN SATURATION: 100 % | DIASTOLIC BLOOD PRESSURE: 68 MMHG | RESPIRATION RATE: 15 BRPM | HEART RATE: 68 BPM

## 2023-12-04 DIAGNOSIS — I47.1 SUPRAVENTRICULAR TACHYCARDIA: ICD-10-CM

## 2023-12-04 DIAGNOSIS — K40.90 UNILATERAL INGUINAL HERNIA, WITHOUT OBSTRUCTION OR GANGRENE, NOT SPECIFIED AS RECURRENT: Chronic | ICD-10-CM

## 2023-12-04 DIAGNOSIS — Z98.890 OTHER SPECIFIED POSTPROCEDURAL STATES: Chronic | ICD-10-CM

## 2023-12-04 DIAGNOSIS — Z95.5 PRESENCE OF CORONARY ANGIOPLASTY IMPLANT AND GRAFT: Chronic | ICD-10-CM

## 2023-12-04 PROCEDURE — 33286 RMVL SUBQ CAR RHYTHM MNTR: CPT

## 2023-12-04 RX ORDER — AMLODIPINE BESYLATE 2.5 MG/1
1 TABLET ORAL
Qty: 0 | Refills: 0 | DISCHARGE

## 2023-12-04 RX ORDER — CEPHALEXIN 500 MG
500 CAPSULE ORAL ONCE
Refills: 0 | Status: COMPLETED | OUTPATIENT
Start: 2023-12-04 | End: 2023-12-04

## 2023-12-04 RX ORDER — DAPAGLIFLOZIN 10 MG/1
1 TABLET, FILM COATED ORAL
Qty: 0 | Refills: 0 | DISCHARGE

## 2023-12-04 RX ORDER — FERROUS GLUCONATE 100 %
0 POWDER (GRAM) MISCELLANEOUS
Qty: 0 | Refills: 0 | DISCHARGE

## 2023-12-04 RX ORDER — CETIRIZINE HYDROCHLORIDE 10 MG/1
1 TABLET ORAL
Qty: 0 | Refills: 0 | DISCHARGE

## 2023-12-04 RX ORDER — ALBUTEROL 90 UG/1
2 AEROSOL, METERED ORAL
Qty: 0 | Refills: 0 | DISCHARGE

## 2023-12-04 RX ORDER — ROSUVASTATIN CALCIUM 5 MG/1
1 TABLET ORAL
Qty: 0 | Refills: 0 | DISCHARGE

## 2023-12-04 RX ADMIN — Medication 500 MILLIGRAM(S): at 16:44

## 2023-12-04 NOTE — DISCHARGE NOTE NURSING/CASE MANAGEMENT/SOCIAL WORK - NSDCVIVACCINE_GEN_ALL_CORE_FT
Tdap; 30-Oct-2022 12:30; Geena Jackson (RN); [employer]; [id number]   (Exp. Date: 08-Jan-2024); IntraMuscular; Deltoid Left.; 0.5 milliLiter(s); VIS (VIS Published: 09-May-2013, VIS Presented: 30-Oct-2022);    Tdap; 30-Oct-2022 12:30; Geena Jackson (RN); [employer]; [id number]   (Exp. Date: 08-Jan-2024); IntraMuscular; Deltoid Left.; 0.5 milliLiter(s); VIS (VIS Published: 09-May-2013, VIS Presented: 30-Oct-2022);

## 2023-12-04 NOTE — H&P PST ADULT - ASSESSMENT
Pt is a 82 year old male has a medical history of CAD post percutaneous coronary intervention (PCI) to the LAD and the first OMl branch in 2018. A repeat cardiac catheterization in May 2020 confirmed the patency of the stents, revealed mild, non-obstructive CAD, and normal left ventricular function. The patient also underwent implantable loop recorder (ILR) placement in May 2020 for supraventricular tachycardia (SVT). Other medical conditions include diabetes mellitus, hypertension, dyslipidemia, and peripheral arterial disease post drug-coated balloon (DCB) angioplasty to the proximal, middle, and distal superficial femoral artery (SFA) on October 6, 2022.  ?  As noted above the ILR was implanted in 2020 for monitoring of SVT/palpitations by Dr. Moreno. Recently, pt established care with Dr. Mendes on 5/2023.  Pt reports no symptoms and ILR has revealed no arrhythmias in now > 3 years of monitoring and has recently reached RRT.  Pt now presents for ILR explant with Dr Mendes.    Plan:  -Consent with Attending  -Keflex 500mg PO x 1 dose

## 2023-12-04 NOTE — DISCHARGE NOTE NURSING/CASE MANAGEMENT/SOCIAL WORK - PATIENT PORTAL LINK FT
You can access the FollowMyHealth Patient Portal offered by [facility] by registering at the following website: [email or web address]. By joining [employer]’s FollowMyHealth portal, you will also be able to view your health information using other applications (apps) compatible with our system. You can access the FollowMyHealth Patient Portal offered by [facility] by registering at the following website: [email or web address]. By joining [employer]’s FollowMyHealth portal, you will also be able to view your health information using other applications (apps) compatible with our system.

## 2023-12-04 NOTE — ASU DISCHARGE PLAN (ADULT/PEDIATRIC) - COMMENTS
Follow up with the [facility]iology team in 4-6 weeks. Our office will contact you in 3-5 days to schedule this appointment. Please call [phone] with questions or concerns. Follow up with the [facility] team in 4-6 weeks. Our office will contact you in 3-5 days to schedule this appointment. Please call [phone] with questions or concerns.

## 2023-12-04 NOTE — ASU DISCHARGE PLAN (ADULT/PEDIATRIC) - CARE PROVIDER_API CALL
Lennox Mendes [facility]  Cardiac Electrophysiology  [address], [address]  [place] [address]  Phone: [phone]  Fax: [phone]  Follow Up Time:    Lennox Mendes [facility]  Cardiac Electrophysiology  [address], [address]  [place] [address]  Phone: [phone]  Fax: [phone]  Follow Up Time:

## 2023-12-04 NOTE — ASU PATIENT PROFILE, ADULT - FALL HARM RISK - UNIVERSAL INTERVENTIONS
Bed in lowest position, wheels locked, appropriate side rails in place/Call bell, personal items and telephone in reach/Instruct patient to call for assistance before getting out of bed or chair/Non-slip footwear when patient is out of bed/[place] to call system/Physically safe environment - no spills, clutter or unnecessary equipment/Purposeful Proactive Rounding/Room/bathroom lighting operational, light cord in reach Bed in lowest position, wheels locked, appropriate side rails in place/Call bell, personal items and telephone in reach/Instruct patient to call for assistance before getting out of bed or chair/Non-slip footwear when patient is out of bed/[place] to call system/Physically safe environment - no spills, clutter or unnecessary equipment/Purposeful Proactive Rounding/Room/bathroom lighting operational, light cord in reach

## 2023-12-04 NOTE — DISCHARGE NOTE NURSING/CASE MANAGEMENT/SOCIAL WORK - NSDCPEFALRISK_GEN_ALL_CORE
For information on Fall & Injury Prevention, visit: [email or web address]/[email or web address]-[email or web address]-mobility OR  [email or web address]://www.[email or web address]/news/fall-prevention-tips-[email or web address]-injury OR  https://www.cdc.gov/steadi/patient.html For information on Fall & Injury Prevention, visit: [email or web address]://www.[email or web address]/news/fall-prevention-protects-and-maintains-health-[email or web address]-mobility OR  https://www.[facility]/news/fall-prevention-tips-to-avoid-injury OR  https://www.cdc.gov/steadi/patient.html

## 2023-12-04 NOTE — ASU DISCHARGE PLAN (ADULT/PEDIATRIC) - NS MD DC FALL RISK RISK
For information on Fall & Injury Prevention, visit: [email or web address]://www.[facility]/news/fall-prevention-protects-and-maintains-health-and-mobility OR  https://www.[facility]/news/fall-prevention-tips-to-avoid-injury OR  https://www.cdc.gov/steadi/patient.html For information on Fall & Injury Prevention, visit: https://www.[facility]/news/fall-prevention-protects-and-maintains-health-and-mobility OR  https://www.[facility]/news/fall-prevention-tips-to-avoid-injury OR  https://www.cdc.gov/steadi/patient.html

## 2023-12-04 NOTE — ASU DISCHARGE PLAN (ADULT/PEDIATRIC) - ASU DC SPECIAL INSTRUCTIONSFT
Loop Recorder Incision Care:     - Do not touch the incision until it is completely healed.   - Keep the incision dry for 24 hours.   - There is Dermabond (skin glue) on your incision, which will start to flake off on its own over the next 2-3 weeks. Do not pick at or peel off the Dermabond.   - Do not apply soaps, creams, lotions, ointments or powders to the incision until it is completely healed.  - You should call the doctor if you notice redness, drainage, swelling, increased tenderness, hot sensation around the incision, bleeding or incision edges pulling apart. Incision for Loop Recorder explant Care:     - Do not touch the incision until it is completely healed.   - Keep the incision dry for 24 hours.   - There is Dermabond (skin glue) on your incision, which will start to flake off on its own over the next 2-3 weeks. Do not pick at or peel off the Dermabond.   - Do not apply soaps, creams, lotions, ointments or powders to the incision until it is completely healed.  - You should call the doctor if you notice redness, drainage, swelling, increased tenderness, hot sensation around the incision, bleeding or incision edges pulling apart.

## 2023-12-04 NOTE — PROGRESS NOTE ADULT - SUBJECTIVE AND OBJECTIVE BOX
Procedure: MDT Loop Recorder explant    Electrophysiologist: Dr Mendes    Pt doing well s/p loop recorder explant. Denies complaint.     Incision: Dermabond C/D/I; no bleeding, hematoma, erythema, exudate or edema    Plan:   Resume PO intake.   Ambulate w/ assist, then progress as tolerated.     Pain control with PO analgesia PRN.   Resume home medications.   D/C home once all criteria met, with outpt f/up in 1-2 weeks.

## 2023-12-04 NOTE — H&P PST ADULT - HISTORY OF PRESENT ILLNESS
Document INCOMPLETE  Pt is a 82 year old male has a medical history of CAD post percutaneous coronary intervention (PCI) to the LAD and the first OMl branch in 2018. A repeat cardiac catheterization in May 2020 confirmed the patency of the stents, revealed mild, non-obstructive CAD, and normal left ventricular function. The patient also underwent implantable loop recorder (ILR) placement in May 2020 for supraventricular tachycardia (SVT). Other medical conditions include diabetes mellitus, hypertension, dyslipidemia, and peripheral arterial disease post drug-coated balloon (DCB) angioplasty to the proximal, middle, and distal superficial femoral artery (SFA) on 2022.  ?  As noted above the ILR was implanted in  for monitoring of SVT/palpitations by Dr. Moreno. Recently, pt established care with Dr. Mendes on 2023.  Pt reports no symptoms and ILR has revealed no arrhythmias in now > 3 years of monitoring and has recently reached RRT.  Pt now presents for ILR explant with Dr Mendes  ?  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 A repeat cardiac catheterization in May 2020 confirmed the patency of the stents, revealed mild, non-obstructive CAD, and normal left ventricular function. The patient also underwent implantable loop recorder (ILR) placement in May 2020 for supraventricular tachycardia (SVT). Other medical conditions include diabetes mellitus, hypertension, dyslipidemia, and peripheral arterial disease post drug-coated balloon (DCB) angioplasty to the proximal, middle, and distal superficial femoral artery (SFA) on 2022.  ?  As noted above the ILR was implanted in  for monitoring of SVT/palpitations by Dr. Moreno. Recently, pt established care with Dr. Mendes on 2023.  Pt reports no symptoms and ILR has revealed no arrhythmias in now > 3 years of monitoring and has recently reached RRT.  Pt now presents for ILR explant with Dr Mendes  ?  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

## 2023-12-13 ENCOUNTER — EMERGENCY (EMERGENCY)
Facility: HOSPITAL | Age: 83
LOS: 1 days | Discharge: HOME | End: 2023-12-13
Attending: STUDENT IN AN ORGANIZED HEALTH CARE EDUCATION/TRAINING PROGRAM
Payer: MEDICARE

## 2023-12-13 VITALS
TEMPERATURE: 98 F | HEIGHT: 62 IN | RESPIRATION RATE: 18 BRPM | DIASTOLIC BLOOD PRESSURE: 117 MMHG | WEIGHT: 141.98 LBS | SYSTOLIC BLOOD PRESSURE: 217 MMHG | OXYGEN SATURATION: 97 % | HEART RATE: 83 BPM

## 2023-12-13 DIAGNOSIS — Z95.5 PRESENCE OF CORONARY ANGIOPLASTY IMPLANT AND GRAFT: Chronic | ICD-10-CM

## 2023-12-13 DIAGNOSIS — Z98.890 OTHER SPECIFIED POSTPROCEDURAL STATES: Chronic | ICD-10-CM

## 2023-12-13 DIAGNOSIS — K40.90 UNILATERAL INGUINAL HERNIA, WITHOUT OBSTRUCTION OR GANGRENE, NOT SPECIFIED AS RECURRENT: Chronic | ICD-10-CM

## 2023-12-13 PROCEDURE — 99053 MED SERV 10PM-8AM 24 HR FAC: CPT

## 2023-12-13 PROCEDURE — 99284 EMERGENCY DEPT VISIT MOD MDM: CPT

## 2023-12-13 NOTE — ED ADULT TRIAGE NOTE - CHIEF COMPLAINT QUOTE
Ambulatory to ED c/o elevated BP for 4 days.  pt states he ran out of amlodipine 4 days ago and has been taking an old prescription of metoprolol.  he also took one dose of an old valsartan prescription today.  denies CP, palpitations, SOB, vision changes, headache, numbness, weakness, tingling.

## 2023-12-14 VITALS
SYSTOLIC BLOOD PRESSURE: 183 MMHG | RESPIRATION RATE: 17 BRPM | DIASTOLIC BLOOD PRESSURE: 85 MMHG | HEART RATE: 64 BPM | OXYGEN SATURATION: 98 %

## 2023-12-14 LAB
ALBUMIN SERPL ELPH-MCNC: 4.1 G/DL — SIGNIFICANT CHANGE UP (ref 3.3–5.2)
ALBUMIN SERPL ELPH-MCNC: 4.1 G/DL — SIGNIFICANT CHANGE UP (ref 3.3–5.2)
ALP SERPL-CCNC: 100 U/L — SIGNIFICANT CHANGE UP (ref 40–120)
ALP SERPL-CCNC: 100 U/L — SIGNIFICANT CHANGE UP (ref 40–120)
ALT FLD-CCNC: 15 U/L — SIGNIFICANT CHANGE UP
ALT FLD-CCNC: 15 U/L — SIGNIFICANT CHANGE UP
ANION GAP SERPL CALC-SCNC: 12 MMOL/L — SIGNIFICANT CHANGE UP (ref 5–17)
ANION GAP SERPL CALC-SCNC: 12 MMOL/L — SIGNIFICANT CHANGE UP (ref 5–17)
AST SERPL-CCNC: 19 U/L — SIGNIFICANT CHANGE UP
AST SERPL-CCNC: 19 U/L — SIGNIFICANT CHANGE UP
BASOPHILS # BLD AUTO: 0.07 K/UL — SIGNIFICANT CHANGE UP (ref 0–0.2)
BASOPHILS # BLD AUTO: 0.07 K/UL — SIGNIFICANT CHANGE UP (ref 0–0.2)
BASOPHILS NFR BLD AUTO: 0.8 % — SIGNIFICANT CHANGE UP (ref 0–2)
BASOPHILS NFR BLD AUTO: 0.8 % — SIGNIFICANT CHANGE UP (ref 0–2)
BILIRUB SERPL-MCNC: 0.5 MG/DL — SIGNIFICANT CHANGE UP (ref 0.4–2)
BILIRUB SERPL-MCNC: 0.5 MG/DL — SIGNIFICANT CHANGE UP (ref 0.4–2)
BUN SERPL-MCNC: 28.1 MG/DL — HIGH (ref 8–20)
BUN SERPL-MCNC: 28.1 MG/DL — HIGH (ref 8–20)
CALCIUM SERPL-MCNC: 9.5 MG/DL — SIGNIFICANT CHANGE UP (ref 8.4–10.5)
CALCIUM SERPL-MCNC: 9.5 MG/DL — SIGNIFICANT CHANGE UP (ref 8.4–10.5)
CHLORIDE SERPL-SCNC: 103 MMOL/L — SIGNIFICANT CHANGE UP (ref 96–108)
CHLORIDE SERPL-SCNC: 103 MMOL/L — SIGNIFICANT CHANGE UP (ref 96–108)
CO2 SERPL-SCNC: 24 MMOL/L — SIGNIFICANT CHANGE UP (ref 22–29)
CO2 SERPL-SCNC: 24 MMOL/L — SIGNIFICANT CHANGE UP (ref 22–29)
CREAT SERPL-MCNC: 1.37 MG/DL — HIGH (ref 0.5–1.3)
CREAT SERPL-MCNC: 1.37 MG/DL — HIGH (ref 0.5–1.3)
EGFR: 51 ML/MIN/1.73M2 — LOW
EGFR: 51 ML/MIN/1.73M2 — LOW
EOSINOPHIL # BLD AUTO: 0.18 K/UL — SIGNIFICANT CHANGE UP (ref 0–0.5)
EOSINOPHIL # BLD AUTO: 0.18 K/UL — SIGNIFICANT CHANGE UP (ref 0–0.5)
EOSINOPHIL NFR BLD AUTO: 2.1 % — SIGNIFICANT CHANGE UP (ref 0–6)
EOSINOPHIL NFR BLD AUTO: 2.1 % — SIGNIFICANT CHANGE UP (ref 0–6)
GLUCOSE SERPL-MCNC: 90 MG/DL — SIGNIFICANT CHANGE UP (ref 70–99)
GLUCOSE SERPL-MCNC: 90 MG/DL — SIGNIFICANT CHANGE UP (ref 70–99)
HCT VFR BLD CALC: 42.3 % — SIGNIFICANT CHANGE UP (ref 39–50)
HCT VFR BLD CALC: 42.3 % — SIGNIFICANT CHANGE UP (ref 39–50)
HGB BLD-MCNC: 14.7 G/DL — SIGNIFICANT CHANGE UP (ref 13–17)
HGB BLD-MCNC: 14.7 G/DL — SIGNIFICANT CHANGE UP (ref 13–17)
IMM GRANULOCYTES NFR BLD AUTO: 0.1 % — SIGNIFICANT CHANGE UP (ref 0–0.9)
IMM GRANULOCYTES NFR BLD AUTO: 0.1 % — SIGNIFICANT CHANGE UP (ref 0–0.9)
LYMPHOCYTES # BLD AUTO: 2.77 K/UL — SIGNIFICANT CHANGE UP (ref 1–3.3)
LYMPHOCYTES # BLD AUTO: 2.77 K/UL — SIGNIFICANT CHANGE UP (ref 1–3.3)
LYMPHOCYTES # BLD AUTO: 32.9 % — SIGNIFICANT CHANGE UP (ref 13–44)
LYMPHOCYTES # BLD AUTO: 32.9 % — SIGNIFICANT CHANGE UP (ref 13–44)
MCHC RBC-ENTMCNC: 28.3 PG — SIGNIFICANT CHANGE UP (ref 27–34)
MCHC RBC-ENTMCNC: 28.3 PG — SIGNIFICANT CHANGE UP (ref 27–34)
MCHC RBC-ENTMCNC: 34.8 GM/DL — SIGNIFICANT CHANGE UP (ref 32–36)
MCHC RBC-ENTMCNC: 34.8 GM/DL — SIGNIFICANT CHANGE UP (ref 32–36)
MCV RBC AUTO: 81.3 FL — SIGNIFICANT CHANGE UP (ref 80–100)
MCV RBC AUTO: 81.3 FL — SIGNIFICANT CHANGE UP (ref 80–100)
MONOCYTES # BLD AUTO: 0.78 K/UL — SIGNIFICANT CHANGE UP (ref 0–0.9)
MONOCYTES # BLD AUTO: 0.78 K/UL — SIGNIFICANT CHANGE UP (ref 0–0.9)
MONOCYTES NFR BLD AUTO: 9.3 % — SIGNIFICANT CHANGE UP (ref 2–14)
MONOCYTES NFR BLD AUTO: 9.3 % — SIGNIFICANT CHANGE UP (ref 2–14)
NEUTROPHILS # BLD AUTO: 4.62 K/UL — SIGNIFICANT CHANGE UP (ref 1.8–7.4)
NEUTROPHILS # BLD AUTO: 4.62 K/UL — SIGNIFICANT CHANGE UP (ref 1.8–7.4)
NEUTROPHILS NFR BLD AUTO: 54.8 % — SIGNIFICANT CHANGE UP (ref 43–77)
NEUTROPHILS NFR BLD AUTO: 54.8 % — SIGNIFICANT CHANGE UP (ref 43–77)
PLATELET # BLD AUTO: 241 K/UL — SIGNIFICANT CHANGE UP (ref 150–400)
PLATELET # BLD AUTO: 241 K/UL — SIGNIFICANT CHANGE UP (ref 150–400)
POTASSIUM SERPL-MCNC: 4.6 MMOL/L — SIGNIFICANT CHANGE UP (ref 3.5–5.3)
POTASSIUM SERPL-MCNC: 4.6 MMOL/L — SIGNIFICANT CHANGE UP (ref 3.5–5.3)
POTASSIUM SERPL-SCNC: 4.6 MMOL/L — SIGNIFICANT CHANGE UP (ref 3.5–5.3)
POTASSIUM SERPL-SCNC: 4.6 MMOL/L — SIGNIFICANT CHANGE UP (ref 3.5–5.3)
PROT SERPL-MCNC: 7.2 G/DL — SIGNIFICANT CHANGE UP (ref 6.6–8.7)
PROT SERPL-MCNC: 7.2 G/DL — SIGNIFICANT CHANGE UP (ref 6.6–8.7)
RBC # BLD: 5.2 M/UL — SIGNIFICANT CHANGE UP (ref 4.2–5.8)
RBC # BLD: 5.2 M/UL — SIGNIFICANT CHANGE UP (ref 4.2–5.8)
RBC # FLD: 15.1 % — HIGH (ref 10.3–14.5)
RBC # FLD: 15.1 % — HIGH (ref 10.3–14.5)
SODIUM SERPL-SCNC: 139 MMOL/L — SIGNIFICANT CHANGE UP (ref 135–145)
SODIUM SERPL-SCNC: 139 MMOL/L — SIGNIFICANT CHANGE UP (ref 135–145)
WBC # BLD: 8.43 K/UL — SIGNIFICANT CHANGE UP (ref 3.8–10.5)
WBC # BLD: 8.43 K/UL — SIGNIFICANT CHANGE UP (ref 3.8–10.5)
WBC # FLD AUTO: 8.43 K/UL — SIGNIFICANT CHANGE UP (ref 3.8–10.5)
WBC # FLD AUTO: 8.43 K/UL — SIGNIFICANT CHANGE UP (ref 3.8–10.5)

## 2023-12-14 PROCEDURE — 85025 COMPLETE CBC W/AUTO DIFF WBC: CPT

## 2023-12-14 PROCEDURE — 99283 EMERGENCY DEPT VISIT LOW MDM: CPT

## 2023-12-14 PROCEDURE — 80053 COMPREHEN METABOLIC PANEL: CPT

## 2023-12-14 PROCEDURE — 36415 COLL VENOUS BLD VENIPUNCTURE: CPT

## 2023-12-14 RX ORDER — AMLODIPINE BESYLATE 2.5 MG/1
10 TABLET ORAL ONCE
Refills: 0 | Status: COMPLETED | OUTPATIENT
Start: 2023-12-14 | End: 2023-12-14

## 2023-12-14 RX ORDER — HYDRALAZINE HCL 50 MG
5 TABLET ORAL ONCE
Refills: 0 | Status: DISCONTINUED | OUTPATIENT
Start: 2023-12-14 | End: 2023-12-14

## 2023-12-14 RX ADMIN — AMLODIPINE BESYLATE 10 MILLIGRAM(S): 2.5 TABLET ORAL at 01:50

## 2023-12-14 NOTE — ED PROVIDER NOTE - PHYSICAL EXAMINATION
Gen: no acute distress  Head: normocephalic, atraumatic  EENT: EOMI, PERRL  Lung: no increased work of breathing, CTABL  CV: normal s1/s2, rrr, 2+ radial pulses b/l, no LE edema  Abd: soft, non-tender, non-distended, no rebound tenderness or guarding; no CVA tenderness  MSK: no visible deformities, full range of motion in all 4 extremities  Neuro: A&Ox4; No focal neurologic deficits

## 2023-12-14 NOTE — ED ADULT NURSE REASSESSMENT NOTE - NS ED NURSE REASSESS COMMENT FT1
assumed pt care from Anle WEBER.  Pt presented w/HTN ongoing for 4 days w/blurred vision.  Per daughter, pt had run out of his amlodipine and had not taken those for 4 days, but taking is other meds.  Pt also c/o heaviness in legs.  Hx of HTN, HLD, DM, prostate Ca in remission, cardiac stents on xarelto. assumed pt care from Anel WEBER.  Pt presented w/HTN ongoing for 4 days w/blurred vision.  Per daughter, pt had run out of his amlodipine and had not taken those for 4 days, but taking is other meds.  Pt also c/o heaviness in legs.  Hx of HTN, HLD, DM, prostate Ca in remission, cardiac stents on xarelto.

## 2023-12-14 NOTE — ED PROVIDER NOTE - PATIENT PORTAL LINK FT
You can access the FollowMyHealth Patient Portal offered by [facility] by registering at the following website: [email or web address]. By joining [employer]’s FollowMyHealth portal, you will also be able to view your health information using other applications (apps) compatible with our system. You can access the FollowMyHealth Patient Portal offered by [facility] by registering at the following website: [email or web address]. By joining [employer]’s FollowMyHealth portal, you will also be able to view your health information using other applications (apps) compatible with our system.

## 2023-12-14 NOTE — ED ADULT NURSE NOTE - OBJECTIVE STATEMENT
Patient present to ED complaining of elevated BP X4 days. Patient states he ran out of amlodipine. Patient took his old metropol, and valsartan.  No acute distress noted.

## 2023-12-14 NOTE — ED PROVIDER NOTE - OBJECTIVE STATEMENT
82-year-old male with history of HTN, HLD, DM2, PVD, prior prostate cancer presenting with hypertension.  Patient reports that he has been hypertensive at home for the past 4 days with the highest being 220s systolic.  He normally takes 10 mg of amlodipine once per day ran out of his medications about 4 days ago.  He tried supplementing with old  losartan and metoprolol prescription was still hypertensive.  Patient endorsing subjective bilateral leg paresthesias and blurry vision.  Denies chest pain, dyspnea on exertion, abdominal pain, nausea, vomiting, extremity weakness, difficulty walking.   Patient's daughter reports that he has a prescription of amlodipine sent to the pharmacy and needs to pick it up today.

## 2023-12-14 NOTE — ED PROVIDER NOTE - CLINICAL SUMMARY MEDICAL DECISION MAKING FREE TEXT BOX
82-year-old male with history of HTN, HLD, PVD, prior prostate cancer presenting with hypertension. Presentation not consistent with hypertensive emergency. Hypertension likely 2/2 medication non-adherence. Will give home meds and check creatinine evaluate for acute cause of increase in blod pressure. Patient's physical exam is entirely benign.

## 2023-12-14 NOTE — ED ADULT NURSE NOTE - NSFALLUNIVINTERV_ED_ALL_ED
Bed/Stretcher in lowest position, wheels locked, appropriate side rails in place/Call bell, personal items and telephone in reach/Instruct patient to call for assistance before getting out of bed/chair/stretcher/Non-slip footwear applied when patient is off stretcher/[place] to call system/Physically safe environment - no spills, clutter or unnecessary equipment/Purposeful proactive rounding/Room/bathroom lighting operational, light cord in reach Bed/Stretcher in lowest position, wheels locked, appropriate side rails in place/Call bell, personal items and telephone in reach/Instruct patient to call for assistance before getting out of bed/chair/stretcher/Non-slip footwear applied when patient is off stretcher/[place] to call system/Physically safe environment - no spills, clutter or unnecessary equipment/Purposeful proactive rounding/Room/bathroom lighting operational, light cord in reach

## 2023-12-14 NOTE — ED PROVIDER NOTE - ATTENDING CONTRIBUTION TO CARE
82y M w/ hx HTN, HLD, prostate CA, presents for elevated BP. Ran out of his amlodipine 4 days ago and hasn't been able to  refill yet. Tried taking old BP meds at home but was still hypertensive as high as 220 systolic prior to arrival. No chest pain, SOB, headache, weakness. On exam, pt well appearing and in no distress, no focal neuro deficits, no signs of respiratory distress. Initially hypertensive to 217/117; improved to 183/88 without intervention. No acute findings on labs. Given home dose of amlodipine. Medically stable for discharge with return precautions.

## 2023-12-14 NOTE — ED PROVIDER NOTE - PROGRESS NOTE DETAILS
Blood pressure improved  on repeat measurement.  Patient given home dose of amlodipine.  Will hold off on IV antihypertensives.  Creatinine improved compared to patient's baseline of 1.7.  He is currently medically stable for discharge at this time.  Strict return precautions given. Leslee Beth MD PGY-3

## 2023-12-14 NOTE — ED PROVIDER NOTE - NSFOLLOWUPINSTRUCTIONS_ED_ALL_ED_FT
- Take your amlodipine as prescribed.   - SEEK IMMEDIATE MEDICAL CARE IF YOU HAVE ANY OF THE FOLLOWING SYMPTOMS: severe headache, confusion, chest pain, abdominal pain, vomiting, or shortness of breath.    Hypertension    Hypertension, commonly called high blood pressure, is when the force of blood pumping through your arteries is too strong. Hypertension forces your heart to work harder to pump blood. Your arteries may become narrow or stiff. Having untreated or uncontrolled hypertension for a long period of time can cause heart attack, stroke, kidney disease, and other problems. If started on a medication, take exactly as prescribed by your health care professional. Maintain a healthy lifestyle and follow up with your primary care physician.

## 2023-12-26 ENCOUNTER — APPOINTMENT (OUTPATIENT)
Dept: ELECTROPHYSIOLOGY | Facility: CLINIC | Age: 83
End: 2023-12-26
Payer: MEDICARE

## 2023-12-26 ENCOUNTER — NON-APPOINTMENT (OUTPATIENT)
Age: 83
End: 2023-12-26

## 2023-12-26 VITALS
BODY MASS INDEX: 24.8 KG/M2 | OXYGEN SATURATION: 100 % | HEIGHT: 63 IN | DIASTOLIC BLOOD PRESSURE: 74 MMHG | SYSTOLIC BLOOD PRESSURE: 130 MMHG | HEART RATE: 60 BPM | TEMPERATURE: 98 F | WEIGHT: 140 LBS

## 2023-12-26 DIAGNOSIS — Z95.818 PRESENCE OF OTHER CARDIAC IMPLANTS AND GRAFTS: ICD-10-CM

## 2023-12-26 DIAGNOSIS — I47.10 SUPRAVENTRICULAR TACHYCARDIA, UNSPECIFIED: ICD-10-CM

## 2023-12-26 PROCEDURE — 93000 ELECTROCARDIOGRAM COMPLETE: CPT

## 2023-12-26 PROCEDURE — 99213 OFFICE O/P EST LOW 20 MIN: CPT

## 2023-12-26 NOTE — HISTORY OF PRESENT ILLNESS
[FreeTextEntry1] : Pt is a 82 year old male has a medical history of CAD post percutaneous coronary intervention (PCI) to the LAD and the first OMl branch in 2018. A repeat cardiac catheterization in May 2020 confirmed the patency of the stents, revealed mild, non-obstructive CAD, and normal left ventricular function. The patient also underwent implantable loop recorder (ILR) placement in May 2020 for supraventricular tachycardia (SVT). Other medical conditions include diabetes mellitus, hypertension, dyslipidemia, and peripheral arterial disease post drug-coated balloon (DCB) angioplasty to the proximal, middle, and distal superficial femoral artery (SFA) on October 6, 2022.  As noted above the ILR was implanted in 2020 for monitoring of SVT/palpitations by Dr. Moreno. Recently, pt established care with Dr. Mendes on 5/2023.  Pt reports no symptoms and ILR has revealed no arrhythmias in now > 3 years of monitoring and has recently reached RRT.  Pt now presents for follow up s/p ILR explant by Dr. Mendes on 12/4/23.  Today, Mr. Braswell reports he has been feeling well. Denies palpitations. Left parasternal incision site well approximated without erythema, edema, or exudate.

## 2023-12-26 NOTE — DISCUSSION/SUMMARY
[FreeTextEntry1] : Pt is a 82 year old male has a medical history of CAD post percutaneous coronary intervention (PCI) to the LAD and the first OMl branch in 2018. A repeat cardiac catheterization in May 2020 confirmed the patency of the stents, revealed mild, non-obstructive CAD, and normal left ventricular function. The patient also underwent implantable loop recorder (ILR) placement in May 2020 for supraventricular tachycardia (SVT). Other medical conditions include diabetes mellitus, hypertension, dyslipidemia, and peripheral arterial disease post drug-coated balloon (DCB) angioplasty to the proximal, middle, and distal superficial femoral artery (SFA) on October 6, 2022.  As noted above the ILR was implanted in 2020 for monitoring of SVT/palpitations by Dr. Moreno. Recently, pt established care with Dr. Mendes on 5/2023.  Pt reports no symptoms and ILR has revealed no arrhythmias in now > 3 years of monitoring and has recently reached RRT.  Pt now presents for follow up s/p ILR explant by Dr. Mendes on 12/4/23.  Today, Mr. Braswell reports he has been feeling well. Denies palpitations. Left parasternal incision site well approximated without erythema, edema, or exudate.   Recommendation:   Mr. Alberto is doing well s/p ILR explant. SIte healing well, patient denies symptoms. Discussed that if patient experiences recurrent palpitations could then arrange external monitoring for further assessment. Continue cardiology f/u, EP follow up on as needed basis.  Octavia SUMMERS-C  [EKG obtained to assist in diagnosis and management of assessed problem(s)] : EKG obtained to assist in diagnosis and management of assessed problem(s)

## 2024-01-01 ENCOUNTER — RX RENEWAL (OUTPATIENT)
Age: 84
End: 2024-01-01

## 2024-01-09 ENCOUNTER — RX RENEWAL (OUTPATIENT)
Age: 84
End: 2024-01-09

## 2024-01-09 ENCOUNTER — APPOINTMENT (OUTPATIENT)
Dept: ELECTROPHYSIOLOGY | Facility: CLINIC | Age: 84
End: 2024-01-09

## 2024-01-10 NOTE — HISTORY OF PRESENT ILLNESS
[FreeTextEntry1] : Mr. Braswell is a pleasant 82 year old male who presents today for arrhythmia and device management. He has a medical history of CAD post percutaneous coronary intervention (PCI) to the LAD and the first OMl branch in 2018. A repeat cardiac catheterization in May 2020 confirmed the patency of the stents, revealed mild, non-obstructive CAD, and normal left ventricular function. The patient also underwent implantable loop recorder (ILR) placement in May 2020 for supraventricular tachycardia (SVT). Other medical conditions include diabetes mellitus, hypertension, dyslipidemia, and peripheral arterial disease post drug-coated balloon (DCB) angioplasty to the proximal, middle, and distal superficial femoral artery (SFA) on October 6, 2022.   As noted above the ILR was implanted in 2020 for monitoring of SVT/palpitations (Dr. Moreno). Recently established care with Dr. Mendes 5/2023 and reported no symptoms. ILR has revealed no arrhythmias in now > 3 years of monitoring and has recently reached RRT>   Mr. Braswell reports he has been feeling well. Denies palpitations, dizziness, near syncope, syncope.

## 2024-01-10 NOTE — END OF VISIT
[FreeTextEntry3] :   I, Dr. Mendes, personally performed the evaluation and management (E/M) services for this new patient. That E/M includes conducting the clinically appropriate initial history &/or exam, assessing all conditions, and establishing the plan of care. Today, my assistant, Brynn Murguia NP, was here to observe my evaluation and management service for this patient & follow plan of care established by me going forward.

## 2024-01-10 NOTE — DISCUSSION/SUMMARY
[FreeTextEntry1] : Mr. Braswell is a pleasant 82 year old male who presents today for arrhythmia and device management. He has a medical history of CAD post percutaneous coronary intervention (PCI) to the LAD and the first OMl branch in 2018. A repeat cardiac catheterization in May 2020 confirmed the patency of the stents, revealed mild, non-obstructive CAD, and normal left ventricular function. The patient also underwent implantable loop recorder (ILR) placement in May 2020 for supraventricular tachycardia (SVT). Other medical conditions include diabetes mellitus, hypertension, dyslipidemia, and peripheral arterial disease post drug-coated balloon (DCB) angioplasty to the proximal, middle, and distal superficial femoral artery (SFA) on October 6, 2022.   As noted above the ILR was implanted in 2020 for monitoring of SVT/palpitations (Dr. Moreno). Recently established care with Dr. Mendes 5/2023 and reported no symptoms. ILR has revealed no arrhythmias in now > 3 years of monitoring and has recently reached RRT>   Mr. Braswell reports he has been feeling well. Denies palpitations, dizziness, near syncope, syncope.   Recommendation:   -In summary, Mr. Braswell is a pleasant 82 year old female with a history of CAD s/p PCI, SVT/palpitations s/p ILR in 2020 (Dr. Moreno), PAD s/p percutaneous intervention, HTN and DM. The patient reports to be doing well in terms of his arrhythmia and denies any new palpitations. ILR has now reached RRT. Device explant recommended. Patient wishes to proceed. We discussed if symptoms were to return in future then periodic external monitoring could be arranged. -To arrange ILR explant with Dr. Mendes at [facility].   Octavia FONTANEZ

## 2024-01-26 ENCOUNTER — RX RENEWAL (OUTPATIENT)
Age: 84
End: 2024-01-26

## 2024-01-26 ENCOUNTER — APPOINTMENT (OUTPATIENT)
Dept: CARDIOLOGY | Facility: CLINIC | Age: 84
End: 2024-01-26

## 2024-02-08 ENCOUNTER — APPOINTMENT (OUTPATIENT)
Dept: INTERNAL MEDICINE | Facility: CLINIC | Age: 84
End: 2024-02-08
Payer: MEDICARE

## 2024-02-08 VITALS
HEART RATE: 68 BPM | SYSTOLIC BLOOD PRESSURE: 133 MMHG | OXYGEN SATURATION: 98 % | BODY MASS INDEX: 24.63 KG/M2 | DIASTOLIC BLOOD PRESSURE: 77 MMHG | WEIGHT: 139 LBS | TEMPERATURE: 97.7 F | HEIGHT: 63 IN | RESPIRATION RATE: 16 BRPM

## 2024-02-08 DIAGNOSIS — N18.30 CHRONIC KIDNEY DISEASE, STAGE 3 UNSPECIFIED: ICD-10-CM

## 2024-02-08 PROCEDURE — 99214 OFFICE O/P EST MOD 30 MIN: CPT

## 2024-02-08 PROCEDURE — 83036 HEMOGLOBIN GLYCOSYLATED A1C: CPT | Mod: QW

## 2024-02-08 PROCEDURE — 36415 COLL VENOUS BLD VENIPUNCTURE: CPT

## 2024-02-08 NOTE — ASSESSMENT
[FreeTextEntry1] : Shawn is a 81 yo M w PMHx CAD s/p PCI, ILR placed 5/20 for SVT, T2DM, HTN, HLD and PAD and CKD stage IV here for follow up Daughter states that he discovered he was taking 10 pills of magnesium (400 mg each) and this was the reason he was having diarrhea and palpitations.   Patient feels well and denies any acute complaints during this visit   #CAD s/p PCI #SVT s/p ILR - Pt sees Cardio Dr. Johanny Breaux - Continue ASA and Plavix  #T2DM - Pt sees Endo Dr. Perlman - Pt sees optho Dr. Ferreira (optho) - A1c 7.4, cont current tx - Discussed dietary modifications such as low cab diet, pt admits to eating plantains every day, discussed risks of worsening diabetes - Continue home Farxiga and Trulicity (added recently) Discussed diabetic diet. Carb allowance of around 130-140 g carbs daily. Educated about foot hygiene and need to see opthalmology and podiatry yearly.  #HTN Well controlled - Discussed low salt diet - Clonidine.2 mg TID mg, cont amlodipine 10 mg qd  Iron def anemia - improved Hg 14 Cont iron Fu cbc No signs of symptoms of bleeding  #HLD - Continue home Rosuvastatin - Discussed diet and exercise Educated eating whole grain foods rich in soluble fiber such as oats and Omega 3 rich fish such as salmon, tout, sardines and bean based meals such as kidney beans, chickpeas and lentils. Small portions of nuts. Limit sugars and alcohol.  #PAD s/p recent angiogram - Follows vascular - Continue ASA and Plavix - Xarelto  #CKD stage IV - Will check CMP - Following with nephro  #History of prostate cancer s/p radiation therapy (2015) - Pt sees Urology Dr. Daniel and Oncologist Dr. Mcintyre - Has PSA checked with urology   rto 3 mo

## 2024-02-08 NOTE — HISTORY OF PRESENT ILLNESS
[FreeTextEntry1] : FU HTN, T2DM, HLD, ANEMIA, CKD. [de-identified] : Shawn is a 82 yo M w PMHx CAD s/p PCI, ILR placed 5/20 for SVT, T2DM, HTN, HLD and PAD and CKD stage IV here for follow up Patient has been feeling very good.  Patient has been out of his glipizide for the past 10 days and did not call office in order to refill.   Patient feels well and denies any acute complaints during this visit

## 2024-02-11 ENCOUNTER — TRANSCRIPTION ENCOUNTER (OUTPATIENT)
Age: 84
End: 2024-02-11

## 2024-02-15 ENCOUNTER — RX CHANGE (OUTPATIENT)
Age: 84
End: 2024-02-15

## 2024-02-15 ENCOUNTER — APPOINTMENT (OUTPATIENT)
Dept: CARDIOLOGY | Facility: CLINIC | Age: 84
End: 2024-02-15
Payer: MEDICARE

## 2024-02-15 VITALS
SYSTOLIC BLOOD PRESSURE: 166 MMHG | OXYGEN SATURATION: 98 % | BODY MASS INDEX: 24.8 KG/M2 | HEIGHT: 63 IN | DIASTOLIC BLOOD PRESSURE: 80 MMHG | HEART RATE: 80 BPM | WEIGHT: 140 LBS

## 2024-02-15 VITALS — DIASTOLIC BLOOD PRESSURE: 80 MMHG | SYSTOLIC BLOOD PRESSURE: 142 MMHG

## 2024-02-15 DIAGNOSIS — Z95.5 PRESENCE OF CORONARY ANGIOPLASTY IMPLANT AND GRAFT: ICD-10-CM

## 2024-02-15 DIAGNOSIS — I47.29 OTHER VENTRICULAR TACHYCARDIA: ICD-10-CM

## 2024-02-15 DIAGNOSIS — I73.9 PERIPHERAL VASCULAR DISEASE, UNSPECIFIED: ICD-10-CM

## 2024-02-15 LAB
ALBUMIN SERPL ELPH-MCNC: 4.1 G/DL
ALP BLD-CCNC: 93 U/L
ALT SERPL-CCNC: 15 U/L
ANION GAP SERPL CALC-SCNC: 13 MMOL/L
AST SERPL-CCNC: 18 U/L
BASOPHILS # BLD AUTO: 0.06 K/UL
BASOPHILS NFR BLD AUTO: 0.9 %
BILIRUB SERPL-MCNC: 0.4 MG/DL
BUN SERPL-MCNC: 34 MG/DL
CALCIUM SERPL-MCNC: 9.8 MG/DL
CHLORIDE SERPL-SCNC: 105 MMOL/L
CHOLEST SERPL-MCNC: 143 MG/DL
CO2 SERPL-SCNC: 24 MMOL/L
CREAT SERPL-MCNC: 1.9 MG/DL
CREAT SPEC-SCNC: 110 MG/DL
EGFR: 34 ML/MIN/1.73M2
EOSINOPHIL # BLD AUTO: 0.2 K/UL
EOSINOPHIL NFR BLD AUTO: 3.1 %
ESTIMATED AVERAGE GLUCOSE: 128 MG/DL
FOLATE SERPL-MCNC: >20 NG/ML
GLUCOSE SERPL-MCNC: 85 MG/DL
HBA1C MFR BLD HPLC: 6.1 %
HCT VFR BLD CALC: 42.4 %
HDLC SERPL-MCNC: 46 MG/DL
HGB BLD-MCNC: 13.8 G/DL
IMM GRANULOCYTES NFR BLD AUTO: 0.3 %
IRON SATN MFR SERPL: 29 %
IRON SERPL-MCNC: 92 UG/DL
LDLC SERPL CALC-MCNC: 77 MG/DL
LYMPHOCYTES # BLD AUTO: 2.27 K/UL
LYMPHOCYTES NFR BLD AUTO: 34.7 %
MAN DIFF?: NORMAL
MCHC RBC-ENTMCNC: 27 PG
MCHC RBC-ENTMCNC: 32.5 GM/DL
MCV RBC AUTO: 82.8 FL
MICROALBUMIN 24H UR DL<=1MG/L-MCNC: 50.9 MG/DL
MICROALBUMIN/CREAT 24H UR-RTO: 461 MG/G
MONOCYTES # BLD AUTO: 0.67 K/UL
MONOCYTES NFR BLD AUTO: 10.2 %
NEUTROPHILS # BLD AUTO: 3.33 K/UL
NEUTROPHILS NFR BLD AUTO: 50.8 %
NONHDLC SERPL-MCNC: 97 MG/DL
PLATELET # BLD AUTO: 224 K/UL
POTASSIUM SERPL-SCNC: 5 MMOL/L
PROT SERPL-MCNC: 6.8 G/DL
RBC # BLD: 5.12 M/UL
RBC # FLD: 15.9 %
SODIUM SERPL-SCNC: 142 MMOL/L
TIBC SERPL-MCNC: 322 UG/DL
TRIGL SERPL-MCNC: 110 MG/DL
UIBC SERPL-MCNC: 230 UG/DL
VIT B12 SERPL-MCNC: 881 PG/ML
WBC # FLD AUTO: 6.55 K/UL

## 2024-02-15 PROCEDURE — 99213 OFFICE O/P EST LOW 20 MIN: CPT

## 2024-02-15 RX ORDER — RIVAROXABAN 2.5 MG/1
2.5 TABLET, FILM COATED ORAL
Qty: 60 | Refills: 3 | Status: ACTIVE | COMMUNITY
Start: 2022-11-07 | End: 1900-01-01

## 2024-02-19 PROBLEM — I47.29 NSVT (NONSUSTAINED VENTRICULAR TACHYCARDIA): Status: ACTIVE | Noted: 2023-04-18

## 2024-02-19 PROBLEM — I73.9 PAD (PERIPHERAL ARTERY DISEASE): Status: ACTIVE | Noted: 2022-08-15

## 2024-02-19 PROBLEM — Z95.5 S/P CORONARY ARTERY STENT PLACEMENT: Status: ACTIVE | Noted: 2022-08-01

## 2024-02-19 NOTE — ASSESSMENT
[FreeTextEntry1] : 80 y/o Male with h/o CAD s/p PCI LAD/OM1 '18 with repeat cardiac catheterization in 5/20 demonstrating patent stents with mild, non-obstructive CAD, normal LV fxn, ILR placed 5/20 for SVT, DM, HTN, Dyslipidemia and PAD s/p DCB to the prox mid and distal SFA on 10/6/2022, presents for follow up   1. PAD s/p DCB to the prox mid and distal SFA on 10/6/2022, with Kaitlin IIb classification  - patient notes no symtpoms or claudication of the bilateral lower extremities  - based on COMPASS trial on Xarelto 2.5mg dosing (based on the COMPASS Trial) and continue with ASA 81mg po q daily  - bedside Doppler of the RLE: monophasic flow in the DP/PT and LLE: biphasic flow in the DP and PT  - US arterial duplex done shows  Moderate atherosclerosis with 20 - 49% stenosis in the proximal SFA. Left Lower Extremity Moderate atherosclerosis with 20 - 49% stenosis in the mid SFA and 20 - 49% stenosis in the popliteal artery. 3. There is a small calcified mobile plaque measuring 0.14 cm in the left CFA measuring. - Continue with high intensity, Crestor 40mg po q daily  - will follow up in 4 months    2. Hypertension  - blood pressure is better controlled in the office today  - on Clonidine 0.3 mg po q BID, Clonidine 0.1 mg po at 1-2 pm,  Norvasc 10mg po q daily and  ? if patient is still taking Coreg to 25mg po 12hrs - discussed to continue with ambulatory blood pressure monitoring   3. CAD s/p LAD/OM1 '18 - no active chest pain or shortness of breath  - on ASA Xarelto 2.5 and statin    4. SVT and possible syncope s/p ILR  - ILR interrogated during this office visit  - no evidence of recent SVT and follows with ALEJA Breaux D.O. [facility]/Vascular Cardiology -[facility] Telephone # [phone]

## 2024-02-19 NOTE — CARDIOLOGY SUMMARY
[de-identified] : 12/8/2022: Sinus  Bradycardia @ 59 bpm WITHIN NORMAL LIMITS\par  10/7/2022: Sinus  Rhythm @ 61 bpm WITHIN NORMAL LIMITS [de-identified] : 5/12/2020:  1. Normal global left ventricular systolic function.\par   2. Left ventricular ejection fraction, by visual estimation, is 65 to 70%.\par   3. Mildly increased LV wall thickness.\par   4. No obvious left ventricle regional wall motion abnormalities visualized.\par   5. Normal right ventricle size and normal systolic function.\par   6. The left atrium is normal in size.\par   7. The right atrium is normal in size.\par   8. Mild thickening and calcification of the anterior and posterior mitral valve leaflets.\par   9. Mild aortic valve leaflet calcification. Small, round echodensities on the aortic valve leaflet tips which may be consistent with calcification. No aortic stenosis.\par  10. Dilated proximal ascending aorta (3.8 cm; 2.1 cm/m S 2).\par  11. Recommend clinical correlation with the above findings. [de-identified] : Peripheral Angiography: 10/6/2022: s/p Jetsream atherectomy, balloon angioplasty and DCB angioplasty using 6 x 220 mm balloon of the prox mid and distal SFA \par   [de-identified] : US arterial duplex: 10/10/2022: 1. Right Lower extremity  -  Moderate atherosclerosis with 20 - 49% stenosis in the proximal SFA.\par  2. Left Lower Extremity  Moderate atherosclerosis with 20 - 49% stenosis in the mid SFA and 20 - 49%  stenosis in the popliteal artery. 3. There is a small calcified mobile plaque measuring 0.14 cm in the left CFA measuring.\par  \par  \par  US arterial duplex: 8/11/2022: 1. Ankle/brachial indices are in the ischemic range bilaterally. 2. Right Lower Extremity  -  Moderate atherosclerosis with 20 - 49% stenosis in the proximal superficial  femoral artery. \par  3. Left Lower Extremity  -   Severe atherosclerosis with 50 - 75% stenosis in the popliteal artery.\par

## 2024-02-19 NOTE — PHYSICAL EXAM
[Well Developed] : well developed [Well Nourished] : well nourished [No Acute Distress] : no acute distress [Normal Conjunctiva] : normal conjunctiva [Normal Venous Pressure] : normal venous pressure [No Carotid Bruit] : no carotid bruit [Normal S1, S2] : normal S1, S2 [No Murmur] : no murmur [No Rub] : no rub [No Gallop] : no gallop [Clear Lung Fields] : clear lung fields [Good Air Entry] : good air entry [No Respiratory Distress] : no respiratory distress  [Soft] : abdomen soft [Non Tender] : non-tender [No Masses/organomegaly] : no masses/organomegaly [Normal Gait] : normal gait [Normal Bowel Sounds] : normal bowel sounds [No Edema] : no edema [No Cyanosis] : no cyanosis [No Clubbing] : no clubbing [No Rash] : no rash [No Varicosities] : no varicosities [No Skin Lesions] : no skin lesions [Moves all extremities] : moves all extremities [No Focal Deficits] : no focal deficits [Alert and Oriented] : alert and oriented [Normal Speech] : normal speech [Normal memory] : normal memory [de-identified] : Non palpabale DP/PT pulses bilaterally; LLE: + doppler signal of the PT and DP RLE: + doppler signal of the DPT/PT

## 2024-02-19 NOTE — HISTORY OF PRESENT ILLNESS
[FreeTextEntry1] : 84 y/o Male with h/o CAD s/p PCI LAD/OM1 '18 with repeat cardiac catheterization in 5/20 demonstrating patent stents with mild, non-obstructive CAD, normal LV fxn, ILR placed 5/20 for SVT, DM, HTN, Dyslipidemia and PAD s/p DCB to the prox mid and distal SFA on 10/6/2022, presents for follow up   Patient presents with no active complaints of any chest pain shortness of breath palpitations dizziness lightheadedness or syncope  Blood pressure was elevated in the office and on repeat 142/80 No cramping or claudication symptoms of the bilateral lower extremities no ulceration

## 2024-02-26 ENCOUNTER — RX CHANGE (OUTPATIENT)
Age: 84
End: 2024-02-26

## 2024-03-18 DIAGNOSIS — K21.9 GASTRO-ESOPHAGEAL REFLUX DISEASE W/OUT ESOPHAGITIS: ICD-10-CM

## 2024-03-18 RX ORDER — PANTOPRAZOLE 20 MG/1
20 TABLET, DELAYED RELEASE ORAL
Qty: 90 | Refills: 1 | Status: ACTIVE | COMMUNITY
Start: 2022-01-31 | End: 1900-01-01

## 2024-04-05 ENCOUNTER — RX RENEWAL (OUTPATIENT)
Age: 84
End: 2024-04-05

## 2024-04-23 ENCOUNTER — RX RENEWAL (OUTPATIENT)
Age: 84
End: 2024-04-23

## 2024-04-23 RX ORDER — CARVEDILOL 25 MG/1
25 TABLET, FILM COATED ORAL TWICE DAILY
Qty: 180 | Refills: 2 | Status: ACTIVE | COMMUNITY
Start: 2022-10-06 | End: 1900-01-01

## 2024-04-30 ENCOUNTER — INPATIENT (INPATIENT)
Facility: HOSPITAL | Age: 84
LOS: 2 days | Discharge: HOME | DRG: 607 | End: 2024-05-03
Attending: THORACIC SURGERY (CARDIOTHORACIC VASCULAR SURGERY) | Admitting: THORACIC SURGERY (CARDIOTHORACIC VASCULAR SURGERY)
Payer: MEDICARE

## 2024-04-30 VITALS
SYSTOLIC BLOOD PRESSURE: 183 MMHG | RESPIRATION RATE: 20 BRPM | TEMPERATURE: 98 F | HEIGHT: 63 IN | WEIGHT: 141.1 LBS | HEART RATE: 69 BPM | OXYGEN SATURATION: 98 % | DIASTOLIC BLOOD PRESSURE: 89 MMHG

## 2024-04-30 DIAGNOSIS — Z98.890 OTHER SPECIFIED POSTPROCEDURAL STATES: Chronic | ICD-10-CM

## 2024-04-30 DIAGNOSIS — Z95.5 PRESENCE OF CORONARY ANGIOPLASTY IMPLANT AND GRAFT: Chronic | ICD-10-CM

## 2024-04-30 DIAGNOSIS — K40.90 UNILATERAL INGUINAL HERNIA, WITHOUT OBSTRUCTION OR GANGRENE, NOT SPECIFIED AS RECURRENT: Chronic | ICD-10-CM

## 2024-04-30 LAB
ALBUMIN SERPL ELPH-MCNC: 3.8 G/DL — SIGNIFICANT CHANGE UP (ref 3.3–5.2)
ALP SERPL-CCNC: 97 U/L — SIGNIFICANT CHANGE UP (ref 40–120)
ALT FLD-CCNC: 21 U/L — SIGNIFICANT CHANGE UP
ANION GAP SERPL CALC-SCNC: 10 MMOL/L — SIGNIFICANT CHANGE UP (ref 5–17)
APTT BLD: 35.4 SEC — SIGNIFICANT CHANGE UP (ref 24.5–35.6)
AST SERPL-CCNC: 23 U/L — SIGNIFICANT CHANGE UP
BASOPHILS # BLD AUTO: 0.07 K/UL — SIGNIFICANT CHANGE UP (ref 0–0.2)
BASOPHILS NFR BLD AUTO: 1 % — SIGNIFICANT CHANGE UP (ref 0–2)
BILIRUB SERPL-MCNC: 0.6 MG/DL — SIGNIFICANT CHANGE UP (ref 0.4–2)
BUN SERPL-MCNC: 23.3 MG/DL — HIGH (ref 8–20)
CALCIUM SERPL-MCNC: 9.3 MG/DL — SIGNIFICANT CHANGE UP (ref 8.4–10.5)
CHLORIDE SERPL-SCNC: 101 MMOL/L — SIGNIFICANT CHANGE UP (ref 96–108)
CO2 SERPL-SCNC: 26 MMOL/L — SIGNIFICANT CHANGE UP (ref 22–29)
CREAT SERPL-MCNC: 1.69 MG/DL — HIGH (ref 0.5–1.3)
EGFR: 39 ML/MIN/1.73M2 — LOW
EOSINOPHIL # BLD AUTO: 0.25 K/UL — SIGNIFICANT CHANGE UP (ref 0–0.5)
EOSINOPHIL NFR BLD AUTO: 3.6 % — SIGNIFICANT CHANGE UP (ref 0–6)
GLUCOSE SERPL-MCNC: 85 MG/DL — SIGNIFICANT CHANGE UP (ref 70–99)
HCT VFR BLD CALC: 41.5 % — SIGNIFICANT CHANGE UP (ref 39–50)
HGB BLD-MCNC: 14 G/DL — SIGNIFICANT CHANGE UP (ref 13–17)
IMM GRANULOCYTES NFR BLD AUTO: 0.1 % — SIGNIFICANT CHANGE UP (ref 0–0.9)
INR BLD: 1.01 RATIO — SIGNIFICANT CHANGE UP (ref 0.85–1.18)
LYMPHOCYTES # BLD AUTO: 2.88 K/UL — SIGNIFICANT CHANGE UP (ref 1–3.3)
LYMPHOCYTES # BLD AUTO: 41.7 % — SIGNIFICANT CHANGE UP (ref 13–44)
MCHC RBC-ENTMCNC: 27.8 PG — SIGNIFICANT CHANGE UP (ref 27–34)
MCHC RBC-ENTMCNC: 33.7 GM/DL — SIGNIFICANT CHANGE UP (ref 32–36)
MCV RBC AUTO: 82.5 FL — SIGNIFICANT CHANGE UP (ref 80–100)
MONOCYTES # BLD AUTO: 0.82 K/UL — SIGNIFICANT CHANGE UP (ref 0–0.9)
MONOCYTES NFR BLD AUTO: 11.9 % — SIGNIFICANT CHANGE UP (ref 2–14)
NEUTROPHILS # BLD AUTO: 2.88 K/UL — SIGNIFICANT CHANGE UP (ref 1.8–7.4)
NEUTROPHILS NFR BLD AUTO: 41.7 % — LOW (ref 43–77)
PLATELET # BLD AUTO: 204 K/UL — SIGNIFICANT CHANGE UP (ref 150–400)
POTASSIUM SERPL-MCNC: 4.7 MMOL/L — SIGNIFICANT CHANGE UP (ref 3.5–5.3)
POTASSIUM SERPL-SCNC: 4.7 MMOL/L — SIGNIFICANT CHANGE UP (ref 3.5–5.3)
PROT SERPL-MCNC: 6.7 G/DL — SIGNIFICANT CHANGE UP (ref 6.6–8.7)
PROTHROM AB SERPL-ACNC: 11.2 SEC — SIGNIFICANT CHANGE UP (ref 9.5–13)
RBC # BLD: 5.03 M/UL — SIGNIFICANT CHANGE UP (ref 4.2–5.8)
RBC # FLD: 16.4 % — HIGH (ref 10.3–14.5)
SODIUM SERPL-SCNC: 137 MMOL/L — SIGNIFICANT CHANGE UP (ref 135–145)
WBC # BLD: 6.91 K/UL — SIGNIFICANT CHANGE UP (ref 3.8–10.5)
WBC # FLD AUTO: 6.91 K/UL — SIGNIFICANT CHANGE UP (ref 3.8–10.5)

## 2024-04-30 PROCEDURE — 76604 US EXAM CHEST: CPT | Mod: 26

## 2024-04-30 PROCEDURE — 71260 CT THORAX DX C+: CPT | Mod: 26,MC

## 2024-04-30 PROCEDURE — 99285 EMERGENCY DEPT VISIT HI MDM: CPT

## 2024-04-30 NOTE — ED PROVIDER NOTE - PROGRESS NOTE DETAILS
CT results as noted and discussed with patient's daughter.  Pt seen and evaluated by CT  Surgery and after discussion with Dr. Lugo patient to be admitted to service of Dr. Lugo for probable drainage tomorrow

## 2024-04-30 NOTE — ED PROVIDER NOTE - CLINICAL SUMMARY MEDICAL DECISION MAKING FREE TEXT BOX
labs and US reviewed.  Pt with thoracic mass.  Pt signed out to dr. torres pending CT and reassessment.

## 2024-04-30 NOTE — ED ADULT NURSE NOTE - ED STAT RN HANDOFF DETAILS
Patient A&Ox3, resting in bed, no distress noted at this time, vitals stable, patient being transported with telemonitoring device  and  in place. hand off report given to GUS Bernal

## 2024-04-30 NOTE — ED ADULT NURSE NOTE - NSFALLUNIVINTERV_ED_ALL_ED
Bed/Stretcher in lowest position, wheels locked, appropriate side rails in place/Call bell, personal items and telephone in reach/Instruct patient to call for assistance before getting out of bed/chair/stretcher/Non-slip footwear applied when patient is off stretcher/[place] to call system/Physically safe environment - no spills, clutter or unnecessary equipment/Purposeful proactive rounding/Room/bathroom lighting operational, light cord in reach

## 2024-04-30 NOTE — ED PROVIDER NOTE - OBJECTIVE STATEMENT
Pt is an 82 yo M co R scapular lump. Pt states that he recently noticed it and that it bothers him when he moves his R arm. no other complaints.

## 2024-04-30 NOTE — ED PROVIDER NOTE - PHYSICAL EXAMINATION
Constitutional - well-developed.   Head - NCAT. Airway patent.   Eyes - PERRL.   CV - RRR. no murmur. no edema.   Pulm - CTAB.   Abd - soft, nt. no rebound. no guarding.   Neuro - A&Ox3. strength 5/5 x4. sensation intact x4. normal gait.   Skin - No rash. .  MSK - normal ROM.   soft mobile mass to R scapular area

## 2024-04-30 NOTE — ED ADULT NURSE REASSESSMENT NOTE - NS ED NURSE REASSESS COMMENT FT1
Pt received from dk Dudley. Pt is resting in stretcher comfortably at this time, no apparent distress noted at this time. Pt safety maintained. Pt denies any complaints at this time.

## 2024-04-30 NOTE — ED ADULT NURSE NOTE - OBJECTIVE STATEMENT
Pt A&Ox4 c/o lump to R scapula noticed it was there on Sunday but not sure how long it has been there. PMHx of prostate CA. Denies pain, fevers, CP, SOB. Airway patent. Respirations even and unlabored. Bed locked in lowest position with sierails raised.

## 2024-05-01 ENCOUNTER — TRANSCRIPTION ENCOUNTER (OUTPATIENT)
Age: 84
End: 2024-05-01

## 2024-05-01 ENCOUNTER — RESULT REVIEW (OUTPATIENT)
Age: 84
End: 2024-05-01

## 2024-05-01 DIAGNOSIS — R22.2 LOCALIZED SWELLING, MASS AND LUMP, TRUNK: ICD-10-CM

## 2024-05-01 DIAGNOSIS — E11.9 TYPE 2 DIABETES MELLITUS WITHOUT COMPLICATIONS: ICD-10-CM

## 2024-05-01 DIAGNOSIS — E78.5 HYPERLIPIDEMIA, UNSPECIFIED: ICD-10-CM

## 2024-05-01 DIAGNOSIS — I10 ESSENTIAL (PRIMARY) HYPERTENSION: ICD-10-CM

## 2024-05-01 DIAGNOSIS — I25.10 ATHEROSCLEROTIC HEART DISEASE OF NATIVE CORONARY ARTERY WITHOUT ANGINA PECTORIS: ICD-10-CM

## 2024-05-01 LAB
A1C WITH ESTIMATED AVERAGE GLUCOSE RESULT: 6.2 % — HIGH (ref 4–5.6)
ANION GAP SERPL CALC-SCNC: 12 MMOL/L — SIGNIFICANT CHANGE UP (ref 5–17)
APTT BLD: 35.6 SEC — SIGNIFICANT CHANGE UP (ref 24.5–35.6)
BASOPHILS # BLD AUTO: 0.08 K/UL — SIGNIFICANT CHANGE UP (ref 0–0.2)
BASOPHILS NFR BLD AUTO: 1.1 % — SIGNIFICANT CHANGE UP (ref 0–2)
BUN SERPL-MCNC: 24 MG/DL — HIGH (ref 8–20)
CALCIUM SERPL-MCNC: 9.3 MG/DL — SIGNIFICANT CHANGE UP (ref 8.4–10.5)
CHLORIDE SERPL-SCNC: 100 MMOL/L — SIGNIFICANT CHANGE UP (ref 96–108)
CO2 SERPL-SCNC: 25 MMOL/L — SIGNIFICANT CHANGE UP (ref 22–29)
CREAT SERPL-MCNC: 1.64 MG/DL — HIGH (ref 0.5–1.3)
EGFR: 41 ML/MIN/1.73M2 — LOW
EOSINOPHIL # BLD AUTO: 0.24 K/UL — SIGNIFICANT CHANGE UP (ref 0–0.5)
EOSINOPHIL NFR BLD AUTO: 3.3 % — SIGNIFICANT CHANGE UP (ref 0–6)
ESTIMATED AVERAGE GLUCOSE: 131 MG/DL — HIGH (ref 68–114)
GLUCOSE BLDC GLUCOMTR-MCNC: 104 MG/DL — HIGH (ref 70–99)
GLUCOSE BLDC GLUCOMTR-MCNC: 110 MG/DL — HIGH (ref 70–99)
GLUCOSE BLDC GLUCOMTR-MCNC: 138 MG/DL — HIGH (ref 70–99)
GLUCOSE SERPL-MCNC: 111 MG/DL — HIGH (ref 70–99)
HCT VFR BLD CALC: 43.4 % — SIGNIFICANT CHANGE UP (ref 39–50)
HGB BLD-MCNC: 14.9 G/DL — SIGNIFICANT CHANGE UP (ref 13–17)
IMM GRANULOCYTES NFR BLD AUTO: 0.3 % — SIGNIFICANT CHANGE UP (ref 0–0.9)
INR BLD: 0.96 RATIO — SIGNIFICANT CHANGE UP (ref 0.85–1.18)
LYMPHOCYTES # BLD AUTO: 2.19 K/UL — SIGNIFICANT CHANGE UP (ref 1–3.3)
LYMPHOCYTES # BLD AUTO: 30.2 % — SIGNIFICANT CHANGE UP (ref 13–44)
MAGNESIUM SERPL-MCNC: 2.4 MG/DL — SIGNIFICANT CHANGE UP (ref 1.6–2.6)
MCHC RBC-ENTMCNC: 27.9 PG — SIGNIFICANT CHANGE UP (ref 27–34)
MCHC RBC-ENTMCNC: 34.3 GM/DL — SIGNIFICANT CHANGE UP (ref 32–36)
MCV RBC AUTO: 81.1 FL — SIGNIFICANT CHANGE UP (ref 80–100)
MONOCYTES # BLD AUTO: 0.66 K/UL — SIGNIFICANT CHANGE UP (ref 0–0.9)
MONOCYTES NFR BLD AUTO: 9.1 % — SIGNIFICANT CHANGE UP (ref 2–14)
NEUTROPHILS # BLD AUTO: 4.06 K/UL — SIGNIFICANT CHANGE UP (ref 1.8–7.4)
NEUTROPHILS NFR BLD AUTO: 56 % — SIGNIFICANT CHANGE UP (ref 43–77)
PLATELET # BLD AUTO: 210 K/UL — SIGNIFICANT CHANGE UP (ref 150–400)
POTASSIUM SERPL-MCNC: 4.2 MMOL/L — SIGNIFICANT CHANGE UP (ref 3.5–5.3)
POTASSIUM SERPL-SCNC: 4.2 MMOL/L — SIGNIFICANT CHANGE UP (ref 3.5–5.3)
PROTHROM AB SERPL-ACNC: 10.7 SEC — SIGNIFICANT CHANGE UP (ref 9.5–13)
RBC # BLD: 5.35 M/UL — SIGNIFICANT CHANGE UP (ref 4.2–5.8)
RBC # FLD: 16.5 % — HIGH (ref 10.3–14.5)
SODIUM SERPL-SCNC: 137 MMOL/L — SIGNIFICANT CHANGE UP (ref 135–145)
WBC # BLD: 7.25 K/UL — SIGNIFICANT CHANGE UP (ref 3.8–10.5)
WBC # FLD AUTO: 7.25 K/UL — SIGNIFICANT CHANGE UP (ref 3.8–10.5)

## 2024-05-01 PROCEDURE — 88305 TISSUE EXAM BY PATHOLOGIST: CPT | Mod: 26

## 2024-05-01 PROCEDURE — 71045 X-RAY EXAM CHEST 1 VIEW: CPT | Mod: 26

## 2024-05-01 PROCEDURE — 76942 ECHO GUIDE FOR BIOPSY: CPT | Mod: 26

## 2024-05-01 PROCEDURE — 99222 1ST HOSP IP/OBS MODERATE 55: CPT

## 2024-05-01 PROCEDURE — 88112 CYTOPATH CELL ENHANCE TECH: CPT | Mod: 26

## 2024-05-01 PROCEDURE — 10160 PNXR ASPIR ABSC HMTMA BULLA: CPT

## 2024-05-01 RX ORDER — DEXTROSE 10 % IN WATER 10 %
125 INTRAVENOUS SOLUTION INTRAVENOUS ONCE
Refills: 0 | Status: DISCONTINUED | OUTPATIENT
Start: 2024-05-01 | End: 2024-05-03

## 2024-05-01 RX ORDER — FERROUS SULFATE 325(65) MG
325 TABLET ORAL DAILY
Refills: 0 | Status: DISCONTINUED | OUTPATIENT
Start: 2024-05-01 | End: 2024-05-03

## 2024-05-01 RX ORDER — AMLODIPINE BESYLATE 2.5 MG/1
10 TABLET ORAL DAILY
Refills: 0 | Status: DISCONTINUED | OUTPATIENT
Start: 2024-05-01 | End: 2024-05-03

## 2024-05-01 RX ORDER — INSULIN LISPRO 100/ML
VIAL (ML) SUBCUTANEOUS
Refills: 0 | Status: DISCONTINUED | OUTPATIENT
Start: 2024-05-01 | End: 2024-05-03

## 2024-05-01 RX ORDER — ATORVASTATIN CALCIUM 80 MG/1
80 TABLET, FILM COATED ORAL AT BEDTIME
Refills: 0 | Status: DISCONTINUED | OUTPATIENT
Start: 2024-05-01 | End: 2024-05-03

## 2024-05-01 RX ORDER — LORATADINE 10 MG/1
10 TABLET ORAL DAILY
Refills: 0 | Status: DISCONTINUED | OUTPATIENT
Start: 2024-05-01 | End: 2024-05-03

## 2024-05-01 RX ORDER — CARVEDILOL PHOSPHATE 80 MG/1
25 CAPSULE, EXTENDED RELEASE ORAL EVERY 12 HOURS
Refills: 0 | Status: DISCONTINUED | OUTPATIENT
Start: 2024-05-01 | End: 2024-05-03

## 2024-05-01 RX ORDER — DEXTROSE 50 % IN WATER 50 %
25 SYRINGE (ML) INTRAVENOUS ONCE
Refills: 0 | Status: DISCONTINUED | OUTPATIENT
Start: 2024-05-01 | End: 2024-05-03

## 2024-05-01 RX ORDER — ASPIRIN/CALCIUM CARB/MAGNESIUM 324 MG
81 TABLET ORAL DAILY
Refills: 0 | Status: DISCONTINUED | OUTPATIENT
Start: 2024-05-01 | End: 2024-05-03

## 2024-05-01 RX ORDER — PANTOPRAZOLE SODIUM 20 MG/1
40 TABLET, DELAYED RELEASE ORAL
Refills: 0 | Status: DISCONTINUED | OUTPATIENT
Start: 2024-05-01 | End: 2024-05-03

## 2024-05-01 RX ORDER — DEXTROSE 50 % IN WATER 50 %
15 SYRINGE (ML) INTRAVENOUS ONCE
Refills: 0 | Status: DISCONTINUED | OUTPATIENT
Start: 2024-05-01 | End: 2024-05-03

## 2024-05-01 RX ORDER — SENNA PLUS 8.6 MG/1
2 TABLET ORAL AT BEDTIME
Refills: 0 | Status: DISCONTINUED | OUTPATIENT
Start: 2024-05-01 | End: 2024-05-03

## 2024-05-01 RX ORDER — GLUCAGON INJECTION, SOLUTION 0.5 MG/.1ML
1 INJECTION, SOLUTION SUBCUTANEOUS ONCE
Refills: 0 | Status: DISCONTINUED | OUTPATIENT
Start: 2024-05-01 | End: 2024-05-03

## 2024-05-01 RX ORDER — HEPARIN SODIUM 5000 [USP'U]/ML
5000 INJECTION INTRAVENOUS; SUBCUTANEOUS EVERY 12 HOURS
Refills: 0 | Status: DISCONTINUED | OUTPATIENT
Start: 2024-05-01 | End: 2024-05-01

## 2024-05-01 RX ORDER — ACETAMINOPHEN 500 MG
650 TABLET ORAL EVERY 6 HOURS
Refills: 0 | Status: DISCONTINUED | OUTPATIENT
Start: 2024-05-01 | End: 2024-05-03

## 2024-05-01 RX ORDER — SODIUM CHLORIDE 9 MG/ML
1000 INJECTION, SOLUTION INTRAVENOUS
Refills: 0 | Status: DISCONTINUED | OUTPATIENT
Start: 2024-05-01 | End: 2024-05-03

## 2024-05-01 RX ADMIN — AMLODIPINE BESYLATE 10 MILLIGRAM(S): 2.5 TABLET ORAL at 07:38

## 2024-05-01 RX ADMIN — CARVEDILOL PHOSPHATE 25 MILLIGRAM(S): 80 CAPSULE, EXTENDED RELEASE ORAL at 18:47

## 2024-05-01 RX ADMIN — Medication 81 MILLIGRAM(S): at 18:47

## 2024-05-01 RX ADMIN — PANTOPRAZOLE SODIUM 40 MILLIGRAM(S): 20 TABLET, DELAYED RELEASE ORAL at 07:38

## 2024-05-01 RX ADMIN — ATORVASTATIN CALCIUM 80 MILLIGRAM(S): 80 TABLET, FILM COATED ORAL at 22:51

## 2024-05-01 RX ADMIN — Medication 0.2 MILLIGRAM(S): at 07:38

## 2024-05-01 RX ADMIN — SENNA PLUS 2 TABLET(S): 8.6 TABLET ORAL at 22:51

## 2024-05-01 RX ADMIN — CARVEDILOL PHOSPHATE 25 MILLIGRAM(S): 80 CAPSULE, EXTENDED RELEASE ORAL at 07:37

## 2024-05-01 RX ADMIN — Medication 325 MILLIGRAM(S): at 18:47

## 2024-05-01 NOTE — H&P ADULT - NSHPLABSRESULTS_GEN_ALL_CORE
< from: US Chest (04.30.24 @ 17:20) >    IMPRESSION:  Complex fluid collection seen in the region of interest of the right   scapular region. The appearance is most consistent with a hematoma.   Correlate for history of trauma or anticoagulation. Other differential   considerations include lymphatic lesion or extension of joint bursa. In   the correct clinical setting abscess may also be considered.      --- End of Report ---      < end of copied text >    < from: CT Chest w/ IV Cont (04.30.24 @ 21:19) >    IMPRESSION:    Right inferior scapula/posterolateral chest wall structure as described,   which may represent a resolving hematoma. Additional differential   diagnosis (less likely) includes infection (abscess) and underlying   lesion/neoplasm. Elastofibroma dorsi can be included in the differential   diagnosis but less likely givenits imaging appearance and recent   development despite typical location. Recommend clinical correlation and   follow-up to resolution.    Additional findings as described.    --- End of Report ---      ONOFRE MARTÍNEZ MD; Attending Radiologist  This document has been electronically signed. Apr 30 2024 10:35PM    < end of copied text >

## 2024-05-01 NOTE — PATIENT PROFILE ADULT - FUNCTIONAL ASSESSMENT - BASIC MOBILITY 6.
4-calculated by average/Not able to assess (calculate score using [facility] averaging method)

## 2024-05-01 NOTE — BRIEF OPERATIVE NOTE - OPERATION/FINDINGS
6 fr catheter inserted into right scapular region subcutaneous collection. 10 cc viscous serosanguinous fluid drained. Catheter removed.

## 2024-05-01 NOTE — PATIENT PROFILE ADULT - FALL HARM RISK - HARM RISK INTERVENTIONS

## 2024-05-01 NOTE — H&P ADULT - ASSESSMENT
82 yo M with PMHx of HTN, HLD, DM, prostate Ca (2015 tx with radiation therapy), CAD s/p 2 stents on Xarelto, presented to [facility] ED c/o of lump under R scapula that he first noticed on Sunday and has increased in size. Pt endorses discomfort on the right side of his back when he moves his arm but denies pain in the area of the lump. Patient is on Xarelto at home, denies fall/trauma. Patient denies any other complaints at this time. CT chest performed in the ED revealed Right inferior scapula/posterolateral chest wall structure with complex fluid collection, which may represent a resolving hematoma. Additional differential diagnosis (less likely) includes infection (abscess) and underlying lesion/neoplasm. Elastofibroma dorsi can be included in the differential diagnosis. Thoracic surgery consulted for evaluation, plan to admit patient to thoracic surgery service for evaluation under Dr. Lugo.

## 2024-05-01 NOTE — H&P ADULT - HISTORY OF PRESENT ILLNESS
82 yo M with PMHx of HTN, HLD, DM, prostate Ca (2015 tx with radiation therapy), CAD s/p 2 stents on Xarelto, presented to [facility] ED c/o of lump under R scapula that he first noticed on Sunday and has increased in size. Pt endorses discomfort on the right side of his back when he moves his arm but denies pain in the area of the lump. Patient is on Xarelto at home, denies fall/trauma. Patient denies any other complaints at this time. CT chest performed in the ED revealed Right inferior scapula/posterolateral chest wall structure with complex fluid collection, which may represent a resolving hematoma. Additional differential diagnosis (less likely) includes infection (abscess) and underlying lesion/neoplasm. Elastofibroma dorsi can be included in the differential diagnosis. Thoracic surgery consulted for evaluation, plan to admit patient to thoracic surgery service for evaluation under Dr. Lugo.

## 2024-05-01 NOTE — H&P ADULT - PROBLEM SELECTOR PLAN 5
Hx of CAD (s/p 2 stents in the past)   Continue ASA, statin   Hold Xarelto for now 2/2 to possible hematoma

## 2024-05-01 NOTE — H&P ADULT - PROBLEM SELECTOR PLAN 1
Right chest wall soft mobile mass on exam   Pt on Xarelto at home, no history of fall/trauma.   Chest US & CT scan with findings of complex fluid collection, differential diagnosis include hematoma, abscess, lesion/neoplasm, Elastofibroma dorsi.  Pt afebrile, H/H stable, no leukocytosis. Vital signs hemodynamically stable.   Plan to admit under Dr. Lugo for further evaluation/possible drainage of chest wall mass.   Plan discussed with Dr. Lugo.

## 2024-05-01 NOTE — H&P ADULT - NSHPSOCIALHISTORY_GEN_ALL_CORE
former smoker quit 15 years ago   occasional ETOH   Creole/Bahamian/Bulgarian speaking  lives with daughter

## 2024-05-01 NOTE — ED ADULT NURSE REASSESSMENT NOTE - NS ED NURSE REASSESS COMMENT FT1
Patient A&Ox4, resting comfortably in bed, language line called for Creole interpretation, no distress noted at this time, vitals stable, medications given as per EMAR, unlabored even breathing noted b/l on room air, awaiting admission. Assumed care from GUS Lawler at 0720, Patient A&Ox4, resting comfortably in bed, language line called for Creole interpretation, no distress noted at this time, vitals stable, medications given as per EMAR, unlabored even breathing noted b/l on room air, lump noted over right scapula, awaiting admission.

## 2024-05-01 NOTE — H&P ADULT - NSHPPHYSICALEXAM_GEN_ALL_CORE
General: NAD  Neurology: Awake, nonfocal, GEORGE x 4  Eyes: Scleras clear, PERRLA/ EOMI, Gross vision intact  Neck: Neck supple, trachea midline, No JVD  Respiratory: CTA B/L, No wheezing, rales, rhonchi  CV: RRR, S1S2, no murmurs, rubs or gallops  Abdominal: Soft, NT, ND +BS  Extremities: No edema, + peripheral pulses  Skin: soft mobile mass under R scapula, non tender to palpation, no echymossis/hematoma noted

## 2024-05-02 LAB
ANION GAP SERPL CALC-SCNC: 11 MMOL/L — SIGNIFICANT CHANGE UP (ref 5–17)
B PERT IGG+IGM PNL SER: ABNORMAL
BUN SERPL-MCNC: 33.5 MG/DL — HIGH (ref 8–20)
CALCIUM SERPL-MCNC: 9.3 MG/DL — SIGNIFICANT CHANGE UP (ref 8.4–10.5)
CHLORIDE SERPL-SCNC: 105 MMOL/L — SIGNIFICANT CHANGE UP (ref 96–108)
CO2 SERPL-SCNC: 22 MMOL/L — SIGNIFICANT CHANGE UP (ref 22–29)
COLOR FLD: SIGNIFICANT CHANGE UP
CREAT SERPL-MCNC: 2.02 MG/DL — HIGH (ref 0.5–1.3)
EGFR: 32 ML/MIN/1.73M2 — LOW
FLUID INTAKE SUBSTANCE CLASS: SIGNIFICANT CHANGE UP
GLUCOSE BLDC GLUCOMTR-MCNC: 102 MG/DL — HIGH (ref 70–99)
GLUCOSE BLDC GLUCOMTR-MCNC: 112 MG/DL — HIGH (ref 70–99)
GLUCOSE BLDC GLUCOMTR-MCNC: 117 MG/DL — HIGH (ref 70–99)
GLUCOSE BLDC GLUCOMTR-MCNC: 117 MG/DL — HIGH (ref 70–99)
GLUCOSE SERPL-MCNC: 119 MG/DL — HIGH (ref 70–99)
GRAM STN FLD: SIGNIFICANT CHANGE UP
HCT VFR BLD CALC: 42.1 % — SIGNIFICANT CHANGE UP (ref 39–50)
HGB BLD-MCNC: 14.8 G/DL — SIGNIFICANT CHANGE UP (ref 13–17)
LYMPHOCYTES # FLD: 89 % — SIGNIFICANT CHANGE UP
MAGNESIUM SERPL-MCNC: 2.4 MG/DL — SIGNIFICANT CHANGE UP (ref 1.8–2.6)
MCHC RBC-ENTMCNC: 28.3 PG — SIGNIFICANT CHANGE UP (ref 27–34)
MCHC RBC-ENTMCNC: 35.2 GM/DL — SIGNIFICANT CHANGE UP (ref 32–36)
MCV RBC AUTO: 80.5 FL — SIGNIFICANT CHANGE UP (ref 80–100)
MONOS+MACROS # FLD: 9 % — SIGNIFICANT CHANGE UP
NEUTROPHILS-BODY FLUID: 2 % — SIGNIFICANT CHANGE UP
PLATELET # BLD AUTO: 210 K/UL — SIGNIFICANT CHANGE UP (ref 150–400)
POTASSIUM SERPL-MCNC: 4.3 MMOL/L — SIGNIFICANT CHANGE UP (ref 3.5–5.3)
POTASSIUM SERPL-SCNC: 4.3 MMOL/L — SIGNIFICANT CHANGE UP (ref 3.5–5.3)
RBC # BLD: 5.23 M/UL — SIGNIFICANT CHANGE UP (ref 4.2–5.8)
RBC # FLD: 16.1 % — HIGH (ref 10.3–14.5)
RCV VOL RI: HIGH /UL (ref 0–0)
SODIUM SERPL-SCNC: 138 MMOL/L — SIGNIFICANT CHANGE UP (ref 135–145)
SPECIMEN SOURCE FLD: SIGNIFICANT CHANGE UP
SPECIMEN SOURCE: SIGNIFICANT CHANGE UP
TOTAL NUCLEATED CELL COUNT, BODY FLUID: 1070 /UL — SIGNIFICANT CHANGE UP
TUBE TYPE: SIGNIFICANT CHANGE UP
WBC # BLD: 7.19 K/UL — SIGNIFICANT CHANGE UP (ref 3.8–10.5)
WBC # FLD AUTO: 7.19 K/UL — SIGNIFICANT CHANGE UP (ref 3.8–10.5)

## 2024-05-02 PROCEDURE — 99232 SBSQ HOSP IP/OBS MODERATE 35: CPT

## 2024-05-02 RX ORDER — SODIUM CHLORIDE 9 MG/ML
1000 INJECTION INTRAMUSCULAR; INTRAVENOUS; SUBCUTANEOUS
Refills: 0 | Status: DISCONTINUED | OUTPATIENT
Start: 2024-05-02 | End: 2024-05-03

## 2024-05-02 RX ORDER — RIVAROXABAN 15 MG-20MG
2.5 KIT ORAL
Refills: 0 | Status: DISCONTINUED | OUTPATIENT
Start: 2024-05-02 | End: 2024-05-03

## 2024-05-02 RX ADMIN — LORATADINE 10 MILLIGRAM(S): 10 TABLET ORAL at 12:41

## 2024-05-02 RX ADMIN — CARVEDILOL PHOSPHATE 25 MILLIGRAM(S): 80 CAPSULE, EXTENDED RELEASE ORAL at 18:02

## 2024-05-02 RX ADMIN — RIVAROXABAN 2.5 MILLIGRAM(S): KIT at 18:02

## 2024-05-02 RX ADMIN — ATORVASTATIN CALCIUM 80 MILLIGRAM(S): 80 TABLET, FILM COATED ORAL at 21:03

## 2024-05-02 RX ADMIN — CARVEDILOL PHOSPHATE 25 MILLIGRAM(S): 80 CAPSULE, EXTENDED RELEASE ORAL at 06:24

## 2024-05-02 RX ADMIN — Medication 600 MILLIGRAM(S): at 21:03

## 2024-05-02 RX ADMIN — PANTOPRAZOLE SODIUM 40 MILLIGRAM(S): 20 TABLET, DELAYED RELEASE ORAL at 06:24

## 2024-05-02 RX ADMIN — SODIUM CHLORIDE 50 MILLILITER(S): 9 INJECTION INTRAMUSCULAR; INTRAVENOUS; SUBCUTANEOUS at 06:39

## 2024-05-02 RX ADMIN — Medication 0.2 MILLIGRAM(S): at 06:24

## 2024-05-02 RX ADMIN — AMLODIPINE BESYLATE 10 MILLIGRAM(S): 2.5 TABLET ORAL at 06:24

## 2024-05-02 NOTE — PROGRESS NOTE ADULT - PROBLEM SELECTOR PLAN 1
Right chest wall soft mobile mass on exam   Pt on DAPT at home, no history of fall/trauma.   Chest US & CT scan with findings of complex fluid collection, differential diagnosis include hematoma, abscess, lesion/neoplasm, Elastofibroma dorsi.  5/1 s/p IR drainage 10cc serosang fluid  Follow fluid studies for culture and cytopath  Remains afebrile, no leukocytosis  Can likely dc home later today and follow up outpatient    Start Lovenox in AM for DVT ppx  Protonix for GI ppx    To be discussed with Dr. Lugo during AM rounds

## 2024-05-02 NOTE — DISCHARGE NOTE PROVIDER - PROVIDER TOKENS
PROVIDER:[TOKEN:[[id number]]] PROVIDER:[TOKEN:[[id number]],SCHEDULEDAPPT:[05/16/2024],SCHEDULEDAPPTTIME:[10:00 AM]]

## 2024-05-02 NOTE — DISCHARGE NOTE PROVIDER - NSDCMRMEDTOKEN_GEN_ALL_CORE_FT
amLODIPine 10 mg oral tablet: 1 tab(s) orally once a day  aspirin 81 mg oral tablet, chewable: 1 tab(s) orally once a day  carvedilol 25 mg oral tablet: 1 tab(s) orally every 12 hours  cloNIDine 0.1 mg oral tablet: 3  orally 2 times a day  Farxiga 10 mg oral tablet: 1 tab(s) orally once a day  ferrous gluconate 324 mg (37.5 mg elemental iron) oral tablet (obsolete): orally once a day  Plavix 75 mg oral tablet: 1 tab(s) orally once a day   Protonix 20 mg oral delayed release tablet: 1 tab(s) orally once a day   rosuvastatin 40 mg oral tablet: 1 tab(s) orally once a day  Tradjenta 5 mg oral tablet: 1 tab(s) orally once a day  Ventolin HFA 90 mcg/inh inhalation aerosol: 2 puff(s) inhaled every 6 hours, As Needed  ZyrTEC 10 mg oral tablet: 1 tab(s) orally once a day   acetaminophen 325 mg oral tablet: 2 tab(s) orally every 6 hours As needed Mild Pain (1 - 3), Moderate Pain (4 - 6)  amLODIPine 10 mg oral tablet: 1 tab(s) orally once a day  aspirin 81 mg oral tablet, chewable: 1 tab(s) orally once a day  carvedilol 25 mg oral tablet: 1 tab(s) orally every 12 hours  cloNIDine 0.1 mg oral tablet: 3  orally 2 times a day  Farxiga 10 mg oral tablet: 1 tab(s) orally once a day  ferrous gluconate 324 mg (37.5 mg elemental iron) oral tablet (obsolete): orally once a day  Protonix 20 mg oral delayed release tablet: 1 tab(s) orally once a day   rosuvastatin 40 mg oral tablet: 1 tab(s) orally once a day  Tradjenta 5 mg oral tablet: 1 tab(s) orally once a day  Ventolin HFA 90 mcg/inh inhalation aerosol: 2 puff(s) inhaled every 6 hours, As Needed  ZyrTEC 10 mg oral tablet: 1 tab(s) orally once a day   acetaminophen 325 mg oral tablet: 2 tab(s) orally every 6 hours As needed Mild Pain (1 - 3), Moderate Pain (4 - 6)  amLODIPine 10 mg oral tablet: 1 tab(s) orally once a day  aspirin 81 mg oral tablet, chewable: 1 tab(s) orally once a day  carvedilol 25 mg oral tablet: 1 tab(s) orally every 12 hours  cloNIDine 0.1 mg oral tablet: 3  orally 2 times a day  Farxiga 10 mg oral tablet: 1 tab(s) orally once a day  ferrous gluconate 324 mg (37.5 mg elemental iron) oral tablet (obsolete): orally once a day  Protonix 20 mg oral delayed release tablet: 1 tab(s) orally once a day   rivaroxaban 2.5 mg oral tablet: 1 tab(s) orally 2 times a day  rosuvastatin 40 mg oral tablet: 1 tab(s) orally once a day  Tradjenta 5 mg oral tablet: 1 tab(s) orally once a day  Ventolin HFA 90 mcg/inh inhalation aerosol: 2 puff(s) inhaled every 6 hours, As Needed  ZyrTEC 10 mg oral tablet: 1 tab(s) orally once a day

## 2024-05-02 NOTE — DISCHARGE NOTE PROVIDER - NSDCFUADDAPPT_GEN_ALL_CORE_FT
You have a follow-up appointment with Dr. Lugo on ***    Please follow up with your Primary Care Physician in  2-4 weeks from discharge.    The Thoracic Surgery office is located at [facility], first floor. Take a left at the end of the lobby until the end of that orr (past the elevator bank). Make a left and the office is on your right across from the elevators.  You have a follow-up appointment with Dr. Lugo on Thursday, May 16th at 10:00 am.    Please follow up with your Primary Care Physician in  2-4 weeks from discharge.    The Thoracic Surgery office is located at [facility], first floor. Take a left at the end of the lobby until the end of that orr (past the elevator bank). Make a left and the office is on your right across from the elevators.     Please follow instructions outlined in discharge papers. Continue all medications as prescribed.

## 2024-05-02 NOTE — DISCHARGE NOTE PROVIDER - DISCHARGE SERVICE FOR PATIENT
Patient is diabetic and is here today for b/l nail care.      Medications verified.  Tobacco history reviewed.  Allergies reviewed.  Past Medical History   Diagnosis Date   • Elevated blood pressure    • Fracture      collar bone, ankle    • Hyperlipidemia    • Neuromuscular disorder    • PONV (postoperative nausea and vomiting)         on the discharge service for the patient. I have reviewed and made amendments to the documentation where necessary.

## 2024-05-02 NOTE — PROGRESS NOTE ADULT - PROBLEM SELECTOR PLAN 4
Pt on Ozempic, Farxiga, Tradjenta at home   A1c 6.2  Hold home agents, add sliding scale insulin  Monitor glucose  CC diet

## 2024-05-02 NOTE — DISCHARGE NOTE PROVIDER - HOSPITAL COURSE
82 yo M with PMHx of HTN, HLD, DM, prostate Ca (2015 tx with radiation therapy), CAD s/p 2 stents on DAPT, presented to [facility] ED c/o of lump under R scapula that he first noticed on Sunday and has increased in size. Pt endorses discomfort on the right side of his back when he moves his arm but denies pain in the area of the lump. Pt denies fall/trauma. Patient denies any other complaints at this time. CT chest performed in the ED revealed Right inferior scapula/posterolateral chest wall structure with complex fluid collection, which may represent a resolving hematoma. Additional differential diagnosis (less likely) includes infection (abscess) and underlying lesion/neoplasm. Elastofibroma dorsi can be included in the differential diagnosis. Admitted to thoracic surgery service for evaluation under Dr. Lugo. 5/1 s/p IR drainage 10cc serosang fluid. Patient has remained hemodynamically stable and stable from a respiratory standpoint and is deemed appropriate for discharge home per Dr. Lugo. Patient will be seen in clinic for follow up. 84 yo M with PMHx of HTN, HLD, DM, prostate Ca (2015 tx with radiation therapy), CAD s/p 2 stents on DAPT, presented to [facility] ED c/o of lump under R scapula that he first noticed on Sunday and has increased in size. Pt endorses discomfort on the right side of his back when he moves his arm but denies pain in the area of the lump. Pt denies fall/trauma. Patient denies any other complaints at this time. CT chest performed in the ED revealed Right inferior scapula/posterolateral chest wall structure with complex fluid collection, which may represent a resolving hematoma. Additional differential diagnosis (less likely) includes infection (abscess) and underlying lesion/neoplasm. Elastofibroma dorsi can be included in the differential diagnosis. Admitted to thoracic surgery service for evaluation under Dr. Lugo. 5/1 s/p IR drainage 10cc serosang fluid. Patient with KNETRELL now improved s/p IVF.  Patient has remained hemodynamically stable and stable from a respiratory standpoint and is deemed appropriate for discharge home per Dr. Lugo. Patient will be seen in clinic for follow up.     Neuro: A+O x 3, non-focal, speech clear and intact  HEENT: PERRL, EOMI, oral mucosa pink and moist  Neck: supple, no JVD  CV: regular rate, regular rhythm, +S1S2, no murmurs or rub  Pulm/chest: lung sounds CTA and equal bilaterally, no accessory muscle use noted  Abd: soft, NT, ND, +BS  Ext: GEORGE x 4, no C/C/E  Skin: warm, well perfused, no rashes

## 2024-05-02 NOTE — PROGRESS NOTE ADULT - SUBJECTIVE AND OBJECTIVE BOX
BRIEF HOSPITAL COURSE  84 yo M with PMHx of HTN, HLD, DM, prostate Ca (2015 tx with radiation therapy), CAD s/p 2 stents on DAPT, presented to [facility] ED c/o of lump under R scapula that he first noticed on Sunday and has increased in size. Pt endorses discomfort on the right side of his back when he moves his arm but denies pain in the area of the lump. Pt denies fall/trauma. Patient denies any other complaints at this time. CT chest performed in the ED revealed Right inferior scapula/posterolateral chest wall structure with complex fluid collection, which may represent a resolving hematoma. Additional differential diagnosis (less likely) includes infection (abscess) and underlying lesion/neoplasm. Elastofibroma dorsi can be included in the differential diagnosis. Admitted to thoracic surgery service for evaluation under Dr. Lugo.   5/1 s/p IR drainage 10cc serosang fluid    SIGNIFICANT RECENT/PAST 24 HR EVENTS  No acute events reported overnight.     SUBJECTIVE  Patient seen and examined on follow up. Pt currently lying in bed in NAD. Denies fevers, chills, HA, dizziness, CP, SOB, abd pain, N/V/D, numbness/tingling in extremities, or any other acute complaints. States no pain.  +Tolerating diet  +Ambulating during day  +Using incentive as instructed  ROS negative x 10 systems except as noted above.    PAST MEDICAL & SURGICAL HISTORY  Hypertension  Hyperlipidemia  Diabetes mellitus  Cancer  Other hemorrhoids  Asthma, mild intermittent, uncomplicated  Sickle cell anemia  MI (myocardial infarction)  CAD (coronary artery disease)  SVT (supraventricular tachycardia)  PAD (peripheral artery disease)  Chronic kidney disease (CKD)  Inguinal hernia  S/P prostatectomy  S/P primary angioplasty with coronary stent  H/O colonoscopy    DAILY REVIEW  Telemetry: Sinus rhythm   Vital Signs Last 24 Hrs  T(C): 36.4 (02 May 2024 00:01), Max: 36.9 (01 May 2024 18:41)  T(F): 97.6 (02 May 2024 00:01), Max: 98.4 (01 May 2024 18:41)  HR: 73 (02 May 2024 00:01) (64 - 78)  BP: 137/80 (02 May 2024 00:01) (106/67 - 159/90)  BP(mean): 104 (01 May 2024 18:41) (104 - 104)  RR: 18 (02 May 2024 00:01) (17 - 20)  SpO2: 95% (02 May 2024 00:01) (95% - 100%)    Parameters below as of 02 May 2024 00:01  Patient On (Oxygen Delivery Method): room air    I&O's Detail    01 May 2024 07:01  -  02 May 2024 00:24  --------------------------------------------------------  IN:  Total IN: 0 mL    OUT:    Voided (mL): 400 mL  Total OUT: 400 mL    Total NET: -400 mL    Admit Wt: Drug Dosing Weight  Height (cm): 160 (30 Apr 2024 14:39)  Weight (kg): 64 (30 Apr 2024 14:39)  BMI (kg/m2): 25 (30 Apr 2024 14:39)  BSA (m2): 1.67 (30 Apr 2024 14:39)    LABS                        14.9   7.25  )-----------( 210      ( 01 May 2024 05:51 )             43.4     05-01    137  |  100  |  24.0<H>  ----------------------------<  111<H>  4.2   |  25.0  |  1.64<H>    Ca    9.3      01 May 2024 05:51  Mg     2.4     05-01    TPro  6.7  /  Alb  3.8  /  TBili  0.6  /  DBili  x   /  AST  23  /  ALT  21  /  AlkPhos  97  04-30    LIVER FUNCTIONS - ( 30 Apr 2024 18:40 )  Alb: 3.8 g/dL / Pro: 6.7 g/dL / ALK PHOS: 97 U/L / ALT: 21 U/L / AST: 23 U/L / GGT: x           PT/INR - ( 01 May 2024 05:51 )   PT: 10.7 sec;   INR: 0.96 ratio      PTT - ( 01 May 2024 05:51 )  PTT:35.6 sec    POCT Blood Glucose.: 138 mg/dL (05-01-24 @ 21:35)  POCT Blood Glucose.: 104 mg/dL (05-01-24 @ 13:10)  POCT Blood Glucose.: 110 mg/dL (05-01-24 @ 08:14)    MEDICATIONS  Home Medications:  amLODIPine 10 mg oral tablet: 1 tab(s) orally once a day (04 Dec 2023 14:42)  aspirin 81 mg oral tablet, chewable: 1 tab(s) orally once a day (04 Dec 2023 14:42)  carvedilol 25 mg oral tablet: 1 tab(s) orally every 12 hours (04 Dec 2023 14:42)  cloNIDine 0.1 mg oral tablet: 3  orally 2 times a day (04 Dec 2023 14:42)  Farxiga 10 mg oral tablet: 1 tab(s) orally once a day (04 Dec 2023 14:42)  ferrous gluconate 324 mg (37.5 mg elemental iron) oral tablet (obsolete): orally once a day (04 Dec 2023 14:42)  rosuvastatin 40 mg oral tablet: 1 tab(s) orally once a day (04 Dec 2023 14:42)  Tradjenta 5 mg oral tablet: 1 tab(s) orally once a day (04 Dec 2023 14:42)  Ventolin HFA 90 mcg/inh inhalation aerosol: 2 puff(s) inhaled every 6 hours, As Needed (04 Dec 2023 14:42)  ZyrTEC 10 mg oral tablet: 1 tab(s) orally once a day (04 Dec 2023 14:42)    MEDICATIONS  (STANDING):  amLODIPine   Tablet 10 milliGRAM(s) Oral daily  aspirin enteric coated 81 milliGRAM(s) Oral daily  atorvastatin 80 milliGRAM(s) Oral at bedtime  carvedilol 25 milliGRAM(s) Oral every 12 hours  cloNIDine 0.2 milliGRAM(s) Oral daily  dextrose 10% Bolus 125 milliLiter(s) IV Bolus once  dextrose 5%. 1000 milliLiter(s) (50 mL/Hr) IV Continuous <Continuous>  dextrose 50% Injectable 25 Gram(s) IV Push once  ferrous    sulfate 325 milliGRAM(s) Oral daily  glucagon  Injectable 1 milliGRAM(s) IntraMuscular once  insulin lispro (ADMELOG) corrective regimen sliding scale   SubCutaneous Before meals and at bedtime  loratadine 10 milliGRAM(s) Oral daily  pantoprazole    Tablet 40 milliGRAM(s) Oral before breakfast    MEDICATIONS  (PRN):  acetaminophen     Tablet .. 650 milliGRAM(s) Oral every 6 hours PRN Mild Pain (1 - 3), Moderate Pain (4 - 6)  dextrose Oral Gel 15 Gram(s) Oral once PRN Blood Glucose LESS THAN 70 milliGRAM(s)/deciliter  senna 2 Tablet(s) Oral at bedtime PRN Constipation    ALLERGIES  strawberry (Hives)  EGGPLANT (Hives)  aspirin (Stomach Upset)  No Known Drug Allergies    DIAGNOSTICS  All relevant and available laboratory results, radiology and medications reviewed.  IR US Guided Needle Placement (05.01.24 @ 17:09)  Impression:  1. Successful ultrasound-guided drainage of a right infrascapular fluid   collection  2. The fluid was serosanguineousand viscous. Possible etiologies include   a resolving hematoma and synovial/bursal cyst. Malignancy cannot be ruled   out although is felt to be very unlikely.    CT Chest w/ IV Cont (04.30.24 @ 21:19)  FINDINGS: Patient's respiratory motion degrades images.    LUNGS AND AIRWAYS: Patent central airways. Atelectasis. Stable scattered   nodules, for example, right upper lobe, right middle lobe and bilateral   lower lobes.  PLEURA: No pleural effusion or pneumothorax.  HEART: Stable heart size. Trace pericardial effusion. Coronary artery and   aortic valve calcification.  VESSELS: Atherosclerosis. Stable ectatic ascending aorta.  MEDIASTINUM AND MAURICIO: Subcentimeter lymph nodes.  CHEST WALL AND LOWER NECK: A 8.5 x 2.0 x 7.0 cm peripherally enhancing,   centrally hypodense structure along the right posterolateral chest wall   (deep to the right latissimus dorsi and serratus anteriormuscles), new   since prior studies. Bilateral gynecomastia. Heterogeneous thyroid gland   with indeterminate lesions extending to the upper mediastinum again noted.  UPPER ABDOMEN: Under distended stomach. A moderate to large amount of   stool at the visualized colon. Bilateral perinephric stranding and   intrarenal calcifications. Bilateral renal subcentimeter hypodensities,   too small to characterize.  BONES: Leftward curvature and degenerative changes of the spine. No   obvious bone erosion or periosteal reaction at the right ribs. Stable   sclerotic focus at the right ninth lateral rib. Chronic appearing slight   bilateral rib deformities again noted.    IMPRESSION:  Right inferior scapula/posterolateral chest wall structure as described,   which may represent a resolving hematoma. Additional differential   diagnosis (less likely) includes infection (abscess) and underlying   lesion/neoplasm. Elastofibroma dorsi can be included in the differential   diagnosis but less likely givenits imaging appearance and recent   development despite typical location. Recommend clinical correlation and   follow-up to resolution.    Additional findings as described.    US Chest (04.30.24 @ 17:20)  IMPRESSION:  Complex fluid collection seen in the region of interest of the right   scapular region. The appearance is most consistent with a hematoma.   Correlate for history of trauma or anticoagulation. Other differential   considerations include lymphatic lesion or extension of joint bursa. In   the correct clinical setting abscess may also be considered.    PHYSICAL EXAM  Constitutional: NAD  Neck: supple, trachea midline. No JVD   Respiratory: Breath sounds clear to auscultation, no accessory muscle use noted. No wheezing, rales, or rhonchi noted b/l   Cardiovascular: Regular rate, regular rhythm, normal S1, S2; no murmurs or rub   Gastrointestinal: Soft, non-tender, non-distended, + bowel sounds   Extremities: GEORGE x 4, no peripheral edema, no cyanosis, no clubbing    Vascular: Equal and normal pulses: 2+ peripheral pulses throughout  Neurological: A+O x 3; speech clear and intact; no gross sensory/motor deficits  Psychiatric: calm, normal mood, normal affect   Skin: warm, dry, well perfused, no rashes   Incisions: Rt subscapular dressing CDI, no mass or ecchymosis

## 2024-05-02 NOTE — PROGRESS NOTE ADULT - ASSESSMENT
84 yo M with PMHx of HTN, HLD, DM, prostate Ca (2015 tx with radiation therapy), CAD s/p 2 stents on DAPT, presented to [facility] ED c/o of lump under R scapula that he first noticed on Sunday and has increased in size. Pt endorses discomfort on the right side of his back when he moves his arm but denies pain in the area of the lump. Pt denies fall/trauma. Patient denies any other complaints at this time. CT chest performed in the ED revealed Right inferior scapula/posterolateral chest wall structure with complex fluid collection, which may represent a resolving hematoma. Additional differential diagnosis (less likely) includes infection (abscess) and underlying lesion/neoplasm. Elastofibroma dorsi can be included in the differential diagnosis. Admitted to thoracic surgery service for evaluation under Dr. Lugo.   5/1 s/p IR drainage 10cc serosang fluid

## 2024-05-02 NOTE — PROGRESS NOTE ADULT - PROBLEM SELECTOR PLAN 5
Hx of CAD (s/p 2 stents in the past)  On DAPT at home  Continue ASA, Statin   Likely resume Plavix in AM  Heart healthy diet

## 2024-05-02 NOTE — DISCHARGE NOTE PROVIDER - NSDCCPCAREPLAN_GEN_ALL_CORE_FT
PRINCIPAL DISCHARGE DIAGNOSIS  Diagnosis: Chest wall mass  Assessment and Plan of Treatment: s/p IR fluid aspiration.  Tylenol as needed for mild pain.  Resume home medications as directed.  You may resume all your normal activty as tolerated.      SECONDARY DISCHARGE DIAGNOSES  Diagnosis: Diabetes mellitus  Assessment and Plan of Treatment: Take all medications as prescribed. Follow up with your primary care doctor within two weeks.  ----  It is extremely important to follow a diabetic (consistent carbohydrates, LOW/NO ADDED SUGAR) diet and monitor your glucose (sugar) levels. You have an increased risk of complications, including wound infections, due to the diabetes.  1. Check your glucoses 3-4 times daily before meals and bedtime.   2. Keep a log of your glucose levels every day.   3. Make an appointment to meet with your primary care provider or endocrinologist within a week.   4. Take ALL of your medications as prescribed. Only hold medications for low glucose levels (< 80) or if you are symptomatic (dizzy, sweating, lightheaded).  5. Ideally, we would like all of the glucose levels to be between .   ----  Please continue to take the medications as prescribed and notify the office, nurse practitioner or your PCP if you have any concerns right away.    Diagnosis: Hypertension  Assessment and Plan of Treatment: Take all medications as prescribed. Follow up with your primary care doctor/Cardiologist within two weeks.     PRINCIPAL DISCHARGE DIAGNOSIS  Diagnosis: Chest wall mass  Assessment and Plan of Treatment: s/p IR fluid aspiration.  Tylenol as needed for mild pain.  Resume home medications as directed.  You may resume all your normal activity as tolerated.      SECONDARY DISCHARGE DIAGNOSES  Diagnosis: Hypertension  Assessment and Plan of Treatment: Take all medications as prescribed. Follow up with your primary care doctor/Cardiologist within two weeks.    Diagnosis: Diabetes mellitus  Assessment and Plan of Treatment: Take all medications as prescribed. Follow up with your primary care doctor within two weeks.  ----  It is extremely important to follow a diabetic (consistent carbohydrates, LOW/NO ADDED SUGAR) diet and monitor your glucose (sugar) levels. You have an increased risk of complications, including wound infections, due to the diabetes.  1. Check your glucoses 3-4 times daily before meals and bedtime.   2. Keep a log of your glucose levels every day.   3. Make an appointment to meet with your primary care provider or endocrinologist within a week.   4. Take ALL of your medications as prescribed. Only hold medications for low glucose levels (< 80) or if you are symptomatic (dizzy, sweating, lightheaded).  5. Ideally, we would like all of the glucose levels to be between .   ----  Please continue to take the medications as prescribed and notify the office, nurse practitioner or your PCP if you have any concerns right away.     PRINCIPAL DISCHARGE DIAGNOSIS  Diagnosis: Chest wall mass  Assessment and Plan of Treatment: s/p IR fluid aspiration.  Tylenol as needed for mild pain.  Resume home medications as directed.  You may resume all your normal activity as tolerated.      SECONDARY DISCHARGE DIAGNOSES  Diagnosis: KENTRELL (acute kidney injury)  Assessment and Plan of Treatment: Resolved with IV fluids. Please follow up with your Primary Care Physician 1-2 weeks from discharge.    Diagnosis: Hypertension  Assessment and Plan of Treatment: Take all medications as prescribed. Follow up with your primary care doctor/Cardiologist within two weeks.    Diagnosis: Diabetes mellitus  Assessment and Plan of Treatment: Take all medications as prescribed. Follow up with your primary care doctor within two weeks.  ----  It is extremely important to follow a diabetic (consistent carbohydrates, LOW/NO ADDED SUGAR) diet and monitor your glucose (sugar) levels. You have an increased risk of complications, including wound infections, due to the diabetes.  1. Check your glucoses 3-4 times daily before meals and bedtime.   2. Keep a log of your glucose levels every day.   3. Make an appointment to meet with your primary care provider or endocrinologist within a week.   4. Take ALL of your medications as prescribed. Only hold medications for low glucose levels (< 80) or if you are symptomatic (dizzy, sweating, lightheaded).  5. Ideally, we would like all of the glucose levels to be between .   ----  Please continue to take the medications as prescribed and notify the office, nurse practitioner or your PCP if you have any concerns right away.

## 2024-05-02 NOTE — DISCHARGE NOTE PROVIDER - CARE PROVIDER_API CALL
Alonzo Lugo  Thoracic Surgery  [address] [place] [id number]  Phone: [phone]  Fax: [phone]  Follow Up Time:    Alonzo Lugo  Thoracic Surgery  [address] [place] [id number]  Phone: [phone]  Fax: [phone]  Scheduled Appointment: 05/16/2024 10:00 AM

## 2024-05-02 NOTE — DISCHARGE NOTE PROVIDER - NSDCFUADDINST_GEN_ALL_CORE_FT
Please call the Cardiothoracic Surgery office at [phone] if you are experiencing any shortness of breath, chest pain, fevers or chills, drainage from the incisions, persistent nausea, vomiting or if you have any questions about your medications. If the symptoms are severe, call 911 and go to the nearest hospital. You can also call [phone] for an emergency [facility] ambulance, which will take you to the closest [facility].    If you need any assistance for making any appointments for a new consult or referral in any specialty, please call our [facility] Clinical Coordination Center at [phone].

## 2024-05-03 ENCOUNTER — TRANSCRIPTION ENCOUNTER (OUTPATIENT)
Age: 84
End: 2024-05-03

## 2024-05-03 VITALS
TEMPERATURE: 98 F | SYSTOLIC BLOOD PRESSURE: 143 MMHG | HEART RATE: 69 BPM | OXYGEN SATURATION: 96 % | DIASTOLIC BLOOD PRESSURE: 76 MMHG | RESPIRATION RATE: 16 BRPM

## 2024-05-03 LAB
ANION GAP SERPL CALC-SCNC: 13 MMOL/L — SIGNIFICANT CHANGE UP (ref 5–17)
BUN SERPL-MCNC: 28.3 MG/DL — HIGH (ref 8–20)
CALCIUM SERPL-MCNC: 9.8 MG/DL — SIGNIFICANT CHANGE UP (ref 8.4–10.5)
CHLORIDE SERPL-SCNC: 105 MMOL/L — SIGNIFICANT CHANGE UP (ref 96–108)
CO2 SERPL-SCNC: 23 MMOL/L — SIGNIFICANT CHANGE UP (ref 22–29)
CREAT SERPL-MCNC: 1.65 MG/DL — HIGH (ref 0.5–1.3)
EGFR: 40 ML/MIN/1.73M2 — LOW
GLUCOSE BLDC GLUCOMTR-MCNC: 107 MG/DL — HIGH (ref 70–99)
GLUCOSE BLDC GLUCOMTR-MCNC: 119 MG/DL — HIGH (ref 70–99)
GLUCOSE SERPL-MCNC: 120 MG/DL — HIGH (ref 70–99)
HCT VFR BLD CALC: 46.8 % — SIGNIFICANT CHANGE UP (ref 39–50)
HGB BLD-MCNC: 16.1 G/DL — SIGNIFICANT CHANGE UP (ref 13–17)
MAGNESIUM SERPL-MCNC: 2.2 MG/DL — SIGNIFICANT CHANGE UP (ref 1.6–2.6)
MCHC RBC-ENTMCNC: 27.6 PG — SIGNIFICANT CHANGE UP (ref 27–34)
MCHC RBC-ENTMCNC: 34.4 GM/DL — SIGNIFICANT CHANGE UP (ref 32–36)
MCV RBC AUTO: 80.3 FL — SIGNIFICANT CHANGE UP (ref 80–100)
PLATELET # BLD AUTO: 217 K/UL — SIGNIFICANT CHANGE UP (ref 150–400)
POTASSIUM SERPL-MCNC: 4.2 MMOL/L — SIGNIFICANT CHANGE UP (ref 3.5–5.3)
POTASSIUM SERPL-SCNC: 4.2 MMOL/L — SIGNIFICANT CHANGE UP (ref 3.5–5.3)
RBC # BLD: 5.83 M/UL — HIGH (ref 4.2–5.8)
RBC # FLD: 16.8 % — HIGH (ref 10.3–14.5)
SODIUM SERPL-SCNC: 141 MMOL/L — SIGNIFICANT CHANGE UP (ref 135–145)
WBC # BLD: 8.34 K/UL — SIGNIFICANT CHANGE UP (ref 3.8–10.5)
WBC # FLD AUTO: 8.34 K/UL — SIGNIFICANT CHANGE UP (ref 3.8–10.5)

## 2024-05-03 PROCEDURE — 85610 PROTHROMBIN TIME: CPT

## 2024-05-03 PROCEDURE — 88112 CYTOPATH CELL ENHANCE TECH: CPT

## 2024-05-03 PROCEDURE — 71260 CT THORAX DX C+: CPT | Mod: MC

## 2024-05-03 PROCEDURE — 89051 BODY FLUID CELL COUNT: CPT

## 2024-05-03 PROCEDURE — C1729: CPT

## 2024-05-03 PROCEDURE — 71045 X-RAY EXAM CHEST 1 VIEW: CPT

## 2024-05-03 PROCEDURE — 83036 HEMOGLOBIN GLYCOSYLATED A1C: CPT

## 2024-05-03 PROCEDURE — 80048 BASIC METABOLIC PNL TOTAL CA: CPT

## 2024-05-03 PROCEDURE — 85730 THROMBOPLASTIN TIME PARTIAL: CPT

## 2024-05-03 PROCEDURE — 82962 GLUCOSE BLOOD TEST: CPT

## 2024-05-03 PROCEDURE — 87205 SMEAR GRAM STAIN: CPT

## 2024-05-03 PROCEDURE — 80053 COMPREHEN METABOLIC PANEL: CPT

## 2024-05-03 PROCEDURE — C1769: CPT

## 2024-05-03 PROCEDURE — 85027 COMPLETE CBC AUTOMATED: CPT

## 2024-05-03 PROCEDURE — 83735 ASSAY OF MAGNESIUM: CPT

## 2024-05-03 PROCEDURE — 36415 COLL VENOUS BLD VENIPUNCTURE: CPT

## 2024-05-03 PROCEDURE — 99285 EMERGENCY DEPT VISIT HI MDM: CPT

## 2024-05-03 PROCEDURE — 87070 CULTURE OTHR SPECIMN AEROBIC: CPT

## 2024-05-03 PROCEDURE — 88305 TISSUE EXAM BY PATHOLOGIST: CPT

## 2024-05-03 PROCEDURE — 71045 X-RAY EXAM CHEST 1 VIEW: CPT | Mod: 26

## 2024-05-03 PROCEDURE — 85025 COMPLETE CBC W/AUTO DIFF WBC: CPT

## 2024-05-03 PROCEDURE — 99239 HOSP IP/OBS DSCHRG MGMT >30: CPT

## 2024-05-03 PROCEDURE — 76604 US EXAM CHEST: CPT

## 2024-05-03 PROCEDURE — 76942 ECHO GUIDE FOR BIOPSY: CPT

## 2024-05-03 RX ORDER — ACETAMINOPHEN 500 MG
2 TABLET ORAL
Qty: 0 | Refills: 0 | DISCHARGE
Start: 2024-05-03

## 2024-05-03 RX ORDER — RIVAROXABAN 15 MG-20MG
1 KIT ORAL
Qty: 60 | Refills: 1
Start: 2024-05-03 | End: 2024-07-01

## 2024-05-03 RX ADMIN — CARVEDILOL PHOSPHATE 25 MILLIGRAM(S): 80 CAPSULE, EXTENDED RELEASE ORAL at 05:28

## 2024-05-03 RX ADMIN — PANTOPRAZOLE SODIUM 40 MILLIGRAM(S): 20 TABLET, DELAYED RELEASE ORAL at 05:31

## 2024-05-03 RX ADMIN — Medication 81 MILLIGRAM(S): at 13:11

## 2024-05-03 RX ADMIN — SODIUM CHLORIDE 50 MILLILITER(S): 9 INJECTION INTRAMUSCULAR; INTRAVENOUS; SUBCUTANEOUS at 03:00

## 2024-05-03 RX ADMIN — AMLODIPINE BESYLATE 10 MILLIGRAM(S): 2.5 TABLET ORAL at 05:28

## 2024-05-03 RX ADMIN — LORATADINE 10 MILLIGRAM(S): 10 TABLET ORAL at 13:11

## 2024-05-03 RX ADMIN — Medication 0.2 MILLIGRAM(S): at 05:28

## 2024-05-03 RX ADMIN — Medication 325 MILLIGRAM(S): at 13:11

## 2024-05-03 NOTE — DISCHARGE NOTE NURSING/CASE MANAGEMENT/SOCIAL WORK - NSDCVIVACCINE_GEN_ALL_CORE_FT
Tdap; 30-Oct-2022 12:30; Geena Jackson (RN); [employer]; [id number]   (Exp. Date: 08-Jan-2024); IntraMuscular; Deltoid Left.; 0.5 milliLiter(s); VIS (VIS Published: 09-May-2013, VIS Presented: 30-Oct-2022);

## 2024-05-03 NOTE — DISCHARGE NOTE NURSING/CASE MANAGEMENT/SOCIAL WORK - PATIENT PORTAL LINK FT
You can access the FollowMyHealth Patient Portal offered by [facility] by registering at the following website: [email or web address]. By joining [employer]’s FollowMyHealth portal, you will also be able to view your health information using other applications (apps) compatible with our system.

## 2024-05-03 NOTE — DISCHARGE NOTE NURSING/CASE MANAGEMENT/SOCIAL WORK - NSDCFUADDAPPT_GEN_ALL_CORE_FT
You have a follow-up appointment with Dr. Lugo on Thursday, May 16th at 10:00 am.    Please follow up with your Primary Care Physician in  2-4 weeks from discharge.    The Thoracic Surgery office is located at [facility], first floor. Take a left at the end of the lobby until the end of that orr (past the elevator bank). Make a left and the office is on your right across from the elevators.     Please follow instructions outlined in discharge papers. Continue all medications as prescribed.

## 2024-05-06 ENCOUNTER — RX RENEWAL (OUTPATIENT)
Age: 84
End: 2024-05-06

## 2024-05-06 LAB — NON-GYNECOLOGICAL CYTOLOGY STUDY: SIGNIFICANT CHANGE UP

## 2024-05-06 RX ORDER — CHLORHEXIDINE GLUCONATE 4 %
325 (65 FE) LIQUID (ML) TOPICAL TWICE DAILY
Qty: 180 | Refills: 0 | Status: ACTIVE | COMMUNITY
Start: 2023-01-10 | End: 1900-01-01

## 2024-05-07 LAB
CULTURE RESULTS: NO GROWTH — SIGNIFICANT CHANGE UP
SPECIMEN SOURCE: SIGNIFICANT CHANGE UP

## 2024-05-12 NOTE — ED ADULT NURSE NOTE - NEURO ASSESSMENT
[FreeTextEntry1] : Patient received Tdap (Boostrix) vaccination as ordered by Dr. Sumner. Prior to administration, reviewed Tdap VIS with patient who verbalized understanding and consent. Patient denied severe allergic reaction to any vaccine protecting against tetanus, diphtheria, or pertussis, or any other prior vaccinations. Patient denied experiencing coma, decreased LOC, or prolonged seizures within seven days after any previous vaccine protecting against pertussis. Patient denied history of Guillain-[place] Syndrome, seizures, or another nervous system problem. Patient also denied experiencing severe pain or swelling after any previous vaccine protecting against tetanus or diphtheria.  : [employer] NDC: [id number] Lot: [id number] Exp: 06/20/2026   Patient tolerated vaccination well IM in left deltoid. No immediate adverse reaction noted. Patient provided Tdap VIS for home review as per protocol.  Sylwia Thornton RN WDL

## 2024-05-16 ENCOUNTER — APPOINTMENT (OUTPATIENT)
Dept: THORACIC SURGERY | Facility: CLINIC | Age: 84
End: 2024-05-16
Payer: MEDICARE

## 2024-05-16 ENCOUNTER — APPOINTMENT (OUTPATIENT)
Dept: INTERNAL MEDICINE | Facility: CLINIC | Age: 84
End: 2024-05-16
Payer: MEDICARE

## 2024-05-16 VITALS
OXYGEN SATURATION: 98 % | HEART RATE: 64 BPM | RESPIRATION RATE: 16 BRPM | DIASTOLIC BLOOD PRESSURE: 78 MMHG | HEIGHT: 63 IN | TEMPERATURE: 97.3 F | BODY MASS INDEX: 24.8 KG/M2 | SYSTOLIC BLOOD PRESSURE: 127 MMHG | WEIGHT: 140 LBS

## 2024-05-16 VITALS
HEART RATE: 70 BPM | OXYGEN SATURATION: 97 % | TEMPERATURE: 97.6 F | BODY MASS INDEX: 24.8 KG/M2 | WEIGHT: 140 LBS | HEIGHT: 63 IN | SYSTOLIC BLOOD PRESSURE: 171 MMHG | DIASTOLIC BLOOD PRESSURE: 70 MMHG | RESPIRATION RATE: 18 BRPM

## 2024-05-16 DIAGNOSIS — I77.1 STRICTURE OF ARTERY: ICD-10-CM

## 2024-05-16 DIAGNOSIS — E78.5 HYPERLIPIDEMIA, UNSPECIFIED: ICD-10-CM

## 2024-05-16 DIAGNOSIS — Z86.39 PERSONAL HISTORY OF OTHER ENDOCRINE, NUTRITIONAL AND METABOLIC DISEASE: ICD-10-CM

## 2024-05-16 DIAGNOSIS — I10 ESSENTIAL (PRIMARY) HYPERTENSION: ICD-10-CM

## 2024-05-16 DIAGNOSIS — D50.9 IRON DEFICIENCY ANEMIA, UNSPECIFIED: ICD-10-CM

## 2024-05-16 DIAGNOSIS — Z80.51 FAMILY HISTORY OF MALIGNANT NEOPLASM OF KIDNEY: ICD-10-CM

## 2024-05-16 DIAGNOSIS — E08.44: ICD-10-CM

## 2024-05-16 DIAGNOSIS — N18.4 CHRONIC KIDNEY DISEASE, STAGE 4 (SEVERE): ICD-10-CM

## 2024-05-16 DIAGNOSIS — Z87.891 PERSONAL HISTORY OF NICOTINE DEPENDENCE: ICD-10-CM

## 2024-05-16 DIAGNOSIS — R22.2 LOCALIZED SWELLING, MASS AND LUMP, TRUNK: ICD-10-CM

## 2024-05-16 PROCEDURE — 83036 HEMOGLOBIN GLYCOSYLATED A1C: CPT | Mod: QW

## 2024-05-16 PROCEDURE — 36415 COLL VENOUS BLD VENIPUNCTURE: CPT

## 2024-05-16 PROCEDURE — 99495 TRANSJ CARE MGMT MOD F2F 14D: CPT

## 2024-05-16 PROCEDURE — 99213 OFFICE O/P EST LOW 20 MIN: CPT

## 2024-05-16 RX ORDER — LINAGLIPTIN 5 MG/1
5 TABLET, FILM COATED ORAL
Refills: 0 | Status: DISCONTINUED | COMMUNITY
End: 2024-05-16

## 2024-05-16 RX ORDER — CLONIDINE HYDROCHLORIDE 0.1 MG/1
0.1 TABLET ORAL
Refills: 0 | Status: ACTIVE | COMMUNITY

## 2024-05-16 RX ORDER — GLIPIZIDE 2.5 MG/1
2.5 TABLET ORAL DAILY
Qty: 90 | Refills: 2 | Status: ACTIVE | COMMUNITY
Start: 2024-05-16 | End: 1900-01-01

## 2024-05-16 RX ORDER — SEMAGLUTIDE 0.68 MG/ML
INJECTION, SOLUTION SUBCUTANEOUS WEEKLY
Refills: 0 | Status: ACTIVE | COMMUNITY

## 2024-05-16 RX ORDER — GLIPIZIDE 2.5 MG/1
2.5 TABLET, FILM COATED, EXTENDED RELEASE ORAL
Qty: 90 | Refills: 0 | Status: COMPLETED | COMMUNITY
Start: 2023-04-10 | End: 2024-05-16

## 2024-05-16 NOTE — ASSESSMENT
[FreeTextEntry1] : I had the pleasure of seeing Mr. MATUTE in the office today to discuss right chest wall mass.  Briefly, this is a 83 year old male with multiple medial conditions presented with an expanding mass on his right posterior chest wall. This was evaluated with a CT scan and drained by interventional radiology. The cytopathology was negative for malignant cells.  On exam today there was no further mass palpated and he feels it doesn't bother him anymore. We will continue to monitor the area with a surveillance CT scan in 3 months.  I have fully reviewed and evaluated all available results. All questions were answered and concerns were addressed.   Plan: - Repeat CT scan in 3 months. - Patient is in full understanding and agreement with the plan going forward.   I, Dr. Alonzo Lugo, personally performed the evaluation and management (E/M) services for this established patient who presents today with an existing condition.  That E/M includes conducting the examination, assessing all conditions, and establishing a new plan of care.  Today, Raghavendra Shah PA-C, was here to observe my evaluation and management services for this/these condition(s) to be followed going forward.

## 2024-05-16 NOTE — HISTORY OF PRESENT ILLNESS
[FreeTextEntry1] : Mr. GUIDO MATUTE is a 83 year male who presents today for follow-up. Previously, he has been followed during admission to [facility] after presenting with a lump under the scapula that had acutely increased in size. CT chest performed in the ED revealed Right inferior scapula/posterolateral chest wall structure with complex fluid collection, which may represent a resolving hematoma. Additional differential diagnosis (less likely) includes infection (abscess) underlying lesion/neoplasm or Elastofibroma dorsi. Underwent IR drainage of 10 cc fluid on 5/1 and received intravenous fluids for acute kidney injury, which improved. Discharged to home 5/3 and presents today for hospital follow-up.   Additional past medical history includes HTN, HLD, DM, prostate Ca (2015 tx with radiation therapy), CAD s/p 2 stents on DAPT   Today, the patient reports feeling fine. He doesn't know if the mass is still there. It doesn't bother him. He does feel some shortness of breath at rest but this has been going on a long time. Patient denies chest pain, palpitations, cough, fevers, chills, fatigue and unintentional weight loss or gain.      Referring: [facility]

## 2024-05-16 NOTE — DATA REVIEWED
[FreeTextEntry1] : GAUTIER, JOSEPH JEANCLAUDE   Accession:                             [id number]  Collected Date/Time:                   5/1/2024 16:20 EDT Received Date/Time:                    5/2/2024 16:20 EDT  Non-Gynecologic Report - Auth (Verified)  Specimen(s) Submitted SCAPULAR, RIGHT, FLUID  Final Diagnosis SCAPULAR, RIGHT, FLUID NEGATIVE FOR MALIGNANT CELLS.  Cytology slide and cell block mature squamous cells, histiocytes and degenerative debris.  Screened by: Gail Raygoza CT(ASCP) Verified by: Nicolette Banerjee MD (Electronic Signature) Reported on: 05/06/24 15:53 EDT, [facility], [place] [address] Phone: [phone]   Fax: [phone] Cytology processed at [facility], [address] [place] [id number] _________________________________________________________________   Clinical Information Sickle cell anemia, Prostate ca., asthma, DM, HTN, MI, CAD. Right scapular region collection drainage.  Gross Description Received: 40 ml of red fluid in CytoLyt Prepared: 1 ThinPrep slide, 1 Cell block [email or web address] YES [phone]  XR CHEST PORTABLE ROUTINE 1V ORDERED BY:   MOISES GARZON  PROCEDURE DATE: 05/03/2024    INTERPRETATION: Portable AP chest radiograph  COMPARISON: 5/1/2024 chest x-ray.  CLINICAL INFORMATION: Status post RIGHT scapula fluid collection. Follow-up..  FINDINGS: CATHETERS AND TUBES: None  PULMONARY: There are no airspace consolidations or radiographic evidence of pulmonary nodules.. No pleural effusion or pneumothorax.  HEART/VASCULAR: The heart size and mediastinum configuration are within the limits of normal.  BONES: The visualized osseous thorax is intact.  IMPRESSION:  No radiographic evidence of active chest disease..  --- End of Report ---      CIRO BRIGGS MD; Attending Radiologist This document has been electronically signed. May 5 2024 1:15PM  CT CHEST IC ORDERED BY: ANTONIETTA AGUILAR  PROCEDURE DATE: 04/30/2024    INTERPRETATION: CLINICAL INDICATION: R scapular fluid lump/collection. Patient on ASA/plavix.  PROCEDURE: CT of the chest was performed with intravenous contrast. Coronal and sagittal reconstruction images were obtained.  CONTRAST/COMPLICATIONS: IV Contrast: Omnipaque 350 90 cc administered 10 cc discarded Oral Contrast: NONE Complications: None reported at time of study completion  COMPARISON: 4/30/2024. 4/3/2024. 3/14/2023. 9/16/2019.  FINDINGS: Patient's respiratory motion degrades images.  LUNGS AND AIRWAYS: Patent central airways. Atelectasis. Stable scattered nodules, for example, right upper lobe, right middle lobe and bilateral lower lobes. PLEURA: No pleural effusion or pneumothorax. HEART: Stable heart size. Trace pericardial effusion. Coronary artery and aortic valve calcification. VESSELS: Atherosclerosis. Stable ectatic ascending aorta. MEDIASTINUM AND MAURICIO: Subcentimeter lymph nodes. CHEST WALL AND LOWER NECK: A 8.5 x 2.0 x 7.0 cm peripherally enhancing, centrally hypodense structure along the right posterolateral chest wall (deep to the right latissimus dorsi and serratus anterior muscles), new since prior studies. Bilateral gynecomastia. Heterogeneous thyroid gland with indeterminate lesions extending to the upper mediastinum again noted. UPPER ABDOMEN: Under distended stomach. A moderate to large amount of stool at the visualized colon. Bilateral perinephric stranding and intrarenal calcifications. Bilateral renal subcentimeter hypodensities, too small to characterize. BONES: Leftward curvature and degenerative changes of the spine. No obvious bone erosion or periosteal reaction at the right ribs. Stable sclerotic focus at the right ninth lateral rib. Chronic appearing slight bilateral rib deformities again noted.  IMPRESSION:  Right inferior scapula/posterolateral chest wall structure as described, which may represent a resolving hematoma. Additional differential diagnosis (less likely) includes infection (abscess) and underlying lesion/neoplasm. Elastofibroma dorsi can be included in the differential diagnosis but less likely given its imaging appearance and recent development despite typical location. Recommend clinical correlation and follow-up to resolution.  Additional findings as described.  --- End of Report ---      ONOFRE MARTÍNEZ MD; Attending Radiologist This document has been electronically signed. Apr 30 2024 10:35PM

## 2024-05-16 NOTE — ASSESSMENT
[FreeTextEntry1] : Saw CT surgeon and pathology of fluid discussed- no malignancy  No pain   #CAD s/p PCI #SVT s/p ILR - Pt sees Cardio Dr. Johanny Breaux - Continue ASA and Plavix  #T2DM - Pt sees Endo Dr. Perlman - Pt sees optho Dr. Ferreira (optho)- last diabetic eye exam 6 months ago, has appt for f/u coming up - A1c 6.4  cont current tx - Discussed dietary modifications such as low cab diet, pt admits to eating plantains every day, discussed risks of worsening diabetes - Continue home Farxiga and glipizide 2.5 Discussed diabetic diet. Carb allowance of around 130-140 g carbs daily. Educated about foot hygiene and need to see opthalmology and podiatry yearly.  #HTN Well controlled - Discussed low salt diet - Clonidine.2 mg TID mg, cont amlodipine 10 mg qd  Iron def anemia - improved Hg 13 Fu cbc No signs of symptoms of bleeding  #HLD - Continue home Rosuvastatin - Discussed diet and exercise Educated eating whole grain foods rich in soluble fiber such as oats and Omega 3 rich fish such as salmon, tout, sardines and bean based meals such as kidney beans, chickpeas and lentils. Small portions of nuts. Limit sugars and alcohol.  #PAD s/p recent angiogram - Follows vascular - Continue ASA and Plavix - Xarelto  #CKD stage IV - Will check CMP - Renal indices had worsen likely 2/2 diarrhea (dehydration) which has now resolved, will cont to trend - Following with nephro  #History of prostate cancer s/p radiation therapy (2015) - Pt sees Urology Dr. Daniel and Oncologist Dr. Mcintyre - Has PSA checked with urology

## 2024-05-16 NOTE — PHYSICAL EXAM
[Coordination Grossly Intact] : coordination grossly intact [No Focal Deficits] : no focal deficits [Normal Gait] : normal gait [Normal] : affect was normal and insight and judgment were intact [13890 - Moderate Complexity requires multiple possible diagnoses and/or the management options, moderate complexity of the medical data (tests, etc.) to be reviewed, and moderate risk of significant complications, morbidity, and/or mortality as well as co] : Moderate Complexity

## 2024-05-16 NOTE — PHYSICAL EXAM
[Sclera] : the sclera and conjunctiva were normal [Neck Appearance] : the appearance of the neck was normal [] : the neck was supple [Respiration, Rhythm And Depth] : normal respiratory rhythm and effort [Auscultation Breath Sounds / Voice Sounds] : lungs were clear to auscultation bilaterally [Heart Rate And Rhythm] : heart rate was normal and rhythm regular [Heart Sounds] : normal S1 and S2 [2+] : left 2+ [Bowel Sounds] : normal bowel sounds [Abdomen Soft] : soft [Cervical Lymph Nodes Enlarged Posterior Bilaterally] : posterior cervical [Cervical Lymph Nodes Enlarged Anterior Bilaterally] : anterior cervical [Supraclavicular Lymph Nodes Enlarged Bilaterally] : supraclavicular [Abnormal Walk] : normal gait [Musculoskeletal - Swelling] : no joint swelling seen [Skin Color & Pigmentation] : normal skin color and pigmentation [Skin Turgor] : normal skin turgor [No Focal Deficits] : no focal deficits [Oriented To Time, Place, And Person] : oriented to person, place, and time [Impaired Insight] : insight and judgment were intact [Affect] : the affect was normal [Mood] : the mood was normal [FreeTextEntry2] : no edema [FreeTextEntry1] : no obvious mass noted

## 2024-05-16 NOTE — DATA REVIEWED
[FreeTextEntry1] : GAUTIER, JOSEPH JEANCLAUDE   Accession:                             [id number]  Collected Date/Time:                   5/1/2024 16:20 EDT Received Date/Time:                    5/2/2024 16:20 EDT  Non-Gynecologic Report - Auth (Verified)  Specimen(s) Submitted SCAPULAR, RIGHT, FLUID  Final Diagnosis SCAPULAR, RIGHT, FLUID NEGATIVE FOR MALIGNANT CELLS.  Cytology slide and cell block mature squamous cells, histiocytes and degenerative debris.  Screened by: Gail Raygoza CT(ASCP) Verified by: Nicolette Banerjee MD (Electronic Signature) Reported on: 05/06/24 15:53 EDT, [facility], [place] [address] Phone: [phone]   Fax: [phone] Cytology processed at [facility], [address] [place] [id number] _________________________________________________________________   Clinical Information Sickle cell anemia, Prostate ca., asthma, DM, HTN, MI, CAD. Right scapular region collection drainage.  Gross Description Received: 40 ml of red fluid in CytoLyt Prepared: 1 ThinPrep slide, 1 Cell block [email or web address] YES [phone]  XR CHEST PORTABLE ROUTINE 1V ORDERED BY:   MOISES GARZON  PROCEDURE DATE: 05/03/2024    INTERPRETATION: Portable AP chest radiograph  COMPARISON: 5/1/2024 chest x-ray.  CLINICAL INFORMATION: Status post RIGHT scapula fluid collection. Follow-up..  FINDINGS: CATHETERS AND TUBES: None  PULMONARY: There are no airspace consolidations or radiographic evidence of pulmonary nodules.. No pleural effusion or pneumothorax.  HEART/VASCULAR: The heart size and mediastinum configuration are within the limits of normal.  BONES: The visualized osseous thorax is intact.  IMPRESSION:  No radiographic evidence of active chest disease..  --- End of Report ---      CIRO BRIGGS MD; Attending Radiologist This document has been electronically signed. May 5 2024 1:15PM  CT CHEST IC ORDERED BY: ANTONIETTA AGUILAR  PROCEDURE DATE: 04/30/2024    INTERPRETATION: CLINICAL INDICATION: R scapular fluid lump/collection. Patient on ASA/plavix.  PROCEDURE: CT of the chest was performed with intravenous contrast. Coronal and sagittal reconstruction images were obtained.  CONTRAST/COMPLICATIONS: IV Contrast: Omnipaque 350 90 cc administered 10 cc discarded Oral Contrast: NONE Complications: None reported at time of study completion  COMPARISON: 4/30/2024. 4/3/2024. 3/14/2023. 9/16/2019.  FINDINGS: Patient's respiratory motion degrades images.  LUNGS AND AIRWAYS: Patent central airways. Atelectasis. Stable scattered nodules, for example, right upper lobe, right middle lobe and bilateral lower lobes. PLEURA: No pleural effusion or pneumothorax. HEART: Stable heart size. Trace pericardial effusion. Coronary artery and aortic valve calcification. VESSELS: Atherosclerosis. Stable ectatic ascending aorta. MEDIASTINUM AND MAURICIO: Subcentimeter lymph nodes. CHEST WALL AND LOWER NECK: A 8.5 x 2.0 x 7.0 cm peripherally enhancing, centrally hypodense structure along the right posterolateral chest wall (deep to the right latissimus dorsi and serratus anterior muscles), new since prior studies. Bilateral gynecomastia. Heterogeneous thyroid gland with indeterminate lesions extending to the upper mediastinum again noted. UPPER ABDOMEN: Under distended stomach. A moderate to large amount of stool at the visualized colon. Bilateral perinephric stranding and intrarenal calcifications. Bilateral renal subcentimeter hypodensities, too small to characterize. BONES: Leftward curvature and degenerative changes of the spine. No obvious bone erosion or periosteal reaction at the right ribs. Stable sclerotic focus at the right ninth lateral rib. Chronic appearing slight bilateral rib deformities again noted.  IMPRESSION:  Right inferior scapula/posterolateral chest wall structure as described, which may represent a resolving hematoma. Additional differential diagnosis (less likely) includes infection (abscess) and underlying lesion/neoplasm. Elastofibroma dorsi can be included in the differential diagnosis but less likely given its imaging appearance and recent development despite typical location. Recommend clinical correlation and follow-up to resolution.  Additional findings as described.  --- End of Report ---      ONOFRE MARTÍNEZ MD; Attending Radiologist This document has been electronically signed. Apr 30 2024 10:35PM

## 2024-05-16 NOTE — HISTORY OF PRESENT ILLNESS
[Post-hospitalization from ___ Hospital] : Post-hospitalization from [unfilled] Hospital [Admitted on: ___] : The patient was admitted on [unfilled] [Discharged on ___] : discharged on [unfilled] [FreeTextEntry2] : Hospital Course: Discharge Date	03-May-2024 Admission Date	01-May-2024 00:23 Reason for Admission	Right Chest Wall Mass Hospital Course	 82 yo M with PMHx of HTN, HLD, DM, prostate Ca (2015 tx with radiation therapy), CAD s/p 2 stents on DAPT, presented to [facility] ED c/o of lump under R scapula that he first noticed on Sunday and has increased in size. Pt endorses discomfort on the right side of his back when he moves his arm but denies pain in the area of the lump. Pt denies fall/trauma. Patient denies any other complaints at this time. CT chest performed in the ED revealed Right inferior scapula/posterolateral chest wall structure with complex fluid collection, which may represent a resolving hematoma. Additional differential diagnosis (less likely) includes infection (abscess) and underlying lesion/neoplasm. Elastofibroma dorsi can be included in the differential diagnosis. Admitted to thoracic surgery service for evaluation under Dr. Lugo. 5/1 s/p IR drainage 10cc serosang fluid. Patient with KENTRELL now improved s/p IVF.  Patient has remained hemodynamically stable and stable from a respiratory standpoint and is deemed appropriate for discharge home per Dr. Lugo. Patient will be seen in clinic for follow up.   Neuro: A+O x 3, non-focal, speech clear and intact HEENT: PERRL, EOMI, oral mucosa pink and moist Neck: supple, no JVD CV: regular rate, regular rhythm, +S1S2, no murmurs or rub Pulm/chest: lung sounds CTA and equal bilaterally, no accessory muscle use noted Abd: soft, NT, ND, +BS Ext: GEORGE x 4, no C/C/E Skin: warm, well perfused, no rashes   Med Reconciliation: Medication Reconciliation Status	Admission Reconciliation is Completed Discharge Reconciliation is Completed Discharge Medications	acetaminophen 325 mg oral tablet: 2 tab(s) orally every 6 hours As needed Mild Pain (1 - 3), Moderate Pain (4 - 6) amLODIPine 10 mg oral tablet: 1 tab(s) orally once a day aspirin 81 mg oral tablet, chewable: 1 tab(s) orally once a day carvedilol 25 mg oral tablet: 1 tab(s) orally every 12 hours cloNIDine 0.1 mg oral tablet: 3  orally 2 times a day Farxiga 10 mg oral tablet: 1 tab(s) orally once a day ferrous gluconate 324 mg (37.5 mg elemental iron) oral tablet (obsolete): orally once a day Protonix 20 mg oral delayed release tablet: 1 tab(s) orally once a day rivaroxaban 2.5 mg oral tablet: 1 tab(s) orally 2 times a day rosuvastatin 40 mg oral tablet: 1 tab(s) orally once a day Tradjenta 5 mg oral tablet: 1 tab(s) orally once a day Ventolin HFA 90 mcg/inh inhalation aerosol: 2 puff(s) inhaled every 6 hours, As Needed ZyrTEC 10 mg oral tablet: 1 tab(s) orally once a day

## 2024-05-16 NOTE — HISTORY OF PRESENT ILLNESS
[FreeTextEntry1] : Mr. GUIDO MATUTE is a 83 year male who presents today for follow-up. Previously, he has been followed during admission to [facility] after presenting with a lump under the scapula that had acutely increased in size. CT chest performed in the ED revealed Right inferior scapula/posterolateral chest wall structure with complex fluid collection, which may represent a resolving hematoma. Additional differential diagnosis (less likely) includes infection (abscess) underlying lesion/neoplasm or Elastofibroma dorsi. Underwent IR drainage of 10 cc fluid on 5/1 and received intravenous fluids for acute kidney injury, which improved. Discharged to home 5/3 and presents today for hospital follow-up.   Additional past medical history includes HTN, HLD, DM, prostate Ca (2015 tx with radiation therapy), CAD s/p 2 stents on DAPT   Today, the patient reports feeling fine. He doesn't know if the mass is still there. It doesn't bother him. He does feel some shortness of breath at rest but this has been going on a long time. Patient denies chest pain, palpitations, cough, fevers, chills, fatigue and unintentional weight loss or gain.      Referring: [facility]

## 2024-05-17 LAB — HBA1C MFR BLD HPLC: 6.5

## 2024-05-19 LAB
ALBUMIN SERPL ELPH-MCNC: 4 G/DL
ALP BLD-CCNC: 105 U/L
ALT SERPL-CCNC: 24 U/L
ANION GAP SERPL CALC-SCNC: 11 MMOL/L
AST SERPL-CCNC: 22 U/L
BASOPHILS # BLD AUTO: 0.07 K/UL
BASOPHILS NFR BLD AUTO: 1 %
BILIRUB SERPL-MCNC: 0.5 MG/DL
BUN SERPL-MCNC: 23 MG/DL
CALCIUM SERPL-MCNC: 9.5 MG/DL
CHLORIDE SERPL-SCNC: 100 MMOL/L
CHOLEST SERPL-MCNC: 151 MG/DL
CO2 SERPL-SCNC: 23 MMOL/L
CREAT SERPL-MCNC: 1.78 MG/DL
EGFR: 37 ML/MIN/1.73M2
EOSINOPHIL # BLD AUTO: 0.17 K/UL
EOSINOPHIL NFR BLD AUTO: 2.4 %
ESTIMATED AVERAGE GLUCOSE: 131 MG/DL
GLUCOSE SERPL-MCNC: 149 MG/DL
HBA1C MFR BLD HPLC: 6.2 %
HCT VFR BLD CALC: 42.8 %
HDLC SERPL-MCNC: 56 MG/DL
HGB BLD-MCNC: 13.9 G/DL
IMM GRANULOCYTES NFR BLD AUTO: 0.1 %
LDLC SERPL CALC-MCNC: 74 MG/DL
LYMPHOCYTES # BLD AUTO: 2.08 K/UL
LYMPHOCYTES NFR BLD AUTO: 29.3 %
MAN DIFF?: NORMAL
MCHC RBC-ENTMCNC: 27.9 PG
MCHC RBC-ENTMCNC: 32.5 GM/DL
MCV RBC AUTO: 85.8 FL
MONOCYTES # BLD AUTO: 0.51 K/UL
MONOCYTES NFR BLD AUTO: 7.2 %
NEUTROPHILS # BLD AUTO: 4.27 K/UL
NEUTROPHILS NFR BLD AUTO: 60 %
NONHDLC SERPL-MCNC: 95 MG/DL
PLATELET # BLD AUTO: 222 K/UL
POTASSIUM SERPL-SCNC: 5 MMOL/L
PROT SERPL-MCNC: 6.6 G/DL
RBC # BLD: 4.99 M/UL
RBC # FLD: 17.4 %
SODIUM SERPL-SCNC: 133 MMOL/L
TRIGL SERPL-MCNC: 114 MG/DL
WBC # FLD AUTO: 7.11 K/UL

## 2024-06-10 NOTE — H&P ADULT - PROBLEM SELECTOR PLAN 1
?etiology.  negative troponin, no ischemic EKG changes.  abnormal stress test on 7/10.  cardiology has been consulted, recommends NTG paste.  last echo revealed EF of 60%%.  serial troponin ordered.  continue ASA, brilinta, atorvastatin, valsartan and metoprolol  further management as per cardiology.
10-Cornelius-2024 09:40

## 2024-06-16 ENCOUNTER — RX RENEWAL (OUTPATIENT)
Age: 84
End: 2024-06-16

## 2024-06-16 RX ORDER — CLONIDINE HYDROCHLORIDE 0.3 MG/1
0.3 TABLET ORAL 3 TIMES DAILY
Qty: 90 | Refills: 1 | Status: ACTIVE | COMMUNITY
Start: 2021-11-11 | End: 1900-01-01

## 2024-08-10 ENCOUNTER — APPOINTMENT (OUTPATIENT)
Dept: CT IMAGING | Facility: CLINIC | Age: 84
End: 2024-08-10

## 2024-08-10 ENCOUNTER — OUTPATIENT (OUTPATIENT)
Dept: OUTPATIENT SERVICES | Facility: HOSPITAL | Age: 84
LOS: 1 days | End: 2024-08-10
Payer: MEDICARE

## 2024-08-10 DIAGNOSIS — R22.2 LOCALIZED SWELLING, MASS AND LUMP, TRUNK: ICD-10-CM

## 2024-08-10 PROCEDURE — 71250 CT THORAX DX C-: CPT

## 2024-08-10 PROCEDURE — 71250 CT THORAX DX C-: CPT | Mod: 26

## 2024-08-16 ENCOUNTER — APPOINTMENT (OUTPATIENT)
Dept: INTERNAL MEDICINE | Facility: CLINIC | Age: 84
End: 2024-08-16
Payer: MEDICARE

## 2024-08-16 VITALS
HEIGHT: 63 IN | RESPIRATION RATE: 16 BRPM | OXYGEN SATURATION: 98 % | WEIGHT: 142 LBS | TEMPERATURE: 97.2 F | SYSTOLIC BLOOD PRESSURE: 157 MMHG | BODY MASS INDEX: 25.16 KG/M2 | DIASTOLIC BLOOD PRESSURE: 93 MMHG | HEART RATE: 62 BPM

## 2024-08-16 DIAGNOSIS — R22.2 LOCALIZED SWELLING, MASS AND LUMP, TRUNK: ICD-10-CM

## 2024-08-16 DIAGNOSIS — E78.5 HYPERLIPIDEMIA, UNSPECIFIED: ICD-10-CM

## 2024-08-16 DIAGNOSIS — I10 ESSENTIAL (PRIMARY) HYPERTENSION: ICD-10-CM

## 2024-08-16 DIAGNOSIS — N18.30 CHRONIC KIDNEY DISEASE, STAGE 3 UNSPECIFIED: ICD-10-CM

## 2024-08-16 PROCEDURE — 99204 OFFICE O/P NEW MOD 45 MIN: CPT

## 2024-08-16 PROCEDURE — G2211 COMPLEX E/M VISIT ADD ON: CPT

## 2024-08-16 RX ORDER — OLMESARTAN MEDOXOMIL 5 MG/1
5 TABLET, FILM COATED ORAL DAILY
Qty: 90 | Refills: 3 | Status: ACTIVE | COMMUNITY
Start: 2024-08-16 | End: 1900-01-01

## 2024-08-16 NOTE — HISTORY OF PRESENT ILLNESS
[FreeTextEntry1] : HTN [de-identified] : Shawn is a 84 yo M w PMHx CAD s/p PCI, ILR placed 5/20 for SVT, T2DM, HTN, HLD and PAD and CKD stage IV here for follow up Patient has been feeling very good. following with Dr Lugo for chest wall mass - had repeat CT chest

## 2024-08-16 NOTE — PLAN
[FreeTextEntry1] : A/P : DM :  Advised ADA diet, daily regular exercise, to loose weight. Check FPG/PPG at home and keep a log, to c/w meds, repeat A1c in 3 mo. Advised daily self-foot exams. Annual eval for dilated eye exam. HTN:  needs better controlled, advised strict low salt diet, daily regular exercise, to add meds, bmp to be monitored closely. will f/u with CTsx for back mass - CT discussed  CKD 3 : to avoid nsaids, adequate hydration , renal panel to be watched on ARB  f/u 4 w to [facility]

## 2024-08-19 LAB
ALBUMIN SERPL ELPH-MCNC: 4.3 G/DL
ALP BLD-CCNC: 100 U/L
ALT SERPL-CCNC: 25 U/L
ANION GAP SERPL CALC-SCNC: 12 MMOL/L
AST SERPL-CCNC: 24 U/L
BASOPHILS # BLD AUTO: 0.09 K/UL
BASOPHILS NFR BLD AUTO: 1.3 %
BILIRUB SERPL-MCNC: 0.5 MG/DL
BUN SERPL-MCNC: 31 MG/DL
CALCIUM SERPL-MCNC: 10 MG/DL
CHLORIDE SERPL-SCNC: 100 MMOL/L
CHOLEST SERPL-MCNC: 161 MG/DL
CO2 SERPL-SCNC: 26 MMOL/L
CREAT SERPL-MCNC: 1.73 MG/DL
EGFR: 38 ML/MIN/1.73M2
EOSINOPHIL # BLD AUTO: 0.34 K/UL
EOSINOPHIL NFR BLD AUTO: 5 %
ESTIMATED AVERAGE GLUCOSE: 137 MG/DL
GLUCOSE SERPL-MCNC: 116 MG/DL
HBA1C MFR BLD HPLC: 6.4 %
HCT VFR BLD CALC: 44.5 %
HDLC SERPL-MCNC: 61 MG/DL
HGB BLD-MCNC: 14.5 G/DL
IMM GRANULOCYTES NFR BLD AUTO: 0.4 %
LDLC SERPL CALC-MCNC: 79 MG/DL
LYMPHOCYTES # BLD AUTO: 2.58 K/UL
LYMPHOCYTES NFR BLD AUTO: 37.8 %
MAN DIFF?: NORMAL
MCHC RBC-ENTMCNC: 27.9 PG
MCHC RBC-ENTMCNC: 32.6 GM/DL
MCV RBC AUTO: 85.6 FL
MONOCYTES # BLD AUTO: 0.69 K/UL
MONOCYTES NFR BLD AUTO: 10.1 %
NEUTROPHILS # BLD AUTO: 3.09 K/UL
NEUTROPHILS NFR BLD AUTO: 45.4 %
NONHDLC SERPL-MCNC: 100 MG/DL
PLATELET # BLD AUTO: 223 K/UL
POTASSIUM SERPL-SCNC: 4.7 MMOL/L
PROT SERPL-MCNC: 7.4 G/DL
RBC # BLD: 5.2 M/UL
RBC # FLD: 15.7 %
SODIUM SERPL-SCNC: 137 MMOL/L
TRIGL SERPL-MCNC: 116 MG/DL
WBC # FLD AUTO: 6.82 K/UL

## 2024-08-22 ENCOUNTER — APPOINTMENT (OUTPATIENT)
Dept: THORACIC SURGERY | Facility: CLINIC | Age: 84
End: 2024-08-22
Payer: MEDICARE

## 2024-08-22 VITALS
SYSTOLIC BLOOD PRESSURE: 138 MMHG | HEIGHT: 63 IN | HEART RATE: 64 BPM | OXYGEN SATURATION: 98 % | RESPIRATION RATE: 16 BRPM | WEIGHT: 142 LBS | DIASTOLIC BLOOD PRESSURE: 76 MMHG | BODY MASS INDEX: 25.16 KG/M2

## 2024-08-22 DIAGNOSIS — E11.21 TYPE 2 DIABETES MELLITUS WITH DIABETIC NEPHROPATHY: ICD-10-CM

## 2024-08-22 PROCEDURE — 99213 OFFICE O/P EST LOW 20 MIN: CPT

## 2024-08-22 NOTE — HISTORY OF PRESENT ILLNESS
[FreeTextEntry1] : Mr. GUIDO MATUTE is an 83-year male who presents today for follow-up. Past medical history includes HTN, HLD, DM, prostate Ca (2015 tx with radiation therapy), CAD s/p 2 stents on DAPT.  Previously, he has been followed during admission to [facility] in April 2024 after presenting with a lump under the scapula that had acutely increased in size.   CT chest 4/30/24: - 8.5 x 2.0 x 7.0 cm peripherally enhancing, centrally hypodense structure along the right posterolateral chest wall (deep to the right latissimus dorsi and serratus anterior muscles) - Bilateral gynecomastia - Heterogeneous thyroid gland with indeterminate lesions extending to the upper mediastinum again noted  5/1/24: s/p IR drainage of 10 cc fluid on 5/1 and received intravenous fluids for acute kidney injury, which improved.   5/3/24: discharged home on 5/3  CT chest performed through [facility] 8/10/24: - Interval resolution of previously seen right posterolateral chest wall collection since April 2024.

## 2024-08-22 NOTE — ASSESSMENT
[FreeTextEntry1] : Mr. GUIDO MATUTE is an 83-year male who presents today for follow-up. Past medical history includes HTN, HLD, DM, prostate Ca (2015 tx with radiation therapy), CAD s/p 2 stents on DAPT.  Previously, he has been followed during admission to [facility] in April 2024 after presenting with a lump under the scapula that had acutely increased in size.  CT chest 4/30/24: - 8.5 x 2.0 x 7.0 cm peripherally enhancing, centrally hypodense structure along the right posterolateral chest wall (deep to the right latissimus dorsi and serratus anterior muscles) - Bilateral gynecomastia - Heterogeneous thyroid gland with indeterminate lesions extending to the upper mediastinum again noted  5/1/24: s/p IR drainage of 10 cc fluid on 5/1 and received intravenous fluids for acute kidney injury, which improved.  5/3/24: discharged home on 5/3  CT chest performed through [facility] 8/10/24: - Interval resolution of previously seen right posterolateral chest wall collection since April 2024.  I have reviewed the patient's medical records and diagnostic images at time of this consultation and have made the following recommendations: 1. Return to care as needed  All questions answered, patient verbalizes understanding and agrees to follow up accordingly.      I, Dr. Lugo personally performed the evaluation and management (E/M) services for this patient. That E/M includes conducting the initial examination, assessing all conditions, and establishing the plan of care. Today, Te Tomlinson NP was here to observe my evaluation and management services for this patient.

## 2024-08-22 NOTE — DATA REVIEWED
[FreeTextEntry1] : I have independently reviewed the following: CT chest performed through [facility] 8/10/24

## 2024-08-22 NOTE — ASSESSMENT
[FreeTextEntry1] : Mr. GUIDO MATUTE is an 83-year male who presents today for follow-up. Past medical history includes HTN, HLD, DM, prostate Ca (2015 tx with radiation therapy), CAD s/p 2 stents on DAPT.  Previously, he has been followed during admission to [facility] in April 2024 after presenting with a lump under the scapula that had acutely increased in size.  CT chest 4/30/24: - 8.5 x 2.0 x 7.0 cm peripherally enhancing, centrally hypodense structure along the right posterolateral chest wall (deep to the right latissimus dorsi and serratus anterior muscles) - Bilateral gynecomastia - Heterogeneous thyroid gland with indeterminate lesions extending to the upper mediastinum again noted  5/1/24: s/p IR drainage of 10 cc fluid on 5/1 and received intravenous fluids for acute kidney injury, which improved.  5/3/24: discharged home on 5/3  CT chest performed through [facility] 8/10/24: - Interval resolution of previously seen right posterolateral chest wall collection since April 2024.  I have reviewed the patient's medical records and diagnostic images at time of this consultation and have made the following recommendations: 1. Return to care as needed  All questions answered, patient verbalizes understanding and agrees to follow up accordingly.      I, Dr. Lugo personally performed the evaluation and management (E/M) services for this patient. That E/M includes conducting the initial examination, assessing all conditions, and establishing the plan of care. Today, Te Tomlinson NP was here to observe my evaluation and management services for this patient.

## 2024-08-22 NOTE — HISTORY OF PRESENT ILLNESS
[FreeTextEntry1] : Mr. GUIDO MATUTE is an 83-year male who presents today for follow-up. Past medical history includes HTN, HLD, DM, prostate Ca (2015 tx with radiation therapy), CAD s/p 2 stents on DAPT.  Previously, he has been followed during admission to [facility] in April 2024 after presenting with a lump under the scapula that had acutely increased in size.   CT chest 4/30/24: - 8.5 x 2.0 x 7.0 cm peripherally enhancing, centrally hypodense structure along the right posterolateral chest wall (deep to the right latissimus dorsi and serratus anterior muscles) - Bilateral gynecomastia - Heterogeneous thyroid gland with indeterminate lesions extending to the upper mediastinum again noted  5/1/24: s/p IR drainage of 10 cc fluid on 5/1 and received intravenous fluids for acute kidney injury, which improved.   5/3/24: discharged home on 5/3  CT chest performed through [facility] 8/10/24: - Interval resolution of previously seen right posterolateral chest wall collection since April 2024.

## 2024-08-22 NOTE — HISTORY OF PRESENT ILLNESS
[FreeTextEntry1] : Mr. GUIDO MATUTE is an 83-year male who presents today for follow-up. Past medical history includes HTN, HLD, DM, prostate Ca (2015 tx with radiation therapy), CAD s/p 2 stents on DAPT.  Previously, he has been followed during admission to [facility] in April 2024 after presenting with a lump under the scapula that had acutely increased in size.   CT chest 4/30/24: - 8.5 x 2.0 x 7.0 cm peripherally enhancing, centrally hypodense structure along the right posterolateral chest wall (deep to the right latissimus dorsi and serratus anterior muscles) - Bilateral gynecomastia - Heterogeneous thyroid gland with indeterminate lesions extending to the upper mediastinum again noted  5/1/24: s/p IR drainage of 10 cc fluid on 5/1 and received intravenous fluids for acute kidney injury, which improved.   5/3/24: discharged home on 5/3  CT chest performed through [facility] 8/10/24: - Interval resolution of previously seen right posterolateral chest wall collection since April 2024.

## 2024-08-22 NOTE — DATA REVIEWED
[FreeTextEntry1] : I have independently reviewed the following: CT chest performed through [facility] 8/10/24

## 2024-08-22 NOTE — ASSESSMENT
[FreeTextEntry1] : Mr. GUIDO MATUTE is an 83-year male who presents today for follow-up. Past medical history includes HTN, HLD, DM, prostate Ca (2015 tx with radiation therapy), CAD s/p 2 stents on DAPT.  Previously, he has been followed during admission to [facility] in April 2024 after presenting with a lump under the scapula that had acutely increased in size.  CT chest 4/30/24: - 8.5 x 2.0 x 7.0 cm peripherally enhancing, centrally hypodense structure along the right posterolateral chest wall (deep to the right latissimus dorsi and serratus anterior muscles) - Bilateral gynecomastia - Heterogeneous thyroid gland with indeterminate lesions extending to the upper mediastinum again noted  5/1/24: s/p IR drainage of 10 cc fluid on 5/1 and received intravenous fluids for acute kidney injury, which improved.  5/3/24: discharged home on 5/3  CT chest performed through [facility] 8/10/24: - Interval resolution of previously seen right posterolateral chest wall collection since April 2024.  I have reviewed the patient's medical records and diagnostic images at time of this consultation and have made the following recommendations: 1. Return to care as needed  All questions answered, patient verbalizes understanding and agrees to follow up accordingly.      I, Dr. Lugo personally performed the evaluation and management (E/M) services for this patient. That E/M includes conducting the initial examination, assessing all conditions, and establishing the plan of care. Today, Te Tomlinson NP was here to observe my evaluation and management services for this patient.

## 2024-09-04 ENCOUNTER — APPOINTMENT (OUTPATIENT)
Dept: CARDIOLOGY | Facility: CLINIC | Age: 84
End: 2024-09-04
Payer: MEDICARE

## 2024-09-04 VITALS
DIASTOLIC BLOOD PRESSURE: 77 MMHG | OXYGEN SATURATION: 99 % | HEIGHT: 63 IN | SYSTOLIC BLOOD PRESSURE: 161 MMHG | BODY MASS INDEX: 25.34 KG/M2 | WEIGHT: 143 LBS | HEART RATE: 77 BPM

## 2024-09-04 DIAGNOSIS — I71.23 ANEURYSM OF THE DESCENDING THORACIC AORTA, WITHOUT RUPTURE: ICD-10-CM

## 2024-09-04 DIAGNOSIS — I73.9 PERIPHERAL VASCULAR DISEASE, UNSPECIFIED: ICD-10-CM

## 2024-09-04 DIAGNOSIS — I71.20 THORACIC AORTIC ANEURYSM, WITHOUT RUPTURE, UNSPECIFIED: ICD-10-CM

## 2024-09-04 DIAGNOSIS — I10 ESSENTIAL (PRIMARY) HYPERTENSION: ICD-10-CM

## 2024-09-04 DIAGNOSIS — E78.5 HYPERLIPIDEMIA, UNSPECIFIED: ICD-10-CM

## 2024-09-04 PROCEDURE — 99214 OFFICE O/P EST MOD 30 MIN: CPT

## 2024-09-04 PROCEDURE — 93000 ELECTROCARDIOGRAM COMPLETE: CPT

## 2024-09-10 ENCOUNTER — NON-APPOINTMENT (OUTPATIENT)
Age: 84
End: 2024-09-10

## 2024-09-10 NOTE — HISTORY OF PRESENT ILLNESS
[FreeTextEntry1] : 84 y/o Male with h/o CAD s/p PCI LAD/OM1 '18 with repeat cardiac catheterization in 5/20 demonstrating patent stents with mild, non-obstructive CAD, normal LV fxn, ILR placed 5/20 for SVT, DM, HTN, Dyslipidemia and PAD s/p DCB to the prox mid and distal SFA on 10/6/2022, presents for follow up   Patient is accompanied by daughter  Patient notes that he has been doing well with no chest pain or shortness of breath at rest or upon exertion, no lower extremity edema orthopnea or PND  Notes some cramping sensation in the calf but this has not been worsening

## 2024-09-10 NOTE — PHYSICAL EXAM
[Well Developed] : well developed [Well Nourished] : well nourished [No Acute Distress] : no acute distress [Normal Conjunctiva] : normal conjunctiva [Normal Venous Pressure] : normal venous pressure [No Carotid Bruit] : no carotid bruit [Normal S1, S2] : normal S1, S2 [No Murmur] : no murmur [No Rub] : no rub [No Gallop] : no gallop [Clear Lung Fields] : clear lung fields [Good Air Entry] : good air entry [No Respiratory Distress] : no respiratory distress  [Soft] : abdomen soft [Non Tender] : non-tender [No Masses/organomegaly] : no masses/organomegaly [Normal Bowel Sounds] : normal bowel sounds [Normal Gait] : normal gait [No Edema] : no edema [No Cyanosis] : no cyanosis [No Clubbing] : no clubbing [No Varicosities] : no varicosities [No Rash] : no rash [No Skin Lesions] : no skin lesions [Moves all extremities] : moves all extremities [No Focal Deficits] : no focal deficits [Normal Speech] : normal speech [Alert and Oriented] : alert and oriented [Normal memory] : normal memory [de-identified] : Non palpabale DP/PT pulses bilaterally; LLE: + doppler signal of the PT and DP RLE: + doppler signal of the DPT/PT

## 2024-09-10 NOTE — ASSESSMENT
[FreeTextEntry1] : 80 y/o Male with h/o CAD s/p PCI LAD/OM1 '18 with repeat cardiac catheterization in 5/20 demonstrating patent stents with mild, non-obstructive CAD, normal LV fxn, ILR placed 5/20 for SVT, DM, HTN, Dyslipidemia and PAD s/p DCB to the prox mid and distal SFA on 10/6/2022, presents for follow up   1. PAD s/p DCB to the prox mid and distal SFA on 10/6/2022, with Kaitlin IIb classification  - patient notes some bilateral  symptoms or claudication of the bilateral lower extremities  - based on COMPASS trial on Xarelto 2.5mg dosing (based on the COMPASS Trial) and continue with ASA 81mg po q daily  - bedside Doppler of the RLE: monophasic flow in the DP/PT and LLE: biphasic flow in the DP and PT  - US arterial duplex done shows  Moderate atherosclerosis with 20 - 49% stenosis in the proximal SFA. Left Lower Extremity Moderate atherosclerosis with 20 - 49% stenosis in the mid SFA and 20 - 49% stenosis in the popliteal artery. 3. There is a small calcified mobile plaque measuring 0.14 cm in the left CFA measuring. - Continue with high intensity, Crestor 40mg po q daily  - obtain US arterial duplex to assess for any worsening PAD, last US was in 5/2023 and needs follow up   2. Hypertension  - blood pressure is better controlled in the office today  - on Clonidine 0.3 mg po q BID, Clonidine 0.1 mg po at 1-2 pm,  Norvasc 10mg po q daily and  ? if patient is still taking Coreg to 25mg po 12hrs - discussed to continue with ambulatory blood pressure monitoring   3. CAD s/p LAD/OM1 '18 - no active chest pain or shortness of breath  - on ASA Xarelto 2.5 and statin    4. SVT and possible syncope s/p ILR  - ILR interrogated during this office visit  - no evidence of recent SVT and follows with EP   5. 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 [facility] Card[facility]/Vascular Cardiology -[facility] Cardiology Telephone # [phone]

## 2024-09-10 NOTE — CARDIOLOGY SUMMARY
[de-identified] : 2024: Sinus Rhythm @ 69 bpm  WITHIN NORMAL LIMITS 12/8/2022: Sinus  Bradycardia @ 59 bpm WITHIN NORMAL LIMITS 10/7/2022: Sinus  Rhythm @ 61 bpm WITHIN NORMAL LIMITS [de-identified] : 5/12/2020:  1. Normal global left ventricular systolic function.\par   2. Left ventricular ejection fraction, by visual estimation, is 65 to 70%.\par   3. Mildly increased LV wall thickness.\par   4. No obvious left ventricle regional wall motion abnormalities visualized.\par   5. Normal right ventricle size and normal systolic function.\par   6. The left atrium is normal in size.\par   7. The right atrium is normal in size.\par   8. Mild thickening and calcification of the anterior and posterior mitral valve leaflets.\par   9. Mild aortic valve leaflet calcification. Small, round echodensities on the aortic valve leaflet tips which may be consistent with calcification. No aortic stenosis.\par  10. Dilated proximal ascending aorta (3.8 cm; 2.1 cm/m S 2).\par  11. Recommend clinical correlation with the above findings. [de-identified] : Peripheral Angiography: 10/6/2022: s/p Jetsream atherectomy, balloon angioplasty and DCB angioplasty using 6 x 220 mm balloon of the prox mid and distal SFA \par   [de-identified] : US arterial duplex: 10/10/2022: 1. Right Lower extremity  -  Moderate atherosclerosis with 20 - 49% stenosis in the proximal SFA.\par  2. Left Lower Extremity  Moderate atherosclerosis with 20 - 49% stenosis in the mid SFA and 20 - 49%  stenosis in the popliteal artery. 3. There is a small calcified mobile plaque measuring 0.14 cm in the left CFA measuring.\par  \par  \par  US arterial duplex: 8/11/2022: 1. Ankle/brachial indices are in the ischemic range bilaterally. 2. Right Lower Extremity  -  Moderate atherosclerosis with 20 - 49% stenosis in the proximal superficial  femoral artery. \par  3. Left Lower Extremity  -   Severe atherosclerosis with 50 - 75% stenosis in the popliteal artery.\par

## 2024-09-23 NOTE — ED ADULT NURSE NOTE - IN THE PAST 12 MONTHS HAVE YOU USED DRUGS OTHER THAN THOSE REQUIRED FOR MEDICAL REASON?
No
Clindamycin Counseling: I counseled the patient regarding use of clindamycin as an antibiotic for prophylactic and/or therapeutic purposes. Clindamycin is active against numerous classes of bacteria, including skin bacteria. Side effects may include nausea, diarrhea, gastrointestinal upset, rash, hives, yeast infections, and in rare cases, colitis.
never true

## 2024-10-06 NOTE — ED PROVIDER NOTE - DISPOSITION TYPE
Is the patient on isolation?: No   Do you expect the patient to require telemetry (informational-only for bed management): No   Do you expect the patient to require observation or inpt? (informational-only for bed management): Inpatient  
DISCHARGE

## 2024-10-14 ENCOUNTER — APPOINTMENT (OUTPATIENT)
Dept: INTERNAL MEDICINE | Facility: CLINIC | Age: 84
End: 2024-10-14
Payer: MEDICARE

## 2024-10-14 VITALS
DIASTOLIC BLOOD PRESSURE: 89 MMHG | TEMPERATURE: 97.6 F | RESPIRATION RATE: 16 BRPM | WEIGHT: 143 LBS | HEART RATE: 64 BPM | HEIGHT: 63 IN | SYSTOLIC BLOOD PRESSURE: 156 MMHG | OXYGEN SATURATION: 98 % | BODY MASS INDEX: 25.34 KG/M2

## 2024-10-14 DIAGNOSIS — N18.30 CHRONIC KIDNEY DISEASE, STAGE 3 UNSPECIFIED: ICD-10-CM

## 2024-10-14 DIAGNOSIS — E11.21 TYPE 2 DIABETES MELLITUS WITH DIABETIC NEPHROPATHY: ICD-10-CM

## 2024-10-14 DIAGNOSIS — E78.5 HYPERLIPIDEMIA, UNSPECIFIED: ICD-10-CM

## 2024-10-14 DIAGNOSIS — I10 ESSENTIAL (PRIMARY) HYPERTENSION: ICD-10-CM

## 2024-10-14 PROCEDURE — G2211 COMPLEX E/M VISIT ADD ON: CPT

## 2024-10-14 PROCEDURE — 99214 OFFICE O/P EST MOD 30 MIN: CPT

## 2024-10-14 RX ORDER — OLMESARTAN MEDOXOMIL 5 MG/1
5 TABLET, FILM COATED ORAL DAILY
Qty: 90 | Refills: 3 | Status: ACTIVE | COMMUNITY
Start: 2024-10-14 | End: 1900-01-01

## 2024-10-17 NOTE — ED ADULT NURSE NOTE - NS ED NURSE RECORD ANOTHER VITAL SIGN
Provider: Marianne  Caller: patient  Relationship to Patient: self  Call Back Phone Number: [phone]  Reason for Call: patient says her heart rate is high and also her blood pressure.  She is currently in a rehab facility     Yes

## 2024-11-05 DIAGNOSIS — I10 ESSENTIAL (PRIMARY) HYPERTENSION: ICD-10-CM

## 2024-11-07 NOTE — ED PROVIDER NOTE - ATTESTATION, MLM
Pt given shock 200    I have reviewed and confirmed nurses' notes for patient's medications, allergies, medical history, and surgical history.

## 2024-12-04 ENCOUNTER — APPOINTMENT (OUTPATIENT)
Dept: INTERNAL MEDICINE | Facility: CLINIC | Age: 84
End: 2024-12-04
Payer: MEDICARE

## 2024-12-04 VITALS
OXYGEN SATURATION: 98 % | SYSTOLIC BLOOD PRESSURE: 135 MMHG | RESPIRATION RATE: 16 BRPM | TEMPERATURE: 97.3 F | DIASTOLIC BLOOD PRESSURE: 77 MMHG | BODY MASS INDEX: 25.34 KG/M2 | HEART RATE: 67 BPM | HEIGHT: 63 IN | WEIGHT: 143 LBS

## 2024-12-04 DIAGNOSIS — I10 ESSENTIAL (PRIMARY) HYPERTENSION: ICD-10-CM

## 2024-12-04 DIAGNOSIS — E11.21 TYPE 2 DIABETES MELLITUS WITH DIABETIC NEPHROPATHY: ICD-10-CM

## 2024-12-04 DIAGNOSIS — N18.4 CHRONIC KIDNEY DISEASE, STAGE 4 (SEVERE): ICD-10-CM

## 2024-12-04 DIAGNOSIS — N18.30 CHRONIC KIDNEY DISEASE, STAGE 3 UNSPECIFIED: ICD-10-CM

## 2024-12-04 DIAGNOSIS — E78.5 HYPERLIPIDEMIA, UNSPECIFIED: ICD-10-CM

## 2024-12-04 PROCEDURE — G2211 COMPLEX E/M VISIT ADD ON: CPT

## 2024-12-04 PROCEDURE — 99214 OFFICE O/P EST MOD 30 MIN: CPT

## 2024-12-18 NOTE — ED PROVIDER NOTE - CPE EDP EYES NORM
Agreed, needs to go to the emergency room secondary to severe pain  
LYNNETTEI-The patient called is in tears due to pain.  She advised that she had been taking Tylenol and this has not helped.  She does not think she has a fever but she has been vomiting.   Writer advised her to go to the ER due to the severity of the pain/vomiting she was having.   
normal...

## 2025-01-21 ENCOUNTER — RX RENEWAL (OUTPATIENT)
Age: 85
End: 2025-01-21

## 2025-01-25 NOTE — ED ADULT NURSE NOTE - NS PRO PASSIVE SMOKE EXP
Unknown
Assistance with ambulation/Assistance OOB with selected safe patient handling equipment/Communicate Risk of Fall with Harm to all staff/Monitor for mental status changes/Monitor gait and stability/Reinforce activity limits and safety measures with patient and family/Tailored Fall Risk Interventions/Visual Cue: Yellow wristband and red socks/Bed in lowest position, wheels locked, appropriate side rails in place/Call bell, personal items and telephone in reach/Instruct patient to call for assistance before getting out of bed or chair/Non-slip footwear when patient is out of bed/[place] to call system/Physically safe environment - no spills, clutter or unnecessary equipment/Purposeful Proactive Rounding/Room/bathroom lighting operational, light cord in reach

## 2025-01-30 ENCOUNTER — RX CHANGE (OUTPATIENT)
Age: 85
End: 2025-01-30

## 2025-02-04 ENCOUNTER — RX RENEWAL (OUTPATIENT)
Age: 85
End: 2025-02-04

## 2025-02-04 RX ORDER — RIVAROXABAN 2.5 MG/1
2.5 TABLET, FILM COATED ORAL
Qty: 60 | Refills: 3 | Status: ACTIVE | COMMUNITY
Start: 1900-01-01 | End: 1900-01-01

## 2025-02-13 ENCOUNTER — RX RENEWAL (OUTPATIENT)
Age: 85
End: 2025-02-13

## 2025-02-21 ENCOUNTER — APPOINTMENT (OUTPATIENT)
Dept: CARDIOLOGY | Facility: CLINIC | Age: 85
End: 2025-02-21

## 2025-02-21 VITALS
DIASTOLIC BLOOD PRESSURE: 80 MMHG | HEIGHT: 63 IN | WEIGHT: 146 LBS | OXYGEN SATURATION: 100 % | HEART RATE: 68 BPM | SYSTOLIC BLOOD PRESSURE: 160 MMHG | BODY MASS INDEX: 25.87 KG/M2

## 2025-02-21 DIAGNOSIS — R42 DIZZINESS AND GIDDINESS: ICD-10-CM

## 2025-02-21 PROCEDURE — 93000 ELECTROCARDIOGRAM COMPLETE: CPT | Mod: 59

## 2025-02-21 PROCEDURE — 99214 OFFICE O/P EST MOD 30 MIN: CPT

## 2025-02-21 PROCEDURE — 93242 EXT ECG>48HR<7D RECORDING: CPT

## 2025-02-21 RX ORDER — NIFEDIPINE 60 MG/1
60 TABLET, FILM COATED, EXTENDED RELEASE ORAL DAILY
Qty: 30 | Refills: 3 | Status: ACTIVE | COMMUNITY
Start: 2025-02-21 | End: 1900-01-01

## 2025-02-27 RX ORDER — ALBUTEROL SULFATE 2.5 MG/3ML
(2.5 MG/3ML) SOLUTION RESPIRATORY (INHALATION)
Qty: 3 | Refills: 3 | Status: ACTIVE | COMMUNITY
Start: 1900-01-01 | End: 1900-01-01

## 2025-02-28 ENCOUNTER — NON-APPOINTMENT (OUTPATIENT)
Age: 85
End: 2025-02-28

## 2025-03-04 PROCEDURE — 93244 EXT ECG>48HR<7D REV&INTERPJ: CPT

## 2025-03-05 NOTE — ED CDU PROVIDER INITIAL DAY NOTE - PSH
Name: Jamie Epstein  YOB: 1977  Gender: male  MRN: [id number]  
H/O colonoscopy  Normal as per patient 3 years ago.  Inguinal hernia    S/P primary angioplasty with coronary stent    S/P prostatectomy

## 2025-03-07 ENCOUNTER — NON-APPOINTMENT (OUTPATIENT)
Age: 85
End: 2025-03-07

## 2025-03-07 RX ORDER — ALBUTEROL SULFATE 90 UG/1
108 (90 BASE) INHALANT RESPIRATORY (INHALATION)
Qty: 1 | Refills: 1 | Status: ACTIVE | COMMUNITY
Start: 2025-03-07 | End: 1900-01-01

## 2025-03-17 ENCOUNTER — APPOINTMENT (OUTPATIENT)
Dept: INTERNAL MEDICINE | Facility: CLINIC | Age: 85
End: 2025-03-17
Payer: MEDICARE

## 2025-03-17 VITALS
OXYGEN SATURATION: 97 % | TEMPERATURE: 97.6 F | WEIGHT: 149 LBS | BODY MASS INDEX: 26.4 KG/M2 | SYSTOLIC BLOOD PRESSURE: 157 MMHG | DIASTOLIC BLOOD PRESSURE: 81 MMHG | HEIGHT: 63 IN | HEART RATE: 69 BPM

## 2025-03-17 DIAGNOSIS — J43.9 EMPHYSEMA, UNSPECIFIED: ICD-10-CM

## 2025-03-17 DIAGNOSIS — E11.21 TYPE 2 DIABETES MELLITUS WITH DIABETIC NEPHROPATHY: ICD-10-CM

## 2025-03-17 DIAGNOSIS — I10 ESSENTIAL (PRIMARY) HYPERTENSION: ICD-10-CM

## 2025-03-17 DIAGNOSIS — E78.5 HYPERLIPIDEMIA, UNSPECIFIED: ICD-10-CM

## 2025-03-17 DIAGNOSIS — N18.30 CHRONIC KIDNEY DISEASE, STAGE 3 UNSPECIFIED: ICD-10-CM

## 2025-03-17 PROCEDURE — 99214 OFFICE O/P EST MOD 30 MIN: CPT

## 2025-03-17 PROCEDURE — G2211 COMPLEX E/M VISIT ADD ON: CPT

## 2025-03-17 RX ORDER — NIFEDIPINE 90 MG/1
90 TABLET, EXTENDED RELEASE ORAL DAILY
Qty: 90 | Refills: 1 | Status: ACTIVE | COMMUNITY
Start: 2025-03-17 | End: 1900-01-01

## 2025-03-19 ENCOUNTER — APPOINTMENT (OUTPATIENT)
Dept: CARDIOLOGY | Facility: CLINIC | Age: 85
End: 2025-03-19
Payer: MEDICARE

## 2025-03-19 PROCEDURE — 93880 EXTRACRANIAL BILAT STUDY: CPT

## 2025-03-19 PROCEDURE — 93306 TTE W/DOPPLER COMPLETE: CPT

## 2025-03-19 PROCEDURE — 93925 LOWER EXTREMITY STUDY: CPT

## 2025-03-20 ENCOUNTER — RX RENEWAL (OUTPATIENT)
Age: 85
End: 2025-03-20

## 2025-03-20 RX ORDER — FLUTICASONE FUROATE AND VILANTEROL TRIFENATATE 200; 25 UG/1; UG/1
200-25 POWDER RESPIRATORY (INHALATION) DAILY
Qty: 1 | Refills: 3 | Status: ACTIVE | COMMUNITY
Start: 2025-03-17 | End: 1900-01-01

## 2025-04-18 ENCOUNTER — APPOINTMENT (OUTPATIENT)
Dept: CARDIOLOGY | Facility: CLINIC | Age: 85
End: 2025-04-18
Payer: MEDICARE

## 2025-04-18 VITALS
HEART RATE: 73 BPM | BODY MASS INDEX: 26.05 KG/M2 | SYSTOLIC BLOOD PRESSURE: 118 MMHG | HEIGHT: 63 IN | OXYGEN SATURATION: 98 % | WEIGHT: 147 LBS | DIASTOLIC BLOOD PRESSURE: 62 MMHG

## 2025-04-18 DIAGNOSIS — E78.5 HYPERLIPIDEMIA, UNSPECIFIED: ICD-10-CM

## 2025-04-18 DIAGNOSIS — I71.23 ANEURYSM OF THE DESCENDING THORACIC AORTA, WITHOUT RUPTURE: ICD-10-CM

## 2025-04-18 DIAGNOSIS — Z95.5 PRESENCE OF CORONARY ANGIOPLASTY IMPLANT AND GRAFT: ICD-10-CM

## 2025-04-18 DIAGNOSIS — I65.23 OCCLUSION AND STENOSIS OF BILATERAL CAROTID ARTERIES: ICD-10-CM

## 2025-04-18 DIAGNOSIS — I73.9 PERIPHERAL VASCULAR DISEASE, UNSPECIFIED: ICD-10-CM

## 2025-04-18 DIAGNOSIS — I10 ESSENTIAL (PRIMARY) HYPERTENSION: ICD-10-CM

## 2025-04-18 PROCEDURE — 99213 OFFICE O/P EST LOW 20 MIN: CPT

## 2025-04-18 PROCEDURE — G2211 COMPLEX E/M VISIT ADD ON: CPT

## 2025-05-07 ENCOUNTER — RX RENEWAL (OUTPATIENT)
Age: 85
End: 2025-05-07

## 2025-05-14 ENCOUNTER — EMERGENCY (EMERGENCY)
Facility: HOSPITAL | Age: 85
LOS: 1 days | End: 2025-05-14
Attending: EMERGENCY MEDICINE
Payer: MEDICARE

## 2025-05-14 VITALS
WEIGHT: 148.59 LBS | OXYGEN SATURATION: 99 % | RESPIRATION RATE: 24 BRPM | DIASTOLIC BLOOD PRESSURE: 81 MMHG | TEMPERATURE: 98 F | HEART RATE: 73 BPM | SYSTOLIC BLOOD PRESSURE: 155 MMHG

## 2025-05-14 VITALS
HEART RATE: 71 BPM | DIASTOLIC BLOOD PRESSURE: 86 MMHG | SYSTOLIC BLOOD PRESSURE: 159 MMHG | OXYGEN SATURATION: 99 % | RESPIRATION RATE: 18 BRPM | TEMPERATURE: 98 F

## 2025-05-14 DIAGNOSIS — K40.90 UNILATERAL INGUINAL HERNIA, WITHOUT OBSTRUCTION OR GANGRENE, NOT SPECIFIED AS RECURRENT: Chronic | ICD-10-CM

## 2025-05-14 DIAGNOSIS — Z95.5 PRESENCE OF CORONARY ANGIOPLASTY IMPLANT AND GRAFT: Chronic | ICD-10-CM

## 2025-05-14 DIAGNOSIS — Z98.890 OTHER SPECIFIED POSTPROCEDURAL STATES: Chronic | ICD-10-CM

## 2025-05-14 LAB
ALBUMIN SERPL ELPH-MCNC: 4 G/DL — SIGNIFICANT CHANGE UP (ref 3.3–5.2)
ALP SERPL-CCNC: 98 U/L — SIGNIFICANT CHANGE UP (ref 40–120)
ALT FLD-CCNC: 19 U/L — SIGNIFICANT CHANGE UP
ANION GAP SERPL CALC-SCNC: 14 MMOL/L — SIGNIFICANT CHANGE UP (ref 5–17)
APPEARANCE UR: CLEAR — SIGNIFICANT CHANGE UP
AST SERPL-CCNC: 25 U/L — SIGNIFICANT CHANGE UP
BACTERIA # UR AUTO: NEGATIVE /HPF — SIGNIFICANT CHANGE UP
BASOPHILS # BLD AUTO: 0.06 K/UL — SIGNIFICANT CHANGE UP (ref 0–0.2)
BASOPHILS NFR BLD AUTO: 0.9 % — SIGNIFICANT CHANGE UP (ref 0–2)
BILIRUB SERPL-MCNC: 0.5 MG/DL — SIGNIFICANT CHANGE UP (ref 0.4–2)
BILIRUB UR-MCNC: NEGATIVE — SIGNIFICANT CHANGE UP
BUN SERPL-MCNC: 32.1 MG/DL — HIGH (ref 8–20)
CALCIUM SERPL-MCNC: 9.5 MG/DL — SIGNIFICANT CHANGE UP (ref 8.4–10.5)
CHLORIDE SERPL-SCNC: 100 MMOL/L — SIGNIFICANT CHANGE UP (ref 96–108)
CO2 SERPL-SCNC: 21 MMOL/L — LOW (ref 22–29)
COLOR SPEC: YELLOW — SIGNIFICANT CHANGE UP
CREAT SERPL-MCNC: 1.72 MG/DL — HIGH (ref 0.5–1.3)
DIFF PNL FLD: NEGATIVE — SIGNIFICANT CHANGE UP
EGFR: 38 ML/MIN/1.73M2 — LOW
EGFR: 38 ML/MIN/1.73M2 — LOW
EOSINOPHIL # BLD AUTO: 0.18 K/UL — SIGNIFICANT CHANGE UP (ref 0–0.5)
EOSINOPHIL NFR BLD AUTO: 2.7 % — SIGNIFICANT CHANGE UP (ref 0–6)
GLUCOSE SERPL-MCNC: 145 MG/DL — HIGH (ref 70–99)
GLUCOSE UR QL: 500 MG/DL
HCT VFR BLD CALC: 40.8 % — SIGNIFICANT CHANGE UP (ref 39–50)
HGB BLD-MCNC: 13.7 G/DL — SIGNIFICANT CHANGE UP (ref 13–17)
IMM GRANULOCYTES # BLD AUTO: 0.01 K/UL — SIGNIFICANT CHANGE UP (ref 0–0.07)
IMM GRANULOCYTES NFR BLD AUTO: 0.1 % — SIGNIFICANT CHANGE UP (ref 0–0.9)
KETONES UR QL: NEGATIVE MG/DL — SIGNIFICANT CHANGE UP
LEUKOCYTE ESTERASE UR-ACNC: NEGATIVE — SIGNIFICANT CHANGE UP
LYMPHOCYTES # BLD AUTO: 2.32 K/UL — SIGNIFICANT CHANGE UP (ref 1–3.3)
LYMPHOCYTES NFR BLD AUTO: 34.6 % — SIGNIFICANT CHANGE UP (ref 13–44)
MAGNESIUM SERPL-MCNC: 2.4 MG/DL — SIGNIFICANT CHANGE UP (ref 1.6–2.6)
MCHC RBC-ENTMCNC: 28.1 PG — SIGNIFICANT CHANGE UP (ref 27–34)
MCHC RBC-ENTMCNC: 33.6 G/DL — SIGNIFICANT CHANGE UP (ref 32–36)
MCV RBC AUTO: 83.8 FL — SIGNIFICANT CHANGE UP (ref 80–100)
MONOCYTES # BLD AUTO: 0.53 K/UL — SIGNIFICANT CHANGE UP (ref 0–0.9)
MONOCYTES NFR BLD AUTO: 7.9 % — SIGNIFICANT CHANGE UP (ref 2–14)
NEUTROPHILS # BLD AUTO: 3.61 K/UL — SIGNIFICANT CHANGE UP (ref 1.8–7.4)
NEUTROPHILS NFR BLD AUTO: 53.8 % — SIGNIFICANT CHANGE UP (ref 43–77)
NITRITE UR-MCNC: NEGATIVE — SIGNIFICANT CHANGE UP
NRBC # BLD AUTO: 0 K/UL — SIGNIFICANT CHANGE UP (ref 0–0)
NRBC # FLD: 0 K/UL — SIGNIFICANT CHANGE UP (ref 0–0)
NRBC BLD AUTO-RTO: 0 /100 WBCS — SIGNIFICANT CHANGE UP (ref 0–0)
PH UR: 7 — SIGNIFICANT CHANGE UP (ref 5–8)
PLATELET # BLD AUTO: 218 K/UL — SIGNIFICANT CHANGE UP (ref 150–400)
PMV BLD: 10.4 FL — SIGNIFICANT CHANGE UP (ref 7–13)
POTASSIUM SERPL-MCNC: 4.9 MMOL/L — SIGNIFICANT CHANGE UP (ref 3.5–5.3)
POTASSIUM SERPL-SCNC: 4.9 MMOL/L — SIGNIFICANT CHANGE UP (ref 3.5–5.3)
PROT SERPL-MCNC: 7.1 G/DL — SIGNIFICANT CHANGE UP (ref 6.6–8.7)
PROT UR-MCNC: 30 MG/DL
RBC # BLD: 4.87 M/UL — SIGNIFICANT CHANGE UP (ref 4.2–5.8)
RBC # FLD: 14.6 % — HIGH (ref 10.3–14.5)
RBC CASTS # UR COMP ASSIST: 2 /HPF — SIGNIFICANT CHANGE UP (ref 0–4)
SODIUM SERPL-SCNC: 134 MMOL/L — LOW (ref 135–145)
SP GR SPEC: 1.01 — SIGNIFICANT CHANGE UP (ref 1–1.03)
SQUAMOUS # UR AUTO: 0 /HPF — SIGNIFICANT CHANGE UP (ref 0–5)
TROPONIN T, HIGH SENSITIVITY RESULT: 19 NG/L — SIGNIFICANT CHANGE UP (ref 0–51)
TROPONIN T, HIGH SENSITIVITY RESULT: 20 NG/L — SIGNIFICANT CHANGE UP (ref 0–51)
UROBILINOGEN FLD QL: 0.2 MG/DL — SIGNIFICANT CHANGE UP (ref 0.2–1)
WBC # BLD: 6.71 K/UL — SIGNIFICANT CHANGE UP (ref 3.8–10.5)
WBC # FLD AUTO: 6.71 K/UL — SIGNIFICANT CHANGE UP (ref 3.8–10.5)
WBC UR QL: 0 /HPF — SIGNIFICANT CHANGE UP (ref 0–5)
YEAST-LIKE CELLS: PRESENT

## 2025-05-14 PROCEDURE — 93005 ELECTROCARDIOGRAM TRACING: CPT

## 2025-05-14 PROCEDURE — 84484 ASSAY OF TROPONIN QUANT: CPT

## 2025-05-14 PROCEDURE — 93010 ELECTROCARDIOGRAM REPORT: CPT

## 2025-05-14 PROCEDURE — 99285 EMERGENCY DEPT VISIT HI MDM: CPT | Mod: 25

## 2025-05-14 PROCEDURE — 85025 COMPLETE CBC W/AUTO DIFF WBC: CPT

## 2025-05-14 PROCEDURE — 99285 EMERGENCY DEPT VISIT HI MDM: CPT

## 2025-05-14 PROCEDURE — 81001 URINALYSIS AUTO W/SCOPE: CPT

## 2025-05-14 PROCEDURE — 80053 COMPREHEN METABOLIC PANEL: CPT

## 2025-05-14 PROCEDURE — 36415 COLL VENOUS BLD VENIPUNCTURE: CPT

## 2025-05-14 PROCEDURE — 71045 X-RAY EXAM CHEST 1 VIEW: CPT

## 2025-05-14 PROCEDURE — 71045 X-RAY EXAM CHEST 1 VIEW: CPT | Mod: 26

## 2025-05-14 PROCEDURE — 83735 ASSAY OF MAGNESIUM: CPT

## 2025-05-14 PROCEDURE — 87040 BLOOD CULTURE FOR BACTERIA: CPT

## 2025-05-14 NOTE — ED PROVIDER NOTE - CARDIAC, MLM
Addended by: Jamie Douglass on: 6/24/2019 09:46 AM     Modules accepted: Orders Normal rate, regular rhythm.  Heart sounds S1, S2.  No murmurs, rubs or gallops.

## 2025-05-14 NOTE — ED PROVIDER NOTE - CLINICAL SUMMARY MEDICAL DECISION MAKING FREE TEXT BOX
The patient is well-appearing and asymptomatic here.  EKG without any signs of ischemia and no arrhythmia on telemetry monitoring.  Labs without acute pathology. No sign of acute infection, UA and cxr clear, no leukocytosis, pt is stable for dc

## 2025-05-14 NOTE — ED PROVIDER NOTE - PATIENT PORTAL LINK FT
You can access the FollowMyHealth Patient Portal offered by [facility] by registering at the following website: [email or web address]. By joining [facility]’s FollowMyHealth portal, you will also be able to view your health information using other applications (apps) compatible with our system.

## 2025-05-14 NOTE — ED PROVIDER NOTE - OBJECTIVE STATEMENT
84-year-old male patient with history of CAD status post stents, hypertension, hyperlipidemia, diabetes, sickle cell anemia, SVT, prostate cancer presents with palpitations.  Patient reports that for several days he is having intermittent episodes of chills.  They usually occur at night, and he then subsequently will break out into a sweat and get palpitations.  There is no chest pain at any time.  No shortness of breath, no fevers, lower extremity edema, cough, abdominal pain or urinary symptoms

## 2025-05-14 NOTE — ED ADULT NURSE NOTE - OBJECTIVE STATEMENT
Pt c/o SOB and chills, palpations mainly at night. Currently denies any pain. SR on monitor Blood work sent and urine. Will continue to monitior
[Negative] : Heme/Lymph

## 2025-05-14 NOTE — ED PROVIDER NOTE - NSICDXPASTMEDICALHX_GEN_ALL_CORE_FT
PAST MEDICAL HISTORY:  Asthma, mild intermittent, uncomplicated     CAD (coronary artery disease)     Cancer protate    Chronic kidney disease (CKD)     Diabetes mellitus     Hyperlipidemia     Hypertension     MI (myocardial infarction)     Other hemorrhoids     PAD (peripheral artery disease)     Sickle cell anemia Carrier of the disease, presently doesn't have it    SVT (supraventricular tachycardia)     
Other

## 2025-05-20 LAB
CULTURE RESULTS: SIGNIFICANT CHANGE UP
SPECIMEN SOURCE: SIGNIFICANT CHANGE UP

## 2025-05-22 DIAGNOSIS — Z86.79 PERSONAL HISTORY OF OTHER DISEASES OF THE CIRCULATORY SYSTEM: ICD-10-CM

## 2025-05-22 DIAGNOSIS — I10 ESSENTIAL (PRIMARY) HYPERTENSION: ICD-10-CM

## 2025-05-22 DIAGNOSIS — E11.9 TYPE 2 DIABETES MELLITUS WITHOUT COMPLICATIONS: ICD-10-CM

## 2025-05-22 DIAGNOSIS — Z95.5 PRESENCE OF CORONARY ANGIOPLASTY IMPLANT AND GRAFT: ICD-10-CM

## 2025-05-22 DIAGNOSIS — Z91.018 ALLERGY TO OTHER FOODS: ICD-10-CM

## 2025-05-22 DIAGNOSIS — I25.10 ATHEROSCLEROTIC HEART DISEASE OF NATIVE CORONARY ARTERY WITHOUT ANGINA PECTORIS: ICD-10-CM

## 2025-05-22 DIAGNOSIS — R00.2 PALPITATIONS: ICD-10-CM

## 2025-05-22 DIAGNOSIS — D57.1 SICKLE-CELL DISEASE WITHOUT CRISIS: ICD-10-CM

## 2025-05-22 DIAGNOSIS — E78.5 HYPERLIPIDEMIA, UNSPECIFIED: ICD-10-CM

## 2025-05-22 DIAGNOSIS — Z88.6 ALLERGY STATUS TO ANALGESIC AGENT: ICD-10-CM

## 2025-05-22 DIAGNOSIS — R68.83 CHILLS (WITHOUT FEVER): ICD-10-CM

## 2025-06-09 ENCOUNTER — RX RENEWAL (OUTPATIENT)
Age: 85
End: 2025-06-09

## 2025-06-17 ENCOUNTER — APPOINTMENT (OUTPATIENT)
Dept: INTERNAL MEDICINE | Facility: CLINIC | Age: 85
End: 2025-06-17
Payer: MEDICARE

## 2025-06-17 VITALS
HEIGHT: 63 IN | TEMPERATURE: 97.2 F | RESPIRATION RATE: 16 BRPM | BODY MASS INDEX: 26.05 KG/M2 | WEIGHT: 147 LBS | SYSTOLIC BLOOD PRESSURE: 168 MMHG | OXYGEN SATURATION: 97 % | DIASTOLIC BLOOD PRESSURE: 90 MMHG | HEART RATE: 71 BPM

## 2025-06-17 PROCEDURE — 99214 OFFICE O/P EST MOD 30 MIN: CPT

## 2025-06-17 PROCEDURE — G2211 COMPLEX E/M VISIT ADD ON: CPT

## 2025-06-19 NOTE — H&P ADULT - NEUROLOGICAL
----- Message from Kt Lux MD sent at 6/19/2025 11:11 AM CDT -----  Pls let patient know that looks the lesion they saw on her right breast is most likely benign, seems to be a cyst but they want her to have another diagnostic mammogram and ultrasound in 6 months. I   already ordered them, pls help her schedule it for 6 months out.   ----- Message -----  From: Anibal Navas MD  Sent: 6/19/2025  10:16 AM CDT  To: Kt Lux MD     Alert & oriented; no sensory, motor or coordination deficits, normal reflexes details…

## 2025-07-03 ENCOUNTER — APPOINTMENT (OUTPATIENT)
Dept: CARDIOLOGY | Facility: CLINIC | Age: 85
End: 2025-07-03
Payer: MEDICARE

## 2025-07-03 ENCOUNTER — RX RENEWAL (OUTPATIENT)
Age: 85
End: 2025-07-03

## 2025-07-03 VITALS — SYSTOLIC BLOOD PRESSURE: 184 MMHG | DIASTOLIC BLOOD PRESSURE: 84 MMHG

## 2025-07-03 VITALS
HEART RATE: 77 BPM | OXYGEN SATURATION: 95 % | SYSTOLIC BLOOD PRESSURE: 182 MMHG | BODY MASS INDEX: 25.87 KG/M2 | DIASTOLIC BLOOD PRESSURE: 83 MMHG | WEIGHT: 146 LBS | HEIGHT: 63 IN

## 2025-07-03 PROBLEM — M79.89 RIGHT LEG SWELLING: Status: ACTIVE | Noted: 2025-07-03

## 2025-07-03 PROCEDURE — G2211 COMPLEX E/M VISIT ADD ON: CPT

## 2025-07-03 PROCEDURE — 99214 OFFICE O/P EST MOD 30 MIN: CPT

## 2025-07-28 ENCOUNTER — LABORATORY RESULT (OUTPATIENT)
Age: 85
End: 2025-07-28

## 2025-07-28 ENCOUNTER — APPOINTMENT (OUTPATIENT)
Dept: INTERNAL MEDICINE | Facility: CLINIC | Age: 85
End: 2025-07-28
Payer: MEDICARE

## 2025-07-28 ENCOUNTER — EMERGENCY (EMERGENCY)
Facility: HOSPITAL | Age: 85
LOS: 1 days | End: 2025-07-28
Attending: EMERGENCY MEDICINE
Payer: MEDICARE

## 2025-07-28 ENCOUNTER — NON-APPOINTMENT (OUTPATIENT)
Age: 85
End: 2025-07-28

## 2025-07-28 VITALS
OXYGEN SATURATION: 97 % | RESPIRATION RATE: 16 BRPM | DIASTOLIC BLOOD PRESSURE: 60 MMHG | BODY MASS INDEX: 25.87 KG/M2 | TEMPERATURE: 98 F | SYSTOLIC BLOOD PRESSURE: 130 MMHG | HEIGHT: 63 IN | HEART RATE: 85 BPM | WEIGHT: 146 LBS

## 2025-07-28 VITALS
HEIGHT: 62 IN | RESPIRATION RATE: 18 BRPM | TEMPERATURE: 98 F | HEART RATE: 70 BPM | OXYGEN SATURATION: 98 % | SYSTOLIC BLOOD PRESSURE: 122 MMHG | WEIGHT: 149.03 LBS | DIASTOLIC BLOOD PRESSURE: 74 MMHG

## 2025-07-28 VITALS
SYSTOLIC BLOOD PRESSURE: 113 MMHG | HEART RATE: 67 BPM | OXYGEN SATURATION: 99 % | RESPIRATION RATE: 17 BRPM | DIASTOLIC BLOOD PRESSURE: 56 MMHG | TEMPERATURE: 98 F

## 2025-07-28 DIAGNOSIS — Z95.5 PRESENCE OF CORONARY ANGIOPLASTY IMPLANT AND GRAFT: Chronic | ICD-10-CM

## 2025-07-28 DIAGNOSIS — E11.21 TYPE 2 DIABETES MELLITUS WITH DIABETIC NEPHROPATHY: ICD-10-CM

## 2025-07-28 DIAGNOSIS — N18.30 CHRONIC KIDNEY DISEASE, STAGE 3 UNSPECIFIED: ICD-10-CM

## 2025-07-28 DIAGNOSIS — K40.90 UNILATERAL INGUINAL HERNIA, WITHOUT OBSTRUCTION OR GANGRENE, NOT SPECIFIED AS RECURRENT: Chronic | ICD-10-CM

## 2025-07-28 DIAGNOSIS — Z98.890 OTHER SPECIFIED POSTPROCEDURAL STATES: Chronic | ICD-10-CM

## 2025-07-28 LAB
ALBUMIN SERPL ELPH-MCNC: 3.7 G/DL — SIGNIFICANT CHANGE UP (ref 3.3–5.2)
ALP SERPL-CCNC: 88 U/L — SIGNIFICANT CHANGE UP (ref 40–120)
ALT FLD-CCNC: 15 U/L — SIGNIFICANT CHANGE UP
ANION GAP SERPL CALC-SCNC: 12 MMOL/L — SIGNIFICANT CHANGE UP (ref 5–17)
APPEARANCE UR: CLEAR — SIGNIFICANT CHANGE UP
AST SERPL-CCNC: 21 U/L — SIGNIFICANT CHANGE UP
BACTERIA # UR AUTO: NEGATIVE /HPF — SIGNIFICANT CHANGE UP
BASOPHILS # BLD AUTO: 0.09 K/UL — SIGNIFICANT CHANGE UP (ref 0–0.2)
BASOPHILS NFR BLD AUTO: 1.3 % — SIGNIFICANT CHANGE UP (ref 0–2)
BILIRUB SERPL-MCNC: 0.8 MG/DL — SIGNIFICANT CHANGE UP (ref 0.4–2)
BILIRUB UR-MCNC: NEGATIVE — SIGNIFICANT CHANGE UP
BUN SERPL-MCNC: 27 MG/DL — HIGH (ref 8–20)
CALCIUM SERPL-MCNC: 9.6 MG/DL — SIGNIFICANT CHANGE UP (ref 8.4–10.5)
CAST: 0 /LPF — SIGNIFICANT CHANGE UP (ref 0–4)
CHLORIDE SERPL-SCNC: 98 MMOL/L — SIGNIFICANT CHANGE UP (ref 96–108)
CO2 SERPL-SCNC: 21 MMOL/L — LOW (ref 22–29)
COLOR SPEC: YELLOW — SIGNIFICANT CHANGE UP
CREAT SERPL-MCNC: 1.91 MG/DL — HIGH (ref 0.5–1.3)
DIFF PNL FLD: NEGATIVE — SIGNIFICANT CHANGE UP
EGFR: 34 ML/MIN/1.73M2 — LOW
EGFR: 34 ML/MIN/1.73M2 — LOW
EOSINOPHIL # BLD AUTO: 0.12 K/UL — SIGNIFICANT CHANGE UP (ref 0–0.5)
EOSINOPHIL NFR BLD AUTO: 1.7 % — SIGNIFICANT CHANGE UP (ref 0–6)
FLUAV AG NPH QL: SIGNIFICANT CHANGE UP
FLUBV AG NPH QL: SIGNIFICANT CHANGE UP
GLUCOSE SERPL-MCNC: 97 MG/DL — SIGNIFICANT CHANGE UP (ref 70–99)
GLUCOSE UR QL: 500 MG/DL
HCT VFR BLD CALC: 36.9 % — LOW (ref 39–50)
HGB BLD-MCNC: 12.4 G/DL — LOW (ref 13–17)
IMM GRANULOCYTES # BLD AUTO: 0.02 K/UL — SIGNIFICANT CHANGE UP (ref 0–0.07)
IMM GRANULOCYTES NFR BLD AUTO: 0.3 % — SIGNIFICANT CHANGE UP (ref 0–0.9)
KETONES UR QL: NEGATIVE MG/DL — SIGNIFICANT CHANGE UP
LACTATE SERPL-SCNC: 0.7 MMOL/L — SIGNIFICANT CHANGE UP (ref 0.5–2)
LEUKOCYTE ESTERASE UR-ACNC: NEGATIVE — SIGNIFICANT CHANGE UP
LYMPHOCYTES # BLD AUTO: 2.45 K/UL — SIGNIFICANT CHANGE UP (ref 1–3.3)
LYMPHOCYTES NFR BLD AUTO: 34.8 % — SIGNIFICANT CHANGE UP (ref 13–44)
MCHC RBC-ENTMCNC: 28.3 PG — SIGNIFICANT CHANGE UP (ref 27–34)
MCHC RBC-ENTMCNC: 33.6 G/DL — SIGNIFICANT CHANGE UP (ref 32–36)
MCV RBC AUTO: 84.2 FL — SIGNIFICANT CHANGE UP (ref 80–100)
MONOCYTES # BLD AUTO: 0.68 K/UL — SIGNIFICANT CHANGE UP (ref 0–0.9)
MONOCYTES NFR BLD AUTO: 9.6 % — SIGNIFICANT CHANGE UP (ref 2–14)
NEUTROPHILS # BLD AUTO: 3.69 K/UL — SIGNIFICANT CHANGE UP (ref 1.8–7.4)
NEUTROPHILS NFR BLD AUTO: 52.3 % — SIGNIFICANT CHANGE UP (ref 43–77)
NITRITE UR-MCNC: NEGATIVE — SIGNIFICANT CHANGE UP
NRBC # BLD AUTO: 0 K/UL — SIGNIFICANT CHANGE UP (ref 0–0)
NRBC # FLD: 0 K/UL — SIGNIFICANT CHANGE UP (ref 0–0)
NRBC BLD AUTO-RTO: 0 /100 WBCS — SIGNIFICANT CHANGE UP (ref 0–0)
PH UR: 7 — SIGNIFICANT CHANGE UP (ref 5–8)
PLATELET # BLD AUTO: 199 K/UL — SIGNIFICANT CHANGE UP (ref 150–400)
PMV BLD: 10.5 FL — SIGNIFICANT CHANGE UP (ref 7–13)
POTASSIUM SERPL-MCNC: 5.2 MMOL/L — SIGNIFICANT CHANGE UP (ref 3.5–5.3)
POTASSIUM SERPL-SCNC: 5.2 MMOL/L — SIGNIFICANT CHANGE UP (ref 3.5–5.3)
PROT SERPL-MCNC: 6.4 G/DL — LOW (ref 6.6–8.7)
PROT UR-MCNC: 100 MG/DL
RBC # BLD: 4.38 M/UL — SIGNIFICANT CHANGE UP (ref 4.2–5.8)
RBC # FLD: 13.2 % — SIGNIFICANT CHANGE UP (ref 10.3–14.5)
RBC CASTS # UR COMP ASSIST: 0 /HPF — SIGNIFICANT CHANGE UP (ref 0–4)
RSV RNA NPH QL NAA+NON-PROBE: SIGNIFICANT CHANGE UP
SARS-COV-2 RNA SPEC QL NAA+PROBE: SIGNIFICANT CHANGE UP
SODIUM SERPL-SCNC: 131 MMOL/L — LOW (ref 135–145)
SOURCE RESPIRATORY: SIGNIFICANT CHANGE UP
SP GR SPEC: 1.01 — SIGNIFICANT CHANGE UP (ref 1–1.03)
SQUAMOUS # UR AUTO: 0 /HPF — SIGNIFICANT CHANGE UP (ref 0–5)
UROBILINOGEN FLD QL: 0.2 MG/DL — SIGNIFICANT CHANGE UP (ref 0.2–1)
WBC # BLD: 7.05 K/UL — SIGNIFICANT CHANGE UP (ref 3.8–10.5)
WBC # FLD AUTO: 7.05 K/UL — SIGNIFICANT CHANGE UP (ref 3.8–10.5)
WBC UR QL: 0 /HPF — SIGNIFICANT CHANGE UP (ref 0–5)

## 2025-07-28 PROCEDURE — 99285 EMERGENCY DEPT VISIT HI MDM: CPT

## 2025-07-28 PROCEDURE — G2211 COMPLEX E/M VISIT ADD ON: CPT

## 2025-07-28 PROCEDURE — 87637 SARSCOV2&INF A&B&RSV AMP PRB: CPT

## 2025-07-28 PROCEDURE — 83605 ASSAY OF LACTIC ACID: CPT

## 2025-07-28 PROCEDURE — 99214 OFFICE O/P EST MOD 30 MIN: CPT

## 2025-07-28 PROCEDURE — 85025 COMPLETE CBC W/AUTO DIFF WBC: CPT

## 2025-07-28 PROCEDURE — 80053 COMPREHEN METABOLIC PANEL: CPT

## 2025-07-28 PROCEDURE — 71045 X-RAY EXAM CHEST 1 VIEW: CPT

## 2025-07-28 PROCEDURE — 87040 BLOOD CULTURE FOR BACTERIA: CPT

## 2025-07-28 PROCEDURE — 36415 COLL VENOUS BLD VENIPUNCTURE: CPT

## 2025-07-28 PROCEDURE — 99285 EMERGENCY DEPT VISIT HI MDM: CPT | Mod: 25

## 2025-07-28 PROCEDURE — 81001 URINALYSIS AUTO W/SCOPE: CPT

## 2025-07-28 PROCEDURE — 71045 X-RAY EXAM CHEST 1 VIEW: CPT | Mod: 26

## 2025-07-28 RX ORDER — OLMESARTAN MEDOXOMIL 20 MG/1
20 TABLET, FILM COATED ORAL
Qty: 90 | Refills: 3 | Status: ACTIVE | COMMUNITY
Start: 2025-07-28 | End: 1900-01-01

## 2025-07-28 RX ADMIN — Medication 1000 MILLILITER(S): at 19:45

## 2025-07-29 LAB
25(OH)D3 SERPL-MCNC: 37.6 NG/ML
ALBUMIN SERPL ELPH-MCNC: 4 G/DL
ALP BLD-CCNC: 95 U/L
ALT SERPL-CCNC: 23 U/L
ANION GAP SERPL CALC-SCNC: 14 MMOL/L
APPEARANCE: CLEAR
AST SERPL-CCNC: 23 U/L
BASOPHILS # BLD AUTO: 0.09 K/UL
BASOPHILS NFR BLD AUTO: 1.4 %
BILIRUB SERPL-MCNC: 0.7 MG/DL
BILIRUBIN URINE: NEGATIVE
BLOOD URINE: NEGATIVE
BUN SERPL-MCNC: 26 MG/DL
CALCIUM SERPL-MCNC: 9.5 MG/DL
CHLORIDE SERPL-SCNC: 96 MMOL/L
CHOLEST SERPL-MCNC: 141 MG/DL
CO2 SERPL-SCNC: 19 MMOL/L
COLOR: YELLOW
CREAT SERPL-MCNC: 1.94 MG/DL
EGFRCR SERPLBLD CKD-EPI 2021: 33 ML/MIN/1.73M2
EOSINOPHIL # BLD AUTO: 0.21 K/UL
EOSINOPHIL NFR BLD AUTO: 3.3 %
ESTIMATED AVERAGE GLUCOSE: 126 MG/DL
GLUCOSE QUALITATIVE U: >=1000 MG/DL
GLUCOSE SERPL-MCNC: 155 MG/DL
HBA1C MFR BLD HPLC: 6 %
HCT VFR BLD CALC: 37.7 %
HDLC SERPL-MCNC: 50 MG/DL
HGB BLD-MCNC: 12.6 G/DL
IMM GRANULOCYTES NFR BLD AUTO: 0.3 %
KETONES URINE: NEGATIVE MG/DL
LDLC SERPL-MCNC: 75 MG/DL
LEUKOCYTE ESTERASE URINE: NEGATIVE
LYMPHOCYTES # BLD AUTO: 2.16 K/UL
LYMPHOCYTES NFR BLD AUTO: 33.6 %
MAN DIFF?: NORMAL
MCHC RBC-ENTMCNC: 29 PG
MCHC RBC-ENTMCNC: 33.4 G/DL
MCV RBC AUTO: 86.7 FL
MONOCYTES # BLD AUTO: 0.64 K/UL
MONOCYTES NFR BLD AUTO: 10 %
NEUTROPHILS # BLD AUTO: 3.3 K/UL
NEUTROPHILS NFR BLD AUTO: 51.4 %
NITRITE URINE: NEGATIVE
NONHDLC SERPL-MCNC: 91 MG/DL
PH URINE: 6
PLATELET # BLD AUTO: 214 K/UL
POTASSIUM SERPL-SCNC: 4.8 MMOL/L
PROT SERPL-MCNC: 6.8 G/DL
PROTEIN URINE: 300 MG/DL
RBC # BLD: 4.35 M/UL
RBC # FLD: 13.9 %
SODIUM SERPL-SCNC: 129 MMOL/L
SPECIFIC GRAVITY URINE: 1.02
TRIGL SERPL-MCNC: 86 MG/DL
TSH SERPL-ACNC: 0.79 UIU/ML
URATE SERPL-MCNC: 5.6 MG/DL
UROBILINOGEN URINE: 0.2 MG/DL
VIT B12 SERPL-MCNC: 747 PG/ML
WBC # FLD AUTO: 6.42 K/UL

## 2025-08-03 LAB
CULTURE RESULTS: SIGNIFICANT CHANGE UP
CULTURE RESULTS: SIGNIFICANT CHANGE UP
SPECIMEN SOURCE: SIGNIFICANT CHANGE UP
SPECIMEN SOURCE: SIGNIFICANT CHANGE UP

## 2025-08-04 ENCOUNTER — RX RENEWAL (OUTPATIENT)
Age: 85
End: 2025-08-04

## 2025-08-07 ENCOUNTER — APPOINTMENT (OUTPATIENT)
Dept: CARDIOLOGY | Facility: CLINIC | Age: 85
End: 2025-08-07

## 2025-08-07 VITALS
HEART RATE: 72 BPM | WEIGHT: 145 LBS | OXYGEN SATURATION: 96 % | SYSTOLIC BLOOD PRESSURE: 117 MMHG | DIASTOLIC BLOOD PRESSURE: 57 MMHG | BODY MASS INDEX: 25.69 KG/M2 | HEIGHT: 63 IN

## 2025-08-07 DIAGNOSIS — E78.5 HYPERLIPIDEMIA, UNSPECIFIED: ICD-10-CM

## 2025-08-07 DIAGNOSIS — R42 DIZZINESS AND GIDDINESS: ICD-10-CM

## 2025-08-07 DIAGNOSIS — I10 ESSENTIAL (PRIMARY) HYPERTENSION: ICD-10-CM

## 2025-08-07 PROCEDURE — 93242 EXT ECG>48HR<7D RECORDING: CPT | Mod: 59

## 2025-08-07 PROCEDURE — 99214 OFFICE O/P EST MOD 30 MIN: CPT

## 2025-08-29 ENCOUNTER — INPATIENT (INPATIENT)
Facility: HOSPITAL | Age: 85
LOS: 0 days | Discharge: HOME | DRG: 312 | End: 2025-08-30
Attending: STUDENT IN AN ORGANIZED HEALTH CARE EDUCATION/TRAINING PROGRAM | Admitting: STUDENT IN AN ORGANIZED HEALTH CARE EDUCATION/TRAINING PROGRAM
Payer: MEDICARE

## 2025-08-29 ENCOUNTER — APPOINTMENT (OUTPATIENT)
Dept: INTERNAL MEDICINE | Facility: CLINIC | Age: 85
End: 2025-08-29

## 2025-08-29 VITALS
SYSTOLIC BLOOD PRESSURE: 144 MMHG | WEIGHT: 145 LBS | HEART RATE: 71 BPM | DIASTOLIC BLOOD PRESSURE: 70 MMHG | OXYGEN SATURATION: 98 % | RESPIRATION RATE: 16 BRPM | HEIGHT: 63 IN | BODY MASS INDEX: 25.69 KG/M2 | TEMPERATURE: 97.3 F

## 2025-08-29 VITALS
HEIGHT: 62 IN | HEART RATE: 73 BPM | WEIGHT: 147.05 LBS | DIASTOLIC BLOOD PRESSURE: 78 MMHG | TEMPERATURE: 98 F | RESPIRATION RATE: 20 BRPM | SYSTOLIC BLOOD PRESSURE: 161 MMHG | OXYGEN SATURATION: 97 %

## 2025-08-29 DIAGNOSIS — R55 SYNCOPE AND COLLAPSE: ICD-10-CM

## 2025-08-29 DIAGNOSIS — D64.9 ANEMIA, UNSPECIFIED: ICD-10-CM

## 2025-08-29 DIAGNOSIS — Z98.890 OTHER SPECIFIED POSTPROCEDURAL STATES: Chronic | ICD-10-CM

## 2025-08-29 DIAGNOSIS — K40.90 UNILATERAL INGUINAL HERNIA, WITHOUT OBSTRUCTION OR GANGRENE, NOT SPECIFIED AS RECURRENT: Chronic | ICD-10-CM

## 2025-08-29 DIAGNOSIS — R40.20 UNSPECIFIED COMA: ICD-10-CM

## 2025-08-29 DIAGNOSIS — Z95.5 PRESENCE OF CORONARY ANGIOPLASTY IMPLANT AND GRAFT: Chronic | ICD-10-CM

## 2025-08-29 LAB
ALBUMIN SERPL ELPH-MCNC: 3.7 G/DL — SIGNIFICANT CHANGE UP (ref 3.3–5.2)
ALP SERPL-CCNC: 85 U/L — SIGNIFICANT CHANGE UP (ref 40–120)
ALT FLD-CCNC: 18 U/L — SIGNIFICANT CHANGE UP
ANION GAP SERPL CALC-SCNC: 10 MMOL/L — SIGNIFICANT CHANGE UP (ref 5–17)
APPEARANCE UR: CLEAR — SIGNIFICANT CHANGE UP
APTT BLD: 36.9 SEC — HIGH (ref 26.1–36.8)
AST SERPL-CCNC: 22 U/L — SIGNIFICANT CHANGE UP
BACTERIA # UR AUTO: NEGATIVE /HPF — SIGNIFICANT CHANGE UP
BASOPHILS # BLD AUTO: 0.07 K/UL — SIGNIFICANT CHANGE UP (ref 0–0.2)
BASOPHILS NFR BLD AUTO: 1.2 % — SIGNIFICANT CHANGE UP (ref 0–2)
BILIRUB SERPL-MCNC: 0.5 MG/DL — SIGNIFICANT CHANGE UP (ref 0.4–2)
BILIRUB UR-MCNC: NEGATIVE — SIGNIFICANT CHANGE UP
BUN SERPL-MCNC: 22.2 MG/DL — HIGH (ref 8–20)
CALCIUM SERPL-MCNC: 9.1 MG/DL — SIGNIFICANT CHANGE UP (ref 8.4–10.5)
CAST: 0 /LPF — SIGNIFICANT CHANGE UP (ref 0–4)
CHLORIDE SERPL-SCNC: 104 MMOL/L — SIGNIFICANT CHANGE UP (ref 96–108)
CO2 SERPL-SCNC: 22 MMOL/L — SIGNIFICANT CHANGE UP (ref 22–29)
COLOR SPEC: YELLOW — SIGNIFICANT CHANGE UP
CREAT SERPL-MCNC: 1.86 MG/DL — HIGH (ref 0.5–1.3)
DIFF PNL FLD: NEGATIVE — SIGNIFICANT CHANGE UP
EGFR: 35 ML/MIN/1.73M2 — LOW
EGFR: 35 ML/MIN/1.73M2 — LOW
EOSINOPHIL # BLD AUTO: 0.18 K/UL — SIGNIFICANT CHANGE UP (ref 0–0.5)
EOSINOPHIL NFR BLD AUTO: 3.1 % — SIGNIFICANT CHANGE UP (ref 0–6)
FERRITIN SERPL-MCNC: 65 NG/ML
FOLATE SERPL-MCNC: >20 NG/ML
GLUCOSE BLDC GLUCOMTR-MCNC: 166 MG/DL — HIGH (ref 70–99)
GLUCOSE SERPL-MCNC: 134 MG/DL — HIGH (ref 70–99)
GLUCOSE UR QL: >=1000 MG/DL
HCT VFR BLD CALC: 36.4 % — LOW (ref 39–50)
HGB BLD-MCNC: 12.3 G/DL — LOW (ref 13–17)
IMM GRANULOCYTES # BLD AUTO: 0.02 K/UL — SIGNIFICANT CHANGE UP (ref 0–0.07)
IMM GRANULOCYTES NFR BLD AUTO: 0.3 % — SIGNIFICANT CHANGE UP (ref 0–0.9)
INR BLD: 1.09 RATIO — SIGNIFICANT CHANGE UP (ref 0.85–1.16)
IRON SATN MFR SERPL: 18 %
IRON SERPL-MCNC: 51 UG/DL
KETONES UR QL: NEGATIVE MG/DL — SIGNIFICANT CHANGE UP
LEUKOCYTE ESTERASE UR-ACNC: NEGATIVE — SIGNIFICANT CHANGE UP
LIDOCAIN IGE QN: 58 U/L — HIGH (ref 22–51)
LYMPHOCYTES # BLD AUTO: 1.85 K/UL — SIGNIFICANT CHANGE UP (ref 1–3.3)
LYMPHOCYTES NFR BLD AUTO: 32.2 % — SIGNIFICANT CHANGE UP (ref 13–44)
MAGNESIUM SERPL-MCNC: 2.3 MG/DL — SIGNIFICANT CHANGE UP (ref 1.6–2.6)
MCHC RBC-ENTMCNC: 28.8 PG — SIGNIFICANT CHANGE UP (ref 27–34)
MCHC RBC-ENTMCNC: 33.8 G/DL — SIGNIFICANT CHANGE UP (ref 32–36)
MCV RBC AUTO: 85.2 FL — SIGNIFICANT CHANGE UP (ref 80–100)
MONOCYTES # BLD AUTO: 0.58 K/UL — SIGNIFICANT CHANGE UP (ref 0–0.9)
MONOCYTES NFR BLD AUTO: 10.1 % — SIGNIFICANT CHANGE UP (ref 2–14)
NEUTROPHILS # BLD AUTO: 3.05 K/UL — SIGNIFICANT CHANGE UP (ref 1.8–7.4)
NEUTROPHILS NFR BLD AUTO: 53.1 % — SIGNIFICANT CHANGE UP (ref 43–77)
NITRITE UR-MCNC: NEGATIVE — SIGNIFICANT CHANGE UP
NRBC # BLD AUTO: 0 K/UL — SIGNIFICANT CHANGE UP (ref 0–0)
NRBC # FLD: 0 K/UL — SIGNIFICANT CHANGE UP (ref 0–0)
NRBC BLD AUTO-RTO: 0 /100 WBCS — SIGNIFICANT CHANGE UP (ref 0–0)
NT-PROBNP SERPL-SCNC: 432 PG/ML — HIGH (ref 0–300)
PH UR: 8 — SIGNIFICANT CHANGE UP (ref 5–8)
PLATELET # BLD AUTO: 200 K/UL — SIGNIFICANT CHANGE UP (ref 150–400)
PMV BLD: 10.9 FL — SIGNIFICANT CHANGE UP (ref 7–13)
POTASSIUM SERPL-MCNC: 4.6 MMOL/L — SIGNIFICANT CHANGE UP (ref 3.5–5.3)
POTASSIUM SERPL-SCNC: 4.6 MMOL/L — SIGNIFICANT CHANGE UP (ref 3.5–5.3)
PROT SERPL-MCNC: 6.5 G/DL — LOW (ref 6.6–8.7)
PROT UR-MCNC: 100 MG/DL
PROTHROM AB SERPL-ACNC: 12.6 SEC — SIGNIFICANT CHANGE UP (ref 9.9–13.4)
RBC # BLD: 4.27 M/UL — SIGNIFICANT CHANGE UP (ref 4.2–5.8)
RBC # FLD: 13.7 % — SIGNIFICANT CHANGE UP (ref 10.3–14.5)
RBC CASTS # UR COMP ASSIST: 0 /HPF — SIGNIFICANT CHANGE UP (ref 0–4)
SODIUM SERPL-SCNC: 136 MMOL/L — SIGNIFICANT CHANGE UP (ref 135–145)
SP GR SPEC: 1.01 — SIGNIFICANT CHANGE UP (ref 1–1.03)
SQUAMOUS # UR AUTO: 0 /HPF — SIGNIFICANT CHANGE UP (ref 0–5)
TIBC SERPL-MCNC: 279 UG/DL
TROPONIN T, HIGH SENSITIVITY RESULT: 27 NG/L — HIGH
TROPONIN T, HIGH SENSITIVITY RESULT: 28 NG/L — HIGH
UIBC SERPL-MCNC: 228 UG/DL
UROBILINOGEN FLD QL: 0.2 MG/DL — SIGNIFICANT CHANGE UP (ref 0.2–1)
VIT B12 SERPL-MCNC: 661 PG/ML
WBC # BLD: 5.75 K/UL — SIGNIFICANT CHANGE UP (ref 3.8–10.5)
WBC # FLD AUTO: 5.75 K/UL — SIGNIFICANT CHANGE UP (ref 3.8–10.5)
WBC UR QL: 0 /HPF — SIGNIFICANT CHANGE UP (ref 0–5)

## 2025-08-29 PROCEDURE — 82962 GLUCOSE BLOOD TEST: CPT

## 2025-08-29 PROCEDURE — 70450 CT HEAD/BRAIN W/O DYE: CPT | Mod: 26

## 2025-08-29 PROCEDURE — 83880 ASSAY OF NATRIURETIC PEPTIDE: CPT

## 2025-08-29 PROCEDURE — 99285 EMERGENCY DEPT VISIT HI MDM: CPT

## 2025-08-29 PROCEDURE — 99222 1ST HOSP IP/OBS MODERATE 55: CPT

## 2025-08-29 PROCEDURE — 97163 PT EVAL HIGH COMPLEX 45 MIN: CPT

## 2025-08-29 PROCEDURE — 71045 X-RAY EXAM CHEST 1 VIEW: CPT

## 2025-08-29 PROCEDURE — 99223 1ST HOSP IP/OBS HIGH 75: CPT

## 2025-08-29 PROCEDURE — 85025 COMPLETE CBC W/AUTO DIFF WBC: CPT

## 2025-08-29 PROCEDURE — 85730 THROMBOPLASTIN TIME PARTIAL: CPT

## 2025-08-29 PROCEDURE — 85610 PROTHROMBIN TIME: CPT

## 2025-08-29 PROCEDURE — 33285 INSJ SUBQ CAR RHYTHM MNTR: CPT

## 2025-08-29 PROCEDURE — 70450 CT HEAD/BRAIN W/O DYE: CPT

## 2025-08-29 PROCEDURE — 99214 OFFICE O/P EST MOD 30 MIN: CPT

## 2025-08-29 PROCEDURE — 84484 ASSAY OF TROPONIN QUANT: CPT

## 2025-08-29 PROCEDURE — G2211 COMPLEX E/M VISIT ADD ON: CPT

## 2025-08-29 PROCEDURE — 83690 ASSAY OF LIPASE: CPT

## 2025-08-29 PROCEDURE — 81001 URINALYSIS AUTO W/SCOPE: CPT

## 2025-08-29 PROCEDURE — 83735 ASSAY OF MAGNESIUM: CPT

## 2025-08-29 PROCEDURE — 80053 COMPREHEN METABOLIC PANEL: CPT

## 2025-08-29 PROCEDURE — 36415 COLL VENOUS BLD VENIPUNCTURE: CPT

## 2025-08-29 PROCEDURE — 93005 ELECTROCARDIOGRAM TRACING: CPT

## 2025-08-29 PROCEDURE — 87086 URINE CULTURE/COLONY COUNT: CPT

## 2025-08-29 PROCEDURE — 71045 X-RAY EXAM CHEST 1 VIEW: CPT | Mod: 26

## 2025-08-29 RX ORDER — CHLORHEXIDINE GLUCONATE 4 %
325 (65 FE) LIQUID (ML) TOPICAL DAILY
Qty: 90 | Refills: 1 | Status: ACTIVE | COMMUNITY
Start: 2025-08-29 | End: 1900-01-01

## 2025-08-29 RX ORDER — DEXTROSE 50 % IN WATER 50 %
15 SYRINGE (ML) INTRAVENOUS ONCE
Refills: 0 | Status: DISCONTINUED | OUTPATIENT
Start: 2025-08-29 | End: 2025-08-30

## 2025-08-29 RX ORDER — FERROUS SULFATE 137(45) MG
325 TABLET, EXTENDED RELEASE ORAL
Refills: 0 | Status: DISCONTINUED | OUTPATIENT
Start: 2025-08-29 | End: 2025-08-30

## 2025-08-29 RX ORDER — ROSUVASTATIN CALCIUM 20 MG/1
40 TABLET, FILM COATED ORAL AT BEDTIME
Refills: 0 | Status: DISCONTINUED | OUTPATIENT
Start: 2025-08-29 | End: 2025-08-30

## 2025-08-29 RX ORDER — ONDANSETRON HCL/PF 4 MG/2 ML
4 VIAL (ML) INJECTION EVERY 8 HOURS
Refills: 0 | Status: DISCONTINUED | OUTPATIENT
Start: 2025-08-29 | End: 2025-08-30

## 2025-08-29 RX ORDER — DEXTROSE 50 % IN WATER 50 %
25 SYRINGE (ML) INTRAVENOUS ONCE
Refills: 0 | Status: DISCONTINUED | OUTPATIENT
Start: 2025-08-29 | End: 2025-08-30

## 2025-08-29 RX ORDER — SODIUM CHLORIDE 9 G/1000ML
1000 INJECTION, SOLUTION INTRAVENOUS
Refills: 0 | Status: DISCONTINUED | OUTPATIENT
Start: 2025-08-29 | End: 2025-08-30

## 2025-08-29 RX ORDER — LISINOPRIL 5 MG/1
5 TABLET ORAL DAILY
Qty: 90 | Refills: 3 | Status: ACTIVE | COMMUNITY
Start: 2025-08-29 | End: 1900-01-01

## 2025-08-29 RX ORDER — LIDOCAINE HCL/EPINEPHRINE/PF 1 %-1:200K
20 AMPUL (ML) INJECTION ONCE
Refills: 0 | Status: DISCONTINUED | OUTPATIENT
Start: 2025-08-29 | End: 2025-08-30

## 2025-08-29 RX ORDER — ASPIRIN 325 MG
81 TABLET ORAL DAILY
Refills: 0 | Status: DISCONTINUED | OUTPATIENT
Start: 2025-08-29 | End: 2025-08-30

## 2025-08-29 RX ORDER — INSULIN LISPRO 100 U/ML
INJECTION, SOLUTION INTRAVENOUS; SUBCUTANEOUS
Refills: 0 | Status: DISCONTINUED | OUTPATIENT
Start: 2025-08-29 | End: 2025-08-30

## 2025-08-29 RX ORDER — FLUTICASONE PROPIONATE 50 UG/1
50 SPRAY NASAL DAILY
Qty: 1 | Refills: 3 | Status: ACTIVE | COMMUNITY
Start: 2025-08-29 | End: 1900-01-01

## 2025-08-29 RX ORDER — DAPAGLIFLOZIN 5 MG/1
10 TABLET, FILM COATED ORAL DAILY
Refills: 0 | Status: DISCONTINUED | OUTPATIENT
Start: 2025-08-29 | End: 2025-08-30

## 2025-08-29 RX ORDER — MELATONIN 5 MG
3 TABLET ORAL AT BEDTIME
Refills: 0 | Status: DISCONTINUED | OUTPATIENT
Start: 2025-08-29 | End: 2025-08-30

## 2025-08-29 RX ORDER — CARVEDILOL 3.12 MG/1
25 TABLET, FILM COATED ORAL EVERY 12 HOURS
Refills: 0 | Status: DISCONTINUED | OUTPATIENT
Start: 2025-08-29 | End: 2025-08-30

## 2025-08-29 RX ORDER — CEPHALEXIN 250 MG/1
500 CAPSULE ORAL ONCE
Refills: 0 | Status: COMPLETED | OUTPATIENT
Start: 2025-08-29 | End: 2025-08-29

## 2025-08-29 RX ORDER — GLUCAGON 3 MG/1
1 POWDER NASAL ONCE
Refills: 0 | Status: DISCONTINUED | OUTPATIENT
Start: 2025-08-29 | End: 2025-08-30

## 2025-08-29 RX ORDER — DEXTROSE 50 % IN WATER 50 %
12.5 SYRINGE (ML) INTRAVENOUS ONCE
Refills: 0 | Status: DISCONTINUED | OUTPATIENT
Start: 2025-08-29 | End: 2025-08-30

## 2025-08-29 RX ORDER — NIFEDIPINE 30 MG
60 TABLET, EXTENDED RELEASE 24 HR ORAL DAILY
Refills: 0 | Status: DISCONTINUED | OUTPATIENT
Start: 2025-08-29 | End: 2025-08-30

## 2025-08-29 RX ORDER — MAGNESIUM, ALUMINUM HYDROXIDE 200-200 MG
30 TABLET,CHEWABLE ORAL EVERY 4 HOURS
Refills: 0 | Status: DISCONTINUED | OUTPATIENT
Start: 2025-08-29 | End: 2025-08-30

## 2025-08-29 RX ORDER — RIVAROXABAN 10 MG/1
2.5 TABLET, FILM COATED ORAL
Refills: 0 | Status: DISCONTINUED | OUTPATIENT
Start: 2025-08-29 | End: 2025-08-30

## 2025-08-29 RX ORDER — ALBUTEROL SULFATE 2.5 MG/3ML
2 VIAL, NEBULIZER (ML) INHALATION EVERY 6 HOURS
Refills: 0 | Status: DISCONTINUED | OUTPATIENT
Start: 2025-08-29 | End: 2025-08-30

## 2025-08-29 RX ORDER — ACETAMINOPHEN 500 MG/5ML
650 LIQUID (ML) ORAL EVERY 6 HOURS
Refills: 0 | Status: DISCONTINUED | OUTPATIENT
Start: 2025-08-29 | End: 2025-08-30

## 2025-08-29 RX ADMIN — INSULIN LISPRO 1: 100 INJECTION, SOLUTION INTRAVENOUS; SUBCUTANEOUS at 21:38

## 2025-08-29 RX ADMIN — Medication 325 MILLIGRAM(S): at 17:11

## 2025-08-29 RX ADMIN — RIVAROXABAN 2.5 MILLIGRAM(S): 10 TABLET, FILM COATED ORAL at 17:12

## 2025-08-29 RX ADMIN — Medication 3 MILLIGRAM(S): at 21:38

## 2025-08-29 RX ADMIN — Medication 60 MILLIGRAM(S): at 21:38

## 2025-08-29 RX ADMIN — ROSUVASTATIN CALCIUM 40 MILLIGRAM(S): 20 TABLET, FILM COATED ORAL at 21:38

## 2025-08-29 RX ADMIN — CARVEDILOL 25 MILLIGRAM(S): 3.12 TABLET, FILM COATED ORAL at 17:12

## 2025-08-29 RX ADMIN — CEPHALEXIN 500 MILLIGRAM(S): 250 CAPSULE ORAL at 18:49

## 2025-08-30 ENCOUNTER — TRANSCRIPTION ENCOUNTER (OUTPATIENT)
Age: 85
End: 2025-08-30

## 2025-08-30 VITALS
RESPIRATION RATE: 18 BRPM | TEMPERATURE: 98 F | HEART RATE: 73 BPM | DIASTOLIC BLOOD PRESSURE: 71 MMHG | OXYGEN SATURATION: 97 % | SYSTOLIC BLOOD PRESSURE: 155 MMHG

## 2025-08-30 LAB
A1C WITH ESTIMATED AVERAGE GLUCOSE RESULT: 6.1 % — HIGH (ref 4–5.6)
ALBUMIN SERPL ELPH-MCNC: 3.8 G/DL — SIGNIFICANT CHANGE UP (ref 3.3–5.2)
ALP SERPL-CCNC: 98 U/L — SIGNIFICANT CHANGE UP (ref 40–120)
ALT FLD-CCNC: 17 U/L — SIGNIFICANT CHANGE UP
ANION GAP SERPL CALC-SCNC: 14 MMOL/L — SIGNIFICANT CHANGE UP (ref 5–17)
AST SERPL-CCNC: 20 U/L — SIGNIFICANT CHANGE UP
BILIRUB SERPL-MCNC: 0.5 MG/DL — SIGNIFICANT CHANGE UP (ref 0.4–2)
BUN SERPL-MCNC: 24.6 MG/DL — HIGH (ref 8–20)
CALCIUM SERPL-MCNC: 9.6 MG/DL — SIGNIFICANT CHANGE UP (ref 8.4–10.5)
CHLORIDE SERPL-SCNC: 105 MMOL/L — SIGNIFICANT CHANGE UP (ref 96–108)
CHOLEST SERPL-MCNC: 138 MG/DL — SIGNIFICANT CHANGE UP
CO2 SERPL-SCNC: 21 MMOL/L — LOW (ref 22–29)
CREAT SERPL-MCNC: 1.83 MG/DL — HIGH (ref 0.5–1.3)
CULTURE RESULTS: SIGNIFICANT CHANGE UP
EGFR: 35 ML/MIN/1.73M2 — LOW
EGFR: 35 ML/MIN/1.73M2 — LOW
ESTIMATED AVERAGE GLUCOSE: 128 MG/DL — HIGH (ref 68–114)
GLUCOSE BLDC GLUCOMTR-MCNC: 171 MG/DL — HIGH (ref 70–99)
GLUCOSE SERPL-MCNC: 109 MG/DL — HIGH (ref 70–99)
HCT VFR BLD CALC: 41.9 % — SIGNIFICANT CHANGE UP (ref 39–50)
HDLC SERPL-MCNC: 56 MG/DL — SIGNIFICANT CHANGE UP
HGB BLD-MCNC: 14.2 G/DL — SIGNIFICANT CHANGE UP (ref 13–17)
LDLC SERPL-MCNC: 65 MG/DL — SIGNIFICANT CHANGE UP
LIPID PNL WITH DIRECT LDL SERPL: 65 MG/DL — SIGNIFICANT CHANGE UP
MCHC RBC-ENTMCNC: 28.6 PG — SIGNIFICANT CHANGE UP (ref 27–34)
MCHC RBC-ENTMCNC: 33.9 G/DL — SIGNIFICANT CHANGE UP (ref 32–36)
MCV RBC AUTO: 84.5 FL — SIGNIFICANT CHANGE UP (ref 80–100)
NONHDLC SERPL-MCNC: 82 MG/DL — SIGNIFICANT CHANGE UP
NRBC # BLD AUTO: 0 K/UL — SIGNIFICANT CHANGE UP (ref 0–0)
NRBC # FLD: 0 K/UL — SIGNIFICANT CHANGE UP (ref 0–0)
NRBC BLD AUTO-RTO: 0 /100 WBCS — SIGNIFICANT CHANGE UP (ref 0–0)
PLATELET # BLD AUTO: 217 K/UL — SIGNIFICANT CHANGE UP (ref 150–400)
PMV BLD: 10.5 FL — SIGNIFICANT CHANGE UP (ref 7–13)
POTASSIUM SERPL-MCNC: 4.6 MMOL/L — SIGNIFICANT CHANGE UP (ref 3.5–5.3)
POTASSIUM SERPL-SCNC: 4.6 MMOL/L — SIGNIFICANT CHANGE UP (ref 3.5–5.3)
PROT SERPL-MCNC: 7.1 G/DL — SIGNIFICANT CHANGE UP (ref 6.6–8.7)
RBC # BLD: 4.96 M/UL — SIGNIFICANT CHANGE UP (ref 4.2–5.8)
RBC # FLD: 13.5 % — SIGNIFICANT CHANGE UP (ref 10.3–14.5)
SODIUM SERPL-SCNC: 140 MMOL/L — SIGNIFICANT CHANGE UP (ref 135–145)
SPECIMEN SOURCE: SIGNIFICANT CHANGE UP
TRIGL SERPL-MCNC: 88 MG/DL — SIGNIFICANT CHANGE UP
WBC # BLD: 8.36 K/UL — SIGNIFICANT CHANGE UP (ref 3.8–10.5)
WBC # FLD AUTO: 8.36 K/UL — SIGNIFICANT CHANGE UP (ref 3.8–10.5)

## 2025-08-30 PROCEDURE — 85730 THROMBOPLASTIN TIME PARTIAL: CPT

## 2025-08-30 PROCEDURE — 80061 LIPID PANEL: CPT

## 2025-08-30 PROCEDURE — 33285 INSJ SUBQ CAR RHYTHM MNTR: CPT

## 2025-08-30 PROCEDURE — 85025 COMPLETE CBC W/AUTO DIFF WBC: CPT

## 2025-08-30 PROCEDURE — 83690 ASSAY OF LIPASE: CPT

## 2025-08-30 PROCEDURE — 87086 URINE CULTURE/COLONY COUNT: CPT

## 2025-08-30 PROCEDURE — 97163 PT EVAL HIGH COMPLEX 45 MIN: CPT

## 2025-08-30 PROCEDURE — 85610 PROTHROMBIN TIME: CPT

## 2025-08-30 PROCEDURE — 83880 ASSAY OF NATRIURETIC PEPTIDE: CPT

## 2025-08-30 PROCEDURE — 84484 ASSAY OF TROPONIN QUANT: CPT

## 2025-08-30 PROCEDURE — 36415 COLL VENOUS BLD VENIPUNCTURE: CPT

## 2025-08-30 PROCEDURE — 70450 CT HEAD/BRAIN W/O DYE: CPT

## 2025-08-30 PROCEDURE — 71045 X-RAY EXAM CHEST 1 VIEW: CPT

## 2025-08-30 PROCEDURE — 99239 HOSP IP/OBS DSCHRG MGMT >30: CPT

## 2025-08-30 PROCEDURE — 83036 HEMOGLOBIN GLYCOSYLATED A1C: CPT

## 2025-08-30 PROCEDURE — 80053 COMPREHEN METABOLIC PANEL: CPT

## 2025-08-30 PROCEDURE — 93005 ELECTROCARDIOGRAM TRACING: CPT

## 2025-08-30 PROCEDURE — C1764: CPT

## 2025-08-30 PROCEDURE — 83735 ASSAY OF MAGNESIUM: CPT

## 2025-08-30 PROCEDURE — 82962 GLUCOSE BLOOD TEST: CPT

## 2025-08-30 PROCEDURE — 85027 COMPLETE CBC AUTOMATED: CPT

## 2025-08-30 PROCEDURE — 81001 URINALYSIS AUTO W/SCOPE: CPT

## 2025-08-30 PROCEDURE — 99285 EMERGENCY DEPT VISIT HI MDM: CPT | Mod: 25

## 2025-08-30 RX ADMIN — INSULIN LISPRO 1: 100 INJECTION, SOLUTION INTRAVENOUS; SUBCUTANEOUS at 08:24

## 2025-08-30 RX ADMIN — Medication 20 MILLIGRAM(S): at 10:18

## 2025-08-30 RX ADMIN — Medication 60 MILLIGRAM(S): at 06:05

## 2025-08-30 RX ADMIN — Medication 325 MILLIGRAM(S): at 06:05

## 2025-08-30 RX ADMIN — Medication 81 MILLIGRAM(S): at 10:18

## 2025-08-30 RX ADMIN — RIVAROXABAN 2.5 MILLIGRAM(S): 10 TABLET, FILM COATED ORAL at 06:05

## 2025-08-30 RX ADMIN — DAPAGLIFLOZIN 10 MILLIGRAM(S): 5 TABLET, FILM COATED ORAL at 10:18

## 2025-08-30 RX ADMIN — CARVEDILOL 25 MILLIGRAM(S): 3.12 TABLET, FILM COATED ORAL at 06:05

## 2025-09-03 ENCOUNTER — EMERGENCY (EMERGENCY)
Facility: HOSPITAL | Age: 85
LOS: 1 days | End: 2025-09-03
Attending: STUDENT IN AN ORGANIZED HEALTH CARE EDUCATION/TRAINING PROGRAM | Admitting: STUDENT IN AN ORGANIZED HEALTH CARE EDUCATION/TRAINING PROGRAM
Payer: MEDICARE

## 2025-09-03 VITALS
RESPIRATION RATE: 18 BRPM | SYSTOLIC BLOOD PRESSURE: 133 MMHG | DIASTOLIC BLOOD PRESSURE: 83 MMHG | TEMPERATURE: 98 F | HEART RATE: 78 BPM | HEIGHT: 63 IN | WEIGHT: 150.58 LBS | OXYGEN SATURATION: 97 %

## 2025-09-03 DIAGNOSIS — K40.90 UNILATERAL INGUINAL HERNIA, WITHOUT OBSTRUCTION OR GANGRENE, NOT SPECIFIED AS RECURRENT: Chronic | ICD-10-CM

## 2025-09-03 DIAGNOSIS — Z95.5 PRESENCE OF CORONARY ANGIOPLASTY IMPLANT AND GRAFT: Chronic | ICD-10-CM

## 2025-09-03 DIAGNOSIS — Z98.890 OTHER SPECIFIED POSTPROCEDURAL STATES: Chronic | ICD-10-CM

## 2025-09-03 LAB
APTT BLD: 33.2 SEC — SIGNIFICANT CHANGE UP (ref 26.1–36.8)
BASOPHILS # BLD AUTO: 0.05 K/UL — SIGNIFICANT CHANGE UP (ref 0–0.2)
BASOPHILS NFR BLD AUTO: 0.9 % — SIGNIFICANT CHANGE UP (ref 0–2)
D DIMER BLD IA.RAPID-MCNC: <150 NG/ML DDU — SIGNIFICANT CHANGE UP
EOSINOPHIL # BLD AUTO: 0.22 K/UL — SIGNIFICANT CHANGE UP (ref 0–0.5)
EOSINOPHIL NFR BLD AUTO: 3.8 % — SIGNIFICANT CHANGE UP (ref 0–6)
HCT VFR BLD CALC: 37.4 % — LOW (ref 39–50)
HGB BLD-MCNC: 12.6 G/DL — LOW (ref 13–17)
IMM GRANULOCYTES # BLD AUTO: 0.02 K/UL — SIGNIFICANT CHANGE UP (ref 0–0.07)
IMM GRANULOCYTES NFR BLD AUTO: 0.3 % — SIGNIFICANT CHANGE UP (ref 0–0.9)
INR BLD: 0.9 RATIO — SIGNIFICANT CHANGE UP (ref 0.85–1.16)
LYMPHOCYTES # BLD AUTO: 1.94 K/UL — SIGNIFICANT CHANGE UP (ref 1–3.3)
LYMPHOCYTES NFR BLD AUTO: 33.7 % — SIGNIFICANT CHANGE UP (ref 13–44)
MCHC RBC-ENTMCNC: 28.6 PG — SIGNIFICANT CHANGE UP (ref 27–34)
MCHC RBC-ENTMCNC: 33.7 G/DL — SIGNIFICANT CHANGE UP (ref 32–36)
MCV RBC AUTO: 84.8 FL — SIGNIFICANT CHANGE UP (ref 80–100)
MONOCYTES # BLD AUTO: 0.58 K/UL — SIGNIFICANT CHANGE UP (ref 0–0.9)
MONOCYTES NFR BLD AUTO: 10.1 % — SIGNIFICANT CHANGE UP (ref 2–14)
NEUTROPHILS # BLD AUTO: 2.94 K/UL — SIGNIFICANT CHANGE UP (ref 1.8–7.4)
NEUTROPHILS NFR BLD AUTO: 51.2 % — SIGNIFICANT CHANGE UP (ref 43–77)
NRBC # BLD AUTO: 0 K/UL — SIGNIFICANT CHANGE UP (ref 0–0)
NRBC # FLD: 0 K/UL — SIGNIFICANT CHANGE UP (ref 0–0)
NRBC BLD AUTO-RTO: 0 /100 WBCS — SIGNIFICANT CHANGE UP (ref 0–0)
PLATELET # BLD AUTO: 207 K/UL — SIGNIFICANT CHANGE UP (ref 150–400)
PMV BLD: 11.2 FL — SIGNIFICANT CHANGE UP (ref 7–13)
PROTHROM AB SERPL-ACNC: 10.4 SEC — SIGNIFICANT CHANGE UP (ref 9.9–13.4)
RBC # BLD: 4.41 M/UL — SIGNIFICANT CHANGE UP (ref 4.2–5.8)
RBC # FLD: 13.6 % — SIGNIFICANT CHANGE UP (ref 10.3–14.5)
WBC # BLD: 5.75 K/UL — SIGNIFICANT CHANGE UP (ref 3.8–10.5)
WBC # FLD AUTO: 5.75 K/UL — SIGNIFICANT CHANGE UP (ref 3.8–10.5)

## 2025-09-03 PROCEDURE — 82962 GLUCOSE BLOOD TEST: CPT

## 2025-09-03 PROCEDURE — 99285 EMERGENCY DEPT VISIT HI MDM: CPT

## 2025-09-03 PROCEDURE — 71045 X-RAY EXAM CHEST 1 VIEW: CPT | Mod: 26

## 2025-09-03 PROCEDURE — 71045 X-RAY EXAM CHEST 1 VIEW: CPT

## 2025-09-03 PROCEDURE — 93010 ELECTROCARDIOGRAM REPORT: CPT

## 2025-09-04 ENCOUNTER — RESULT REVIEW (OUTPATIENT)
Age: 85
End: 2025-09-04

## 2025-09-04 VITALS
DIASTOLIC BLOOD PRESSURE: 71 MMHG | RESPIRATION RATE: 16 BRPM | SYSTOLIC BLOOD PRESSURE: 154 MMHG | TEMPERATURE: 98 F | HEART RATE: 65 BPM | OXYGEN SATURATION: 100 %

## 2025-09-04 DIAGNOSIS — R00.2 PALPITATIONS: ICD-10-CM

## 2025-09-04 DIAGNOSIS — I25.10 ATHEROSCLEROTIC HEART DISEASE OF NATIVE CORONARY ARTERY WITHOUT ANGINA PECTORIS: ICD-10-CM

## 2025-09-04 DIAGNOSIS — I10 ESSENTIAL (PRIMARY) HYPERTENSION: ICD-10-CM

## 2025-09-04 LAB
ALBUMIN SERPL ELPH-MCNC: 3.7 G/DL — SIGNIFICANT CHANGE UP (ref 3.3–5.2)
ALP SERPL-CCNC: 86 U/L — SIGNIFICANT CHANGE UP (ref 40–120)
ALT FLD-CCNC: 15 U/L — SIGNIFICANT CHANGE UP
ANION GAP SERPL CALC-SCNC: 12 MMOL/L — SIGNIFICANT CHANGE UP (ref 5–17)
AST SERPL-CCNC: 20 U/L — SIGNIFICANT CHANGE UP
BILIRUB SERPL-MCNC: 0.3 MG/DL — LOW (ref 0.4–2)
BUN SERPL-MCNC: 26.4 MG/DL — HIGH (ref 8–20)
CALCIUM SERPL-MCNC: 9.1 MG/DL — SIGNIFICANT CHANGE UP (ref 8.4–10.5)
CHLORIDE SERPL-SCNC: 103 MMOL/L — SIGNIFICANT CHANGE UP (ref 96–108)
CO2 SERPL-SCNC: 22 MMOL/L — SIGNIFICANT CHANGE UP (ref 22–29)
CREAT SERPL-MCNC: 2.35 MG/DL — HIGH (ref 0.5–1.3)
EGFR: 26 ML/MIN/1.73M2 — LOW
EGFR: 26 ML/MIN/1.73M2 — LOW
GLUCOSE SERPL-MCNC: 122 MG/DL — HIGH (ref 70–99)
POTASSIUM SERPL-MCNC: 5.2 MMOL/L — SIGNIFICANT CHANGE UP (ref 3.5–5.3)
POTASSIUM SERPL-SCNC: 5.2 MMOL/L — SIGNIFICANT CHANGE UP (ref 3.5–5.3)
PROT SERPL-MCNC: 6.4 G/DL — LOW (ref 6.6–8.7)
SODIUM SERPL-SCNC: 137 MMOL/L — SIGNIFICANT CHANGE UP (ref 135–145)
TROPONIN T, HIGH SENSITIVITY RESULT: 22 NG/L — HIGH
TROPONIN T, HIGH SENSITIVITY RESULT: 23 NG/L — HIGH

## 2025-09-04 PROCEDURE — 93291 INTERROG DEV EVAL SCRMS IP: CPT

## 2025-09-04 PROCEDURE — 93010 ELECTROCARDIOGRAM REPORT: CPT

## 2025-09-04 PROCEDURE — 93306 TTE W/DOPPLER COMPLETE: CPT | Mod: 26

## 2025-09-04 PROCEDURE — 99223 1ST HOSP IP/OBS HIGH 75: CPT

## 2025-09-04 PROCEDURE — 85025 COMPLETE CBC W/AUTO DIFF WBC: CPT

## 2025-09-04 PROCEDURE — G0378: CPT

## 2025-09-04 PROCEDURE — 85379 FIBRIN DEGRADATION QUANT: CPT

## 2025-09-04 PROCEDURE — 93306 TTE W/DOPPLER COMPLETE: CPT

## 2025-09-04 PROCEDURE — 84484 ASSAY OF TROPONIN QUANT: CPT

## 2025-09-04 PROCEDURE — 85610 PROTHROMBIN TIME: CPT

## 2025-09-04 PROCEDURE — 36415 COLL VENOUS BLD VENIPUNCTURE: CPT

## 2025-09-04 PROCEDURE — 93005 ELECTROCARDIOGRAM TRACING: CPT

## 2025-09-04 PROCEDURE — 85730 THROMBOPLASTIN TIME PARTIAL: CPT

## 2025-09-04 PROCEDURE — 99285 EMERGENCY DEPT VISIT HI MDM: CPT | Mod: 25

## 2025-09-04 PROCEDURE — 84443 ASSAY THYROID STIM HORMONE: CPT

## 2025-09-04 PROCEDURE — 82962 GLUCOSE BLOOD TEST: CPT

## 2025-09-04 PROCEDURE — 71045 X-RAY EXAM CHEST 1 VIEW: CPT

## 2025-09-04 PROCEDURE — 80053 COMPREHEN METABOLIC PANEL: CPT

## 2025-09-04 RX ORDER — DAPAGLIFLOZIN 5 MG/1
1 TABLET, FILM COATED ORAL
Refills: 0 | DISCHARGE

## 2025-09-04 RX ORDER — FERROUS SULFATE 137(45) MG
1 TABLET, EXTENDED RELEASE ORAL
Refills: 0 | DISCHARGE

## 2025-09-04 RX ORDER — ROSUVASTATIN CALCIUM 20 MG/1
40 TABLET, FILM COATED ORAL AT BEDTIME
Refills: 0 | Status: ACTIVE | OUTPATIENT
Start: 2025-09-04 | End: 2026-08-03

## 2025-09-04 RX ORDER — RIVAROXABAN 10 MG/1
1 TABLET, FILM COATED ORAL
Refills: 0 | DISCHARGE

## 2025-09-04 RX ORDER — ROSUVASTATIN CALCIUM 20 MG/1
1 TABLET, FILM COATED ORAL
Refills: 0 | DISCHARGE

## 2025-09-04 RX ORDER — FLUTICASONE PROPIONATE 50 UG/1
1 SPRAY, METERED NASAL
Refills: 0 | Status: ACTIVE | OUTPATIENT
Start: 2025-09-04 | End: 2026-08-03

## 2025-09-04 RX ORDER — NIFEDIPINE 30 MG
1 TABLET, EXTENDED RELEASE 24 HR ORAL
Refills: 0 | DISCHARGE

## 2025-09-04 RX ORDER — RIVAROXABAN 10 MG/1
2.5 TABLET, FILM COATED ORAL ONCE
Refills: 0 | Status: COMPLETED | OUTPATIENT
Start: 2025-09-04 | End: 2025-09-04

## 2025-09-04 RX ORDER — LINAGLIPTIN 5 MG/1
1 TABLET, FILM COATED ORAL
Refills: 0 | DISCHARGE

## 2025-09-04 RX ORDER — DAPAGLIFLOZIN 5 MG/1
10 TABLET, FILM COATED ORAL DAILY
Refills: 0 | Status: ACTIVE | OUTPATIENT
Start: 2025-09-04 | End: 2026-08-03

## 2025-09-04 RX ORDER — FLUTICASONE FUROATE AND VILANTEROL TRIFENATATE 100; 25 UG/1; UG/1
1 POWDER RESPIRATORY (INHALATION)
Refills: 0 | DISCHARGE

## 2025-09-04 RX ORDER — ORAL SEMAGLUTIDE 14 MG/1
1 TABLET ORAL
Refills: 0 | DISCHARGE

## 2025-09-04 RX ORDER — FERROUS SULFATE 137(45) MG
325 TABLET, EXTENDED RELEASE ORAL
Refills: 0 | DISCHARGE

## 2025-09-04 RX ORDER — FLUTICASONE PROPIONATE 50 UG/1
0 SPRAY, METERED NASAL
Refills: 0 | DISCHARGE

## 2025-09-04 RX ORDER — LISINOPRIL 5 MG/1
5 TABLET ORAL DAILY
Refills: 0 | Status: ACTIVE | OUTPATIENT
Start: 2025-09-04 | End: 2026-08-03

## 2025-09-04 RX ORDER — LOSARTAN POTASSIUM 100 MG/1
50 TABLET, FILM COATED ORAL DAILY
Refills: 0 | Status: ACTIVE | OUTPATIENT
Start: 2025-09-04 | End: 2026-08-03

## 2025-09-04 RX ORDER — FLUTICASONE PROPIONATE 220 UG/1
1 AEROSOL, METERED RESPIRATORY (INHALATION)
Refills: 0 | DISCHARGE

## 2025-09-04 RX ORDER — LINAGLIPTIN 5 MG/1
5 TABLET, FILM COATED ORAL DAILY
Refills: 0 | Status: ACTIVE | OUTPATIENT
Start: 2025-09-04 | End: 2026-08-03

## 2025-09-04 RX ORDER — FERROUS SULFATE 137(45) MG
325 TABLET, EXTENDED RELEASE ORAL
Refills: 0 | Status: ACTIVE | OUTPATIENT
Start: 2025-09-04 | End: 2026-08-03

## 2025-09-04 RX ORDER — ORAL SEMAGLUTIDE 14 MG/1
0 TABLET ORAL
Refills: 0 | DISCHARGE

## 2025-09-04 RX ORDER — CARVEDILOL 3.12 MG/1
25 TABLET, FILM COATED ORAL EVERY 12 HOURS
Refills: 0 | Status: ACTIVE | OUTPATIENT
Start: 2025-09-04 | End: 2026-08-03

## 2025-09-04 RX ORDER — OLMESARTAN MEDOXOMIL 5 MG/1
1 TABLET, FILM COATED ORAL
Refills: 0 | DISCHARGE

## 2025-09-04 RX ORDER — CARVEDILOL 3.12 MG/1
1 TABLET, FILM COATED ORAL
Refills: 0 | DISCHARGE

## 2025-09-04 RX ORDER — LISINOPRIL 5 MG/1
1 TABLET ORAL
Refills: 0 | DISCHARGE

## 2025-09-04 RX ORDER — NIFEDIPINE 30 MG
60 TABLET, EXTENDED RELEASE 24 HR ORAL
Refills: 0 | Status: ACTIVE | OUTPATIENT
Start: 2025-09-04 | End: 2026-08-03

## 2025-09-04 RX ADMIN — Medication 40 MILLIGRAM(S): at 06:37

## 2025-09-04 RX ADMIN — RIVAROXABAN 2.5 MILLIGRAM(S): 10 TABLET, FILM COATED ORAL at 10:15

## 2025-09-04 RX ADMIN — LISINOPRIL 5 MILLIGRAM(S): 5 TABLET ORAL at 06:37

## 2025-09-04 RX ADMIN — LOSARTAN POTASSIUM 50 MILLIGRAM(S): 100 TABLET, FILM COATED ORAL at 06:37

## 2025-09-04 RX ADMIN — LINAGLIPTIN 5 MILLIGRAM(S): 5 TABLET, FILM COATED ORAL at 11:22

## 2025-09-04 RX ADMIN — DAPAGLIFLOZIN 10 MILLIGRAM(S): 5 TABLET, FILM COATED ORAL at 11:22

## 2025-09-04 RX ADMIN — Medication 500 MILLILITER(S): at 11:12

## 2025-09-05 PROCEDURE — 93244 EXT ECG>48HR<7D REV&INTERPJ: CPT

## 2025-09-15 ENCOUNTER — APPOINTMENT (OUTPATIENT)
Dept: INTERNAL MEDICINE | Facility: CLINIC | Age: 85
End: 2025-09-15
Payer: MEDICARE

## 2025-09-15 VITALS
DIASTOLIC BLOOD PRESSURE: 80 MMHG | SYSTOLIC BLOOD PRESSURE: 138 MMHG | OXYGEN SATURATION: 97 % | HEART RATE: 69 BPM | TEMPERATURE: 97.3 F | WEIGHT: 147 LBS | HEIGHT: 63 IN | BODY MASS INDEX: 26.05 KG/M2

## 2025-09-15 DIAGNOSIS — E78.5 HYPERLIPIDEMIA, UNSPECIFIED: ICD-10-CM

## 2025-09-15 DIAGNOSIS — E11.21 TYPE 2 DIABETES MELLITUS WITH DIABETIC NEPHROPATHY: ICD-10-CM

## 2025-09-15 DIAGNOSIS — N18.30 CHRONIC KIDNEY DISEASE, STAGE 3 UNSPECIFIED: ICD-10-CM

## 2025-09-15 PROCEDURE — 99214 OFFICE O/P EST MOD 30 MIN: CPT

## 2025-09-15 PROCEDURE — G2211 COMPLEX E/M VISIT ADD ON: CPT

## 2025-09-18 ENCOUNTER — APPOINTMENT (OUTPATIENT)
Dept: CARDIOLOGY | Facility: CLINIC | Age: 85
End: 2025-09-18
Payer: MEDICARE

## 2025-09-18 VITALS
OXYGEN SATURATION: 98 % | DIASTOLIC BLOOD PRESSURE: 62 MMHG | HEIGHT: 63 IN | BODY MASS INDEX: 26.05 KG/M2 | SYSTOLIC BLOOD PRESSURE: 102 MMHG | HEART RATE: 68 BPM | WEIGHT: 147 LBS

## 2025-09-18 DIAGNOSIS — Z09 ENCOUNTER FOR FOLLOW-UP EXAMINATION AFTER COMPLETED TREATMENT FOR CONDITIONS OTHER THAN MALIGNANT NEOPLASM: ICD-10-CM

## 2025-09-18 DIAGNOSIS — R55 SYNCOPE AND COLLAPSE: ICD-10-CM

## 2025-09-18 DIAGNOSIS — Z95.5 PRESENCE OF CORONARY ANGIOPLASTY IMPLANT AND GRAFT: ICD-10-CM

## 2025-09-18 DIAGNOSIS — R00.2 PALPITATIONS: ICD-10-CM

## 2025-09-18 DIAGNOSIS — I10 ESSENTIAL (PRIMARY) HYPERTENSION: ICD-10-CM

## 2025-09-18 PROCEDURE — 99215 OFFICE O/P EST HI 40 MIN: CPT

## 2025-09-18 PROCEDURE — 93000 ELECTROCARDIOGRAM COMPLETE: CPT

## 2025-09-18 RX ORDER — LISINOPRIL 5 MG/1
5 TABLET ORAL DAILY
Refills: 0 | Status: DISCONTINUED | COMMUNITY
End: 2025-09-18